# Patient Record
Sex: MALE | Race: BLACK OR AFRICAN AMERICAN | Employment: UNEMPLOYED | URBAN - METROPOLITAN AREA
[De-identification: names, ages, dates, MRNs, and addresses within clinical notes are randomized per-mention and may not be internally consistent; named-entity substitution may affect disease eponyms.]

---

## 2017-03-31 ENCOUNTER — OFFICE VISIT (OUTPATIENT)
Dept: ORTHOPEDIC SURGERY | Facility: CLINIC | Age: 47
End: 2017-03-31

## 2017-03-31 VITALS
DIASTOLIC BLOOD PRESSURE: 71 MMHG | BODY MASS INDEX: 21.19 KG/M2 | TEMPERATURE: 98.7 F | HEART RATE: 82 BPM | WEIGHT: 135 LBS | SYSTOLIC BLOOD PRESSURE: 117 MMHG | HEIGHT: 67 IN

## 2017-03-31 DIAGNOSIS — M75.81 ROTATOR CUFF TENDINITIS, RIGHT: ICD-10-CM

## 2017-03-31 DIAGNOSIS — Z98.890 S/P ARTHROSCOPY OF SHOULDER: ICD-10-CM

## 2017-03-31 DIAGNOSIS — M75.52 BURSITIS OF SHOULDER, LEFT: ICD-10-CM

## 2017-03-31 DIAGNOSIS — M87.051 AVASCULAR NECROSIS OF BONES OF BOTH HIPS (HCC): ICD-10-CM

## 2017-03-31 DIAGNOSIS — M87.052 AVASCULAR NECROSIS OF BONES OF BOTH HIPS (HCC): ICD-10-CM

## 2017-03-31 DIAGNOSIS — M17.12 PRIMARY OSTEOARTHRITIS OF LEFT KNEE: Primary | ICD-10-CM

## 2017-03-31 DIAGNOSIS — M87.9 BONE INFARCT (HCC): ICD-10-CM

## 2017-03-31 DIAGNOSIS — Z87.39 HX OF GOUT: ICD-10-CM

## 2017-03-31 DIAGNOSIS — M25.562 LEFT KNEE PAIN, UNSPECIFIED CHRONICITY: ICD-10-CM

## 2017-03-31 DIAGNOSIS — M22.42 CHONDROMALACIA, PATELLA, LEFT: ICD-10-CM

## 2017-03-31 RX ORDER — FOLIC ACID 1 MG/1
TABLET ORAL
COMMUNITY
Start: 2014-10-17 | End: 2018-01-16

## 2017-03-31 RX ORDER — LORATADINE 10 MG/1
TABLET ORAL
COMMUNITY
End: 2018-01-16

## 2017-03-31 NOTE — MR AVS SNAPSHOT
Visit Information Date & Time Provider Department Dept. Phone Encounter #  
 3/31/2017  2:00 PM Norman Wiseman MD [facility] [id number] Follow-up Instructions Return if symptoms worsen or fail to improve. Upcoming Health Maintenance Date Due DTaP/Tdap/Td series (1 - Tdap) 5/23/1991 INFLUENZA AGE 9 TO ADULT 8/1/2016 Allergies as of 3/31/2017  Review Complete On: 3/31/2017 By: Wash Rolls No Known Allergies Current Immunizations  Never Reviewed No immunizations on file. Not reviewed this visit You Were Diagnosed With   
  
 Codes Comments Primary osteoarthritis of left knee    -  Primary ICD-10-CM: M17.12 
ICD-9-CM: 715.16 Mild, medial compartment Left knee pain, unspecified chronicity     ICD-10-CM: J69.457 ICD-9-CM: 719.46 Bursitis of shoulder, left     ICD-10-CM: M75.52 
ICD-9-CM: 726.10 Chondromalacia, patella, left     ICD-10-CM: M22.42 
ICD-9-CM: 717.7 S/P arthroscopy of shoulder     ICD-10-CM: P08.473 ICD-9-CM: V45.89 Right Avascular necrosis of bones of both hips (HCC)     ICD-10-CM: M87.051, M87.052 ICD-9-CM: 733.42 Hx of gout     ICD-10-CM: Z87.39 
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)     ICD-10-CM: M87.9 ICD-9-CM: 733.40 Right humerus, proximal  
  
Vitals BP Pulse Temp Height(growth percentile) Weight(growth percentile) BMI  
 117/71 82 98.7 °F (37.1 °C) 5' 7\" (1.702 m) 135 lb (61.2 kg) 21.14 kg/m2 Smoking Status Never Smoker Vitals History BMI and BSA Data Body Mass Index Body Surface Area  
 21.14 kg/m 2 1.7 m 2 Your Updated Medication List  
  
   
This list is accurate as of: 3/31/17  2:26 PM.  Always use your most recent med list.  
  
  
  
  
 ALAVERT 10 mg tablet Generic drug:  loratadine Alavert TABS  Refills: 0  Active FLECTOR 1.3 % Pt12 Generic drug:  diclofenac  
by Apply Externally route. fluticasone 50 mcg/actuation nasal spray Commonly known as:  FLONASE  
by Nasal route nightly. Administer to {[facility] RX NASAL each/right/left nostril:12865} nostril. folic acid 1 mg tablet Commonly known as:  Waldo Folic Acid 1 MG Oral Tablet TAKE 1 TABLET DAILY. Quantity: 30;  Refills: 5    ANAHI STORM D.O.;  Started 17-Oct-2014 Active FOLIC ACID-VIT [id number] PO Take  by mouth. HYDROcodone-acetaminophen 5-500 mg Cap Take  by mouth. hydroxyurea 500 mg capsule Commonly known as:  HYDREA Take 500 mg by mouth daily. ibuprofen 800 mg tablet Commonly known as:  MOTRIN Take  by mouth every six (6) hours as needed. ULTRAM 50 mg tablet Generic drug:  traMADol Take 50 mg by mouth every six (6) hours as needed. We Performed the Following AMB POC X-RAY KNEE 3 VIEW [57939 CPT(R)] Follow-up Instructions Return if symptoms worsen or fail to improve. Patient Instructions Knee Arthritis: Exercises Your Care Instructions Here are some examples of exercises for knee arthritis. Start each exercise slowly. Ease off the exercise if you start to have pain. Your doctor or physical therapist will tell you when you can start these exercises and which ones will work best for you. How to do the exercises Knee flexion with heel slide 1. Lie on your back with your knees bent. 2. Slide your heel back by bending your affected knee as far as you can. Then hook your other foot around your ankle to help pull your heel even farther back. 3. Hold for about 6 seconds, then rest for up to 10 seconds. 4. Repeat 8 to 12 times. 5. Switch legs and repeat steps 1 through 4, even if only one knee is sore. [employer] 1.  Sit with your affected leg straight and supported on the floor or a firm bed. Place a small, rolled-up towel under your knee. Your other leg should be bent, with that foot flat on the floor. 2. Tighten the thigh muscles of your affected leg by pressing the back of your knee down into the towel. 3. Hold for about 6 seconds, then rest for up to 10 seconds. 4. Repeat 8 to 12 times. 5. Switch legs and repeat steps 1 through 4, even if only one knee is sore. Straight-leg raises to the front 1. Lie on your back with your good knee bent so that your foot rests flat on the floor. Your affected leg should be straight. Make sure that your low back has a normal curve. You should be able to slip your hand in between the floor and the small of your back, with your palm touching the floor and your back touching the back of your hand. 2. Tighten the thigh muscles in your affected leg by pressing the back of your knee flat down to the floor. Hold your knee straight. 3. Keeping the thigh muscles tight and your leg straight, lift your affected leg up so that your heel is about 12 inches off the floor. Hold for about 6 seconds, then lower slowly. 4. Relax for up to 10 seconds between repetitions. 5. Repeat 8 to 12 times. 6. Switch legs and repeat steps 1 through 5, even if only one knee is sore. Active knee flexion 1. Lie on your stomach with your knees straight. If your kneecap is uncomfortable, roll up a washcloth and put it under your leg just above your kneecap. 2. Lift the foot of your affected leg by bending the knee so that you bring the foot up toward your buttock. If this motion hurts, try it without bending your knee quite as far. This may help you avoid any painful motion. 3. Slowly move your leg up and down. 4. Repeat 8 to 12 times. 5. Switch legs and repeat steps 1 through 4, even if only one knee is sore. Quadriceps stretch (facedown) 1. Lie flat on your stomach, and rest your face on the floor. 2. Wrap a towel or belt strap around the lower part of your affected leg. Then use the towel or belt strap to slowly pull your heel toward your buttock until you feel a stretch. 3. Hold for about 15 to 30 seconds, then relax your leg against the towel or belt strap. 4. Repeat 2 to 4 times. 5. Switch legs and repeat steps 1 through 4, even if only one knee is sore. Stationary exercise bike If you do not have a stationary exercise bike at home, you can find one to ride at your local health club or community center. 1. Adjust the height of the bike seat so that your knee is slightly bent when your leg is extended downward. If your knee hurts when the pedal reaches the top, you can raise the seat so that your knee does not bend as much. 2. Start slowly. At first, try to do 5 to 10 minutes of cycling with little to no resistance. Then increase your time and the resistance bit by bit until you can do 20 to 30 minutes without pain. 3. If you start to have pain, rest your knee until your pain gets back to the level that is normal for you. Or cycle for less time or with less effort. Follow-up care is a key part of your treatment and safety. Be sure to make and go to all appointments, and call your doctor if you are having problems. It's also a good idea to know your test results and keep a list of the medicines you take. Where can you learn more? Go to http://davy-earl.info/. Enter C159 in the search box to learn more about \"Knee Arthritis: Exercises. \" Current as of: May 23, 2016 Content Version: 11.2 © 6211-7306 [employer], [employer]. Care instructions adapted under license by [employer] (which disclaims liability or warranty for this information). If you have questions about a medical condition or this instruction, always ask your healthcare professional. [address] any warranty or liability for your use of this information. Patellofemoral Pain Syndrome (Runner's Knee): Exercises Your Care Instructions Here are some examples of typical rehabilitation exercises for your condition. Start each exercise slowly. Ease off the exercise if you start to have pain. Your doctor or physical therapist will tell you when you can start these exercises and which ones will work best for you. How to do the exercises Calf wall stretch 6. Stand facing a wall with your hands on the wall at about eye level. Put your affected leg about a step behind your other leg. 7. Keeping your back leg straight and your back heel on the floor, bend your front knee and gently bring your hip and chest toward the wall until you feel a stretch in the calf of your back leg. 8. Hold the stretch for at least 15 to 30 seconds. 9. Repeat 2 to 4 times. 10. Repeat steps 1 through 4, but this time keep your back knee bent. Quadriceps stretch 6. If you are not steady on your feet, hold on to a chair, counter, or wall. 7. Bend your affected leg, and reach behind you to grab the front of your foot or ankle with the hand on the same side. For example, if you are stretching your right leg, use your right hand. 8. Keeping your knees next to each other, pull your foot toward your buttock until you feel a gentle stretch across the front of your hip and down the front of your thigh. Your knee should be pointed directly to the ground, and not out to the side. 9. Hold the stretch for at least 15 to 30 seconds. 10. Repeat 2 to 4 times. Hamstring wall stretch 1. Lie on your back in a doorway, with your good leg through the open door. 2. Slide your affected leg up the wall to straighten your knee. You should feel a gentle stretch down the back of your leg. ¨ Do not arch your back. ¨ Do not bend either knee. ¨ Keep one heel touching the floor and the other heel touching the wall. Do not point your toes. 3. Hold the stretch for at least 1 minute. Then over time, try to lengthen the time you hold the stretch to as long as 6 minutes. 4. Repeat 2 to 4 times. If you do not have a place to do this exercise in a doorway, there is another way to do it: 6. Lie on your back, and bend your affected leg. 7. Loop a towel under the ball and toes of that foot, and hold the ends of the towel in your hands. 8. Straighten your knee, and slowly pull back on the towel. You should feel a gentle stretch down the back of your leg. 9. Hold the stretch for at least 15 to 30 seconds. Or even better, hold the stretch for 1 minute if you can. 10. Repeat 2 to 4 times. [facility] 6. Sit with your affected leg straight and supported on the floor or a firm bed. Place a small, rolled-up towel under your affected knee. Your other leg should be bent, with that foot flat on the floor. 7. Tighten the thigh muscles of your affected leg by pressing the back of your knee down into the towel. 8. Hold for about 6 seconds, then rest for up to 10 seconds. 9. Repeat 8 to 12 times. Straight-leg raises to the front 4. Lie on your back with your good knee bent so that your foot rests flat on the floor. Your affected leg should be straight. Make sure that your low back has a normal curve. You should be able to slip your hand in between the floor and the small of your back, with your palm touching the floor and your back touching the back of your hand. 5. Tighten the thigh muscles in your affected leg by pressing the back of your knee flat down to the floor. Hold your knee straight. 6. Keeping the thigh muscles tight and your leg straight, lift your affected leg up so that your heel is about 12 inches off the floor. 7. Hold for about 6 seconds, then lower your leg slowly. Rest for up to 10 seconds between repetitions. 8. Repeat 8 to 12 times. Straight-leg raises to the back 1. Lie on your stomach, and lift your leg straight up behind you (toward the ceiling). 2. Lift your toes about 6 inches off the floor, hold for about 6 seconds, then lower slowly. 3. Do 8 to 12 repetitions. Wall slide with ball squeeze 1. Stand with your back against a wall and with your feet about shoulder-width apart. Your feet should be about 12 inches away from the wall. 2. Put a ball about the size of a soccer ball between your knees. Then slowly slide down the wall until your knees are bent about 20 to 30 degrees. 3. Tighten your thigh muscles by squeezing the ball between your knees. Hold that position for about 10 seconds, then stop squeezing. Rest for up to 10 seconds between repetitions. 4. Repeat 8 to 12 times. Follow-up care is a key part of your treatment and safety. Be sure to make and go to all appointments, and call your doctor if you are having problems. It's also a good idea to know your test results and keep a list of the medicines you take. Where can you learn more? Go to http://davy-earl.info/. Enter A404 in the search box to learn more about \"Patellofemoral Pain Syndrome (Runner's Knee): Exercises. \" Current as of: May 23, 2016 Content Version: 11.2 © 4476-0259 [employer]. Care instructions adapted under license by [employer] (which disclaims liability or warranty for this information). If you have questions about a medical condition or this instruction, always ask your healthcare professional. [place] [address] any warranty or liability for your use of this information. Introducing [facility]! Maddie Urban introduces [employer] patient portal. Now you can access parts of your medical record, email your doctor's office, and request medication refills online. 1. In your internet browser, go to [email or web address] 2. Click on the First Time User? Click Here link in the Sign In box.  You will see the New Member Sign Up page. 3. Enter your [facility] Access Code exactly as it appears below. You will not need to use this code after youve completed the sign-up process. If you do not sign up before the expiration date, you must request a new code. · [facility] Access Code: [email or web address] Expires: 6/29/2017  1:37 PM 
 
4. Enter the last four digits of your Social Security Number (xxxx) and Date of Birth (mm/dd/yyyy) as indicated and click Submit. You will be taken to the next sign-up page. 5. Create a [facility] ID. This will be your [facility] login ID and cannot be changed, so think of one that is secure and easy to remember. 6. Create a [facility] password. You can change your password at any time. 7. Enter your Password Reset Question and Answer. This can be used at a later time if you forget your password. 8. Enter your e-mail address. You will receive e-mail notification when new information is available in [address]. 9. Click Sign Up. You can now view and download portions of your medical record. 10. Click the Download Summary menu link to download a portable copy of your medical information. If you have questions, please visit the Frequently Asked Questions section of the [facility] website. Remember, [facility] is NOT to be used for urgent needs. For medical emergencies, dial 911. Now available from your iPhone and Android! Please provide this summary of care documentation to your next provider. Your primary care clinician is listed as Manuelito De. If you have any questions after today's visit, please call [phone].

## 2017-03-31 NOTE — PATIENT INSTRUCTIONS
Knee Arthritis: Exercises  Your Care Instructions  Here are some examples of exercises for knee arthritis. Start each exercise slowly. Ease off the exercise if you start to have pain. Your doctor or physical therapist will tell you when you can start these exercises and which ones will work best for you. How to do the exercises  Knee flexion with heel slide    1. Lie on your back with your knees bent. 2. Slide your heel back by bending your affected knee as far as you can. Then hook your other foot around your ankle to help pull your heel even farther back. 3. Hold for about 6 seconds, then rest for up to 10 seconds. 4. Repeat 8 to 12 times. 5. Switch legs and repeat steps 1 through 4, even if only one knee is sore. Quad sets    1. Sit with your affected leg straight and supported on the floor or a firm bed. Place a small, rolled-up towel under your knee. Your other leg should be bent, with that foot flat on the floor. 2. Tighten the thigh muscles of your affected leg by pressing the back of your knee down into the towel. 3. Hold for about 6 seconds, then rest for up to 10 seconds. 4. Repeat 8 to 12 times. 5. Switch legs and repeat steps 1 through 4, even if only one knee is sore. Straight-leg raises to the front    1. Lie on your back with your good knee bent so that your foot rests flat on the floor. Your affected leg should be straight. Make sure that your low back has a normal curve. You should be able to slip your hand in between the floor and the small of your back, with your palm touching the floor and your back touching the back of your hand. 2. Tighten the thigh muscles in your affected leg by pressing the back of your knee flat down to the floor. Hold your knee straight. 3. Keeping the thigh muscles tight and your leg straight, lift your affected leg up so that your heel is about 12 inches off the floor. Hold for about 6 seconds, then lower slowly.   4. Relax for up to 10 seconds between repetitions. 5. Repeat 8 to 12 times. 6. Switch legs and repeat steps 1 through 5, even if only one knee is sore. Active knee flexion    1. Lie on your stomach with your knees straight. If your kneecap is uncomfortable, roll up a washcloth and put it under your leg just above your kneecap. 2. Lift the foot of your affected leg by bending the knee so that you bring the foot up toward your buttock. If this motion hurts, try it without bending your knee quite as far. This may help you avoid any painful motion. 3. Slowly move your leg up and down. 4. Repeat 8 to 12 times. 5. Switch legs and repeat steps 1 through 4, even if only one knee is sore. Quadriceps stretch (facedown)    1. Lie flat on your stomach, and rest your face on the floor. 2. Wrap a towel or belt strap around the lower part of your affected leg. Then use the towel or belt strap to slowly pull your heel toward your buttock until you feel a stretch. 3. Hold for about 15 to 30 seconds, then relax your leg against the towel or belt strap. 4. Repeat 2 to 4 times. 5. Switch legs and repeat steps 1 through 4, even if only one knee is sore. Stationary exercise bike    If you do not have a stationary exercise bike at home, you can find one to ride at your local health club or community center. 1. Adjust the height of the bike seat so that your knee is slightly bent when your leg is extended downward. If your knee hurts when the pedal reaches the top, you can raise the seat so that your knee does not bend as much. 2. Start slowly. At first, try to do 5 to 10 minutes of cycling with little to no resistance. Then increase your time and the resistance bit by bit until you can do 20 to 30 minutes without pain. 3. If you start to have pain, rest your knee until your pain gets back to the level that is normal for you. Or cycle for less time or with less effort. Follow-up care is a key part of your treatment and safety.  Be sure to make and go to all appointments, and call your doctor if you are having problems. It's also a good idea to know your test results and keep a list of the medicines you take. Where can you learn more? Go to http://davy-earl.info/. Enter C159 in the search box to learn more about \"Knee Arthritis: Exercises. \"  Current as of: May 23, 2016  Content Version: 11.2  © 2006-2017 [employer]. Care instructions adapted under license by [employer] (which disclaims liability or warranty for this information). If you have questions about a medical condition or this instruction, always ask your healthcare professional. [place] [address] any warranty or liability for your use of this information. Patellofemoral Pain Syndrome (Runner's Knee): Exercises  Your Care Instructions  Here are some examples of typical rehabilitation exercises for your condition. Start each exercise slowly. Ease off the exercise if you start to have pain. Your doctor or physical therapist will tell you when you can start these exercises and which ones will work best for you. How to do the exercises  Calf wall stretch    6. Stand facing a wall with your hands on the wall at about eye level. Put your affected leg about a step behind your other leg. 7. Keeping your back leg straight and your back heel on the floor, bend your front knee and gently bring your hip and chest toward the wall until you feel a stretch in the calf of your back leg. 8. Hold the stretch for at least 15 to 30 seconds. 9. Repeat 2 to 4 times. 10. Repeat steps 1 through 4, but this time keep your back knee bent. Quadriceps stretch    6. If you are not steady on your feet, hold on to a chair, counter, or wall. 7. Bend your affected leg, and reach behind you to grab the front of your foot or ankle with the hand on the same side. For example, if you are stretching your right leg, use your right hand.   8. Keeping your knees next to each other, pull your foot toward your buttock until you feel a gentle stretch across the front of your hip and down the front of your thigh. Your knee should be pointed directly to the ground, and not out to the side. 9. Hold the stretch for at least 15 to 30 seconds. 10. Repeat 2 to 4 times. Hamstring wall stretch    1. Lie on your back in a doorway, with your good leg through the open door. 2. Slide your affected leg up the wall to straighten your knee. You should feel a gentle stretch down the back of your leg. ¨ Do not arch your back. ¨ Do not bend either knee. ¨ Keep one heel touching the floor and the other heel touching the wall. Do not point your toes. 3. Hold the stretch for at least 1 minute. Then over time, try to lengthen the time you hold the stretch to as long as 6 minutes. 4. Repeat 2 to 4 times. If you do not have a place to do this exercise in a doorway, there is another way to do it:  6. Lie on your back, and bend your affected leg. 7. Loop a towel under the ball and toes of that foot, and hold the ends of the towel in your hands. 8. Straighten your knee, and slowly pull back on the towel. You should feel a gentle stretch down the back of your leg. 9. Hold the stretch for at least 15 to 30 seconds. Or even better, hold the stretch for 1 minute if you can. 10. Repeat 2 to 4 times. Quad sets    6. Sit with your affected leg straight and supported on the floor or a firm bed. Place a small, rolled-up towel under your affected knee. Your other leg should be bent, with that foot flat on the floor. 7. Tighten the thigh muscles of your affected leg by pressing the back of your knee down into the towel. 8. Hold for about 6 seconds, then rest for up to 10 seconds. 9. Repeat 8 to 12 times. Straight-leg raises to the front    4. Lie on your back with your good knee bent so that your foot rests flat on the floor. Your affected leg should be straight.  Make sure that your low back has a normal curve. You should be able to slip your hand in between the floor and the small of your back, with your palm touching the floor and your back touching the back of your hand. 5. Tighten the thigh muscles in your affected leg by pressing the back of your knee flat down to the floor. Hold your knee straight. 6. Keeping the thigh muscles tight and your leg straight, lift your affected leg up so that your heel is about 12 inches off the floor. 7. Hold for about 6 seconds, then lower your leg slowly. Rest for up to 10 seconds between repetitions. 8. Repeat 8 to 12 times. Straight-leg raises to the back    1. Lie on your stomach, and lift your leg straight up behind you (toward the ceiling). 2. Lift your toes about 6 inches off the floor, hold for about 6 seconds, then lower slowly. 3. Do 8 to 12 repetitions. Wall slide with ball squeeze    1. Stand with your back against a wall and with your feet about shoulder-width apart. Your feet should be about 12 inches away from the wall. 2. Put a ball about the size of a soccer ball between your knees. Then slowly slide down the wall until your knees are bent about 20 to 30 degrees. 3. Tighten your thigh muscles by squeezing the ball between your knees. Hold that position for about 10 seconds, then stop squeezing. Rest for up to 10 seconds between repetitions. 4. Repeat 8 to 12 times. Follow-up care is a key part of your treatment and safety. Be sure to make and go to all appointments, and call your doctor if you are having problems. It's also a good idea to know your test results and keep a list of the medicines you take. Where can you learn more? Go to http://davy-earl.info/. Enter A404 in the search box to learn more about \"Patellofemoral Pain Syndrome (Runner's Knee): Exercises. \"  Current as of: May 23, 2016  Content Version: 11.2  © 6124-2153 [employer].  Care instructions adapted under license by [employer] (which disclaims liability or warranty for this information). If you have questions about a medical condition or this instruction, always ask your healthcare professional. [address] any warranty or liability for your use of this information.

## 2017-03-31 NOTE — PROGRESS NOTES
Patient: Connie Damon                MRN: [id number]       SSN: [id number]  YOB: 1970        AGE: 55 y.o. SEX: male    PCP: Regulo Zamora MD  03/31/17    Chief Complaint   Patient presents with    Knee Pain     left nx     HISTORY:  Connie Damon is a 55 y.o. male who is seen for left knee pain. He reports increased knee pains with descending stairs. He denies any previous knee injury or trauma. He notes pain with standing and walking. He was previously seen for right shoulder pain in June of 2015. He notes shoulder pain with overhead activities. He is s/p right shoulder arthroscopy by Dr. Quyen Malagon in 2006. He denies a h/o right shoulder injury or trauma. He had temporary response to previous shoulder cortisone injections. He has a h/o AVN in his hips and gout. Pain Assessment  3/31/2017   Location of Pain Knee   Location Modifiers Left   Severity of Pain 8   Quality of Pain Sharp; Aching   Duration of Pain Persistent     Occupation, etc:  Mr. Jim Ortiz receives social security disability benefits for sickle cell anemia. He states that he was hospitalized for 2 days for sickle cell crisis in January of 2017. He does not exercise regularly. He has been researching shoulder support garments online. He is right-handed. He reports that his weight is stable. Current weight is 135 pounds. He is 5'7\" tall. He is not diabetic. He stays busy going to the park and doing push-ups, pull-ups, and other home exercises. He lives with his parents and brother in [place]. Weight Metrics 3/31/2017 12/3/2010   Weight 135 lb 124 lb   BMI 21.14 kg/m2 19.42 kg/m2     Patient Active Problem List   Diagnosis Code    Bursitis of shoulder, left M75.52    DJD (degenerative joint disease) M19.90     REVIEW OF SYSTEMS: All Below are Negative except: See HPI   Constitutional: negative for fever, chills, and weight loss.    Cardiovascular: negative for chest pain, claudication, leg swelling, SOB, LEAVITT   Gastrointestinal: Negative for pain, N/V/C/D, Blood in stool or urine, dysuria,  hematuria, incontinence, pelvic pain. Musculoskeletal: See HPI   Neurological: Negative for dizziness and weakness. Negative for headaches, Visual changes, confusion, seizures   Phychiatric/Behavioral: Negative for depression, memory loss, substance  abuse. Extremities: Negative for hair changes, rash, or skin lesion changes. Hematologic: Negative for bleeding problems, bruising, pallor or swollen lymph  nodes   Peripheral Vascular: No calf pain, no circulation deficits. Social History     Social History    Marital status: SINGLE     Spouse name: N/A    Number of children: N/A    Years of education: N/A     Occupational History    Not on file.      Social History Main Topics    Smoking status: Never Smoker    Smokeless tobacco: Not on file    Alcohol use No    Drug use: No    Sexual activity: Not on file     Other Topics Concern    Not on file     Social History Narrative      No Known Allergies      PHYSICAL EXAMINATION:  Visit Vitals    /71    Pulse 82    Temp 98.7 °F (37.1 °C)    Ht 5' 7\" (1.702 m)    Wt 135 lb (61.2 kg)    BMI 21.14 kg/m2      ORTHO EXAMINATION:  Examination Right shoulder Left shoulder   Skin Intact Intact   Effusion - -   Biceps deformity - -   Atrophy - -   AC joint tenderness - -   Acromial tenderness + +   Biceps tenderness - -   Forward flexion/Elevation  170   Active abduction  160   External rotation ROM 30 30   Internal rotation ROM 70 70   Apprehension - -   Impingement - -   Drop Arm Test - -   Neurovascular Intact Intact     Examination Right knee Left knee   Skin Intact Intact   Range of motion 120-0 135-0   Effusion - -   Medial joint line tenderness + +, anterior   Lateral joint line tenderness - -   Popliteal tenderness - -   Osteophytes palpable - -   Gamas - -   Patella crepitus - +   Anterior drawer - -   Lateral laxity - - Medial laxity - -   Varus deformity - -   Valgus deformity - -   Pretibial edema - -   Calf tenderness - -     RADIOGRAPHS:  XR LEFT KNEE 3/31/17  IMPRESSION:  No fractures, no effusion, mild medial joint space narrowing, small lateral osteophytes present. IMPRESSION:      ICD-10-CM ICD-9-CM    1. Primary osteoarthritis of left knee M17.12 715.16 REFERRAL TO PHYSICAL THERAPY    Mild, medial compartment   2. Left knee pain, unspecified chronicity M25.562 719.46 AMB POC X-RAY KNEE 3 VIEW      REFERRAL TO PHYSICAL THERAPY   3. Bursitis of shoulder, left M75.52 726.10    4. Chondromalacia, patella, left M22.42 717.7 REFERRAL TO PHYSICAL THERAPY   5. S/P arthroscopy of shoulder Z98.890 V45.89     Right   6. Avascular necrosis of bones of both hips (HCC) M87.051 733.42     M87.052     7. Hx of gout Z87.39 V12.29    8. Rotator cuff tendinitis, right M75.81 726.10    9. 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 He will start a brief course of outpatient physical therapy to his left knee. We discussed a possible left knee MRI in the future if pain continues.       Scribed by [employer] ([address]) as dictated by Felipe Murrell MD

## 2017-04-04 ENCOUNTER — APPOINTMENT (OUTPATIENT)
Dept: PHYSICAL THERAPY | Age: 47
End: 2017-04-04

## 2017-04-17 ENCOUNTER — HOSPITAL ENCOUNTER (OUTPATIENT)
Dept: PHYSICAL THERAPY | Age: 47
Discharge: HOME | End: 2017-04-17
Payer: MEDICAID

## 2017-04-17 PROCEDURE — 97110 THERAPEUTIC EXERCISES: CPT

## 2017-04-17 PROCEDURE — 97161 PT EVAL LOW COMPLEX 20 MIN: CPT

## 2017-04-17 NOTE — PROGRESS NOTES
[facility] PHYSICAL THERAPY AT [facility] [place]    [address] [facility] [address] , [place]  Phone: [phone] Fax: [phone] / [address] [place] [address] PHYSICAL THERAPY SERVICES  Patient Name: Jewel Shelton : 1970   Medical   Diagnosis: Acute pain of left knee [M25.562] Treatment Diagnosis: L > R knee pain, chondromalacia patella   Onset Date: Nov/Dec 2016     Referral Source: Lowry Rinne, MD Start of Care [facility]): 2017   Prior Hospitalization: See medical history Provider #: [id number]   Prior Level of Function: WNL   Comorbidities: Sickle cell anemia; AVN (B) hips   Medications: Verified on Patient Summary List   The Plan of Care and following information is based on the information from the initial evaluation.   ========================================================================  Assessment / key information: Pt is a 56 yo male with insidious onset L knee pain Nov/Dec 2016. Pain with descending stairs . Previous rx has included the following: ice, meds, braces. He presents with pain ranging from 0-9/10, located patella L > R.  Pain is made worse with descending stairs, better with avoiding stairs. Functional Limitations include descending stairs. Social: full flight of stairs (5 + 10) inside, 5 stairs to enter home. Palpation reveals TTP L patellar tendon, medial joint line. Patellar tracking is normal although with crepitus during LAQ. Gait: WFL. Knee AROM 5 to 0 to 148 (B), no pain. Ankle AROM DF: 5 (B). Limited PROM hip flexion with posterior chain tightness. LE MMT: hip flex 4/5 (B), abd L 3+/4, R 5/5, add 4/5, ext R 4/5, L 5/5, knee flex 3+/5 (B), ext L 4+/5, R 5/5, ankle 4/5. Patellar mobility is 100% all planes, pain free. HS 90/90 30 (B), [place] + , Jose Elias R hip flexor and ITB, L ITB. SLS EO 10\" EO with increased sway, hip drop and poor ankle strategy.   Pt will benefit from PT interventions to address the aforementioned deficits and allow pt to return to PLOF.    ========================================================================  Eval Complexity: History: MEDIUM  Complexity : 1-2 comorbidities / personal factors will impact the outcome/ POC Exam:HIGH Complexity : 4+ Standardized tests and measures addressing body structure, function, activity limitation and / or participation in recreation  Presentation: LOW Complexity : Stable, uncomplicated  Clinical Decision Making:MEDIUM Complexity : FOTO score of 26-74Overall Complexity:LOW   Problem List: pain affecting function, decrease ROM, decrease strength, impaired gait/ balance, decrease ADL/ functional abilitiies, decrease activity tolerance, decrease flexibility/ joint mobility and decrease transfer abilities   Treatment Plan may include any combination of the following: Therapeutic exercise, Therapeutic activities, Neuromuscular re-education, Physical agent/modality, Gait/balance training, Manual therapy, Patient education, Self Care training, Functional mobility training, Home safety training and Stair training  Patient / Family readiness to learn indicated by: asking questions, trying to perform skills and interest  Persons(s) to be included in education: patient (P)  Barriers to Learning/Limitations: None  Measures taken: FOTO 46/100   Patient Goal (s): \"Get better, decrease pain when I come down stairs\"   Patient self reported health status: good  Rehabilitation Potential: good   Short Term Goals: To be accomplished in  2  treatments:  1. Pt will be independent and compliant with HEP to decrease pain, increase ROM and return pt to PLOF.  Long Term Goals: To be accomplished in  8-12  treatments:  1. Pt will have an increase in FOTO to > or = 64/100 to demo an increase in functional activity tolerance  2. Pt will have an increase in (B) hip and knee MMT to > or = 4+/5 all planes to improve ecccentric control for stair descent  3.  Pt will have an increase in SLS with EO to > or = 7\" with normal sway and no hip drop to improve control for stairs, curbs. 4. Pt will be able to descend a full flight of stairs in the clinic with pain no > than 3/10 to improve prognosis and in-home mobility   Frequency / Duration:   Patient to be seen  2  times per week for 8-12  treatments:  Patient / Caregiver education and instruction: self care, activity modification and exercises  G-Codes (GP): siena  Therapist Signature: Janene Pierson DPT Date: 3/30/3653   Certification Period: na Time: 10:24 AM   ========================================================================  I certify that the above Physical Therapy Services are being furnished while the patient is under my care. I agree with the treatment plan and certify that this therapy is necessary. Physician Signature:        Date:       Time:   Please sign and return to [facility] or you may fax the signed copy to [phone]. Thank you.

## 2017-04-17 NOTE — PROGRESS NOTES
PT DAILY TREATMENT NOTE     Patient Name: Jennifer Dale  Date:2017  : 1970  [x]  Patient  Verified  Payor: [address] / Plan: [address] [facility] / Product Type: Managed Care Medicaid /    In time:10:22  Out time:11:00  Total Treatment Time (min): 38  Total Timed Codes (min): 10  1:1 Treatment Time (min):    Visit #: 1 of     Treatment Area: Acute pain of left knee [M25.562]    SUBJECTIVE  Pain Level (0-10 scale): 1  Any medication changes, allergies to medications, adverse drug reactions, diagnosis change, or new procedure performed?: [x] No    [] Yes (see summary sheet for update)  Subjective functional status/changes:   [] No changes reported  SEE IE for Subjective Information    OBJECTIVE      10 min Therapeutic Exercise:  [x] See flow sheet :   Rationale: increase strength and improve coordination to improve the patients ability to iprove descending stairs           x min Patient Education: [x] Review HEP    [] Progressed/Changed HEP based on:   [] positioning   [] body mechanics   [] transfers   [x] heat/ice application        Other Objective/Functional Measures:   See IE    Pain Level (0-10 scale) post treatment: 0    ASSESSMENT/Changes in Function: initiate POC. See IE. Fatigued with initiation of TE. Patient will continue to benefit from skilled PT services to modify and progress therapeutic interventions, address functional mobility deficits, address ROM deficits, address strength deficits, analyze and address soft tissue restrictions, analyze and cue movement patterns, analyze and modify body mechanics/ergonomics, assess and modify postural abnormalities, address imbalance/dizziness and instruct in home and community integration to attain remaining goals.      [x]  See Plan of Care  []  See progress note/recertification  []  See Discharge Summary         Progress towards goals / Updated goals:  SEE IE FOR GOALS     PLAN  []  Upgrade activities as tolerated []  Continue plan of care  []  Update interventions per flow sheet       []  Discharge due to:_  [x]  Other:Initiate POC as stated in the IE      Justification for Eval Code Complexity:  Patient History : (B) hip avn, sickle cell anemia  Examination decreased flexibility ,decreased strength, impaired balance, impaired functional negotiation of stairs   Clinical Presentation: stable  Clinical Decision Making : CORIE Ca 4/17/2017  10:24 AM

## 2017-04-19 ENCOUNTER — HOSPITAL ENCOUNTER (OUTPATIENT)
Dept: PHYSICAL THERAPY | Age: 47
Discharge: HOME | End: 2017-04-19
Payer: MEDICAID

## 2017-04-19 PROCEDURE — 97110 THERAPEUTIC EXERCISES: CPT

## 2017-04-19 NOTE — PROGRESS NOTES
PT DAILY TREATMENT NOTE     Patient Name: Romina Orozco  Date:2017  : 1970  [x]  Patient  Verified  Payor: [address] / Plan: [address] [facility] / Product Type: Managed Care Medicaid /    In time: 5:00pm     Out time: 5:48pm  Total Treatment Time (min): 48  Visit #: 2 of     Treatment Area: Acute pain of left knee [M25.562]    SUBJECTIVE  Pain Level (0-10 scale): 0  Any medication changes, allergies to medications, adverse drug reactions, diagnosis change, or new procedure performed?: [x] No    [] Yes (see summary sheet for update)  Subjective functional status/changes:   [] No changes reported  \"I have been doing the exercises at home. \"    OBJECTIVE  Modality rationale: NT   Min Type Additional Details    [] Estim: []Att   []Unatt        []TENS instruct                  []IFC  []Premod   []NMES                     []Other:  []w/US   []w/ice   []w/heat  Position:  Location:    []  Traction: [] Cervical       []Lumbar                       [] Prone          []Supine                       []Intermittent   []Continuous Lbs:  [] before manual  [] after manual    []  Ultrasound: []Continuous   [] Pulsed                           []1MHz   []3MHz Location:  W/cm2:    []  Iontophoresis with dexamethasone         Location: [] Take home patch   [] In clinic    []  Ice     []  heat  []  Ice massage Position:  Location:    []  Vasopneumatic Device Pressure:       [] lo [] med [] hi   Temperature: [] lo [] med [] hi   [] Skin assessment post-treatment:  []intact []redness- no adverse reaction       []redness - adverse reaction:       48 min Therapeutic Exercise:  [x] See flow sheet: initiated therex per IE   Rationale: increase ROM, increase strength and improve coordination to improve the patients ability to negotiate stairs pain-free          X min Patient Education: [x] Review HEP from IE      Other Objective/Functional Measures:     Pain Level (0-10 scale) post treatment: 0    ASSESSMENT/Changes in Function:  HS strength deficits noted as patient demo knee hyperextension with SLR; cueing to decrease intensity of QS with proper carryover. Significant glut med strength deficits. Normal hip sway with EO SR with good stability. Poor balance without visual input as patient unable to maintain SR EC >5 seconds without LOB req UE support on // bars, making patient a fall risk for amb at night. No pain reported with leg press at 90 degree squat upon cueing for proper alignment of the knee to foot; will assess mechanics with stair negotiation NV. Patient will continue to benefit from skilled PT services to modify and progress therapeutic interventions, address functional mobility deficits, address ROM deficits, address strength deficits, analyze and address soft tissue restrictions, analyze and cue movement patterns, analyze and modify body mechanics/ergonomics, assess and modify postural abnormalities, address imbalance/dizziness and instruct in home and community integration to attain remaining goals. [x]  See Plan of Care  []  See progress note/recertification  []  See Discharge Summary         Progress towards goals / Updated goals: · Short Term Goals: To be accomplished in 2 treatments:  1. Pt will be independent and compliant with HEP to decrease pain, increase ROM and return pt to PLOF. · Long Term Goals: To be accomplished in 8-12 treatments:  1. Pt will have an increase in FOTO to > or = 64/100 to demo an increase in functional activity tolerance  2. Pt will have an increase in (B) hip and knee MMT to > or = 4+/5 all planes to improve ecccentric control for stair descent  3. Pt will have an increase in SLS with EO to > or = 7\" with normal sway and no hip drop to improve control for stairs, curbs. -SR 30 seconds EO bilaterally (4/19/17)  4.  Pt will be able to descend a full flight of stairs in the clinic with pain no > than 3/10 to improve prognosis and in-home mobility  -Good tolerance to SL leg press today (4/19/17)    PLAN  [x]  Upgrade activities as tolerated     [x]  Continue plan of care  []  Update interventions per flow sheet       []  Discharge due to:_  [x]  Other: please inc resistance with leg press; patient initially hesitant today but agreeable to inc resistance ANANT Muñiz, PTA 4/19/2017

## 2017-04-25 ENCOUNTER — HOSPITAL ENCOUNTER (OUTPATIENT)
Dept: PHYSICAL THERAPY | Age: 47
Discharge: HOME | End: 2017-04-25
Payer: MEDICAID

## 2017-04-25 PROCEDURE — 97110 THERAPEUTIC EXERCISES: CPT

## 2017-04-25 NOTE — PROGRESS NOTES
PT DAILY TREATMENT NOTE     Patient Name: Jermaine Holcomb  Date:2017  : 1970  [x]  Patient  Verified  Payor: [address] / Plan: [address] [facility] / Product Type: Managed Care Medicaid /    In time: 1:33pm     Out time: 2:33pm  Total Treatment Time (min): 60  Visit #: 3 of     Treatment Area: Acute pain of left knee [M25.562]    SUBJECTIVE  Pain Level (0-10 scale): 6 in (B) knees  Any medication changes, allergies to medications, adverse drug reactions, diagnosis change, or new procedure performed?: [x] No    [] Yes (see summary sheet for update)  Subjective functional status/changes:   [] No changes reported  \"It's always worse with bad weather. I still have pain when I go down the stairs. I think I slept wrong because my neck was bothering me. \"    OBJECTIVE  Modality rationale: NT   Min Type Additional Details    [] Estim: []Att   []Unatt        []TENS instruct                  []IFC  []Premod   []NMES                     []Other:  []w/US   []w/ice   []w/heat  Position:  Location:    []  Traction: [] Cervical       []Lumbar                       [] Prone          []Supine                       []Intermittent   []Continuous Lbs:  [] before manual  [] after manual    []  Ultrasound: []Continuous   [] Pulsed                           []1MHz   []3MHz Location:  W/cm2:    []  Iontophoresis with dexamethasone         Location: [] Take home patch   [] In clinic   10 [x]  Ice to (B) knees    [x]  Heat to C/S  []  Ice massage Position: semi-reclined  Location: see type    []  Vasopneumatic Device Pressure:       [] lo [] med [] hi   Temperature: [] lo [] med [] hi   [x] Skin assessment post-treatment:  [x]intact []redness- no adverse reaction       []redness - adverse reaction:       50 min Therapeutic Exercise:  [x] See flow sheet: added RDLs (DL) and progressed therex in reps or resistance   Rationale: increase ROM, increase strength and improve coordination to improve the patients ability to negotiate stairs pain-free          X min Patient Education: [x] Review HEP - added SLS at kitchen counter and prone quad stretch with belt     Other Objective/Functional Measures:    SLS EO: (L) 10 seconds, (R) 8 seconds     Pain Level (0-10 scale) post treatment: 0    ASSESSMENT/Changes in Function:  Good tolerance to progression of therex. Patient able to utilize HEP to abolish (B) knee symptoms; met LTG#3 today with normal sway and no substitutions at the hip. Patient will continue to benefit from skilled PT services to modify and progress therapeutic interventions, address functional mobility deficits, address ROM deficits, address strength deficits, analyze and address soft tissue restrictions, analyze and cue movement patterns, analyze and modify body mechanics/ergonomics, assess and modify postural abnormalities, address imbalance/dizziness and instruct in home and community integration to attain remaining goals. [x]  See Plan of Care  []  See progress note/recertification  []  See Discharge Summary         Progress towards goals / Updated goals:  Short Term Goals: To be accomplished in 2 treatments:  1. Pt will be independent and compliant with HEP to decrease pain, increase ROM and return pt to PLOF. -Goal met; patient reporting compliance (4/25/17)  Long Term Goals: To be accomplished in 8-12 treatments:  1. Pt will have an increase in FOTO to > or = 64/100 to demo an increase in functional activity tolerance  2. Pt will have an increase in (B) hip and knee MMT to > or = 4+/5 all planes to improve ecccentric control for stair descent  3. Pt will have an increase in SLS with EO to > or = 7\" with normal sway and no hip drop to improve control for stairs, curbs. -Goal met; (L) 10 seconds, (R) 8 seconds (4/25/17)  4.  Pt will be able to descend a full flight of stairs in the clinic with pain no > than 3/10 to improve prognosis and in-home mobility  -Good tolerance to SL leg press today (4/19/17)    PLAN  [x]  Upgrade activities as tolerated     [x]  Continue plan of care  []  Update interventions per flow sheet       []  Discharge due to:_  [x]  Other: possibly add forward elizabeth Marvin, PTA 4/25/2017

## 2017-04-27 ENCOUNTER — HOSPITAL ENCOUNTER (OUTPATIENT)
Dept: PHYSICAL THERAPY | Age: 47
Discharge: HOME | End: 2017-04-27
Payer: MEDICAID

## 2017-04-27 PROCEDURE — 97110 THERAPEUTIC EXERCISES: CPT

## 2017-04-27 NOTE — PROGRESS NOTES
PT DAILY TREATMENT NOTE     Patient Name: Clarisse Ivory  Date:2017  : 1970  [x]  Patient  Verified  Payor: [address] / Plan: [address] [facility] / Product Type: Managed Care Medicaid /    In time: 1:00pm     Out time: 2:10pm  Total Treatment Time (min): 70  Visit #: 4 of     Treatment Area: Acute pain of left knee [M25.562]    SUBJECTIVE  Pain Level (0-10 scale): 0  Any medication changes, allergies to medications, adverse drug reactions, diagnosis change, or new procedure performed?: [x] No    [] Yes (see summary sheet for update)  Subjective functional status/changes:   [] No changes reported  \"Going down the stairs still aggravates my knees, especially at night. My neck still bothers me, but it's because of the way I've been sleeping. \"    OBJECTIVE  Modality rationale: reduce post-therex soreness to improve patient's ability to perform ADLs   Min Type Additional Details    [] Estim: []Att   []Unatt        []TENS instruct                  []IFC  []Premod   []NMES                     []Other:  []w/US   []w/ice   []w/heat  Position:  Location:    []  Traction: [] Cervical       []Lumbar                       [] Prone          []Supine                       []Intermittent   []Continuous Lbs:  [] before manual  [] after manual    []  Ultrasound: []Continuous   [] Pulsed                           []1MHz   []3MHz Location:  W/cm2:    []  Iontophoresis with dexamethasone         Location: [] Take home patch   [] In clinic   10 [x]  Ice to (B) knees    [x]  Heat to C/S  []  Ice massage Position: semi-reclined  Location: see type    []  Vasopneumatic Device Pressure:       [] lo [] med [] hi   Temperature: [] lo [] med [] hi   [x] Skin assessment post-treatment:  [x]intact []redness- no adverse reaction       []redness - adverse reaction:       60 min Therapeutic Exercise:  [x] See flow sheet: assessed stair negotiation   Rationale: increase ROM, increase strength and improve coordination to improve the patients ability to negotiate stairs pain-free          X min Patient Education: [x] Review HEP - added SLS at kitchen counter and prone quad stretch with belt     Other Objective/Functional Measures:    Stair negotiation: intermittent 4/10 pain with stair descent; encouraged reduced valgus collapse with improved comfort   Pain: (A) 0, (W) 7 with stair descent    Pain Level (0-10 scale) post treatment: 0    ASSESSMENT/Changes in Function:  Good tolerance to progression of therex today. Improved static SLS balance, however, patient r/o foot \"burning\". Encouraged use of shoes that supply proper arch support to prevent valgus collapse at the ankle joint with patient agreeable. Discussed mechanics and positioning for stair negotiation. Patient will continue to benefit from skilled PT services to modify and progress therapeutic interventions, address functional mobility deficits, address ROM deficits, address strength deficits, analyze and address soft tissue restrictions, analyze and cue movement patterns, analyze and modify body mechanics/ergonomics, assess and modify postural abnormalities, address imbalance/dizziness and instruct in home and community integration to attain remaining goals. [x]  See Plan of Care  []  See progress note/recertification  []  See Discharge Summary         Progress towards goals / Updated goals:  Short Term Goals: To be accomplished in 2 treatments:  1. Pt will be independent and compliant with HEP to decrease pain, increase ROM and return pt to PLOF. -Goal met; patient reporting compliance (4/25/17)  Long Term Goals: To be accomplished in 8-12 treatments:  1. Pt will have an increase in FOTO to > or = 64/100 to demo an increase in functional activity tolerance  2. Pt will have an increase in (B) hip and knee MMT to > or = 4+/5 all planes to improve ecccentric control for stair descent. -Good progressing with SLR x 4 (4/27/17)  3.  Pt will an increase in SLS with EO to > or = 7\" with normal sway and no hip drop to improve control for stairs, curbs. -Goal met; (L) 10 seconds, (R) 8 seconds (4/25/17)  4.  Pt will be able to descend a full flight of stairs in the clinic with pain no > than 3/10 to improve prognosis and in-home mobility  -Goal progressing; intermittent 4/10 pain in (L) knee with stair descent, able to correct valgus collapse with improved comfort (4/27/17)    PLAN  [x]  Upgrade activities as tolerated     [x]  Continue plan of care  []  Update interventions per flow sheet       []  Discharge due to:_  []  Other:_    Maia Chapman PTA 4/27/2017

## 2017-05-02 ENCOUNTER — HOSPITAL ENCOUNTER (OUTPATIENT)
Dept: PHYSICAL THERAPY | Age: 47
Discharge: HOME | End: 2017-05-02
Payer: MEDICAID

## 2017-05-02 PROCEDURE — 97110 THERAPEUTIC EXERCISES: CPT

## 2017-05-02 NOTE — PROGRESS NOTES
PT DAILY TREATMENT NOTE     Patient Name: Tom Busby  Date:2017  : 1970  [x]  Patient  Verified  Payor: [address] / Plan: [address] [facility] / Product Type: Managed Care Medicaid /    In time: 12:56pm     Out time: 2:03pm  Total Treatment Time (min): 79  Visit #: 5 of     Treatment Area: Acute pain of left knee [M25.562]    SUBJECTIVE  Pain Level (0-10 scale): 0  Any medication changes, allergies to medications, adverse drug reactions, diagnosis change, or new procedure performed?: [x] No    [] Yes (see summary sheet for update)  Subjective functional status/changes:   [] No changes reported  \"I'm doing fine today, unless I go down the stairs. \"    OBJECTIVE  Modality rationale: reduce post-therex soreness to improve patient's ability to perform ADLs   Min Type Additional Details    [] Estim: []Att   []Unatt        []TENS instruct                  []IFC  []Premod   []NMES                     []Other:  []w/US   []w/ice   []w/heat  Position:  Location:    []  Traction: [] Cervical       []Lumbar                       [] Prone          []Supine                       []Intermittent   []Continuous Lbs:  [] before manual  [] after manual    []  Ultrasound: []Continuous   [] Pulsed                           []1MHz   []3MHz Location:  W/cm2:    []  Iontophoresis with dexamethasone         Location: [] Take home patch   [] In clinic   10 [x]  Ice to (B) knees    [x]  Heat to C/S  []  Ice massage Position: semi-reclined  Location: see type    []  Vasopneumatic Device Pressure:       [] lo [] med [] hi   Temperature: [] lo [] med [] hi   [x] Skin assessment post-treatment:  [x]intact []redness- no adverse reaction       []redness - adverse reaction:     57 min Therapeutic Exercise:  [x] See flow sheet: added SL RDLs; held DL RDLs sec pain   Rationale: increase ROM, increase strength and improve coordination to improve the patients ability to negotiate stairs pain-free          X min Patient Education: [x] Review HEP     Other Objective/Functional Measures:    (+) PRINCESS at ~110deg knee flexion     Pain Level (0-10 scale) post treatment: 0    ASSESSMENT/Changes in Function:  Continued valgus collapse with stair descent req VCs to correct with intermittent carryover. Improved gastroc tightness at ~12deg DF AROM with knee extended. Ecc quad strength slowly improving, but quick onset of fatigue and pain with knee flexion >90deg during close-chained therex. Held SL leg press to 1 sec pain at inferior knee. Poor balance noted with SL RDLs, therefore, may plan to hold NV. Patient will continue to benefit from skilled PT services to modify and progress therapeutic interventions, address functional mobility deficits, address ROM deficits, address strength deficits, analyze and address soft tissue restrictions, analyze and cue movement patterns, analyze and modify body mechanics/ergonomics, assess and modify postural abnormalities, address imbalance/dizziness and instruct in home and community integration to attain remaining goals. [x]  See Plan of Care  []  See progress note/recertification  []  See Discharge Summary         Progress towards goals / Updated goals:  Short Term Goals: To be accomplished in 2 treatments:  1. Pt will be independent and compliant with HEP to decrease pain, increase ROM and return pt to PLOF. -Goal met; patient reporting compliance (4/25/17)  Long Term Goals: To be accomplished in 8-12 treatments:  1. Pt will have an increase in FOTO to > or = 64/100 to demo an increase in functional activity tolerance  2. Pt will have an increase in (B) hip and knee MMT to > or = 4+/5 all planes to improve ecccentric control for stair descent. -Good progressing with SLR x 4 (4/27/17)  3. Pt will an increase in SLS with EO to > or = 7\" with normal sway and no hip drop to improve control for stairs, curbs. -Goal met; (L) 10 seconds, (R) 8 seconds (4/25/17)  4.  Pt will be able to descend a full flight of stairs in the clinic with pain no > than 3/10 to improve prognosis and in-home mobility  -Goal progressing; intermittent 4/10 pain in (L) knee with stair descent, able to correct valgus collapse with improved comfort (4/27/17)    PLAN  [x]  Upgrade activities as tolerated     [x]  Continue plan of care  []  Update interventions per flow sheet       []  Discharge due to:_  []  Other:_    Anthony Mcdermott, PTA 5/2/2017

## 2017-05-04 ENCOUNTER — HOSPITAL ENCOUNTER (OUTPATIENT)
Dept: PHYSICAL THERAPY | Age: 47
Discharge: HOME | End: 2017-05-04
Payer: MEDICAID

## 2017-05-04 PROCEDURE — 97110 THERAPEUTIC EXERCISES: CPT

## 2017-05-04 PROCEDURE — 97140 MANUAL THERAPY 1/> REGIONS: CPT

## 2017-05-09 ENCOUNTER — HOSPITAL ENCOUNTER (OUTPATIENT)
Dept: PHYSICAL THERAPY | Age: 47
Discharge: HOME | End: 2017-05-09
Payer: MEDICAID

## 2017-05-09 PROCEDURE — 97140 MANUAL THERAPY 1/> REGIONS: CPT

## 2017-05-09 PROCEDURE — 97016 VASOPNEUMATIC DEVICE THERAPY: CPT

## 2017-05-09 PROCEDURE — 97110 THERAPEUTIC EXERCISES: CPT

## 2017-05-09 NOTE — PROGRESS NOTES
PT DAILY TREATMENT NOTE     Patient Name: Goldy Washington  Date:2017  : 1970  [x]  Patient  Verified  Payor: [address] [place] [address] Ave / Plan: [address] [facility] / Product Type: Managed Care Medicaid /    In time: 1:00pm     Out time: 2:00pm  Total Treatment Time (min): 60  Visit #: 7 of     Treatment Area: Acute pain of left knee [M25.562]    SUBJECTIVE  Pain Level (0-10 scale): 0  Any medication changes, allergies to medications, adverse drug reactions, diagnosis change, or new procedure performed?: [x] No    [] Yes (see summary sheet for update)  Subjective functional status/changes:   [] No changes reported  \"Sara worked on my calves last time. It hurt but I felt so much better after. \"    OBJECTIVE  Modality rationale: decrease inflammation and decrease pain to improve the patients ability to improve comfort with descending stairs    Min Type Additional Details    [] Estim: []Att   []Unatt        []TENS instruct                  []IFC  []Premod   []NMES                     []Other:  []w/US   []w/ice   []w/heat  Position:  Location:    []  Traction: [] Cervical       []Lumbar                       [] Prone          []Supine                       []Intermittent   []Continuous Lbs:  [] before manual  [] after manual    []  Ultrasound: []Continuous   [] Pulsed                           []1MHz   []3MHz Location:  W/cm2:    []  Iontophoresis with dexamethasone         Location: [] Take home patch   [] In clinic   10 [x]  Ice     []  heat  []  Ice massage Position: semi-reclined  Location: (B) knees    []  Vasopneumatic Device Pressure:       [] lo [] med [] hi   Temperature: [] lo [] med [] hi   [x] Skin assessment post-treatment:  [x]intact []redness- no adverse reaction       []redness - adverse reaction:     35 min Therapeutic Exercise:  [x] See flow sheet: added clam I with RTB, TC other therex sec re-introduction of manual interventions   Rationale: increase ROM, increase strength and improve coordination to improve the patients ability to iprove descending stairs pain-free     15 min Manual Therapy:  DTM and TrP release to (B) gastroc and soleus in prone, (B) gastroc stretching 3x30\" each    Rationale: increase ROM, increase strength and improve coordination to improve the patients ability to iprove descending stairs pain-free           X min Patient Education: [x] Review HEP - added clams     Other Objective/Functional Measures:    Gastroc tightness: B 10 degrees (WNL = 20 degrees)    Pain Level (0-10 scale) post treatment: 0    ASSESSMENT/Changes in Function:   Quick onset of fatigue with addition of clams to therex. Excellent stability with EO SR standing up to 30 seconds without pain and \"foot burning\" today upon cueing for TA draw and GS, however, patient challenged by EC progression (unable to hold >10 secs bilaterally). Significant gastroc/soleus tightness addressed well with manual interventions. Patient reports last session aided in pain relief up to today. Patient will continue to benefit from skilled PT services to modify and progress therapeutic interventions, address functional mobility deficits, address ROM deficits, address strength deficits, analyze and address soft tissue restrictions, analyze and cue movement patterns, analyze and modify body mechanics/ergonomics, assess and modify postural abnormalities, address imbalance/dizziness and instruct in home and community integration to attain remaining goals. [x]  See Plan of Care  []  See progress note/recertification  []  See Discharge Summary         Progress towards goals / Updated goals:  Short Term Goals: To be accomplished in 2 treatments:  1. Pt will be independent and compliant with HEP to decrease pain, increase ROM and return pt to PLOF. -Goal met; patient reporting compliance (4/25/17)  Long Term Goals: To be accomplished in 8-12 treatments:  1.  Pt will have an increase in FOTO to > or = 64/100 to demo an increase in functional activity tolerance  2. Pt will have an increase in (B) hip and knee MMT to > or = 4+/5 all planes to improve ecccentric control for stair descent. -Good progressing with SLR x 4 (5/417) but fatigue noted  3. Pt will an increase in SLS with EO to > or = 7\" with normal sway and no hip drop to improve control for stairs, curbs. -Goal met; (L) 10 seconds, (R) 8 seconds (4/25/17)  4.  Pt will be able to descend a full flight of stairs in the clinic with pain no > than 3/10 to improve prognosis and in-home mobility  -Goal progressing; intermittent 4/10 pain in (L) knee with stair descent, able to correct valgus collapse with improved comfort (4/27/17)    PLAN  [x]  Upgrade activities as tolerated     [x]  Continue plan of care  []  Update interventions per flow sheet       []  Discharge due to:_  [x]  Other: assess response to clams at home     Alejandra Hazel, PTA  5/9/2017

## 2017-05-11 ENCOUNTER — HOSPITAL ENCOUNTER (OUTPATIENT)
Dept: PHYSICAL THERAPY | Age: 47
Discharge: HOME | End: 2017-05-11
Payer: MEDICAID

## 2017-05-11 PROCEDURE — 97110 THERAPEUTIC EXERCISES: CPT

## 2017-05-11 PROCEDURE — 97140 MANUAL THERAPY 1/> REGIONS: CPT

## 2017-05-11 NOTE — PROGRESS NOTES
PT DAILY TREATMENT NOTE     Patient Name: Josselyn Dumas  Date:2017  : 1970  [x]  Patient  Verified  Payor: [address] / Plan: [address] [facility] / Product Type: Managed Care Medicaid /    In time:5:00  Out time:6:07  Total Treatment Time (min): 67  Total Timed Codes (min): 57  1:1 Treatment Time (min): 62   Visit #: 8 of     Treatment Area: Acute pain of left knee [M25.562]    SUBJECTIVE  Pain Level (0-10 scale): 0  Any medication changes, allergies to medications, adverse drug reactions, diagnosis change, or new procedure performed?: [x] No    [] Yes (see summary sheet for update)  Subjective functional status/changes:   [] No changes reported  Feeling good with the work on the calves. But I still have pain when I come down stairs every time.      OBJECTIVE  Modality rationale: decrease edema, decrease inflammation and decrease pain to improve the patients ability to improve descending stairs    Min Type Additional Details    [] Estim: []Att   []Unatt        []TENS instruct                  []IFC  []Premod   []NMES                     []Other:  []w/US   []w/ice   []w/heat  Position:  Location:    []  Traction: [] Cervical       []Lumbar                       [] Prone          []Supine                       []Intermittent   []Continuous Lbs:  [] before manual  [] after manual    []  Ultrasound: []Continuous   [] Pulsed                           []1MHz   []3MHz Location:  W/cm2:    []  Iontophoresis with dexamethasone         Location: [] Take home patch   [] In clinic   10 [x]  Ice     []  heat  []  Ice massage Position: semi-reclined  Location: (B) knees    []  Vasopneumatic Device Pressure:       [] lo [] med [] hi   Temperature: [] lo [] med [] hi   [x] Skin assessment post-treatment:  [x]intact []redness- no adverse reaction       []redness - adverse reaction:       42 min Therapeutic Exercise:  [x] See flow sheet :   Rationale: increase ROM, increase strength, improve coordination and improve balance to improve the patients ability to improve descending stairs, mobility tolerance     15 min Manual Therapy:  DTm to (B) gastroc soleus in prone    Rationale: decrease pain, increase ROM, increase tissue extensibility and decrease trigger points to improve mobility for decline, descending stairs           x min Patient Education: [x] Review HEP    [] Progressed/Changed HEP based on:   [] positioning   [] body mechanics   [] transfers   [] heat/ice application        Other Objective/Functional Measures: TrP releasing between and within sessions. L lateral gastroc, R medial gastroc with TrP greater than other areas    Attempted decline squats with c/o pain  Descending stairs: c/o pain on every step  Attempted 120# leg press with c/o knee pain     Pain Level (0-10 scale) post treatment: 0    ASSESSMENT/Changes in Function: slow progress with functional gains for descending stairs and inclines without pain. natalie Beck pt demo's increased strength, increased gastroc length, increased activity tolerance. Patient will continue to benefit from skilled PT services to modify and progress therapeutic interventions, address functional mobility deficits, address ROM deficits, address strength deficits, analyze and address soft tissue restrictions, analyze and cue movement patterns, analyze and modify body mechanics/ergonomics, assess and modify postural abnormalities and instruct in home and community integration to attain remaining goals. []  See Plan of Care  []  See progress note/recertification  []  See Discharge Summary         Progress towards goals / Updated goals:  Short Term Goals: To be accomplished in 2 treatments:  1. Pt will be independent and compliant with HEP to decrease pain, increase ROM and return pt to PLOF. -Goal met; patient reporting compliance (4/25/17)  Long Term Goals: To be accomplished in 8-12 treatments:  1.  Pt will have an increase in FOTO to > or = 64/100 to demo an increase in functional activity tolerance  2. Pt will have an increase in (B) hip and knee MMT to > or = 4+/5 all planes to improve ecccentric control for stair descent. -Good progressing with SLR x 4 (5/417) but fatigue noted  3. Pt will an increase in SLS with EO to > or = 7\" with normal sway and no hip drop to improve control for stairs, curbs. -Goal met; (L) 10 seconds, (R) 8 seconds (4/25/17)  4. Pt will be able to descend a full flight of stairs in the clinic with pain no > than 3/10 to improve prognosis and in-home mobility  -Goal progressing with improved strength and decreased valgus collapse however con't with constant c/o pain each repetition (5/11/17)    PLAN  []  Upgrade activities as tolerated     []  Continue plan of care  []  Update interventions per flow sheet       []  Discharge due to:_  [x]  Other:_  Pt to go OOT for vacation for 2 days which should be strenuous on knees. Assess response to long walking, horseback riding.  Assess for PN next week and assess co'nt vs D/C due to no changes with pain related to chondromalacia despite increases in ROM, strength, balance and flexibility   Kvng Kilgore, PT 5/11/2017  1:39 PM

## 2017-05-22 ENCOUNTER — HOSPITAL ENCOUNTER (OUTPATIENT)
Dept: PHYSICAL THERAPY | Age: 47
Discharge: HOME | End: 2017-05-22
Payer: MEDICAID

## 2017-05-22 PROCEDURE — 97140 MANUAL THERAPY 1/> REGIONS: CPT

## 2017-05-22 PROCEDURE — 97110 THERAPEUTIC EXERCISES: CPT

## 2017-05-22 NOTE — PROGRESS NOTES
[address] [facility] [address] [facility] AT [address] Jonelle Coronado  Phone: [phone] Fax: [phone]  PROGRESS NOTE  Patient Name: Miroslava Mean : 1970   Treatment/Medical Diagnosis: Acute pain of left knee [M25.562]   Referral Source: Lizeth Back MD     Date of Initial Visit: 17 Attended Visits: 9 Missed Visits: 0     SUMMARY OF TREATMENT  Pt is a 54 yo male with insidious onset L knee pain Nov/Dec 2016, CC is pain with descending stairs. Ther ex including strengthening, ROM, flexibility, stabilization; manual therapy including: DTM and TrP release to (B) gastroc/soleus complex; flexibility; Patient education; HEP, cryotherapy for pain management and self-care education   CURRENT STATUS  Pt is making good progress in therapy, progressing well with functional gains but slowly changing pain levels. Pain ranges from 0-8/10, ave is 4/10 and 7/10 with descending stairs, and pt rates 80% improvement. Score on the FOTO has improved from 46/100 at IE to 68/100 demonstrating functional improvements. Functional improvements: stronger leg mm Allow for better squatting, descending stairs better but not fully normal  Functional Deficits: pain with descending stairs 7/10, descending ramps 7/10. Pt goals: \"No pain in the knees going down stairs\"    AROM knee L 1 to 0 to 142, R 1 to 0 to 146  Ankle DF witch knee exteded L 7, R 7, with knee flexed L 10 , R 11  LE MMT hip flexion L 4+, R 4+, abd L 4+/5, R 5/5, Extension L 4+, R 5/5, knee flexion L 4, R 4, knee ext L 4+ R 5  HS 90/90 L 26, R 31  Ely Min+ Left  Jose Elias L min ITB+, R hip flexor and ITB   SLS EO L 20\" normal sway and able to correct for 1xLOB, R 23\" with normal sway    Short Term Goals: To be accomplished in 2 treatments:  1. Pt will be independent and compliant with HEP to decrease pain, increase ROM and return pt to PLOF.  -Goal met; patient reporting compliance (17)  Long Term Goals: To be accomplished in 8-12 treatments:  1. Pt will have an increase in FOTO to > or = 64/100 to demo an increase in functional activity tolerance Goal met at 68/100 (5/22/17)  2. Pt will have an increase in (B) hip and knee MMT to > or = 4+/5 all planes to improve ecccentric control for stair descent. -Good progressing as above , increased TE (5/22/17)  3. Pt will an increase in SLS with EO to > or = 7\" with normal sway and no hip drop to improve control for stairs, curbs. -Goal met; (L) 20 seconds, (R) 22 seconds (5/22/17)  4. Pt will be able to descend a full flight of stairs in the clinic with pain no > than 3/10 to improve prognosis and in-home mobility  -Goal progressing with improved strength and decreased valgus collapse , con't with 7/10 pain (5/22/17)      New Goals to be achieved in __6__  treatments:  1. Pt will have an increase in (B) hip and knee MMT to > or = 4+/5 all planes to improve ecccentric control for stair descent   2. Pt will be able to descend a full flight of stairs in the clinic with pain no > than 3/10 to improve prognosis and in-home mobility   3. Pt will be independent and compliant with HEP to decrease pain, increase ROM and return pt to PLOF     G-Codes: na  RECOMMENDATIONS  Recommend con't with PT 1x/week for 6 sessions. If you have any questions/comments please contact us directly at [phone]. Thank you for allowing us to assist in the care of your patient. Therapist Signature: Tobin Cutler DPT Date: 5/22/2017     Time: 7:37 AM   NOTE TO PHYSICIAN:  PLEASE COMPLETE THE ORDERS BELOW AND FAX TO   [facility]: [phone].   If you are unable to process this request in 24 hours please contact our office: [phone].  ___ I have read the above report and request that my patient continue as recommended.   ___ I have read the above report and request that my patient continue therapy with the following changes/special instructions:_________________________________________________________   ___ I have read the above report and request that my patient be discharged from therapy.      Physician Signature:        Date:       Time:

## 2017-05-22 NOTE — PROGRESS NOTES
PT DAILY TREATMENT NOTE     Patient Name: Michael Rainey  Date:2017  : 1970  [x]  Patient  Verified  Payor: [address] / Plan: [address] [facility] / Product Type: Managed Care Medicaid /    In time:12:54  Out time:2:18  Total Treatment Time (min): 84  Total Timed Codes (min): 74  1:1 Treatment Time (min): 74   Visit #: 9 of     Treatment Area: Acute pain of left knee [M25.562]    SUBJECTIVE  Pain Level (0-10 scale): 4  Any medication changes, allergies to medications, adverse drug reactions, diagnosis change, or new procedure performed?: [x] No    [] Yes (see summary sheet for update)  Subjective functional status/changes:   [] No changes reported  Pain increased last Monday. We started at the [employer] with lots of incline and decline walking. Then lots of driving. Tired legs and painful knees. The pain was a 9/10 after all the walking and then decreased after rest. I climbed down into caverns and was ok but then climbed back up the cavern and pain up to 8/10. The following day, horseback riding did ok. Then went to [address] [address] and my legs were burning, knees were sore (points to anterior knees). Functional improvements: stronger leg mm  Allow for better squatting, descending stairs better but not fully normal  Functional Deficits: pain with descending stairs 7/10, descending ramps 7/10. Pain range recently 0-8/10, ave pain is typically 4/10  80% improvements    Pt goals: \"No pain in the knees going down stairs\"  No MD f/u at this time.         OBJECTIVE  Modality rationale: decrease edema, decrease inflammation and decrease pain to improve the patients ability to improve descending stairs    Min Type Additional Details    [] Estim: []Att   []Unatt        []TENS instruct                  []IFC  []Premod   []NMES                     []Other:  []w/US   []w/ice   []w/heat  Position:  Location:    []  Traction: [] Cervical       []Lumbar                       [] Prone          []Supine                       []Intermittent   []Continuous Lbs:  [] before manual  [] after manual    []  Ultrasound: []Continuous   [] Pulsed                           [x]1MHz   []3MHz Location:  W/cm2:    []  Iontophoresis with dexamethasone         Location: [] Take home patch   [] In clinic   10 [x]  Ice     []  heat  []  Ice massage Position: semireclined  Location: (B) knees    []  Vasopneumatic Device Pressure:       [] lo [] med [] hi   Temperature: [] lo [] med [] hi   [x] Skin assessment post-treatment:  [x]intact []redness- no adverse reaction       []redness - adverse reaction:       59 min Therapeutic Exercise:  [x] See flow sheet : reassessment. Initiated wall sits, changed supine SLR to stand 3 way hip for functional CKC strength, added hip flexor stretch ; completed FOTO with patient. Rationale: increase ROM, increase strength, improve coordination and improve balance to improve the patients ability to improve mobility, stair descent     15 min Manual Therapy:  DTM to (B) gastroc, soleus. Gastroc and soleus stretch. (B) quad stretch in prone.     Rationale: decrease pain, increase ROM, increase tissue extensibility and decrease trigger points to improve mobility, stair descent           x min Patient Education: [x] Review HEP    [] Progressed/Changed HEP based on:   [] positioning   [] body mechanics   [] transfers   [] heat/ice application        Other Objective/Functional Measures:   FOTO 68/100  AROM knee L 1 to 0 to 142, R 1 to 0 to 146  Ankle DF witch knee exteded L 7, R 7, with knee flexed L 10 , R 11  LE MMT hip flexion L 4+, R 4+, abd L 4+/5, R 5/5, Extension L 4+, R 5/5, knee flexion L 4, R 4, knee ext L 4+ R 5  HS 90/90 L 26, R 31  Ely Min+ Left  Jose Elias L min ITB+, R hip flexor and ITB   SLS EO L 20\" normal sway and able to correct for 1xLOB, R 23\" with normal sway    Pain Level (0-10 scale) post treatment: 0    ASSESSMENT/Changes in Function: see PN     Patient will continue to benefit from skilled PT services to modify and progress therapeutic interventions, address functional mobility deficits, address ROM deficits, address strength deficits, analyze and address soft tissue restrictions, analyze and cue movement patterns, analyze and modify body mechanics/ergonomics, assess and modify postural abnormalities, address imbalance/dizziness and instruct in home and community integration to attain remaining goals. []  See Plan of Care  []  See progress note/recertification  []  See Discharge Summary         Progress towards goals / Updated goals:  Short Term Goals: To be accomplished in 2 treatments:  1. Pt will be independent and compliant with HEP to decrease pain, increase ROM and return pt to PLOF. -Goal met; patient reporting compliance (4/25/17)  Long Term Goals: To be accomplished in 8-12 treatments:  1. Pt will have an increase in FOTO to > or = 64/100 to demo an increase in functional activity tolerance Goal met at 68/100 (5/22/17)  2. Pt will have an increase in (B) hip and knee MMT to > or = 4+/5 all planes to improve ecccentric control for stair descent. -Good progressing as above , increased TE (5/22/17)  3. Pt will an increase in SLS with EO to > or = 7\" with normal sway and no hip drop to improve control for stairs, curbs. -Goal met; (L) 20 seconds, (R) 22 seconds (5/22/17)  4. Pt will be able to descend a full flight of stairs in the clinic with pain no > than 3/10 to improve prognosis and in-home mobility  -Goal progressing with improved strength and decreased valgus collapse , con't with 7/10 pain (5/22/17)  PLAN  []  Upgrade activities as tolerated     []  Continue plan of care  []  Update interventions per flow sheet       []  Discharge due to:_  [x]  Other:_  Assess response to increased TE and change in HEP (5/22/17). 1x/week for 6 sessions to build strength and decrease pain for functional improvements.      Kennedi Hong, PT 5/22/2017  7:34 AM

## 2017-05-25 ENCOUNTER — HOSPITAL ENCOUNTER (OUTPATIENT)
Dept: PHYSICAL THERAPY | Age: 47
Discharge: HOME | End: 2017-05-25
Payer: MEDICAID

## 2017-05-25 PROCEDURE — 97140 MANUAL THERAPY 1/> REGIONS: CPT

## 2017-05-25 PROCEDURE — 97110 THERAPEUTIC EXERCISES: CPT

## 2017-05-25 NOTE — PROGRESS NOTES
PT DAILY TREATMENT NOTE 8    Patient Name: Dallas Simpson  Date:2017  : 1970  [x]  Patient  Verified  Payor: [address] / Plan: [address] [facility] / Product Type: Managed Care Medicaid /    In time: 1:00pm      Out time: 2:00pm  Total Treatment Time (min): 60  Visit #: 1 of 6    Treatment Area: Acute pain of left knee [M25.562]    SUBJECTIVE  Pain Level (0-10 scale): 0  Any medication changes, allergies to medications, adverse drug reactions, diagnosis change, or new procedure performed?: [x] No    [] Yes (see summary sheet for update)  Subjective functional status/changes:   [] No changes reported  \"I'm still having pain going down the stairs. The exercises at home are okay. \"    OBJECTIVE  Modality rationale: decrease edema, decrease inflammation and decrease pain to improve the patients ability to improve descending stairs    Min Type Additional Details    [] Estim: []Att   []Unatt        []TENS instruct                  []IFC  []Premod   []NMES                     []Other:  []w/US   []w/ice   []w/heat  Position:  Location:    []  Traction: [] Cervical       []Lumbar                       [] Prone          []Supine                       []Intermittent   []Continuous Lbs:  [] before manual  [] after manual    []  Ultrasound: []Continuous   [] Pulsed                           [x]1MHz   []3MHz Location:  W/cm2:    []  Iontophoresis with dexamethasone         Location: [] Take home patch   [] In clinic   10 [x]  Ice     []  heat  []  Ice massage Position: semireclined  Location: (B) knees    []  Vasopneumatic Device Pressure:       [] lo [] med [] hi   Temperature: [] lo [] med [] hi   [x] Skin assessment post-treatment:  [x]intact []redness- no adverse reaction       []redness - adverse reaction:       35 min Therapeutic Exercise:  [x] See flow sheet: progressed per FS   Rationale: increase ROM, increase strength, improve coordination and improve balance to improve the patients ability to improve mobility, stair descent     15 min Manual Therapy:  DTM to (B) gastroc and soleus; prone quad stretch   Rationale: decrease pain, increase ROM, increase tissue extensibility and decrease trigger points to improve mobility, stair descent           X min Patient Education: [x] Review HEP from last treatment      Other Objective/Functional Measures:    (-) L PRINCESS, (+) min R PRINCESS post-MT     Pain Level (0-10 scale) post treatment: 0    ASSESSMENT/Changes in Function:    Cont'd gastroc tightness addressed well with manual interventions. Pain persists with DL leg press, but no pain with RDLs. Slowly improving balance with SL HR/TR. Patient will continue to benefit from skilled PT services to modify and progress therapeutic interventions, address functional mobility deficits, address ROM deficits, address strength deficits, analyze and address soft tissue restrictions, analyze and cue movement patterns, analyze and modify body mechanics/ergonomics, assess and modify postural abnormalities, address imbalance/dizziness and instruct in home and community integration to attain remaining goals. []  See Plan of Care  [x]  See progress note/recertification  []  See Discharge Summary         Progress towards goals / Updated goals:  1. Pt will have an increase in (B) hip and knee MMT to > or = 4+/5 all planes to improve ecccentric control for stair descent. -Addressing well with std hip 3-way (5/25/17)   2. Pt will be able to descend a full flight of stairs in the clinic with pain no > than 3/10 to improve prognosis and in-home mobility.    3.  Pt will be independent and compliant with HEP to decrease pain, increase ROM and return pt to PLOF.      PLAN  [x]  Upgrade activities as tolerated     [x]  Continue plan of care  []  Update interventions per flow sheet       []  Discharge due to:_  []  Other:_     Ghada Alvarado, PTA 5/25/2017

## 2017-05-31 ENCOUNTER — HOSPITAL ENCOUNTER (OUTPATIENT)
Dept: PHYSICAL THERAPY | Age: 47
Discharge: HOME | End: 2017-05-31
Payer: MEDICAID

## 2017-05-31 PROCEDURE — 97110 THERAPEUTIC EXERCISES: CPT

## 2017-05-31 PROCEDURE — 97140 MANUAL THERAPY 1/> REGIONS: CPT

## 2017-05-31 NOTE — PROGRESS NOTES
PT DAILY TREATMENT NOTE 8    Patient Name: Edna Stevenson  Date:2017  : 1970  [x]  Patient  Verified  Payor: [address] [facility] [facility] [address] / Plan: [address] [facility] / Product Type: Managed Care Medicaid /    In time: 4:00pm      Out time: 4:57pm  Total Treatment Time (min): 62  Visit #: 2 of 6    Treatment Area: Acute pain of left knee [M25.562]    SUBJECTIVE  Pain Level (0-10 scale): 0  Any medication changes, allergies to medications, adverse drug reactions, diagnosis change, or new procedure performed?: [x] No    [] Yes (see summary sheet for update)  Subjective functional status/changes:   [] No changes reported  \"I've been walking up and down the stairs. My knees didn't feel too good. I started having pain on the inner knee and below the knee. \"    OBJECTIVE  Modality rationale: decrease edema, decrease inflammation and decrease pain to improve the patients ability to improve descending stairs    Min Type Additional Details    [] Estim: []Att   []Unatt        []TENS instruct                  []IFC  []Premod   []NMES                     []Other:  []w/US   []w/ice   []w/heat  Position:  Location:    []  Traction: [] Cervical       []Lumbar                       [] Prone          []Supine                       []Intermittent   []Continuous Lbs:  [] before manual  [] after manual    []  Ultrasound: []Continuous   [] Pulsed                           [x]1MHz   []3MHz Location:  W/cm2:    []  Iontophoresis with dexamethasone         Location: [] Take home patch   [] In clinic   NI [x]  Ice     []  heat  []  Ice massage Position: semireclined  Location: (B) knees    []  Vasopneumatic Device Pressure:       [] lo [] med [] hi   Temperature: [] lo [] med [] hi   [x] Skin assessment post-treatment:  [x]intact []redness- no adverse reaction       []redness - adverse reaction:       37 min Therapeutic Exercise:  [x] See flow sheet: (billed 1 unit)   Rationale: increase ROM, increase strength, improve coordination and improve balance to improve the patients ability to improve mobility, stair descent     20 min Manual Therapy: rolling to (B) ITB in S/L; DTM/TPR to (B) adductor group; hamstring and calf stretching; (B) grade III inferior and medial patellar mobs and knee PROM   Rationale: decrease pain, increase ROM, increase tissue extensibility and decrease trigger points to improve mobility, stair descent           X min Patient Education: [x] Review HEP - added prone quad stretch with patient instructed to perform in prone/standing before going up/down stairs and wall sits     Other Objective/Functional Measures:     Pain Level (0-10 scale) post treatment: 0    ASSESSMENT/Changes in Function:    Noted pain infrapatellar knee pain with knee flexion at ~30-45degrees during wall sits abolished with inc knee flexion to 90 degrees (pain-free), no pain reported when returning into extended position upon cueing for QS. Reinitiated prone quad stretch with minimal pain reported during stair negotiation in clinic. Patient will continue to benefit from skilled PT services to modify and progress therapeutic interventions, address functional mobility deficits, address ROM deficits, address strength deficits, analyze and address soft tissue restrictions, analyze and cue movement patterns, analyze and modify body mechanics/ergonomics, assess and modify postural abnormalities, address imbalance/dizziness and instruct in home and community integration to attain remaining goals. []  See Plan of Care  [x]  See progress note/recertification  []  See Discharge Summary         Progress towards goals / Updated goals:  1. Pt will have an increase in (B) hip and knee MMT to > or = 4+/5 all planes to improve ecccentric control for stair descent. -Addressing well with std hip 3-way (5/25/17)   2. Pt will be able to descend a full flight of stairs in the clinic with pain no > than 3/10 to improve prognosis and in-home mobility. -Goal met; min pain with stair negotiation following quad stretching and ITB rolling (5/31/17)   3. Pt will be independent and compliant with HEP to decrease pain, increase ROM and return pt to PLOF.  -Goal met; patient reporting compliance (5/31/17)      PLAN  [x]  Upgrade activities as tolerated     [x]  Continue plan of care  []  Update interventions per flow sheet       []  Discharge due to:_  []  Other:_     Harini Manuel, PTA 5/31/2017

## 2017-06-06 ENCOUNTER — HOSPITAL ENCOUNTER (OUTPATIENT)
Dept: PHYSICAL THERAPY | Age: 47
Discharge: HOME | End: 2017-06-06
Payer: MEDICAID

## 2017-06-06 PROCEDURE — 97140 MANUAL THERAPY 1/> REGIONS: CPT

## 2017-06-06 PROCEDURE — 97110 THERAPEUTIC EXERCISES: CPT

## 2017-06-06 NOTE — PROGRESS NOTES
PT DAILY TREATMENT NOTE     Patient Name: Rafaela Samson  Date:2017  : 1970  [x]  Patient  Verified  Payor: [address] / Plan: [address] [facility] / Product Type: Managed Care Medicaid /    In time: 2:33pm      Out time: 3:20pm  Total Treatment Time (min): 52  Visit #: 3 of 6    Treatment Area: Acute pain of left knee [M25.562]    SUBJECTIVE  Pain Level (0-10 scale): 0  Any medication changes, allergies to medications, adverse drug reactions, diagnosis change, or new procedure performed?: [x] No    [] Yes (see summary sheet for update)  Subjective functional status/changes:   [] No changes reported  \"I don't have pain walking for long anymore, but definitely going down the stairs. I'm not feeling too great today, just really tired. I think it's the sickle cell. \"    OBJECTIVE  Modality rationale: decrease edema, decrease inflammation and decrease pain to improve the patients ability to improve descending stairs    Min Type Additional Details    [] Estim: []Att   []Unatt        []TENS instruct                  []IFC  []Premod   []NMES                     []Other:  []w/US   []w/ice   []w/heat  Position:  Location:    []  Traction: [] Cervical       []Lumbar                       [] Prone          []Supine                       []Intermittent   []Continuous Lbs:  [] before manual  [] after manual    []  Ultrasound: []Continuous   [] Pulsed                           [x]1MHz   []3MHz Location:  W/cm2:    []  Iontophoresis with dexamethasone         Location: [] Take home patch   [] In clinic   NI [x]  Ice     []  heat  []  Ice massage Position: semireclined  Location: (B) knees    []  Vasopneumatic Device Pressure:       [] lo [] med [] hi   Temperature: [] lo [] med [] hi   [x] Skin assessment post-treatment:  [x]intact []redness- no adverse reaction       []redness - adverse reaction:     27 min Therapeutic Exercise:  [x] See flow sheet: held most therex today sec inc fatigue Rationale: increase ROM, increase strength, improve coordination and improve balance to improve the patients ability to improve mobility, stair descent     20 min Manual Therapy: rolling to (B) ITB in S/L; DTM/TPR to (B) adductor group; hamstring and calf stretching; (B) grade III inferior and medial patellar mobs and knee PROM   Rationale: decrease pain, increase ROM, increase tissue extensibility and decrease trigger points to improve mobility, stair descent           X min Patient Education: [x] Review HEP - encouraged inc hold with wall sits; reviewed importance of stretching prior to stair negotiation and HEP     Other Objective/Functional Measures:    O2 stats: 84% saturation following std hip 3-way, held further therex and encouraged seated rest break, inc to 94% following 3 minutes of diaphragmatic breathing    Pain Level (0-10 scale) post treatment: 0    ASSESSMENT/Changes in Function:    Patient notes no pain with prolonged walking, amb on inclines, and decreased pain with descending stairs. Inc fatigue noted upon arrival patient attributes to hx of sickle cell disease - modified therex to include inc hold times to improve recovery. Held all therex following standing hip 3-way due to r/o lightheadedness and feeling of \"passing out. \" Encouraged patient to perform diaphragmatic breathing in sitting and provided 3 cups of water to aid in O2 saturation, inc from 84% to 94% with patient noting 80% is patient's normal.    Patient will continue to benefit from skilled PT services to modify and progress therapeutic interventions, address functional mobility deficits, address ROM deficits, address strength deficits, analyze and address soft tissue restrictions, analyze and cue movement patterns, analyze and modify body mechanics/ergonomics, assess and modify postural abnormalities, address imbalance/dizziness and instruct in home and community integration to attain remaining goals.      []  See Plan of Care  [x] See progress note/recertification  []  See Discharge Summary         Progress towards goals / Updated goals:  1. Pt will have an increase in (B) hip and knee MMT to > or = 4+/5 all planes to improve ecccentric control for stair descent. -Addressing well with std hip 3-way (5/25/17)   2. Pt will be able to descend a full flight of stairs in the clinic with pain no > than 3/10 to improve prognosis and in-home mobility. -Goal met; min pain with stair negotiation following quad stretching and ITB rolling (5/31/17)   3. Pt will be independent and compliant with HEP to decrease pain, increase ROM and return pt to PLOF.  -Goal met; patient reporting compliance (5/31/17)      PLAN  [x]  Upgrade activities as tolerated     [x]  Continue plan of care  []  Update interventions per flow sheet       []  Discharge due to:_  [x]  Other: assess response to treatment    Salvatore Mayen PTA 6/6/2017

## 2017-06-13 ENCOUNTER — HOSPITAL ENCOUNTER (OUTPATIENT)
Dept: PHYSICAL THERAPY | Age: 47
Discharge: HOME | End: 2017-06-13
Payer: MEDICAID

## 2017-06-13 PROCEDURE — 97140 MANUAL THERAPY 1/> REGIONS: CPT

## 2017-06-13 PROCEDURE — 97110 THERAPEUTIC EXERCISES: CPT

## 2017-06-13 NOTE — PROGRESS NOTES
PT DAILY TREATMENT NOTE 8-14    Patient Name: Miroslava Mean  Date:2017  : 1970  [x]  Patient  Verified  Payor: [address] / Plan: [address] [facility] / Product Type: Managed Care Medicaid /    In time: 2:28pm      Out time: 3:34pm  Total Treatment Time (min): 66  Visit #: 4 of 6    Treatment Area: Acute pain of left knee [M25.562]    SUBJECTIVE  Pain Level (0-10 scale): 0  Any medication changes, allergies to medications, adverse drug reactions, diagnosis change, or new procedure performed?: [x] No    [] Yes (see summary sheet for update)  Subjective functional status/changes:   [] No changes reported  \"I can finally say I feel a difference with going up and down the stairs. I don't have pain anymore and I haven't had pain the last three days. The wall sits still hurt though. I go back to work [employer] this week. \" Pt notes quad weakness with the wall sits.     OBJECTIVE  Modality rationale: decrease edema, decrease inflammation and decrease pain to improve the patients ability to improve descending stairs    Min Type Additional Details    [] Estim: []Att   []Unatt        []TENS instruct                  []IFC  []Premod   []NMES                     []Other:  []w/US   []w/ice   []w/heat  Position:  Location:    []  Traction: [] Cervical       []Lumbar                       [] Prone          []Supine                       []Intermittent   []Continuous Lbs:  [] before manual  [] after manual    []  Ultrasound: []Continuous   [] Pulsed                           [x]1MHz   []3MHz Location:  W/cm2:    []  Iontophoresis with dexamethasone         Location: [] Take home patch   [] In clinic   10 [x]  Ice     []  heat  []  Ice massage Position: semireclined  Location: (B) knees    []  Vasopneumatic Device Pressure:       [] lo [] med [] hi   Temperature: [] lo [] med [] hi   [x] Skin assessment post-treatment:  [x]intact []redness- no adverse reaction       []redness - adverse reaction: 36 min Therapeutic Exercise:  [x] See flow sheet: reinitiated prone planks, clams with RTB   Rationale: increase ROM, increase strength, improve coordination and improve balance to improve the patients ability to improve mobility, stair descent     20 min Manual Therapy: rolling to (B) ITB in S/L; DTM/TPR to (B) adductor group; hamstring and calf stretching; (B) grade III inferior and medial patellar mobs and knee PROM   Rationale: decrease pain, increase ROM, increase tissue extensibility and decrease trigger points to improve mobility, stair descent           X min Patient Education: [x] Review HEP     Other Objective/Functional Measures:    Functional improvement: stair negotiation, recently    Pain Level (0-10 scale) post treatment: 0    ASSESSMENT/Changes in Function:    Good tolerance to treatment today. Patient cont to r/o infrapatellar pain with wall sits, but reporting significant improvement in ability to negotiate stairs pain-free. Quad and core strength deficits noted by challenge with maintaining prone plank. Cont to address quad and calf tightness with (+) results. Patient will continue to benefit from skilled PT services to modify and progress therapeutic interventions, address functional mobility deficits, address ROM deficits, address strength deficits, analyze and address soft tissue restrictions, analyze and cue movement patterns, analyze and modify body mechanics/ergonomics, assess and modify postural abnormalities, address imbalance/dizziness and instruct in home and community integration to attain remaining goals. []  See Plan of Care  [x]  See progress note/recertification  []  See Discharge Summary         Progress towards goals / Updated goals:  1. Pt will have an increase in (B) hip and knee MMT to > or = 4+/5 all planes to improve ecccentric control for stair descent. -Addressing well with std hip 3-way (5/25/17)   2.   Pt will be able to descend a full flight of stairs in the clinic with pain no > than 3/10 to improve prognosis and in-home mobility. -Goal met; patient reporting no pain with stair negotiation recently (6/13/17)   3. Pt will be independent and compliant with HEP to decrease pain, increase ROM and return pt to PLOF.  -Goal met; patient reporting compliance (5/31/17)      PLAN  [x]  Upgrade activities as tolerated     [x]  Continue plan of care  []  Update interventions per flow sheet       []  Discharge due to:_  [x]  Other: assess response to treatment; patient scheduled for two additional sessions and will most likely be DC at that point    Paul Clark, PTA 6/13/2017

## 2017-06-27 ENCOUNTER — HOSPITAL ENCOUNTER (OUTPATIENT)
Dept: PHYSICAL THERAPY | Age: 47
Discharge: HOME | End: 2017-06-27
Payer: MEDICAID

## 2017-06-27 PROCEDURE — 97530 THERAPEUTIC ACTIVITIES: CPT | Performed by: PHYSICAL THERAPIST

## 2017-06-27 PROCEDURE — 97110 THERAPEUTIC EXERCISES: CPT | Performed by: PHYSICAL THERAPIST

## 2017-06-27 NOTE — PROGRESS NOTES
PT DAILY TREATMENT NOTE 8-    Patient Name: Blaise Little  Date:2017  : 1970  [x]  Patient  Verified  Payor: [address] [place] [address] Ave / Plan: [address] [facility] / Product Type: Managed Care Medicaid /    In time: 550pm      Out time: 630pm  Total Treatment Time (min): 40  Visit #: 6 of 6    Treatment Area: Acute pain of left knee [M25.562]    SUBJECTIVE  Pain Level (0-10 scale): 1  Any medication changes, allergies to medications, adverse drug reactions, diagnosis change, or new procedure performed?: [x] No    [] Yes (see summary sheet for update)  Subjective functional status/changes:   [] No changes reported  See DC  summary    OBJECTIVE  Modality rationale: decrease edema, decrease inflammation and decrease pain to improve the patients ability to improve descending stairs    Min Type Additional Details    [] Estim: []Att   []Unatt        []TENS instruct                  []IFC  []Premod   []NMES                     []Other:  []w/US   []w/ice   []w/heat  Position:  Location:    []  Traction: [] Cervical       []Lumbar                       [] Prone          []Supine                       []Intermittent   []Continuous Lbs:  [] before manual  [] after manual    []  Ultrasound: []Continuous   [] Pulsed                           [x]1MHz   []3MHz Location:  W/cm2:    []  Iontophoresis with dexamethasone         Location: [] Take home patch   [] In clinic   PD [x]  Ice     []  heat  []  Ice massage Position: semireclined  Location: (B) knees    []  Vasopneumatic Device Pressure:       [] lo [] med [] hi   Temperature: [] lo [] med [] hi   [x] Skin assessment post-treatment:  [x]intact []redness- no adverse reaction       []redness - adverse reaction:     40 min Therapeutic Exercise:  [x] See flow sheet: reviewed HEP and self care   Rationale: increase ROM, increase strength, improve coordination and improve balance to improve the patients ability to improve mobility, stair descent     PD min Manual Therapy: rolling to (B) ITB in S/L; DTM/TPR to (B) adductor group; hamstring and calf stretching; (B) grade III inferior and medial patellar mobs and knee PROM   Rationale: decrease pain, increase ROM, increase tissue extensibility and decrease trigger points to improve mobility, stair descent           X min Patient Education: [x] Review HEP     Other Objective/Functional Measures    Patient reports 50% improvements in sx since [facility]. Functional improvements: Patient states pain decreased with descending stairs but continues to have mild pain, improved strength-knee no longer gives out   Objective: Hip strength 4+/5 overall, continues with pain with eccentric lowering at step and with squats. Pain is a 4/10 consistently with going up/down stairs. Patient states pain levels decreased for a while, but now the pain is staying at about a 4/10 with steps. Patient states I in HEP with daily compliance. Patient educated in importance of flexibility of quad and strength maintenance. Pain Level (0-10 scale) post treatment: 0-1    ASSESSMENT/Changes in Function: see DC    Patient will continue to benefit from skilled PT services to modify and progress therapeutic interventions, address functional mobility deficits, address ROM deficits, address strength deficits, analyze and address soft tissue restrictions, analyze and cue movement patterns, analyze and modify body mechanics/ergonomics, assess and modify postural abnormalities, address imbalance/dizziness and instruct in home and community integration to attain remaining goals. []  See Plan of Care  []  See progress note/recertification  [x]  See Discharge Summary         Progress towards goals / Updated goals:  1. Pt will have an increase in (B) hip and knee MMT to > or = 4+/5 all planes to improve ecccentric control for stair descent. -Addressing well with std hip 3-way (5/25/17)   2.   Pt will be able to descend a full flight of stairs in the clinic with pain no > than 3/10 to improve prognosis and in-home mobility. -Goal met; patient reporting no pain with stair negotiation recently (6/13/17)   3. Pt will be independent and compliant with HEP to decrease pain, increase ROM and return pt to PLOF.  -Goal met; patient reporting compliance (5/31/17)      PLAN  []  Upgrade activities as tolerated     []  Continue plan of care  []  Update interventions per flow sheet       [x]  Discharge due to:MEt or progressing towards goals_  []  Other:      Adela Bates, PT 6/27/2017

## 2017-06-27 NOTE — PROGRESS NOTES
[address] [facility] AT [address], [place], [place]  Phone: [phone] Fax [phone]  DISCHARGE SUMMARY  Patient Name: Wolfgang Obregon : 1970   Treatment/Medical Diagnosis: Acute pain of left knee [M25.562]   Referral Source: Inessa Lima MD     Date of Initial Visit: 17 Attended Visits: 15 Missed Visits: 0     SUMMARY OF TREATMENT  Therapeutic exercise including ROM, stretching, progressive strengthening, stabilization training,  patient education, HEP instruction, CP, MT.    CURRENT STATUS    Patient reports 50% improvements in sx since [facility]. Functional improvements: Patient states pain decreased with descending stairs but continues to have mild pain, improved strength-knee no longer gives out   Objective: Hip strength 4+/5 overall, continues with pain with eccentric lowering at step and with squats. Pain is a 4/10 consistently with going up/down stairs. Patient states pain levels decreased for a while, but now the pain is staying at about a 4/10 with steps. Patient states I in HEP with daily compliance. Patient educated in importance of flexibility of quad and strength maintenance. Progress towards goals / Updated goals:  1. Pt will have an increase in (B) hip and knee MMT to > or = 4+/5 all planes to improve ecccentric control for stair descent. -Addressing well with std hip 3-way (17)   2. Pt will be able to descend a full flight of stairs in the clinic with pain no > than 3/10 to improve prognosis and in-home mobility. -Goal met; patient reporting no pain with stair negotiation recently (17)   3. Pt will be independent and compliant with HEP to decrease pain, increase ROM and return pt to PLOF. -Goal met; patient reporting compliance (17)     RECOMMENDATIONS  Discontinue therapy. Progressing towards or have reached established goals. Gcode:    If you have any questions/comments please contact us directly at [phone]. Thank you for allowing us to assist in the care of your patient.   Therapist Signature: Ivonne Richardson, PT Date: 6-27-17   Reporting Period:  Time: 6:39 PM

## 2017-07-05 ENCOUNTER — APPOINTMENT (OUTPATIENT)
Dept: PHYSICAL THERAPY | Age: 47
End: 2017-07-05

## 2018-01-16 PROBLEM — K80.20 CHOLELITHIASIS: Status: ACTIVE | Noted: 2018-01-16

## 2018-01-16 PROBLEM — M19.90 ARTHRITIS: Status: ACTIVE | Noted: 2018-01-16

## 2018-01-16 PROBLEM — D64.9 ANEMIA: Status: ACTIVE | Noted: 2018-01-16

## 2018-01-16 PROBLEM — M79.605 PAIN OF LEFT LOWER EXTREMITY: Status: ACTIVE | Noted: 2018-01-16

## 2018-01-16 PROBLEM — J02.9 ACUTE PHARYNGITIS: Status: ACTIVE | Noted: 2018-01-16

## 2018-01-16 PROBLEM — R07.89 ATYPICAL CHEST PAIN: Status: ACTIVE | Noted: 2018-01-16

## 2018-01-16 PROBLEM — H72.00 CENTRAL PERFORATION OF TYMPANIC MEMBRANE: Status: ACTIVE | Noted: 2018-01-16

## 2018-01-16 PROBLEM — H81.10 BENIGN PAROXYSMAL POSITIONAL VERTIGO: Status: ACTIVE | Noted: 2018-01-16

## 2018-01-16 PROBLEM — M10.9 GOUT: Status: ACTIVE | Noted: 2018-01-16

## 2018-01-16 PROBLEM — Z23 NEED FOR IMMUNIZATION AGAINST INFLUENZA: Status: ACTIVE | Noted: 2018-01-16

## 2018-01-16 PROBLEM — M54.12 CERVICAL RADICULOPATHY: Status: ACTIVE | Noted: 2018-01-16

## 2018-01-16 PROBLEM — M87.00 AVASCULAR NECROSIS (HCC): Status: ACTIVE | Noted: 2018-01-16

## 2018-01-16 PROBLEM — R79.89 ABNORMAL LIVER FUNCTION TESTS: Status: ACTIVE | Noted: 2018-01-16

## 2018-01-16 PROBLEM — M25.569 KNEE PAIN: Status: ACTIVE | Noted: 2018-01-16

## 2018-01-16 PROBLEM — H90.2 CONDUCTIVE HEARING LOSS, UNILATERAL: Status: ACTIVE | Noted: 2018-01-16

## 2018-01-16 PROBLEM — M54.2 NECK PAIN: Status: ACTIVE | Noted: 2018-01-16

## 2018-01-16 PROBLEM — R79.89 ABNORMAL LIVER FUNCTION TEST: Status: ACTIVE | Noted: 2018-01-16

## 2018-01-16 PROBLEM — G47.21 SLEEP-WAKE SCHEDULE DISORDER, DELAYED PHASE TYPE: Status: ACTIVE | Noted: 2018-01-16

## 2018-01-16 PROBLEM — R30.0 DIFFICULT OR PAINFUL URINATION: Status: ACTIVE | Noted: 2018-01-16

## 2018-01-16 PROBLEM — J30.9 ATOPIC RHINITIS: Status: ACTIVE | Noted: 2018-01-16

## 2018-01-16 PROBLEM — J06.9 ACUTE UPPER RESPIRATORY INFECTION: Status: ACTIVE | Noted: 2018-01-16

## 2018-01-16 PROBLEM — G56.00 CARPAL TUNNEL SYNDROME: Status: ACTIVE | Noted: 2018-01-16

## 2018-01-16 PROBLEM — M79.629 PAIN IN AXILLA: Status: ACTIVE | Noted: 2018-01-16

## 2018-01-16 PROBLEM — G44.82 COITAL HEADACHE: Status: ACTIVE | Noted: 2018-01-16

## 2018-01-16 PROBLEM — K59.00 CONSTIPATION: Status: ACTIVE | Noted: 2018-01-16

## 2018-01-16 PROBLEM — D57.1 HB-SS DISEASE WITHOUT CRISIS (HCC): Status: ACTIVE | Noted: 2018-01-16

## 2018-01-16 PROBLEM — R82.998 DARK URINE: Status: ACTIVE | Noted: 2018-01-16

## 2018-01-16 PROBLEM — M87.00 AVASCULAR NECROSIS OF BONE (HCC): Status: ACTIVE | Noted: 2018-01-16

## 2018-01-16 PROBLEM — M79.603 PAIN OF UPPER EXTREMITY: Status: ACTIVE | Noted: 2018-01-16

## 2018-01-16 PROBLEM — J30.9 ALLERGIC RHINITIS: Status: ACTIVE | Noted: 2018-01-16

## 2018-01-16 PROBLEM — M54.6 THORACIC BACK PAIN: Status: ACTIVE | Noted: 2018-01-16

## 2018-01-16 PROBLEM — S29.9XXA: Status: ACTIVE | Noted: 2018-01-16

## 2018-02-06 ENCOUNTER — OFFICE VISIT (OUTPATIENT)
Dept: SURGERY | Age: 48
End: 2018-02-06

## 2018-02-06 VITALS
RESPIRATION RATE: 16 BRPM | DIASTOLIC BLOOD PRESSURE: 60 MMHG | WEIGHT: 138.8 LBS | HEIGHT: 67 IN | HEART RATE: 98 BPM | BODY MASS INDEX: 21.79 KG/M2 | SYSTOLIC BLOOD PRESSURE: 125 MMHG | TEMPERATURE: 98.1 F | OXYGEN SATURATION: 98 %

## 2018-02-06 DIAGNOSIS — K80.20 GALLSTONES: ICD-10-CM

## 2018-02-06 DIAGNOSIS — K81.1 CHRONIC CHOLECYSTITIS: Primary | ICD-10-CM

## 2018-02-06 RX ORDER — SUMATRIPTAN 50 MG/1
TABLET, FILM COATED ORAL
COMMUNITY
Start: 2016-11-30 | End: 2018-03-23

## 2018-02-06 RX ORDER — GABAPENTIN 300 MG/1
300 CAPSULE ORAL 3 TIMES DAILY
COMMUNITY
Start: 2018-02-05 | End: 2018-04-16 | Stop reason: ALTCHOICE

## 2018-02-06 NOTE — PATIENT INSTRUCTIONS
If you have any questions or concerns about today's appointment, the verbal and/or written instructions you were given for follow up care, please call our office at [phone]. [address] [facility]  [id number] [place] [address] [address], [address]    [phone] office  [phone] fax      . PATIENT PRE AND POST OPERATIVE INSTRUCTIONS     [facility] [address]  [phone]    Before Surgery Instructions:   1) You must have someone available to drive you to and from your procedure and stay with you for the first 24 hours. 2) It is very important that you have nothing to eat or drink after midnight the night before your surgery. This includes chewing gum or sucking on hard candy. Take only heart, blood pressure and cholesterol medications the morning of surgery with only a sip of water. 3) Please stop taking Plavix 10 days prior to your surgery. Stop taking Coumadin 5 days prior to your surgery. Stop taking all Aspirin or Aspirin containing products 7 days prior to your surgery. Stop taking Advil, Motrin, Aleve, and etc. 3 days prior to your surgery. 4) If you take any diabetic medications please consult with your primary care physician on how to take them on the day of your surgery  5) Please stop all Herbal products 2 weeks prior to your surgery. 6) Please arrive at the hospital 2 hours prior to your surgery, unless you have been otherwise instructed. 7) Patients having an operation on their colon will be given a separate instruction sheet on their Bowel Prep. 8) For any pre-operative work up check in at the main entrance to [facility], and then go to Patient Registration. These studies are done on a walk in basis they are open from 7:00am to 5:00pm Monday through Friday. 9) Please wash your surgical site the morning of your surgery with soap and water.   10) If you are of child bearing age you will have pregnancy test done the morning of your surgery as soon as you arrive. 11) You may be contacted to change your surgery time. At times this is necessary due to equipment or staffing needs. After Surgery Instructions: You will need to be seen in the office for a follow-up visit 7-14 days after your surgery. Please call after you have had the procedure to make this appointment. Unless otherwise instructed, you may remove your outer bandage and shower 48 hours after your surgery. If you develop a fever greater than 101, have any significant drainage, bleeding, swelling and/or pus of the wound. Please call our office immediately. Surgery Date and Time: Tuesday, March 27, 2018 at 7:30am    Please check in at [facility], enter through the Emergency Room entrance and go up to the second floor. Please check in by  6:00am the day of your surgery. You may contact Uri Hernandez with any questions at [phone].

## 2018-02-06 NOTE — PROGRESS NOTES
General Surgery Consult      [place]  Admit date: (Not on file)    MRN: [id number]     : 1970     Age: 52 y.o. Attending Physician: Gavin Escamilla MD, FACS      Subjective:     Moni Valencia is a 52 y.o. male with a history of abdominal pain. He has been having the pain for about 10 years. This pain is localized in the right upper quadrant. It is not associated with any food intake. He has nausea but no vomiting. He had multiple ultrasound in the past, one in  and one in , and both showed cholelithiasis with evidence of cholecystitis with thickening of the gallbladder wall and pericholecystic fluid. The patient has a history of sickle cell disease, which could be the reason for his multiple gallstones. The patient  has not had jaundice, acholic stools or dark urine and has not had a history of pancreatitis or hepatitis. He has no previous abdominal surgeries.     Patient Active Problem List    Diagnosis Date Noted    Abnormal liver function tests 2018    Abnormal liver function test 2018    Acute pharyngitis 2018    Acute upper respiratory infection 2018    Atopic rhinitis 2018    Allergic rhinitis 2018    Anemia 2018    Pain of upper extremity 2018    Pain in axilla 2018    Arthritis 2018    Atypical chest pain 2018    Avascular necrosis of bone ([address] 2018    Avascular necrosis (HCC) 2018    Benign paroxysmal positional vertigo 2018    Carpal tunnel syndrome 2018    Central perforation of tympanic membrane 2018    Cervical radiculopathy 2018    Neck pain 2018    Cholelithiasis 2018    Sleep-wake schedule disorder, delayed phase type 2018    Coital headache 2018    Conductive hearing loss, unilateral 2018    Constipation 2018    Dark urine 2018    Difficult or painful urination 2018    Need for immunization against influenza 01/16/2018    Gout 01/16/2018    Hb-SS disease without crisis ([facility] 01/16/2018    Pain of left lower extremity 01/16/2018    Injury to chest wall 01/16/2018    Knee pain 01/16/2018    Thoracic back pain 01/16/2018    Bursitis of shoulder, left 08/03/2009    DJD (degenerative joint disease) 08/03/2009     Past Medical History:   Diagnosis Date    Anemia NEC     Bursitis of shoulder, left 08/03/2009    MRI revealed tendinitis and bursitis of the rotator cuff.  DJD (degenerative joint disease) 08/03/2009    Early djd, left radiocarpal joint with associated ganglion cyst.      Elevated white blood cell count 07/20/2009    GERD (gastroesophageal reflux disease)     H/O: rotator cuff tear 06/07/2006    Chronic    Hyperlipidemia     Pain in joint, upper arm 07/2009    Pain in shoulder, elbow and wrist.    Sickle cell anemia ([facility]     Sleep apnea     Vertigo 07/20/2009      Past Surgical History:   Procedure Laterality Date    SHOULDER SURG PROC UNLISTED  06/14/2006    right shoulder arthroscopy      Social History   Substance Use Topics    Smoking status: Never Smoker    Smokeless tobacco: Never Used    Alcohol use No      History   Smoking Status    Never Smoker   Smokeless Tobacco    Never Used     Family History   Problem Relation Age of Onset    Diabetes Mother       Current Outpatient Prescriptions   Medication Sig    gabapentin (NEURONTIN) 300 mg capsule Take  by mouth.  SUMAtriptan (IMITREX) 50 mg tablet Take  by mouth.  amitriptyline (ELAVIL) 25 mg tablet     cyclobenzaprine (FLEXERIL) 5 mg tablet Cyclobenzaprine HCl - 5 MG Oral Tablet  TAKE 1 TABLET AT BEDTIME AS NEEDED. Quantity: 30;  Refills: 0       [employer]., ;  Started 12-May-2015  Active    multivitamin (ONE A DAY) tablet Take 1 Tab by mouth daily.  meclizine (ANTIVERT) 25 mg tablet Take  by mouth three (3) times daily as needed.     naproxen (NAPROSYN) 250 mg tablet Take  by mouth two (2) times daily (with meals).  hydrocodone-acetaminophen 5-500 mg Cap Take  by mouth. No current facility-administered medications for this visit. Allergies   Allergen Reactions    Other Food Other (comments)     Walnuts. Gets headaches    Corn Other (comments)     Told by  allergic    Shellfish Derived Swelling     swelling        Review of Systems:  Constitutional: negative  Eyes: negative  Ears, Nose, Mouth, Throat, and Face: negative  Respiratory: negative  Cardiovascular: negative  Gastrointestinal: positive for nausea and abdominal pain  Genitourinary:negative  Integument/Breast: negative  Hematologic/Lymphatic: negative  Musculoskeletal:negative  Neurological: negative  Behavioral/Psychiatric: negative  Endocrine: negative  Allergic/Immunologic: negative    Objective:     Visit Vitals    /60 (BP 1 Location: Right arm, BP Patient Position: Sitting)    Pulse 98    Temp 98.1 °F (36.7 °C) (Oral)    Resp 16    Ht 5' 7\" (1.702 m)    Wt 63 kg (138 lb 12.8 oz)    SpO2 98%    BMI 21.74 kg/m2       Physical Exam:      General:  in no apparent distress, alert, oriented times 3, afebrile and normal vitals   Eyes:  conjunctivae and sclerae normal, pupils equal, round, reactive to light   Throat & Neck: no erythema or exudates noted and neck supple and symmetrical; no palpable masses   Lungs:   clear to auscultation bilaterally   Heart:  Regular rate and rhythm   Abdomen:   rounded, soft, non tender except for right upper quadrant tenderness, nondistended, no masses or organomegaly but he has a slight bulge on the left side of the abdominal wall.  No abdominal wall hernias   Extremities: extremities normal, atraumatic, no cyanosis or edema   Skin: Normal.         Imaging and Lab Review:     CBC: No results found for: WBC, RBC, HGB, HCT, PLT, HGBEXT, HCTEXT, PLTEXT  BMP: No results found for: GLU, NA, K, CL, CO2, BUN, CREA, CA  CMP:No results found for: GLU, NA, K, CL, CO2, BUN, CREA, CA, AGAP, BUCR, TBIL, GPT, AP, TP, ALB, GLOB, AGRAT    No results found for this or any previous visit (from the past 24 hour(s)). images and reports reviewed    Assessment:   Cathy Blair is a 52 y.o. male is presenting with abdominal pain and a picture of chronic cholecystitis. I explained the indications for robotic cholecystectomy as well as the alternatives. I discussed the potential risks, including but not limited to bleeding, wound infection, trocar injuries, bile duct injury and leak, and also the possible need for conversion to open procedure. I also explained the firefly technology and how it allow us to visualize the biliary tree to avoid bile duct injury or leak. He indicates that he understands the risks, accepts and wishes to proceed. Plan:     1. Will schedule for robotic single-site cholecystectomy with firefly (ICG) technology for identification of the biliary tree. 2. No heavy lifting (more than 15 pounds) for 2 weeks after the surgery. 3. Avoid constipation after the surgery (take stool softener).       Please call me if you have any questions (cell phone: [phone])     Signed By: Corinne Nix MD     February 6, 2018

## 2018-02-06 NOTE — LETTER
2/6/2018 2:27 PM 
 
Patient:  Nathanael Hathaway YOB: 1970 Date of Visit: 2/6/2018 Bijal Layton MD 
[address] [place] [address]  [address] [id number] VIA Facsimile: [phone] Dear Bijal Layton MD, Thank you for referring Mr. Cristian Mahoney to [place] [address] for evaluation and treatment. Below are the relevant portions of my assessment and plan of care. Thank you very much for your referral of Mr. Cristian Mahoney. If you have questions, please do not hesitate to call me. I look forward to following Mr. Claude Huerta along with you and will keep you updated as to his progress. Sincerely, Adalid Khan MD

## 2018-02-06 NOTE — MR AVS SNAPSHOT
Lizabeth Veliz 
 
 
 [address] [address] 
[phone] Patient: Jose Antonio Elkins MRN: [id number] Visit Information Date & Time Provider Department Dept. Phone Encounter #  
 2/6/2018  2:30 PM MD [facility] [phone] [id number] Your Appointments 4/9/2018 11:00 AM  
POST OP with Olimpia Santiago MD  
[address] [facility]) Appt Note: 2 weeks post op [address] [place]  
  
   
 [address] [facility] [address] 710 [facility] 951 4/10/2018  9:15 AM  
ESTABLISHED PATIENT with Jen Jameson MD  
[facility] [address]) Appt Note: 3 MONTH F/U  
 [address] [place] [address]  
[phone]  
  
   
 [place] [phone] Upcoming Health Maintenance Date Due Pneumococcal 19-64 Highest Risk (1 of 3 - PCV13) 5/23/1989 DTaP/Tdap/Td series (1 - Tdap) 5/23/1991 Influenza Age 5 to Adult 8/1/2017 Allergies as of 2/6/2018  Review Complete On: 2/6/2018 By: Pearl Queen Severity Noted Reaction Type Reactions Other Food  01/16/2018    Other (comments) Walnuts. Gets headaches Equality  01/16/2018    Other (comments) Told by  allergic Shellfish Derived  01/16/2018    Swelling  
 swelling Current Immunizations  Never Reviewed No immunizations on file. Not reviewed this visit Vitals BP Pulse Temp Resp Height(growth percentile) Weight(growth percentile) 125/60 (BP 1 Location: Right arm, BP Patient Position: Sitting) 98 98.1 °F (36.7 °C) (Oral) 16 5' 7\" (1.702 m) 138 lb 12.8 oz (63 kg) SpO2 BMI Smoking Status 98% 21.74 kg/m2 Never Smoker BMI and BSA Data Body Mass Index Body Surface Area 21.74 kg/m 2 1.73 m 2 Your Updated Medication List  
  
   
This list is accurate as of: 2/6/18  2:50 PM.  Always use your most recent med list.  
  
  
  
  
 amitriptyline 25 mg tablet Commonly known as:  ELAVIL  
  
 cyclobenzaprine 5 mg tablet Commonly known as:  FLEXERIL Cyclobenzaprine HCl - 5 MG Oral Tablet TAKE 1 TABLET AT BEDTIME AS NEEDED. Quantity: 30;  Refills: 0    [employer]., ;  Started 12-May-2015 Active  
  
 gabapentin 300 mg capsule Commonly known as:  NEURONTIN Take  by mouth. HYDROcodone-acetaminophen 5-500 mg Cap Take  by mouth.  
  
 meclizine 25 mg tablet Commonly known as:  ANTIVERT Take  by mouth three (3) times daily as needed. multivitamin tablet Commonly known as:  ONE A DAY Take 1 Tab by mouth daily. naproxen 250 mg tablet Commonly known as:  NAPROSYN Take  by mouth two (2) times daily (with meals). SUMAtriptan 50 mg tablet Commonly known as:  IMITREX Take  by mouth. Patient Instructions If you have any questions or concerns about today's appointment, the verbal and/or written instructions you were given for follow up care, please call our office at [phone]. [facility] - [address], [address] [facility], [address] [place] [address] 
 
[phone] office [phone] fax Vera Pérez PATIENT PRE AND POST OPERATIVE INSTRUCTIONS [address] 
[phone] Before Surgery Instructions:  
1) You must have someone available to drive you to and from your procedure and stay with you for the first 24 hours. 2) It is very important that you have nothing to eat or drink after midnight the night before your surgery. This includes chewing gum or sucking on hard candy. Take only heart, blood pressure and cholesterol medications the morning of surgery with only a sip of water. 3) Please stop taking Plavix 10 days prior to your surgery. Stop taking Coumadin 5 days prior to your surgery. Stop taking all Aspirin or Aspirin containing products 7 days prior to your surgery. Stop taking Advil, Motrin, Aleve, and etc. 3 days prior to your surgery. 4) If you take any diabetic medications please consult with your primary care physician on how to take them on the day of your surgery 5) Please stop all Herbal products 2 weeks prior to your surgery. 6) Please arrive at the hospital 2 hours prior to your surgery, unless you have been otherwise instructed. 7) Patients having an operation on their colon will be given a separate instruction sheet on their Bowel Prep. 8) For any pre-operative work up check in at the main entrance to [address] [place] [address], and then go to Patient Registration. These studies are done on a walk in basis they are open from 7:00am to 5:00pm Monday through Friday. 9) Please wash your surgical site the morning of your surgery with soap and water. 10) If you are of child bearing age you will have pregnancy test done the morning of your surgery as soon as you arrive. 11) You may be contacted to change your surgery time. At times this is necessary due to equipment or staffing needs. After Surgery Instructions: You will need to be seen in the office for a follow-up visit 7-14 days after your surgery. Please call after you have had the procedure to make this appointment. Unless otherwise instructed, you may remove your outer bandage and shower 48 hours after your surgery. If you develop a fever greater than 101, have any significant drainage, bleeding, swelling and/or pus of the wound. Please call our office immediately. Surgery Date and Time: Tuesday, March 27, 2018 at 7:30am 
 
Please check in at [facility], enter through the Emergency Room entrance and go up to the second floor. Please check in by  6:00am the day of your surgery. You may contact Zackery Cole with any questions at [phone]. Introducing [facility]! [facility] introduces [employer] patient portal. Now you can access parts of your medical record, email your doctor's office, and request medication refills online. 1. In your internet browser, go to [email or web address] 2. Click on the First Time User? Click Here link in the Sign In box. You will see the New Member Sign Up page. 3. Enter your Bindo Access Code exactly as it appears below. You will not need to use this code after youve completed the sign-up process. If you do not sign up before the expiration date, you must request a new code. · Bindo Access Code: [email or web address] Expires: 4/16/2018  3:07 PM 
 
4. Enter the last four digits of your Social Security Number (xxxx) and Date of Birth (mm/dd/yyyy) as indicated and click Submit. You will be taken to the next sign-up page. 5. Create a Bindo ID. This will be your Bindo login ID and cannot be changed, so think of one that is secure and easy to remember. 6. Create a Bindo password. You can change your password at any time. 7. Enter your Password Reset Question and Answer. This can be used at a later time if you forget your password. 8. Enter your e-mail address. You will receive e-mail notification when new information is available in [address] 9. Click Sign Up. You can now view and download portions of your medical record. 10. Click the Download Summary menu link to download a portable copy of your medical information. If you have questions, please visit the Frequently Asked Questions section of the Bindo website. Remember, Bindo is NOT to be used for urgent needs. For medical emergencies, dial 911. Now available from your iPhone and Android! Please provide this summary of care documentation to your next provider. Your primary care clinician is listed as Elliott Mancera.  If you have any questions after today's visit, please call [phone].

## 2018-03-12 ENCOUNTER — HOSPITAL ENCOUNTER (OUTPATIENT)
Dept: MRI IMAGING | Age: 48
Discharge: HOME | End: 2018-03-12
Attending: FAMILY MEDICINE
Payer: COMMERCIAL

## 2018-03-12 DIAGNOSIS — M25.512 LEFT SHOULDER PAIN: ICD-10-CM

## 2018-03-12 DIAGNOSIS — G62.9 NEUROPATHY: ICD-10-CM

## 2018-03-12 PROCEDURE — 73221 MRI JOINT UPR EXTREM W/O DYE: CPT

## 2018-03-23 RX ORDER — PROPRANOLOL HYDROCHLORIDE 80 MG/1
80 CAPSULE, EXTENDED RELEASE ORAL DAILY
COMMUNITY
Start: 2018-03-15 | End: 2018-09-06

## 2018-03-23 RX ORDER — LORATADINE 10 MG
10 TABLET,DISINTEGRATING ORAL DAILY
COMMUNITY
End: 2018-09-06

## 2018-03-23 RX ORDER — VALACYCLOVIR HYDROCHLORIDE 1 G/1
1 TABLET, FILM COATED ORAL
Refills: 0 | COMMUNITY
Start: 2018-01-18

## 2018-03-23 RX ORDER — ASPIRIN 500 MG
1000 TABLET, DELAYED RELEASE (ENTERIC COATED) ORAL
COMMUNITY
End: 2018-09-06

## 2018-03-26 ENCOUNTER — ANESTHESIA EVENT (OUTPATIENT)
Dept: SURGERY | Age: 48
End: 2018-03-26
Payer: MEDICAID

## 2018-03-27 ENCOUNTER — HOSPITAL ENCOUNTER (OUTPATIENT)
Age: 48
Setting detail: OUTPATIENT SURGERY
Discharge: HOME | End: 2018-03-27
Attending: SURGERY | Admitting: SURGERY
Payer: MEDICAID

## 2018-03-27 ENCOUNTER — ANESTHESIA (OUTPATIENT)
Dept: SURGERY | Age: 48
End: 2018-03-27
Payer: MEDICAID

## 2018-03-27 VITALS
RESPIRATION RATE: 16 BRPM | HEIGHT: 67 IN | HEART RATE: 83 BPM | TEMPERATURE: 97.7 F | OXYGEN SATURATION: 94 % | WEIGHT: 124.06 LBS | BODY MASS INDEX: 19.47 KG/M2 | DIASTOLIC BLOOD PRESSURE: 77 MMHG | SYSTOLIC BLOOD PRESSURE: 129 MMHG

## 2018-03-27 LAB
BUN BLD-MCNC: 31 MG/DL (ref 7–18)
GLUCOSE BLD STRIP.AUTO-MCNC: 96 MG/DL (ref 74–106)
HCT VFR BLD CALC: 22 % (ref 36–49)
HGB BLD-MCNC: 7.5 G/DL (ref 12–16)
POTASSIUM BLD-SCNC: 4.2 MMOL/L (ref 3.5–5.5)
SODIUM BLD-SCNC: 141 MMOL/L (ref 136–145)

## 2018-03-27 PROCEDURE — 84295 ASSAY OF SERUM SODIUM: CPT

## 2018-03-27 PROCEDURE — 77030010939 HC CLP LIG TELE -B: Performed by: SURGERY

## 2018-03-27 PROCEDURE — 77030011640 HC PAD GRND REM COVD -A: Performed by: SURGERY

## 2018-03-27 PROCEDURE — 74011000272 HC RX REV CODE- 272: Performed by: SURGERY

## 2018-03-27 PROCEDURE — 76210000026 HC REC RM PH II 1 TO 1.5 HR: Performed by: SURGERY

## 2018-03-27 PROCEDURE — 74011250636 HC RX REV CODE- 250/636: Performed by: SURGERY

## 2018-03-27 PROCEDURE — 76060000032 HC ANESTHESIA 0.5 TO 1 HR: Performed by: SURGERY

## 2018-03-27 PROCEDURE — 74011000250 HC RX REV CODE- 250: Performed by: SURGERY

## 2018-03-27 PROCEDURE — 77030008683 HC TU ET CUF COVD -A: Performed by: NURSE ANESTHETIST, CERTIFIED REGISTERED

## 2018-03-27 PROCEDURE — 77030002933 HC SUT MCRYL J&J -A: Performed by: SURGERY

## 2018-03-27 PROCEDURE — 77030020703 HC SEAL CANN DISP INTU -B: Performed by: SURGERY

## 2018-03-27 PROCEDURE — 77030011267 HC ELECTRD BLD COVD -A: Performed by: SURGERY

## 2018-03-27 PROCEDURE — 77030018842 HC SOL IRR SOD CL 9% BAXT -A: Performed by: SURGERY

## 2018-03-27 PROCEDURE — 74011250636 HC RX REV CODE- 250/636: Performed by: NURSE ANESTHETIST, CERTIFIED REGISTERED

## 2018-03-27 PROCEDURE — 77030002966 HC SUT PDS J&J -A: Performed by: SURGERY

## 2018-03-27 PROCEDURE — 77030008477 HC STYL SATN SLP COVD -A: Performed by: NURSE ANESTHETIST, CERTIFIED REGISTERED

## 2018-03-27 PROCEDURE — 74011250636 HC RX REV CODE- 250/636

## 2018-03-27 PROCEDURE — 74011250637 HC RX REV CODE- 250/637: Performed by: NURSE ANESTHETIST, CERTIFIED REGISTERED

## 2018-03-27 PROCEDURE — 88304 TISSUE EXAM BY PATHOLOGIST: CPT | Performed by: SURGERY

## 2018-03-27 PROCEDURE — 76010000933 HC OR TIME 0.5 TO 1HR INTENSV - TIER 2: Performed by: SURGERY

## 2018-03-27 PROCEDURE — 77030032490 HC SLV COMPR SCD KNE COVD -B: Performed by: SURGERY

## 2018-03-27 PROCEDURE — 74011000250 HC RX REV CODE- 250

## 2018-03-27 PROCEDURE — 76210000000 HC OR PH I REC 2 TO 2.5 HR: Performed by: SURGERY

## 2018-03-27 RX ORDER — NEOSTIGMINE METHYLSULFATE 5 MG/5 ML
SYRINGE (ML) INTRAVENOUS AS NEEDED
Status: DISCONTINUED | OUTPATIENT
Start: 2018-03-27 | End: 2018-03-27 | Stop reason: HOSPADM

## 2018-03-27 RX ORDER — FLUMAZENIL 0.1 MG/ML
0.2 INJECTION INTRAVENOUS
Status: DISCONTINUED | OUTPATIENT
Start: 2018-03-27 | End: 2018-03-27 | Stop reason: HOSPADM

## 2018-03-27 RX ORDER — LIDOCAINE HYDROCHLORIDE 40 MG/ML
SOLUTION TOPICAL AS NEEDED
Status: DISCONTINUED | OUTPATIENT
Start: 2018-03-27 | End: 2018-03-27 | Stop reason: HOSPADM

## 2018-03-27 RX ORDER — MAGNESIUM SULFATE 100 %
4 CRYSTALS MISCELLANEOUS AS NEEDED
Status: DISCONTINUED | OUTPATIENT
Start: 2018-03-27 | End: 2018-03-27 | Stop reason: HOSPADM

## 2018-03-27 RX ORDER — CEFAZOLIN SODIUM 2 G/50ML
2 SOLUTION INTRAVENOUS
Status: COMPLETED | OUTPATIENT
Start: 2018-03-27 | End: 2018-03-27

## 2018-03-27 RX ORDER — LIDOCAINE HYDROCHLORIDE 20 MG/ML
INJECTION, SOLUTION EPIDURAL; INFILTRATION; INTRACAUDAL; PERINEURAL AS NEEDED
Status: DISCONTINUED | OUTPATIENT
Start: 2018-03-27 | End: 2018-03-27 | Stop reason: HOSPADM

## 2018-03-27 RX ORDER — KETOROLAC TROMETHAMINE 10 MG/1
10 TABLET, FILM COATED ORAL
Qty: 30 TAB | Refills: 0 | Status: SHIPPED | OUTPATIENT
Start: 2018-03-27 | End: 2018-05-10 | Stop reason: SDUPTHER

## 2018-03-27 RX ORDER — FAMOTIDINE 20 MG/1
20 TABLET, FILM COATED ORAL ONCE
Status: COMPLETED | OUTPATIENT
Start: 2018-03-28 | End: 2018-03-27

## 2018-03-27 RX ORDER — ONDANSETRON 2 MG/ML
INJECTION INTRAMUSCULAR; INTRAVENOUS AS NEEDED
Status: DISCONTINUED | OUTPATIENT
Start: 2018-03-27 | End: 2018-03-27 | Stop reason: HOSPADM

## 2018-03-27 RX ORDER — HYDROCODONE BITARTRATE AND ACETAMINOPHEN 5; 325 MG/1; MG/1
1 TABLET ORAL
Status: DISCONTINUED | OUTPATIENT
Start: 2018-03-27 | End: 2018-03-27 | Stop reason: HOSPADM

## 2018-03-27 RX ORDER — MORPHINE SULFATE 10 MG/ML
INJECTION, SOLUTION INTRAMUSCULAR; INTRAVENOUS AS NEEDED
Status: DISCONTINUED | OUTPATIENT
Start: 2018-03-27 | End: 2018-03-27 | Stop reason: HOSPADM

## 2018-03-27 RX ORDER — FENTANYL CITRATE 50 UG/ML
INJECTION, SOLUTION INTRAMUSCULAR; INTRAVENOUS AS NEEDED
Status: DISCONTINUED | OUTPATIENT
Start: 2018-03-27 | End: 2018-03-27 | Stop reason: HOSPADM

## 2018-03-27 RX ORDER — PROPOFOL 10 MG/ML
INJECTION, EMULSION INTRAVENOUS AS NEEDED
Status: DISCONTINUED | OUTPATIENT
Start: 2018-03-27 | End: 2018-03-27 | Stop reason: HOSPADM

## 2018-03-27 RX ORDER — DEXTROSE MONOHYDRATE 25 G/50ML
25-50 INJECTION, SOLUTION INTRAVENOUS AS NEEDED
Status: DISCONTINUED | OUTPATIENT
Start: 2018-03-27 | End: 2018-03-27 | Stop reason: HOSPADM

## 2018-03-27 RX ORDER — GLYCOPYRROLATE 0.2 MG/ML
INJECTION INTRAMUSCULAR; INTRAVENOUS AS NEEDED
Status: DISCONTINUED | OUTPATIENT
Start: 2018-03-27 | End: 2018-03-27 | Stop reason: HOSPADM

## 2018-03-27 RX ORDER — VECURONIUM BROMIDE FOR INJECTION 1 MG/ML
INJECTION, POWDER, LYOPHILIZED, FOR SOLUTION INTRAVENOUS AS NEEDED
Status: DISCONTINUED | OUTPATIENT
Start: 2018-03-27 | End: 2018-03-27 | Stop reason: HOSPADM

## 2018-03-27 RX ORDER — FAMOTIDINE 20 MG/1
TABLET, FILM COATED ORAL
Status: DISCONTINUED
Start: 2018-03-27 | End: 2018-03-27 | Stop reason: HOSPADM

## 2018-03-27 RX ORDER — FENTANYL CITRATE 50 UG/ML
50 INJECTION, SOLUTION INTRAMUSCULAR; INTRAVENOUS
Status: DISCONTINUED | OUTPATIENT
Start: 2018-03-27 | End: 2018-03-27 | Stop reason: HOSPADM

## 2018-03-27 RX ORDER — NALOXONE HYDROCHLORIDE 0.4 MG/ML
0.04 INJECTION, SOLUTION INTRAMUSCULAR; INTRAVENOUS; SUBCUTANEOUS
Status: DISCONTINUED | OUTPATIENT
Start: 2018-03-27 | End: 2018-03-27 | Stop reason: HOSPADM

## 2018-03-27 RX ORDER — FENTANYL CITRATE 50 UG/ML
25 INJECTION, SOLUTION INTRAMUSCULAR; INTRAVENOUS AS NEEDED
Status: DISCONTINUED | OUTPATIENT
Start: 2018-03-27 | End: 2018-03-27 | Stop reason: HOSPADM

## 2018-03-27 RX ORDER — SUCCINYLCHOLINE CHLORIDE 20 MG/ML
INJECTION INTRAMUSCULAR; INTRAVENOUS AS NEEDED
Status: DISCONTINUED | OUTPATIENT
Start: 2018-03-27 | End: 2018-03-27 | Stop reason: HOSPADM

## 2018-03-27 RX ORDER — SODIUM CHLORIDE, SODIUM LACTATE, POTASSIUM CHLORIDE, CALCIUM CHLORIDE 600; 310; 30; 20 MG/100ML; MG/100ML; MG/100ML; MG/100ML
100 INJECTION, SOLUTION INTRAVENOUS CONTINUOUS
Status: DISCONTINUED | OUTPATIENT
Start: 2018-03-28 | End: 2018-03-27 | Stop reason: HOSPADM

## 2018-03-27 RX ORDER — MIDAZOLAM HYDROCHLORIDE 1 MG/ML
INJECTION, SOLUTION INTRAMUSCULAR; INTRAVENOUS AS NEEDED
Status: DISCONTINUED | OUTPATIENT
Start: 2018-03-27 | End: 2018-03-27 | Stop reason: HOSPADM

## 2018-03-27 RX ADMIN — MIDAZOLAM HYDROCHLORIDE 2 MG: 1 INJECTION, SOLUTION INTRAMUSCULAR; INTRAVENOUS at 07:28

## 2018-03-27 RX ADMIN — SUCCINYLCHOLINE CHLORIDE 60 MG: 20 INJECTION INTRAMUSCULAR; INTRAVENOUS at 07:37

## 2018-03-27 RX ADMIN — PROPOFOL 130 MG: 10 INJECTION, EMULSION INTRAVENOUS at 07:36

## 2018-03-27 RX ADMIN — FENTANYL CITRATE 50 MCG: 50 INJECTION, SOLUTION INTRAMUSCULAR; INTRAVENOUS at 09:17

## 2018-03-27 RX ADMIN — LIDOCAINE HYDROCHLORIDE 40 MG: 20 INJECTION, SOLUTION EPIDURAL; INFILTRATION; INTRACAUDAL; PERINEURAL at 07:36

## 2018-03-27 RX ADMIN — ONDANSETRON 4 MG: 2 INJECTION INTRAMUSCULAR; INTRAVENOUS at 07:41

## 2018-03-27 RX ADMIN — FENTANYL CITRATE 50 MCG: 50 INJECTION, SOLUTION INTRAMUSCULAR; INTRAVENOUS at 07:31

## 2018-03-27 RX ADMIN — LIDOCAINE HYDROCHLORIDE 4 ML: 40 SOLUTION TOPICAL at 07:36

## 2018-03-27 RX ADMIN — EPINEPHRINE 30 ML: 1 INJECTION, SOLUTION INTRAMUSCULAR; SUBCUTANEOUS at 08:09

## 2018-03-27 RX ADMIN — FENTANYL CITRATE 50 MCG: 50 INJECTION, SOLUTION INTRAMUSCULAR; INTRAVENOUS at 07:42

## 2018-03-27 RX ADMIN — VECURONIUM BROMIDE FOR INJECTION 1 MG: 1 INJECTION, POWDER, LYOPHILIZED, FOR SOLUTION INTRAVENOUS at 07:36

## 2018-03-27 RX ADMIN — VECURONIUM BROMIDE FOR INJECTION 3 MG: 1 INJECTION, POWDER, LYOPHILIZED, FOR SOLUTION INTRAVENOUS at 07:41

## 2018-03-27 RX ADMIN — MORPHINE SULFATE 5 MG: 10 INJECTION, SOLUTION INTRAMUSCULAR; INTRAVENOUS at 07:28

## 2018-03-27 RX ADMIN — CEFAZOLIN SODIUM 2 G: 2 SOLUTION INTRAVENOUS at 07:39

## 2018-03-27 RX ADMIN — FAMOTIDINE 20 MG: 20 TABLET ORAL at 07:10

## 2018-03-27 RX ADMIN — Medication 3 MG: at 08:06

## 2018-03-27 RX ADMIN — GLYCOPYRROLATE 0.4 MG: 0.2 INJECTION INTRAMUSCULAR; INTRAVENOUS at 08:06

## 2018-03-27 RX ADMIN — SODIUM CHLORIDE, SODIUM LACTATE, POTASSIUM CHLORIDE, AND CALCIUM CHLORIDE 100 ML/HR: 600; 310; 30; 20 INJECTION, SOLUTION INTRAVENOUS at 07:10

## 2018-03-27 RX ADMIN — MORPHINE SULFATE 2 MG: 10 INJECTION, SOLUTION INTRAMUSCULAR; INTRAVENOUS at 07:41

## 2018-03-27 NOTE — ANESTHESIA POSTPROCEDURE EVALUATION
Post-Anesthesia Evaluation and Assessment    Patient: Gilberto Quevedo MRN: [id number]  SSN: [id number]    YOB: 1970  Age: 52 y.o. Sex: male      Data from PACU flowsheet    Cardiovascular Function/Vital Signs  Visit Vitals    /85    Pulse 81    Temp 36.5 °C (97.7 °F)    Resp 9    Ht 5' 7\" (1.702 m)    Wt 56.3 kg (124 lb 1 oz)    SpO2 92%    BMI 19.43 kg/m2       Patient is status post general anesthesia for Procedure(s):  davinci  SINGLE SITE CHOLECYSTECTOMY ROBOTIC ASSISTED. Nausea/Vomiting: controlled    Postoperative hydration reviewed and adequate. Pain:  Pain Scale 1: Visual (03/27/18 0953)  Pain Intensity 1: 0 (03/27/18 0953)   Managed      Mental Status and Level of Consciousness: Alert and oriented     Pulmonary Status:   O2 Device: Nasal cannula (03/27/18 0953)   Adequate oxygenation and airway patent    Complications related to anesthesia: None    Post-anesthesia assessment completed.  No concerns    Signed By: Linh Benz MD     March 27, 2018

## 2018-03-27 NOTE — PERIOP NOTES
Spoke w/Dr. Nam Rodriguez re: inability to get patient off of 02 without sats dropping to 86-88. Discussed incentive spirometer and re eval.    Had pt move around and get dressed. PT is now Sating 92-97%    Advised by Dr. Nam Rodriguez to use 02 tonight. Ok for release.

## 2018-03-27 NOTE — ANESTHESIA PREPROCEDURE EVALUATION
Anesthetic History   No history of anesthetic complications            Review of Systems / Medical History  Patient summary reviewed and pertinent labs reviewed    Pulmonary        Sleep apnea: No treatment           Neuro/Psych   Within defined limits           Cardiovascular  Within defined limits                Exercise tolerance: >4 METS     GI/Hepatic/Renal  Within defined limits              Endo/Other        Blood dyscrasia (Sickle Cell Anemia) and arthritis     Other Findings   Comments: Documentation of current medication  Current medications obtained, documented and obtained? YES      Risk Factors for Postoperative nausea/vomiting:       History of postoperative nausea/vomiting? NO       Female? NO       Motion sickness? NO       Intended opioid administration for postoperative analgesia? YES      Smoking Abstinence:  Current Smoker? NO  Elective Surgery? YES  Seen preoperatively by anesthesiologist or proxy prior to day of surgery? YES  Pt abstained from smoking 24 hours prior to anesthesia?  N/A    Preventive care/screening for High Blood Pressure:  Aged 18 years and older: YES  Screened for high blood pressure: YES  Patients with high blood pressure referred to primary care provider   for BP management: YES                 Physical Exam    Airway  Mallampati: II  TM Distance: 4 - 6 cm  Neck ROM: normal range of motion   Mouth opening: Normal     Cardiovascular  Regular rate and rhythm,  S1 and S2 normal,  no murmur, click, rub, or gallop  Rhythm: regular  Rate: normal         Dental  No notable dental hx    Comments: One top front tooth missing   Pulmonary  Breath sounds clear to auscultation               Abdominal  GI exam deferred       Other Findings            Anesthetic Plan    ASA: 2  Anesthesia type: general          Induction: Intravenous  Anesthetic plan and risks discussed with: Patient

## 2018-03-27 NOTE — IP AVS SNAPSHOT
[address] [address] [place]  
[phone] Patient: Justina Crow MRN: [id number] [facility]:6/56/9280 About your hospitalization You were admitted on:  March 27, 2018 You last received care in the:  [facility] PHASE 2 RECOVERY You were discharged on:  March 27, 2018 Why you were hospitalized Your primary diagnosis was:  Not on File Follow-up Information Follow up With Details Comments Contact Info Syed Abraham MD   [address] [place] [address] [address] [address] 
[phone] Your Scheduled Appointments Monday April 09, 2018 11:00 AM EDT  
POST OP with Bhargav Castellano MD  
[address] [facility] [id number] [place] [address] [address] [address]  
[phone] Tuesday April 10, 2018  9:15 AM EDT  
ESTABLISHED PATIENT with Amanda Rankin MD  
[facility]. Melchor Ortega [address] [place] [address]  
[phone] Discharge Orders None A check rogelio indicates which time of day the medication should be taken. My Medications START taking these medications Instructions Each Dose to Equal  
 Morning Noon Evening Bedtime  
 ketorolac 10 mg tablet Commonly known as:  TORADOL Your last dose was: Your next dose is: Take 1 Tab by mouth every six (6) hours as needed for Pain. 10 mg CONTINUE taking these medications Instructions Each Dose to Equal  
 Morning Noon Evening Bedtime  
 aspirin  mg tablet Your last dose was: Your next dose is: Take 1,000 mg by mouth every twelve (12) hours as needed for Pain. 1000 mg  
    
   
   
   
  
 gabapentin 300 mg capsule Commonly known as:  NEURONTIN Your last dose was: Your next dose is: Take 300 mg by mouth three (3) times daily. 300 mg HYDROcodone-acetaminophen 5-500 mg Cap Your last dose was: Your next dose is: Take 1 Tab by mouth every six (6) hours as needed. 1 Tab INDERAL LA 80 mg SR capsule Generic drug:  propranolol LA Your last dose was: Your next dose is: Take 80 mg by mouth daily. Headache prevention 80 mg  
    
   
   
   
  
 loratadine 10 mg dissolvable tablet Commonly known as:  Dylanjosue Villar Your last dose was: Your next dose is: Take 10 mg by mouth daily. 10 mg  
    
   
   
   
  
 meclizine 25 mg tablet Commonly known as:  ANTIVERT Your last dose was: Your next dose is: Take  by mouth three (3) times daily as needed. multivitamin tablet Commonly known as:  ONE A DAY Your last dose was: Your next dose is: Take 1 Tab by mouth daily. 1 Tab  
    
   
   
   
  
 valACYclovir 1 gram tablet Commonly known as:  VALTREX Your last dose was: Your next dose is: Take 1 Tab by mouth as needed. 1 Tab ASK your doctor about these medications Instructions Each Dose to Equal  
 Morning Noon Evening Bedtime  
 naproxen 250 mg tablet Commonly known as:  NAPROSYN Your last dose was: Your next dose is: Take  by mouth two (2) times daily (with meals). Where to Get Your Medications Information on where to get these meds will be given to you by the nurse or doctor. ! Ask your nurse or doctor about these medications  
  ketorolac 10 mg tablet Opioid Education  Prescription Opioids: What You Need to Know: 
 
 
After general anesthesia or intravenous sedation, for 24 hours or while taking prescription Narcotics: · Limit your activities · Do not drive and operate hazardous machinery · Do not make important personal or business decisions · Do  not drink alcoholic beverages · If you have not urinated within 8 hours after discharge, please contact your surgeon on call. Report the following to your surgeon: 
· Excessive pain, swelling, redness or odor of or around the surgical area · Temperature over 100.5 · Nausea and vomiting lasting longer than 4 hours or if unable to take medications · Any signs of decreased circulation or nerve impairment to extremity: change in color, persistent  numbness, tingling, coldness or increase pain · Any questions What to do at Home: These are general instructions for a healthy lifestyle: No smoking/ No tobacco products/ Avoid exposure to second hand smoke Surgeon General's Warning:  Quitting smoking now greatly reduces serious risk to your health. Obesity, smoking, and sedentary lifestyle greatly increases your risk for illness A healthy diet, regular physical exercise & weight monitoring are important for maintaining a healthy lifestyle You may be retaining fluid if you have a history of heart failure or if you experience any of the following symptoms:  Weight gain of 3 pounds or more overnight or 5 pounds in a week, increased swelling in our hands or feet or shortness of breath while lying flat in bed. Please call your doctor as soon as you notice any of these symptoms; do not wait until your next office visit. Recognize signs and symptoms of STROKE: 
 
F-face looks uneven A-arms unable to move or move unevenly S-speech slurred or non-existent T-time-call 911 as soon as signs and symptoms begin-DO NOT go Back to bed or wait to see if you get better-TIME IS BRAIN. Warning Signs of HEART ATTACK Call 911 if you have these symptoms: 
? Chest discomfort. Most heart attacks involve discomfort in the center of the chest that lasts more than a few minutes, or that goes away and comes back. It can feel like uncomfortable pressure, squeezing, fullness, or pain. ? Discomfort in other areas of the upper body. Symptoms can include pain or discomfort in one or both arms, the back, neck, jaw, or stomach. ? Shortness of breath with or without chest discomfort. ? Other signs may include breaking out in a cold sweat, nausea, or lightheadedness. Don't wait more than five minutes to call [address]! Fast action can save your life. Calling 911 is almost always the fastest way to get lifesaving treatment. Emergency Medical Services staff can begin treatment when they arrive  up to an hour sooner than if someone gets to the hospital by car. The discharge information has been reviewed with the patient. The patient verbalized understanding. Discharge medications reviewed with the patient and appropriate educational materials and side effects teaching were provided. ___________________________________________________________________________________________________________________________________ Instructions Following Ambulatory Surgery Patient: Barbra Zarate MRN: [id number]  SSN: [id number] YOB: 1970  Age: 52 y.o. Sex: male Activity · As tolerated, walking encourage, stairs are okay · Avoid strenuous activities - no lifting anything heavier than 15 pounds. · You may shower at home after 48 hours. Diet · Regular diet after nausea from the anesthetic has passed. Pain · Take pain medication as directed by your doctor ·  Call your doctor if pain is NOT relieved by medication Dressing Care · Remove outer dressing (if any) after 48 hours. Leave steri-strips (if any) in place until they fall off. After Anesthesia · For the first 24 hours: DO NOT Drive, Drink alcoholic beverages, or Make important decisions · Be aware of dizziness following anesthesia and while taking pain medication Call your doctor if 
· Excessive bleeding that does not stop after holding mild pressure over the area · Temperature of 101 degrees F or above · Redness,excessive swelling or bruising, and/or green or yellow, smelly discharge from incision · If nausea and vomiting continues Follow-Up Phone Calls · Call the office at [phone] to make your follow-up appointment Appointment date/time: 2 weeks after the surgery. Dr. Raz Bhakta cell phone number is [phone]. Please call me if you have any concerns or questions. Patient armband removed and given to patient to take home. Patient was informed of the privacy risks if armband lost or stolen Introducing [facility]! [employer] introduces Heart Buddy patient portal. Now you can access parts of your medical record, email your doctor's office, and request medication refills online. 1. In your internet browser, go to [email or web address] 2. Click on the First Time User? Click Here link in the Sign In box. You will see the New Member Sign Up page. 3. Enter your Heart Buddy Access Code exactly as it appears below. You will not need to use this code after youve completed the sign-up process. If you do not sign up before the expiration date, you must request a new code. · Heart Buddy Access Code: [email or web address] Expires: 4/16/2018  4:07 PM 
 
4. Enter the last four digits of your Social Security Number (xxxx) and Date of Birth (mm/dd/yyyy) as indicated and click Submit. You will be taken to the next sign-up page. 5. Create a Heart Buddy ID.  This will be your Heart Buddy login ID and cannot be changed, so think of one that is secure and easy to remember. 6. Create a UPlanMe password. You can change your password at any time. 7. Enter your Password Reset Question and Answer. This can be used at a later time if you forget your password. 8. Enter your e-mail address. You will receive e-mail notification when new information is available in [address] 9. Click Sign Up. You can now view and download portions of your medical record. 10. Click the Download Summary menu link to download a portable copy of your medical information. If you have questions, please visit the Frequently Asked Questions section of the UPlanMe website. Remember, UPlanMe is NOT to be used for urgent needs. For medical emergencies, dial 911. Now available from your iPhone and Android! Introducing Bashir Walters As a [employer] patient, I wanted to make you aware of our electronic visit tool called Bashir Silvianofin. [employer] 24/SocialBuy allows you to connect within minutes with a medical provider 24 hours a day, seven days a week via a mobile device or tablet or logging into a secure website from your computer. You can access Bashir Avaamoperfin from anywhere in the [place]. A virtual visit might be right for you when you have a simple condition and feel like you just dont want to get out of bed, or cant get away from work for an appointment, when your regular [employer] [employer] provider is not available (evenings, weekends or holidays), or when youre out of town and need minor care. Electronic visits cost only $49 and if the [employer] 24/SocialBuy provider determines a prescription is needed to treat your condition, one can be electronically transmitted to a nearby pharmacy*. Please take a moment to enroll today if you have not already done so. The enrollment process is free and takes just a few minutes.   To enroll, please download the [employer] 24/7 bret to your tablet or phone, or visit [email or web address] to enroll on your computer. And, as an [address] [place] [address] patient with a Vestorly account, the results of your visits will be scanned into your electronic medical record and your primary care provider will be able to view the scanned results. We urge you to continue to see your regular [facility] provider for your ongoing medical care. And while your primary care provider may not be the one available when you seek a Bashir Zhangperfin virtual visit, the peace of mind you get from getting a real diagnosis real time can be priceless. For more information on Qiwi Post, view our Frequently Asked Questions (FAQs) at [email or web address]. Sincerely, 
 
Moses Allen MD 
Chief Medical Officer [facility] Carmen Segovia *:  certain medications cannot be prescribed via DECAperfin Providers Seen During Your Hospitalization Provider Specialty Primary office phone Annel Vo MD Surgery [phone] Your Primary Care Physician (PCP) Primary Care Physician Office Phone Office [email or web address]  [phone] You are allergic to the following Allergen Reactions Other Food Other (comments) Walnuts. Gets headaches Corn Other (comments) Told by  allergic Shellfish Derived Swelling  
 swelling Recent Documentation Height Weight BMI Smoking Status 1.702 m 56.3 kg 19.43 kg/m2 Never Smoker Emergency Contacts Name Discharge Info Relation Home Work Mobile [address] CAREGIVER [3] Mother [14] [phone] [address] [facility]. CAREGIVER [3] Girlfriend [18] [phone] Patient Belongings The following personal items are in your possession at time of discharge: 
  Dental Appliances: None  Visual Aid: None Please provide this summary of care documentation to your next provider. Signatures-by signing, you are acknowledging that this After Visit Summary has been reviewed with you and you have received a copy. Patient Signature:  ____________________________________________________________ Date:  ____________________________________________________________  
  
Cornelia Deiters Provider Signature:  ____________________________________________________________ Date:  ____________________________________________________________

## 2018-03-27 NOTE — DISCHARGE INSTRUCTIONS
DISCHARGE SUMMARY from Nurse    PATIENT INSTRUCTIONS:    After general anesthesia or intravenous sedation, for 24 hours or while taking prescription Narcotics:  · Limit your activities  · Do not drive and operate hazardous machinery  · Do not make important personal or business decisions  · Do  not drink alcoholic beverages  · If you have not urinated within 8 hours after discharge, please contact your surgeon on call. Report the following to your surgeon:  · Excessive pain, swelling, redness or odor of or around the surgical area  · Temperature over 100.5  · Nausea and vomiting lasting longer than 4 hours or if unable to take medications  · Any signs of decreased circulation or nerve impairment to extremity: change in color, persistent  numbness, tingling, coldness or increase pain  · Any questions    What to do at Home:  These are general instructions for a healthy lifestyle:    No smoking/ No tobacco products/ Avoid exposure to second hand smoke  Surgeon General's Warning:  Quitting smoking now greatly reduces serious risk to your health. Obesity, smoking, and sedentary lifestyle greatly increases your risk for illness    A healthy diet, regular physical exercise & weight monitoring are important for maintaining a healthy lifestyle    You may be retaining fluid if you have a history of heart failure or if you experience any of the following symptoms:  Weight gain of 3 pounds or more overnight or 5 pounds in a week, increased swelling in our hands or feet or shortness of breath while lying flat in bed. Please call your doctor as soon as you notice any of these symptoms; do not wait until your next office visit. Recognize signs and symptoms of STROKE:    F-face looks uneven    A-arms unable to move or move unevenly    S-speech slurred or non-existent    T-time-call 911 as soon as signs and symptoms begin-DO NOT go       Back to bed or wait to see if you get better-TIME IS BRAIN.     Warning Signs of HEART ATTACK     Call 911 if you have these symptoms:   Chest discomfort. Most heart attacks involve discomfort in the center of the chest that lasts more than a few minutes, or that goes away and comes back. It can feel like uncomfortable pressure, squeezing, fullness, or pain.  Discomfort in other areas of the upper body. Symptoms can include pain or discomfort in one or both arms, the back, neck, jaw, or stomach.  Shortness of breath with or without chest discomfort.  Other signs may include breaking out in a cold sweat, nausea, or lightheadedness. Don't wait more than five minutes to call 911 - MINUTES MATTER! Fast action can save your life. Calling 911 is almost always the fastest way to get lifesaving treatment. Emergency Medical Services staff can begin treatment when they arrive -- up to an hour sooner than if someone gets to the hospital by car. The discharge information has been reviewed with the patient. The patient verbalized understanding. Discharge medications reviewed with the patient and appropriate educational materials and side effects teaching were provided. ___________________________________________________________________________________________________________________________________ Instructions Following Ambulatory Surgery    Patient: Kim Vivar MRN: [id number]  SSN: [id number]    YOB: 1970  Age: 52 y.o. Sex: male      Activity  · As tolerated, walking encourage, stairs are okay  · Avoid strenuous activities - no lifting anything heavier than 15 pounds. · You may shower at home after 48 hours. Diet  · Regular diet after nausea from the anesthetic has passed. Pain  · Take pain medication as directed by your doctor  ·  Call your doctor if pain is NOT relieved by medication    Dressing Care  · Remove outer dressing (if any) after 48 hours. Leave steri-strips (if any) in place until they fall off.     After Anesthesia  · For the first 24 hours: DO NOT Drive, Drink alcoholic beverages, or Make important decisions  · Be aware of dizziness following anesthesia and while taking pain medication    Call your doctor if  · Excessive bleeding that does not stop after holding mild pressure over the area  · Temperature of 101 degrees F or above  · Redness,excessive swelling or bruising, and/or green or yellow, smelly discharge from incision  · If nausea and vomiting continues    Follow-Up Phone Calls  · Call the office at [phone] to make your follow-up appointment    Appointment date/time: 2 weeks after the surgery. Dr. Lucio Cassidy cell phone number is [phone]. Please call me if you have any concerns or questions. Patient armband removed and given to patient to take home.   Patient was informed of the privacy risks if armband lost or stolen

## 2018-03-27 NOTE — OP NOTES
[address] REPORT    Jf Lewis  MR#: [id number]  : 1970  ACCOUNT #: [de-identified]   DATE OF SERVICE: 2018    SURGEON:  Piero Escamilla MD    PREOPERATIVE DIAGNOSES:  Cholelithiasis and chronic cholecystitis. POSTOPERATIVE DIAGNOSES:  Cholelithiasis and possible chronic cholecystitis. PROCEDURE PERFORMED:  Robotic single-site cholecystectomy with Firefly to identify the biliary tree. ANESTHESIA:  General.    COMPLICATIONS:  None. SPECIMEN:  Gallbladder. BLOOD LOSS:  Minimal.     DETAILS OF PROCEDURE:  The patient was brought to the operating room. Anesthesia was induced. Prepping and draping of the abdomen was done in usual manner. A timeout was performed. A skin incision was performed inside the umbilicus. It was about 2.5 cm. There was already an umbilical hernia about 1 cm in size. This was included in the opening of the fascia. Under direct visualization there was no adhesion inside the abdomen. The single port was inserted. The robot was docked. First, I tried to look at the spleen because the patient has sickle cell anemia. His left lobe of the liver was very large and his colon was slightly distended, so I was not able to see the spleen very well, so I decided to proceed with the cholecystectomy. At this point, the gallbladder was retracted cephalad. The cystic duct and cystic artery were dissected at the Calot triangle. Firefly was used to identify the junction between the cystic duct and the common bile duct. The cystic duct was clipped and divided. The cystic artery was cauterized. The gallbladder was taken out from the liver bed using electrocautery. Hemostasis well secured. The gallbladder and the single port were taken out through the single incision. The fascia and the umbilical hernia were closed with a #1 PDS figure-of-eight suture and the skin was closed loosely with 4-0 Monocryl.       Ismael Serrano MD Evertt Holter / Eri Sherri  D: 03/27/2018 08:19     T: 03/27/2018 11:23  JOB #: [id number]

## 2018-03-27 NOTE — BRIEF OP NOTE
BRIEF OPERATIVE NOTE    Date of Procedure: 3/27/2018   Preoperative Diagnosis: chronic cholecysitis gallstone   k81.1   k80.20  Postoperative Diagnosis: chronic cholecysitis gallstone   k81.1   k80.20    Procedure(s):  davinci  SINGLE SITE CHOLECYSTECTOMY ROBOTIC ASSISTED  Surgeon(s) and Role:     * Surjit Montejo MD - Primary         Assistant Staff: None      Surgical Staff:  Circ-1: Altagracia Porras RN  Scrub Tech-1: [facility]  Surg Asst-1: Lisa CRAVEN Thompsons  Event Time In   Incision Start 5479   Incision Close 0813     Anesthesia: General   Estimated Blood Loss: Minimal  Specimens:   ID Type Source Tests Collected by Time Destination   1 : GALLBLADDER Preservative   Surjit Montejo MD 3/27/2018 0800 Pathology      Findings: Gallstones   Complications: none  Implants: * No implants in log *

## 2018-03-27 NOTE — H&P
Lui Clifton is a 52 y.o. male with a history of abdominal pain. He has been having the pain for about 10 years. This pain is localized in the right upper quadrant. It is not associated with any food intake. He has nausea but no vomiting. He had multiple ultrasound in the past, one in 2013 and one in 2017, and both showed cholelithiasis with evidence of cholecystitis with thickening of the gallbladder wall and pericholecystic fluid. The patient has a history of sickle cell disease, which could be the reason for his multiple gallstones. The patient  has not had jaundice, acholic stools or dark urine and has not had a history of pancreatitis or hepatitis.  He has no previous abdominal surgeries.          Patient Active Problem List     Diagnosis Date Noted    Abnormal liver function tests 01/16/2018    Abnormal liver function test 01/16/2018    Acute pharyngitis 01/16/2018    Acute upper respiratory infection 01/16/2018    Atopic rhinitis 01/16/2018    Allergic rhinitis 01/16/2018    Anemia 01/16/2018    Pain of upper extremity 01/16/2018    Pain in axilla 01/16/2018    Arthritis 01/16/2018    Atypical chest pain 01/16/2018    Avascular necrosis of bone ([address] 01/16/2018    Avascular necrosis ([address] 01/16/2018    Benign paroxysmal positional vertigo 01/16/2018    Carpal tunnel syndrome 01/16/2018    Central perforation of tympanic membrane 01/16/2018    Cervical radiculopathy 01/16/2018    Neck pain 01/16/2018    Cholelithiasis 01/16/2018    Sleep-wake schedule disorder, delayed phase type 01/16/2018    Coital headache 01/16/2018    Conductive hearing loss, unilateral 01/16/2018    Constipation 01/16/2018    Dark urine 01/16/2018    Difficult or painful urination 01/16/2018    Need for immunization against influenza 01/16/2018    Gout 01/16/2018    Hb-SS disease without crisis ([address] 01/16/2018    Pain of left lower extremity 01/16/2018    Injury to chest wall 01/16/2018    Knee pain 01/16/2018  Thoracic back pain 01/16/2018    Bursitis of shoulder, left 08/03/2009    DJD (degenerative joint disease) 08/03/2009           Past Medical History:   Diagnosis Date    Anemia NEC      Bursitis of shoulder, left 08/03/2009     MRI revealed tendinitis and bursitis of the rotator cuff.  DJD (degenerative joint disease) 08/03/2009     Early djd, left radiocarpal joint with associated ganglion cyst.      Elevated white blood cell count 07/20/2009    GERD (gastroesophageal reflux disease)      H/O: rotator cuff tear 06/07/2006     Chronic    Hyperlipidemia      Pain in joint, upper arm 07/2009     Pain in shoulder, elbow and wrist.    Sickle cell anemia (HCC)      Sleep apnea      Vertigo 07/20/2009            Past Surgical History:   Procedure Laterality Date    SHOULDER SURG PROC UNLISTED   06/14/2006     right shoulder arthroscopy           Social History   Substance Use Topics    Smoking status: Never Smoker    Smokeless tobacco: Never Used    Alcohol use No      History   Smoking Status    Never Smoker   Smokeless Tobacco    Never Used            Family History   Problem Relation Age of Onset    Diabetes Mother             Current Outpatient Prescriptions   Medication Sig    gabapentin (NEURONTIN) 300 mg capsule Take  by mouth.  SUMAtriptan (IMITREX) 50 mg tablet Take  by mouth.  amitriptyline (ELAVIL) 25 mg tablet      cyclobenzaprine (FLEXERIL) 5 mg tablet Cyclobenzaprine HCl - 5 MG Oral Tablet  TAKE 1 TABLET AT BEDTIME AS NEEDED. Quantity: 30;  Refills: 0         [employer] ;  Started 12-May-2015  Active    multivitamin (ONE A DAY) tablet Take 1 Tab by mouth daily.  meclizine (ANTIVERT) 25 mg tablet Take  by mouth three (3) times daily as needed.  naproxen (NAPROSYN) 250 mg tablet Take  by mouth two (2) times daily (with meals).  hydrocodone-acetaminophen 5-500 mg Cap Take  by mouth.      No current facility-administered medications for this visit. Allergies   Allergen Reactions    Other Food Other (comments)       Walnuts. Gets headaches    Corn Other (comments)       Told by  allergic    Shellfish Derived Swelling       swelling         Review of Systems:  Constitutional: negative  Eyes: negative  Ears, Nose, Mouth, Throat, and Face: negative  Respiratory: negative  Cardiovascular: negative  Gastrointestinal: positive for nausea and abdominal pain  Genitourinary:negative  Integument/Breast: negative  Hematologic/Lymphatic: negative  Musculoskeletal:negative  Neurological: negative  Behavioral/Psychiatric: negative  Endocrine: negative  Allergic/Immunologic: negative     Objective:           Visit Vitals    /60 (BP 1 Location: Right arm, BP Patient Position: Sitting)    Pulse 98    Temp 98.1 °F (36.7 °C) (Oral)    Resp 16    Ht 5' 7\" (1.702 m)    Wt 63 kg (138 lb 12.8 oz)    SpO2 98%    BMI 21.74 kg/m2         Physical Exam:       General:  in no apparent distress, alert, oriented times 3, afebrile and normal vitals   Eyes:  conjunctivae and sclerae normal, pupils equal, round, reactive to light   Throat & Neck: no erythema or exudates noted and neck supple and symmetrical; no palpable masses   Lungs:   clear to auscultation bilaterally   Heart:  Regular rate and rhythm   Abdomen:   rounded, soft, non tender except for right upper quadrant tenderness, nondistended, no masses or organomegaly but he has a slight bulge on the left side of the abdominal wall.  No abdominal wall hernias   Extremities: extremities normal, atraumatic, no cyanosis or edema   Skin: Normal.          Imaging and Lab Review:      CBC: No results found for: WBC, RBC, HGB, HCT, PLT, HGBEXT, HCTEXT, PLTEXT  BMP: No results found for: GLU, NA, K, CL, CO2, BUN, CREA, CA  CMP:No results found for: GLU, NA, K, CL, CO2, BUN, CREA, CA, AGAP, BUCR, TBIL, GPT, AP, TP, ALB, GLOB, AGRAT      Recent Results    No results found for this or any previous visit (from the past 24 hour(s)).        images and reports reviewed     Assessment:   Chavo Kwok is a 52 y.o. male is presenting with abdominal pain and a picture of chronic cholecystitis. I explained the indications for robotic cholecystectomy as well as the alternatives. I discussed the potential risks, including but not limited to bleeding, wound infection, trocar injuries, bile duct injury and leak, and also the possible need for conversion to open procedure. I also explained the firefly technology and how it allow us to visualize the biliary tree to avoid bile duct injury or leak. He indicates that he understands the risks, accepts and wishes to proceed.     Plan:      1. Will schedule for robotic single-site cholecystectomy with firefly (ICG) technology for identification of the biliary tree. 2. No heavy lifting (more than 15 pounds) for 2 weeks after the surgery. 3. Avoid constipation after the surgery (take stool softener).

## 2018-03-27 NOTE — PROGRESS NOTES
Date of Surgery Update:  Marciano Pollack was seen and examined. History and physical has been reviewed. The patient has been examined. There have been no significant clinical changes since the completion of the originally dated History and Physical. Patient seen and examined. Will proceed with robotic cholecystectomy.      Signed By: Lauren Trejo MD     March 27, 2018 6:58 AM

## 2018-03-27 NOTE — PERIOP NOTES
902 - Updated girlfriend, Noreen Brown, in waiting room. Girlfriend stated that patient is tran on percocet and that pt takes Vicodin at home. 1014 - Updated girlfriend, Noreen Brown, in waiting room.  Notified that trying to maintain pt oxygen sats - stated he always has a low oxygen level

## 2018-04-09 ENCOUNTER — OFFICE VISIT (OUTPATIENT)
Dept: SURGERY | Age: 48
End: 2018-04-09

## 2018-04-09 VITALS
TEMPERATURE: 97.5 F | OXYGEN SATURATION: 94 % | DIASTOLIC BLOOD PRESSURE: 61 MMHG | HEART RATE: 65 BPM | WEIGHT: 136 LBS | RESPIRATION RATE: 18 BRPM | HEIGHT: 67 IN | SYSTOLIC BLOOD PRESSURE: 130 MMHG | BODY MASS INDEX: 21.35 KG/M2

## 2018-04-09 DIAGNOSIS — Z09 POSTOPERATIVE EXAMINATION: Primary | ICD-10-CM

## 2018-04-09 NOTE — MR AVS SNAPSHOT
Agnes Sanchez 
 
 
 [address] [address] [address] 
[phone] Patient: Jaylin Matos MRN: [id number] TSL:[birth year] Visit Information Date & Time Provider Department Dept. Phone Encounter #  
 4/9/2018 11:00 AM MD Alejo Biswas [facility] [phone] [id number] Your Appointments 4/10/2018  9:15 AM  
ESTABLISHED PATIENT with Shania Sesay MD  
[facility]) Appt Note: 3 MONTH F/U  
 [address] [address]  
[phone]  
  
   
 [place] [address] Upcoming Health Maintenance Date Due Pneumococcal 19-64 Highest Risk (1 of 3 - PCV13) 5/23/1989 DTaP/Tdap/Td series (1 - Tdap) 5/23/1991 Allergies as of 4/9/2018  Review Complete On: 4/9/2018 By: Severiano Silva Severity Noted Reaction Type Reactions Other Food  01/16/2018    Other (comments) Walnuts. Gets headaches [place]  01/16/2018    Other (comments) Told by  allergic Shellfish Derived  01/16/2018    Swelling  
 swelling Current Immunizations  Never Reviewed No immunizations on file. Not reviewed this visit You Were Diagnosed With   
  
 Codes Comments Postoperative examination    -  Primary ICD-10-CM: L33 ICD-9-CM: V67.00 Vitals BP Pulse Temp Resp Height(growth percentile) Weight(growth percentile) 130/61 (BP 1 Location: Right arm, BP Patient Position: Sitting) 65 97.5 °F (36.4 °C) (Oral) 18 5' 7\" (1.702 m) 136 lb (61.7 kg) SpO2 BMI Smoking Status 94% 21.3 kg/m2 Never Smoker Vitals History BMI and BSA Data Body Mass Index Body Surface Area  
 21.3 kg/m 2 1.71 m 2 Your Updated Medication List  
  
   
This list is accurate as of 4/9/18 11:15 AM.  Always use your most recent med list.  
  
  
  
  
 aspirin  mg tablet Take 1,000 mg by mouth every twelve (12) hours as needed for Pain.  
  
 gabapentin 300 mg capsule Commonly known as:  NEURONTIN Take 300 mg by mouth three (3) times daily. HYDROcodone-acetaminophen 5-500 mg Cap Take 1 Tab by mouth every six (6) hours as needed. INDERAL LA 80 mg SR capsule Generic drug:  propranolol LA Take 80 mg by mouth daily. Headache prevention  
  
 ketorolac 10 mg tablet Commonly known as:  TORADOL Take 1 Tab by mouth every six (6) hours as needed for Pain.  
  
 loratadine 10 mg dissolvable tablet Commonly known as:  Girtha Elliott Take 10 mg by mouth daily. meclizine 25 mg tablet Commonly known as:  ANTIVERT Take  by mouth three (3) times daily as needed. multivitamin tablet Commonly known as:  ONE A DAY Take 1 Tab by mouth daily. naproxen 250 mg tablet Commonly known as:  NAPROSYN Take  by mouth two (2) times daily (with meals). valACYclovir 1 gram tablet Commonly known as:  VALTREX Take 1 Tab by mouth as needed. Patient Instructions If you have any questions or concerns about today's appointment, the verbal and/or written instructions you were given for follow up care, please call our office at [phone]. [facility] - [address] 
 
[phone] office [phone] Introducing [facility] & HEALTH [facility]! [facility] introduces [employer] patient portal. Now you can access parts of your medical record, email your doctor's office, and request medication refills online. 1. In your internet browser, go to [email or web address] 2. Click on the First Time User? Click Here link in the Sign In box. You will see the New Member Sign Up page. 3. Enter your Fan[employer] Access Code exactly as it appears below. You will not need to use this code after youve completed the sign-up process.  If you do not sign up before the expiration date, you must request a new code. · [employer] ([employer]) Access Code: [email or web address] Expires: 4/16/2018  4:07 PM 
 
4. Enter the last four digits of your Social Security Number (xxxx) and Date of Birth (mm/dd/yyyy) as indicated and click Submit. You will be taken to the next sign-up page. 5. Create a [employer]) ID. This will be your [employer] login ID and cannot be changed, so think of one that is secure and easy to remember. 6. Create a [employer] password. You can change your password at any time. 7. Enter your Password Reset Question and Answer. This can be used at a later time if you forget your password. 8. Enter your e-mail address. You will receive e-mail notification when new information is available in [address] 9. Click Sign Up. You can now view and download portions of your medical record. 10. Click the Download Summary menu link to download a portable copy of your medical information. If you have questions, please visit the Frequently Asked Questions section of the [employer] website. Remember, [employer]) is NOT to be used for urgent needs. For medical emergencies, dial 911. Now available from your iPhone and Android! Please provide this summary of care documentation to your next provider. Your primary care clinician is listed as Heriberto Hyltonite. If you have any questions after today's visit, please call [phone].

## 2018-04-09 NOTE — PROGRESS NOTES
Candelario Angelo is a 52 y.o. male who presents today for a post-op exam for a robotic single-site cholecystectomy performed on 03/27/18. Patient scores their post-op pain level on a pain scale from 1-10 as a 0 today. 1. Have you been to the ER, urgent care clinic since your last visit? Hospitalized since your last visit? No    2. Have you seen or consulted any other health care providers outside of the [facility] since your last visit? Include any pap smears or colon screening.  No

## 2018-04-09 NOTE — PATIENT INSTRUCTIONS
If you have any questions or concerns about today's appointment, the verbal and/or written instructions you were given for follow up care, please call our office at [phone].     [facility] - [address]    [phone] office  [phone]

## 2018-04-09 NOTE — LETTER
4/9/2018 11:02 AM 
 
Patient:  Horace Katz YOB: 1970 Date of Visit: 4/9/2018 Eric Rojo MD 
[address] [place] [address]  [address] [id number] VIA Facsimile: [phone] Dear Eric Rojo MD, Thank you for referring Mr. Shanta Mathew to [place] [address] for evaluation and treatment. Below are the relevant portions of my assessment and plan of care. Thank you very much for your referral of Mr. Shanta Mathew. If you have questions, please do not hesitate to call me. I look forward to following Mr. Karlee Sandra along with you and will keep you updated as to his progress. Sincerely, Melodie Simpson MD

## 2018-04-09 NOTE — PROGRESS NOTES
Patient seen and examined. He is doing well. Tolerating diet.    Abdomen is soft and non-tender  Wound is healing well  Follow up as needed

## 2018-04-10 PROBLEM — T14.8XXA NERVE COMPRESSION: Status: ACTIVE | Noted: 2018-04-10

## 2018-04-10 PROBLEM — G47.33 OSA (OBSTRUCTIVE SLEEP APNEA): Status: ACTIVE | Noted: 2018-04-10

## 2018-04-10 PROBLEM — M75.42 IMPINGEMENT SYNDROME OF LEFT SHOULDER: Status: ACTIVE | Noted: 2018-04-10

## 2018-04-10 PROBLEM — Z79.891 OPIOID CONTRACT EXISTS: Status: ACTIVE | Noted: 2018-04-10

## 2018-04-10 PROBLEM — D72.829 LEUKOCYTOSIS: Status: ACTIVE | Noted: 2018-04-10

## 2018-04-10 PROBLEM — H90.11 CONDUCTIVE HEARING LOSS IN RIGHT EAR: Status: ACTIVE | Noted: 2018-04-10

## 2018-04-10 PROBLEM — Z09 HOSPITAL DISCHARGE FOLLOW-UP: Status: ACTIVE | Noted: 2018-04-10

## 2018-04-10 PROBLEM — Z87.39 HISTORY OF OSTEONECROSIS: Status: ACTIVE | Noted: 2018-04-10

## 2018-04-10 PROBLEM — Z86.39 HISTORY OF HYPERKALEMIA: Status: ACTIVE | Noted: 2018-04-10

## 2018-04-10 PROBLEM — D75.839 THROMBOCYTOSIS: Status: ACTIVE | Noted: 2018-04-10

## 2018-04-10 PROBLEM — R59.1 LYMPHADENOPATHY: Status: ACTIVE | Noted: 2018-04-10

## 2018-04-10 PROBLEM — S20.211A CONTUSION OF RIB ON RIGHT SIDE: Status: ACTIVE | Noted: 2018-04-10

## 2018-04-10 PROBLEM — M75.100 SUPRASPINATUS TENDON TEAR: Status: ACTIVE | Noted: 2018-04-10

## 2018-04-10 PROBLEM — J18.9 COMMUNITY ACQUIRED PNEUMONIA: Status: ACTIVE | Noted: 2017-12-09

## 2018-04-10 PROBLEM — N52.9 MALE ERECTILE DYSFUNCTION, UNSPECIFIED: Status: ACTIVE | Noted: 2018-04-10

## 2018-04-10 PROBLEM — E78.5 HYPERLIPIDEMIA: Status: ACTIVE | Noted: 2018-04-10

## 2018-04-10 PROBLEM — R07.89 STERNUM PAIN: Status: ACTIVE | Noted: 2018-04-10

## 2018-04-10 PROBLEM — Z86.69 HISTORY OF PERFORATED EAR DRUM: Status: ACTIVE | Noted: 2018-04-10

## 2018-04-10 PROBLEM — H81.399 VERTIGO, PERIPHERAL: Status: ACTIVE | Noted: 2018-04-10

## 2018-04-10 PROBLEM — R51.9 HEADACHE: Status: ACTIVE | Noted: 2018-04-10

## 2018-04-10 PROBLEM — B00.1 RECURRENT COLD SORES: Status: ACTIVE | Noted: 2018-04-10

## 2018-04-10 PROBLEM — N46.9 MALE FERTILITY PROBLEM: Status: ACTIVE | Noted: 2018-04-10

## 2018-04-10 PROBLEM — D57.00 SICKLE CELL ANEMIA WITH PAIN (HCC): Status: ACTIVE | Noted: 2017-12-09

## 2018-04-10 PROBLEM — D57.00 SICKLE CELL PAIN CRISIS (HCC): Status: ACTIVE | Noted: 2017-01-03

## 2018-04-10 PROBLEM — I27.20 PULMONARY HYPERTENSION (HCC): Status: ACTIVE | Noted: 2018-04-10

## 2018-04-10 PROBLEM — R20.2 PARESTHESIA OF LEFT UPPER EXTREMITY: Status: ACTIVE | Noted: 2018-04-10

## 2018-04-10 PROBLEM — Z20.820 EXPOSURE TO CHICKENPOX: Status: ACTIVE | Noted: 2018-04-10

## 2018-04-10 PROBLEM — M75.20 BICIPITAL TENDINITIS: Status: ACTIVE | Noted: 2018-04-10

## 2018-04-10 PROBLEM — D57.00 SICKLE CELL CRISIS (HCC): Status: ACTIVE | Noted: 2017-01-03

## 2018-04-12 ENCOUNTER — OFFICE VISIT (OUTPATIENT)
Dept: ORTHOPEDIC SURGERY | Facility: CLINIC | Age: 48
End: 2018-04-12

## 2018-04-12 VITALS
OXYGEN SATURATION: 88 % | RESPIRATION RATE: 16 BRPM | WEIGHT: 135 LBS | SYSTOLIC BLOOD PRESSURE: 98 MMHG | HEART RATE: 66 BPM | HEIGHT: 67 IN | DIASTOLIC BLOOD PRESSURE: 49 MMHG | BODY MASS INDEX: 21.19 KG/M2 | TEMPERATURE: 97.8 F

## 2018-04-12 DIAGNOSIS — M54.2 NECK PAIN: Primary | ICD-10-CM

## 2018-04-12 DIAGNOSIS — M25.512 CHRONIC LEFT SHOULDER PAIN: ICD-10-CM

## 2018-04-12 DIAGNOSIS — D57.00 HB-SS DISEASE WITH CRISIS (HCC): ICD-10-CM

## 2018-04-12 DIAGNOSIS — R20.0 NUMBNESS AND TINGLING IN LEFT HAND: ICD-10-CM

## 2018-04-12 DIAGNOSIS — G89.29 CHRONIC LEFT SHOULDER PAIN: ICD-10-CM

## 2018-04-12 DIAGNOSIS — R20.2 NUMBNESS AND TINGLING IN LEFT HAND: ICD-10-CM

## 2018-04-12 RX ORDER — BUPIVACAINE HYDROCHLORIDE 2.5 MG/ML
4 INJECTION, SOLUTION EPIDURAL; INFILTRATION; INTRACAUDAL ONCE
Qty: 4 ML | Refills: 0
Start: 2018-04-12 | End: 2018-04-12

## 2018-04-12 RX ORDER — BETAMETHASONE SODIUM PHOSPHATE AND BETAMETHASONE ACETATE 3; 3 MG/ML; MG/ML
6 INJECTION, SUSPENSION INTRA-ARTICULAR; INTRALESIONAL; INTRAMUSCULAR; SOFT TISSUE ONCE
Qty: 0.5 ML | Refills: 0
Start: 2018-04-12 | End: 2018-04-12

## 2018-04-12 NOTE — PATIENT INSTRUCTIONS
Neck: Exercises  Your Care Instructions  Here are some examples of typical rehabilitation exercises for your condition. Start each exercise slowly. Ease off the exercise if you start to have pain. Your doctor or physical therapist will tell you when you can start these exercises and which ones will work best for you. How to do the exercises  Neck stretch    1. This stretch works best if you keep your shoulder down as you lean away from it. To help you remember to do this, start by relaxing your shoulders and lightly holding on to your thighs or your chair. 2. Tilt your head toward your shoulder and hold for 15 to 30 seconds. Let the weight of your head stretch your muscles. 3. If you would like a little added stretch, use your hand to gently and steadily pull your head toward your shoulder. For example, keeping your right shoulder down, lean your head to the left. 4. Repeat 2 to 4 times toward each shoulder. Diagonal neck stretch    1. Turn your head slightly toward the direction you will be stretching, and tilt your head diagonally toward your chest and hold for 15 to 30 seconds. 2. If you would like a little added stretch, use your hand to gently and steadily pull your head forward on the diagonal.  3. Repeat 2 to 4 times toward each side. Dorsal glide stretch    The dorsal glide stretches the back of the neck. If you feel pain, do not glide so far back. Some people find this exercise easier to do while lying on their backs with an ice pack on the neck. 1. Sit or stand tall and look straight ahead. 2. Slowly tuck your chin as you glide your head backward over your body  3. Hold for a count of 6, and then relax for up to 10 seconds. 4. Repeat 8 to 12 times. Chest and shoulder stretch    1. Sit or stand tall and glide your head backward as in the dorsal glide stretch. 2. Raise both arms so that your hands are next to your ears.   3. Take a deep breath, and as you breathe out, lower your elbows down and behind your back. You will feel your shoulder blades slide down and together, and at the same time you will feel a stretch across your chest and the front of your shoulders. 4. Hold for about 6 seconds, and then relax for up to 10 seconds. 5. Repeat 8 to 12 times. Strengthening: Hands on head    1. Move your head backward, forward, and side to side against gentle pressure from your hands, holding each position for about 6 seconds. 2. Repeat 8 to 12 times. Follow-up care is a key part of your treatment and safety. Be sure to make and go to all appointments, and call your doctor if you are having problems. It's also a good idea to know your test results and keep a list of the medicines you take. Where can you learn more? Go to http://davy-earl.info/. Enter P975 in the search box to learn more about \"Neck: Exercises. \"  Current as of: March 21, 2017  Content Version: 11.4  © 6819-4086 [employer]. Care instructions adapted under license by [employer] (which disclaims liability or warranty for this information). If you have questions about a medical condition or this instruction, always ask your healthcare professional. [place] [address] any warranty or liability for your use of this information. Joint Injections: Care Instructions  Your Care Instructions  Joint injections are shots into a joint, such as the knee. They may be used to put in medicines, such as pain relievers. Or they can be used to take out fluid. Sometimes the fluid is tested in a lab. This can help find the cause of a joint problem. A corticosteroid, or steroid, shot is used to reduce inflammation in tendons or joints. It is often used to treat problems such as arthritis, tendinitis, and bursitis. Steroids can be injected directly into a painful, inflamed joint. They can also help reduce inflammation of a bursa. A bursa is a sac of fluid.  It cushions and lubricates areas where tendons, ligaments, skin, muscles, or bones rub against each other. A steroid shot can sometimes help with short-term pain relief when other treatments haven't worked. If steroid shots help, pain may improve for weeks or months. Follow-up care is a key part of your treatment and safety. Be sure to make and go to all appointments, and call your doctor if you are having problems. It's also a good idea to know your test results and keep a list of the medicines you take. How can you care for yourself at home? · Put ice or a cold pack on the area for 10 to 20 minutes at a time. Put a thin cloth between the ice and your skin. · Take anti-inflammatory medicines to reduce pain, swelling, or inflammation. These include ibuprofen (Advil, Motrin) and naproxen (Aleve). Read and follow all instructions on the label. · Avoid strenuous activities for several days, especially those that put stress on the area where you got the shot. · If you have dressings over the area, keep them clean and dry. You may remove them when your doctor tells you to. When should you call for help? Call your doctor now or seek immediate medical care if:  ? · You have signs of infection, such as:  ¨ Increased pain, swelling, warmth, or redness. ¨ Red streaks leading from the site. ¨ Pus draining from the site. ¨ A fever. ? Watch closely for changes in your health, and be sure to contact your doctor if you have any problems. Where can you learn more? Go to http://davy-earl.info/. Enter N616 in the search box to learn more about \"Joint Injections: Care Instructions. \"  Current as of: March 21, 2017  Content Version: 11.4  © 9942-1105 [employer]. Care instructions adapted under license by [employer] (which disclaims liability or warranty for this information).  If you have questions about a medical condition or this instruction, always ask your healthcare professional. [employer] disclaims any warranty or liability for your use of this information.

## 2018-04-12 NOTE — MR AVS SNAPSHOT
[address] [place] [address], [address] [facility] [address] 
[phone] Patient: Sunny Harman MRN: [id number] WSD:[id number] Visit Information Date & Time Provider Department Dept. Phone Encounter #  
 4/12/2018 11:10 AM Rae Acevedo MD [facility] - [place] [id number]  Follow-up Instructions Return if symptoms worsen or fail to improve. Your Appointments 7/30/2018  1:45 PM  
ESTABLISHED PATIENT with Jm Huynh MD  
[facility] [address] [place] [address] [facility]: 3 MONTH F/U  
 [address] [address]  
[phone]  
  
   
 [place] [id number] Upcoming Health Maintenance Date Due Pneumococcal 19-64 Highest Risk (3 of 3 - PCV13) 10/15/2017 DTaP/Tdap/Td series (2 - Td) 10/15/2022 Allergies as of 4/12/2018  Review Complete On: 0/57/0817 By: Sheldon Naranjo Severity Noted Reaction Type Reactions Other Food  01/16/2018    Other (comments) Walnuts. Gets headaches [facility]  01/16/2018    Other (comments) Told by  allergic Shellfish Derived  01/16/2018    Swelling  
 swelling Current Immunizations  Never Reviewed Name Date Hib (PRP-OMP) 10/19/2007 12:00 AM  
 Influenza Vaccine 11/24/2017 12:00 AM, 10/14/2016 12:00 AM, 10/15/2015 12:00 AM, 10/17/2014 12:00 AM, 10/19/2007 12:00 AM, 10/25/2006 12:00 AM, 10/25/2005 12:00 AM  
 Meningococcal (MCV4P) Vaccine 8/31/2016 12:00 AM, 10/19/2007 12:00 AM  
 Pneumococcal Conjugate (PCV-7) 10/17/2014 12:00 AM  
 Pneumococcal Polysaccharide (PPSV-23) 10/15/2016 12:00 AM, 8/31/2016 12:00 AM, 10/19/2007 12:00 AM  
 TB Skin Test (PPD) Intradermal 4/16/2012 12:00 AM  
 Tdap 10/15/2012 12:00 AM  
  
 Not reviewed this visit You Were Diagnosed With   
  
 Codes Comments Neck pain    -  Primary ICD-10-CM: M54.2 ICD-9-CM: 723.1 Chronic left shoulder pain     ICD-10-CM: M25.512, G89.29 ICD-9-CM: 719.41, 338.29 Numbness and tingling in left hand     ICD-10-CM: R20.0, R20.2 ICD-9-CM: 782.0 Hb-SS disease with crisis [facility])     ICD-10-CM: D57.00 ICD-9-CM: 282.62 Vitals BP Pulse Temp Resp Height(growth percentile) Weight(growth percentile) 98/49 66 97.8 °F (36.6 °C) (Oral) 16 5' 7\" (1.702 m) 135 lb (61.2 kg) SpO2 BMI Smoking Status (!) 88% 21.14 kg/m2 Former Smoker Vitals History BMI and BSA Data Body Mass Index Body Surface Area  
 21.14 kg/m 2 1.7 m 2 Your Updated Medication List  
  
   
This list is accurate as of 4/12/18 11:53 AM.  Always use your most recent med list.  
  
  
  
  
 aspirin  mg tablet Take 1,000 mg by mouth every twelve (12) hours as needed for Pain.  
  
 gabapentin 300 mg capsule Commonly known as:  NEURONTIN Take 300 mg by mouth three (3) times daily. HYDROcodone-acetaminophen 5-500 mg Cap Take 1 Tab by mouth every six (6) hours as needed. INDERAL LA 80 mg SR capsule Generic drug:  propranolol LA Take 80 mg by mouth daily. Headache prevention  
  
 ketorolac 10 mg tablet Commonly known as:  TORADOL Take 1 Tab by mouth every six (6) hours as needed for Pain.  
  
 loratadine 10 mg dissolvable tablet Commonly known as:  Lorrene Closs Take 10 mg by mouth daily. meclizine 25 mg tablet Commonly known as:  ANTIVERT Take  by mouth three (3) times daily as needed. multivitamin tablet Commonly known as:  ONE A DAY Take 1 Tab by mouth daily. naproxen 250 mg tablet Commonly known as:  NAPROSYN Take  by mouth two (2) times daily (with meals). valACYclovir 1 gram tablet Commonly known as:  VALTREX Take 1 Tab by mouth as needed. Follow-up Instructions Return if symptoms worsen or fail to improve. Patient Instructions Neck: Exercises Your Care Instructions Here are some examples of typical rehabilitation exercises for your condition. Start each exercise slowly. Ease off the exercise if you start to have pain. Your doctor or physical therapist will tell you when you can start these exercises and which ones will work best for you. How to do the exercises Neck stretch 1. This stretch works best if you keep your shoulder down as you lean away from it. To help you remember to do this, start by relaxing your shoulders and lightly holding on to your thighs or your chair. 2. Tilt your head toward your shoulder and hold for 15 to 30 seconds. Let the weight of your head stretch your muscles. 3. If you would like a little added stretch, use your hand to gently and steadily pull your head toward your shoulder. For example, keeping your right shoulder down, lean your head to the left. 4. Repeat 2 to 4 times toward each shoulder. Diagonal neck stretch 1. Turn your head slightly toward the direction you will be stretching, and tilt your head diagonally toward your chest and hold for 15 to 30 seconds. 2. If you would like a little added stretch, use your hand to gently and steadily pull your head forward on the diagonal. 
3. Repeat 2 to 4 times toward each side. Dorsal glide stretch The dorsal glide stretches the back of the neck. If you feel pain, do not glide so far back. Some people find this exercise easier to do while lying on their backs with an ice pack on the neck. 1. Sit or stand tall and look straight ahead. 2. Slowly tuck your chin as you glide your head backward over your body 3. Hold for a count of 6, and then relax for up to 10 seconds. 4. Repeat 8 to 12 times. Chest and shoulder stretch 1. Sit or stand tall and glide your head backward as in the dorsal glide stretch. 2. Raise both arms so that your hands are next to your ears.  
3. Take a deep breath, and as you breathe out, lower your elbows down and behind your back. You will feel your shoulder blades slide down and together, and at the same time you will feel a stretch across your chest and the front of your shoulders. 4. Hold for about 6 seconds, and then relax for up to 10 seconds. 5. Repeat 8 to 12 times. Strengthening: Hands on head 1. Move your head backward, forward, and side to side against gentle pressure from your hands, holding each position for about 6 seconds. 2. Repeat 8 to 12 times. Follow-up care is a key part of your treatment and safety. Be sure to make and go to all appointments, and call your doctor if you are having problems. It's also a good idea to know your test results and keep a list of the medicines you take. Where can you learn more? Go to http://davy-earl.info/. Enter P975 in the search box to learn more about \"Neck: Exercises. \" Current as of: March 21, 2017 Content Version: 11.4 © 7169-2018 [employer]. Care instructions adapted under license by [employer] (which disclaims liability or warranty for this information). If you have questions about a medical condition or this instruction, always ask your healthcare professional. [address] any warranty or liability for your use of this information. Joint Injections: Care Instructions Your Care Instructions Joint injections are shots into a joint, such as the knee. They may be used to put in medicines, such as pain relievers. Or they can be used to take out fluid. Sometimes the fluid is tested in a lab. This can help find the cause of a joint problem. A corticosteroid, or steroid, shot is used to reduce inflammation in tendons or joints. It is often used to treat problems such as arthritis, tendinitis, and bursitis. Steroids can be injected directly into a painful, inflamed joint. They can also help reduce inflammation of a bursa. A bursa is a sac of fluid.  It cushions and lubricates areas where tendons, ligaments, skin, muscles, or bones rub against each other. A steroid shot can sometimes help with short-term pain relief when other treatments haven't worked. If steroid shots help, pain may improve for weeks or months. Follow-up care is a key part of your treatment and safety. Be sure to make and go to all appointments, and call your doctor if you are having problems. It's also a good idea to know your test results and keep a list of the medicines you take. How can you care for yourself at home? · Put ice or a cold pack on the area for 10 to 20 minutes at a time. Put a thin cloth between the ice and your skin. · Take anti-inflammatory medicines to reduce pain, swelling, or inflammation. These include ibuprofen (Advil, Motrin) and naproxen (Aleve). Read and follow all instructions on the label. · Avoid strenuous activities for several days, especially those that put stress on the area where you got the shot. · If you have dressings over the area, keep them clean and dry. You may remove them when your doctor tells you to. When should you call for help? Call your doctor now or seek immediate medical care if: 
? · You have signs of infection, such as: 
¨ Increased pain, swelling, warmth, or redness. ¨ Red streaks leading from the site. ¨ Pus draining from the site. ¨ A fever. ? Watch closely for changes in your health, and be sure to contact your doctor if you have any problems. Where can you learn more? Go to http://davy-earl.info/. Enter N616 in the search box to learn more about \"Joint Injections: Care Instructions. \" Current as of: March 21, 2017 Content Version: 11.4 © 1834-6010 [employer]. Care instructions adapted under license by [employer] (which disclaims liability or warranty for this information).  If you have questions about a medical condition or this instruction, always ask your healthcare professional. [place] [address] any warranty or liability for your use of this information. Introducing [facility] & HE[facility]! Schuyler Glez introduces DeluxeBox patient portal. Now you can access parts of your medical record, email your doctor's office, and request medication refills online. 1. In your internet browser, go to [email or web address] 2. Click on the First Time User? Click Here link in the Sign In box. You will see the New Member Sign Up page. 3. Enter your DeluxeBox Access Code exactly as it appears below. You will not need to use this code after youve completed the sign-up process. If you do not sign up before the expiration date, you must request a new code. · DeluxeBox Access Code: [email or web address] Expires: 4/16/2018  4:07 PM 
 
4. Enter the last four digits of your Social Security Number (xxxx) and Date of Birth (mm/dd/yyyy) as indicated and click Submit. You will be taken to the next sign-up page. 5. Create a DeluxeBox ID. This will be your DeluxeBox login ID and cannot be changed, so think of one that is secure and easy to remember. 6. Create a DeluxeBox password. You can change your password at any time. 7. Enter your Password Reset Question and Answer. This can be used at a later time if you forget your password. 8. Enter your e-mail address. You will receive e-mail notification when new information is available in [address] 9. Click Sign Up. You can now view and download portions of your medical record. 10. Click the Download Summary menu link to download a portable copy of your medical information. If you have questions, please visit the Frequently Asked Questions section of the DeluxeBox website. Remember, DeluxeBox is NOT to be used for urgent needs. For medical emergencies, dial 911. Now available from your iPhone and Android! Please provide this summary of care documentation to your next provider. Your primary care clinician is listed as Kendal Matthew. If you have any questions after today's visit, please call [phone].

## 2018-04-12 NOTE — PROGRESS NOTES
Patient: Clemente Lemus                MRN: [id number]       SSN: [id number]  YOB: 1970        AGE: 52 y.o. SEX: male    PCP: Conrad Bosworth, MD  04/12/18    Chief Complaint   Patient presents with    Shoulder Pain     L SHOULDER PAIN      HISTORY:  Clemente Lemus is a 52 y.o. male who is seen for left shoulder and neck pain. He reports left shoulder pain for the past few months with no history of injury. His neck pain radiates into his left shoulder and left arm. He states that when he lays down at night, he is paralyzed and is not able to move. He states he has numbness in his left hand. He was previously seen for right shoulder and left knee pain. He notes shoulder pain with overhead activities. He is s/p right shoulder arthroscopy by Dr. Leyda Gastelum in 2006. He denies a h/o right shoulder injury or trauma. He had temporary response to previous shoulder cortisone injections. He has a h/o AVN in his hips and gout. He reports increased knee pains with descending stairs. He denies any previous knee injury or trauma. He notes pain with standing and walking. Pain Assessment  4/12/2018   Location of Pain Shoulder   Location Modifiers Left   Severity of Pain 9   Quality of Pain Throbbing;Locking   Duration of Pain Persistent   Frequency of Pain Constant   Aggravating Factors (No Data)   Aggravating Factors Comment NO   Limiting Behavior Yes   Relieving Factors Nothing   Result of Injury No     Occupation, etc:  Mr. Richelle Puckett receives social security disability benefits for sickle cell anemia. He states that he was hospitalized for 2 days for sickle cell crisis in January of 2017. He does not exercise regularly. He has been researching shoulder support garments online. He is right-handed. He reports that his weight is stable. Current weight is 135 pounds. He is 5'7\" tall. He is not diabetic.   He stays busy going to the park and doing push-ups, pull-ups, and other home exercises. He lives with his parents and brother in [place]. He is left handed. He is s/p cholecystectomy on 3/27/18. Weight Metrics 4/12/2018 4/10/2018 4/9/2018 3/27/2018 2/6/2018 1/16/2018 3/31/2017   Weight 135 lb 130 lb 136 lb 124 lb 1 oz 138 lb 12.8 oz 135 lb 135 lb   BMI 21.14 kg/m2 20.36 kg/m2 21.3 kg/m2 19.43 kg/m2 21.74 kg/m2 21.14 kg/m2 21.14 kg/m2     Patient Active Problem List   Diagnosis Code    Bursitis of shoulder, left M75.52    DJD (degenerative joint disease) M19.90    Abnormal liver function tests R94.5    Abnormal liver function test R94.5    Acute pharyngitis J02.9    Acute upper respiratory infection J06.9    Atopic rhinitis J30.9    Allergic rhinitis J30.9    Anemia D64.9    Pain of upper extremity M79.603    Pain in axilla M79.629    Arthritis M19.90    Atypical chest pain R07.89    Avascular necrosis of bone (HCC) M87.00    Avascular necrosis (HCC) M87.00    Benign paroxysmal positional vertigo H81.10    Carpal tunnel syndrome G56.00    Central perforation of tympanic membrane H72.00    Cervical radiculopathy M54.12    Neck pain M54.2    Cholelithiasis K80.20    Sleep-wake schedule disorder, delayed phase type G47.21    Coital headache G44.82    Conductive hearing loss, unilateral H90.2    Constipation K59.00    Dark urine R82.99    Difficult or painful urination R30.0    Need for immunization against influenza Z23    Gout M10.9    Hb-SS disease without crisis ([facility] [address] 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 9XXA    Knee pain M25.569    Thoracic back pain M54.6    Bicipital tendinitis M75.20    Cervical spondylosis without myelopathy M47.812    Community acquired pneumonia J18.9    Conductive hearing loss in right ear H90.11    Contusion of rib on right side S20.211A    Exposure to chickenpox Z20.820    Hb-SS disease with acute chest syndrome (HCC) D57.01    Headache R51    History of hyperkalemia Z86.39    History of osteonecrosis Z87.39    History of perforated ear drum Z86.69   [facility][1] discharge follow-up Z09    Hyperlipidemia E78.5    Hypoxia R09.02    Impingement syndrome of left shoulder M75.42    Leukocytosis D72.829    Lymphadenopathy R59.1    Male erectile dysfunction, unspecified N52.9    Male fertility problem N46.9    Nerve compression T14. 8XXA    Opioid contract exists Z02.89    DANIELA (obstructive sleep apnea) G47.33    Paresthesia of left upper extremity R20.2    Pneumonia J18.9    Pulmonary hypertension (HCC) I27.20    Recurrent cold sores B00.1    Sickle-cell anemia ([facility]) D57.1    Sickle cell anemia with pain ([facility]) D57.00    Sickle cell crisis (HCC) D57.00    Sickle cell pain crisis ([facility]) D57.00    SOB (shortness of breath) R06.02    Sternum pain R07.89    Supraspinatus tendon tear S46.819A    Thrombocytosis ([facility]) D47.3    Vertigo, peripheral H81.399     REVIEW OF SYSTEMS: All Below are Negative except: See HPI   Constitutional: negative for fever, chills, and weight loss. Cardiovascular: negative for chest pain, claudication, leg swelling, SOB, LEAVITT   Gastrointestinal: Negative for pain, N/V/C/D, Blood in stool or urine, dysuria,  hematuria, incontinence, pelvic pain. Musculoskeletal: See HPI   Neurological: Negative for dizziness and weakness. Negative for headaches, Visual changes, confusion, seizures   Phychiatric/Behavioral: Negative for depression, memory loss, substance  abuse. Extremities: Negative for hair changes, rash, or skin lesion changes. Hematologic: Negative for bleeding problems, bruising, pallor or swollen lymph  nodes   Peripheral Vascular: No calf pain, no circulation deficits. Social History     Social History    Marital status: SINGLE     Spouse name: N/A    Number of children: N/A    Years of education: N/A     Occupational History    Not on file.      Social History Main Topics    Smoking status: Former Smoker     Types: Cigarettes     Quit date: 7/12/2003    Smokeless tobacco: Never Used    Alcohol use No    Drug use: No    Sexual activity: Yes     Partners: Female     Other Topics Concern    Not on file     Social History Narrative      Allergies   Allergen Reactions    Other Food Other (comments)     Walnuts. Gets headaches    Corn Other (comments)     Told by  allergic    Shellfish Derived Swelling     swelling         PHYSICAL EXAMINATION:  Visit Vitals    BP 98/49    Pulse 66    Temp 97.8 °F (36.6 °C) (Oral)    Resp 16    Ht 5' 7\" (1.702 m)    Wt 135 lb (61.2 kg)    SpO2 (!) 88%    BMI 21.14 kg/m2      ORTHO EXAMINATION:  Examination Neck   Skin Intact   Tenderness +, left paracervical   Tightness +, left paracervical   Flexion Decreased 25%   Extension Decreased 25%   Lateral bend left Normal   Lateral bend right Normal   Masses -   Biceps reflex Normal   Triceps reflex Normal   Brachioradialis reflex Normal     Examination Right shoulder Left shoulder   Skin Intact Intact   Effusion - -   Biceps deformity - -   Atrophy - -   AC joint tenderness - -   Acromial tenderness - -   Biceps tenderness - -   Forward flexion/Elevation  170   Active abduction  160   External rotation ROM 30 30   Internal rotation ROM 70 70   Apprehension - -   Impingement - -   Drop Arm Test - -   Neurovascular Intact Intact     Examination Right knee Left knee   Skin Intact Intact   Range of motion 130-0 135-0   Effusion - -   Medial joint line tenderness + +, anterior   Lateral joint line tenderness - -   Popliteal tenderness - -   Osteophytes palpable - -   Gamas - -   Patella crepitus - +   Anterior drawer - -   Lateral laxity - -   Medial laxity - -   Varus deformity - -   Valgus deformity - -   Pretibial edema - -   Calf tenderness - -     PROCEDURE: After discussing treatment options, patient's left paracervical region was injected with 4 cc Marcaine and 1/2 cc Celestone.     Chart reviewed for the following:   Sean Gonzalez MD, have reviewed the History, Physical and updated the Allergic reactions for George Garzon performed immediately prior to start of procedure:  Sean Gonzalez MD, have performed the following reviews on Clemente Lemus prior to the start of the procedure:            * Patient was identified by name and date of birth   * Agreement on procedure being performed was verified  * Risks and Benefits explained to the patient  * Procedure site verified and marked as necessary  * Patient was positioned for comfort  * Consent was obtained     Time: 11:50 AM     Date of procedure: 4/12/2018    Procedure performed by:  Abbie Castellano MD    Mr. Lozano tolerated the procedure well with no complications. MRI LEFT SHOULDER WO CONT 3/12/18  IMPRESSION:   1. Suboptimal visualization intra-articular biceps tendon, not confirmed intact.   2. Slight, linear insertional supraspinatus tendon tearing. Mild inferior offset of the acromion which can predispose to supraspinatus outlet impingement.   3. Overall decreased T1 marrow signal, again which can be seen in the setting of marrow expansion/anemia. MRI RIGHT SHOULDER WO CONT 6/22/15  Impression:   1. Moderate supraspinatus, mild infraspinatus, and moderate subscapularis insertional tendinosis. Degenerative fibrillation along the bursal sided supraspinatus critical zone fibers. No focal rotator cuff tear. 2. Mild degenerative osteoarthropathy of the right acromioclavicular joint without morphology or secondary findings of subacromial impingement. Mild subacromial-subdeltoid bursitis. 3. Unchanged humeral head medullary infarct; no findings of avascular necrosis.     RADIOGRAPHS:  XR LEFT KNEE 3/31/17  IMPRESSION:  No fractures, no effusion, mild medial joint space narrowing, small lateral osteophytes present. IMPRESSION:      ICD-10-CM ICD-9-CM    1.  Neck pain M54.2 723.1 betamethasone (CELESTONE SOLUSPAN) 6 mg/mL injection      BETAMETHASONE ACETATE & SODIUM PHOSPHATE INJECTION 3 MG EA. THER/PROPH/DIAG INJECTION, SUBCUT/IM      bupivacaine, PF, (MARCAINE, PF,) 0.25 % (2.5 mg/mL) injection      REFERRAL TO SPINE SURGERY   2. Chronic left shoulder pain M25.512 719.41     G89.29 338.29    3. Numbness and tingling in left hand R20.0 782.0     R20.2     4. Hb-SS disease with crisis ([facility] [address] D57.00 282.62      PLAN:  After discussing treatment options, patient's left paracervical region was injected with 4 cc Marcaine and 1/2 cc Celestone. He will follow up at the spine center if neck pain continues.      Scribed by Francine Brown ([facility]) as dictated by Johnny Park MD

## 2018-04-16 ENCOUNTER — OFFICE VISIT (OUTPATIENT)
Dept: ORTHOPEDIC SURGERY | Age: 48
End: 2018-04-16

## 2018-04-16 VITALS
WEIGHT: 137 LBS | RESPIRATION RATE: 20 BRPM | OXYGEN SATURATION: 89 % | DIASTOLIC BLOOD PRESSURE: 64 MMHG | HEART RATE: 84 BPM | BODY MASS INDEX: 21.5 KG/M2 | HEIGHT: 67 IN | TEMPERATURE: 98.9 F | SYSTOLIC BLOOD PRESSURE: 112 MMHG

## 2018-04-16 DIAGNOSIS — M79.18 MYOFASCIAL PAIN: ICD-10-CM

## 2018-04-16 DIAGNOSIS — M54.12 CERVICAL RADICULOPATHY: Primary | ICD-10-CM

## 2018-04-16 DIAGNOSIS — M54.2 NONSPECIFIC PAIN IN THE NECK REGION: ICD-10-CM

## 2018-04-16 RX ORDER — GABAPENTIN 300 MG/1
600 CAPSULE ORAL 3 TIMES DAILY
Qty: 180 CAP | Refills: 2 | Status: SHIPPED | OUTPATIENT
Start: 2018-04-16 | End: 2018-06-22 | Stop reason: ALTCHOICE

## 2018-04-16 NOTE — MR AVS SNAPSHOT
[address] [place] [address] [address] [facility] [address] 
[phone] Patient: Leslie Mosley MRN: [id number] Visit Information Date & Time Provider Department Dept. Phone Encounter #  
 4/16/2018  9:00 AM Rut Kiser MD [facility] [phone] [id number] Follow-up Instructions Return in about 2 weeks (around 4/30/2018). Your Appointments 7/30/2018  1:45 PM  
ESTABLISHED PATIENT with Rudy Naranjo MD  
[facility]. [address] Orte[address] [facility]) Appt Note: 3 MONTH F/U  
 [address] [address]  
[phone]  
  
   
 [place] [id number] Upcoming Health Maintenance Date Due Pneumococcal 19-64 Highest Risk (3 of 3 - PCV13) 10/15/2017 DTaP/Tdap/Td series (2 - Td) 10/15/2022 Allergies as of 4/16/2018  Review Complete On: 4/16/2018 By: Rut Kiser MD  
  
 Severity Noted Reaction Type Reactions Other Food  01/16/2018    Other (comments) Walnuts. Gets headaches [place]  01/16/2018    Other (comments) Told by  allergic Shellfish Derived  01/16/2018    Swelling  
 swelling Current Immunizations  Never Reviewed Name Date Hib (PRP-OMP) 10/19/2007 12:00 AM  
 Influenza Vaccine 11/24/2017 12:00 AM, 10/14/2016 12:00 AM, 10/15/2015 12:00 AM, 10/17/2014 12:00 AM, 10/19/2007 12:00 AM, 10/25/2006 12:00 AM, 10/25/2005 12:00 AM  
 Meningococcal (MCV4P) Vaccine 8/31/2016 12:00 AM, 10/19/2007 12:00 AM  
 Pneumococcal Conjugate (PCV-7) 10/17/2014 12:00 AM  
 Pneumococcal Polysaccharide (PPSV-23) 10/15/2016 12:00 AM, 8/31/2016 12:00 AM, 10/19/2007 12:00 AM  
 TB Skin Test (PPD) Intradermal 4/16/2012 12:00 AM  
 Tdap 10/15/2012 12:00 AM  
  
 Not reviewed this visit You Were Diagnosed With   
  
 Codes Comments Cervical radiculopathy    -  Primary ICD-10-CM: M54.12 
ICD-9-CM: 723.4 Myofascial pain     ICD-10-CM: M79.1 ICD-9-CM: 729.1 Nonspecific pain in the neck region     ICD-10-CM: M54.2 ICD-9-CM: 723.1 Vitals BP Pulse Temp Resp Height(growth percentile) Weight(growth percentile) 112/64 84 98.9 °F (37.2 °C) 20 5' 7\" (1.702 m) 137 lb (62.1 kg) SpO2 BMI Smoking Status (!) 89% 21.46 kg/m2 Former Smoker BMI and BSA Data Body Mass Index Body Surface Area  
 21.46 kg/m 2 1.71 m 2 Preferred Pharmacy Pharmacy Name Phone [facility] [address]  [facility] [phone] Your Updated Medication List  
  
   
This list is accurate as of 4/16/18 10:03 AM.  Always use your most recent med list.  
  
  
  
  
 aspirin  mg tablet Take 1,000 mg by mouth every twelve (12) hours as needed for Pain.  
  
 gabapentin 300 mg capsule Commonly known as:  NEURONTIN Take 2 Caps by mouth three (3) times daily. HYDROcodone-acetaminophen 5-500 mg Cap Take 1 Tab by mouth every six (6) hours as needed. INDERAL LA 80 mg SR capsule Generic drug:  propranolol LA Take 80 mg by mouth daily. Headache prevention  
  
 ketorolac 10 mg tablet Commonly known as:  TORADOL Take 1 Tab by mouth every six (6) hours as needed for Pain.  
  
 loratadine 10 mg dissolvable tablet Commonly known as:  Vickki Yeboah Take 10 mg by mouth daily. meclizine 25 mg tablet Commonly known as:  ANTIVERT Take  by mouth three (3) times daily as needed. multivitamin tablet Commonly known as:  ONE A DAY Take 1 Tab by mouth daily. naproxen 250 mg tablet Commonly known as:  NAPROSYN Take  by mouth two (2) times daily (with meals). valACYclovir 1 gram tablet Commonly known as:  VALTREX Take 1 Tab by mouth as needed. Prescriptions Sent to Pharmacy Refills  
 gabapentin (NEURONTIN) 300 mg capsule 2 Sig: Take 2 Caps by mouth three (3) times daily. Class: Normal  
 Pharmacy: [facility] [address]  [facility] #: [phone] Route: Oral  
  
We Performed the Following AMB POC XRAY, SPINE, CERVICAL; 2 OR 3 [38068 CPT(R)] REFERRAL TO PAIN MANAGEMENT [BGJ399 Custom] Comments:  
 Consider for cervical epidurals Follow-up Instructions Return in about 2 weeks (around 4/30/2018). To-Do List   
 04/23/2018 Imaging:  MRI CERV SPINE WO CONT   
  
 04/25/2018 3:00 PM  
  Appointment with [facility] MRI RM 1 at [address] MRI ([phone]) GENERAL INSTRUCTIONS  Bring information (ID card) if you have any medically implanted devices. You will be required to lie still while the procedure is being performed. Remove any jewelry (including body piercing, hairpins) prior to MRI. If you have had a creatinine level drawn within the past 30 days, please bring most recent results to your appt. Bring any films, CD's, and reports related to your study with you on the day of your exam.  This only includes studies done outside of [address] [place] [address], [address]. Bring a complete list of all medications you are currently taking to include prescriptions, over-the-counter meds, herbals, vitamins & any dietary supplements. If you were given medications for claustrophobia or anxiety, please arrange to have someone drive you to your appointment. QUESTIONS  Notify the MRI Department if you have any questions concerning your study. [facility] - [phone] [facility] [address] [place] [address] [phone] Referral Information Referral ID Referred By Referred To  
  
 [id number] Kaylin Talley MD   
   [address] [facility] [place], [address] Phone: [phone] Fax: [phone] Visits Status Start Date End Date 1 New Request 4/16/18 4/16/19 If your referral has a status of pending review or denied, additional information will be sent to support the outcome of this decision. Introducing [facility]! Jessica Perez introduces [employer] patient portal. Now you can access parts of your medical record, email your doctor's office, and request medication refills online. 1. In your internet browser, go to [email or web address] 2. Click on the First Time User? Click Here link in the Sign In box. You will see the New Member Sign Up page. 3. Enter your [employer] Access Code exactly as it appears below. You will not need to use this code after youve completed the sign-up process. If you do not sign up before the expiration date, you must request a new code. · [employer] Access Code: [email or web address] Expires: 4/16/2018  4:07 PM 
 
4. Enter the last four digits of your Social Security Number (xxxx) and Date of Birth (mm/dd/yyyy) as indicated and click Submit. You will be taken to the next sign-up page. 5. Create a [employer] ID. This will be your [employer] login ID and cannot be changed, so think of one that is secure and easy to remember. 6. Create a [employer] password. You can change your password at any time. 7. Enter your Password Reset Question and Answer. This can be used at a later time if you forget your password. 8. Enter your e-mail address. You will receive e-mail notification when new information is available in [address] 9. Click Sign Up. You can now view and download portions of your medical record. 10. Click the Download Summary menu link to download a portable copy of your medical information. If you have questions, please visit the Frequently Asked Questions section of the [employer] website. Remember, [employer] is NOT to be used for urgent needs. For medical emergencies, dial 911. Now available from your iPhone and Android! Please provide this summary of care documentation to your next provider. Your primary care clinician is listed as Laurie Anguiano. If you have any questions after today's visit, please call [phone].

## 2018-04-16 NOTE — PROGRESS NOTES
[address]  [place], [address] [place] [address] Street  Phone: [phone]  Fax: [phone]        Michelle Montaño  : 1970  PCP: Lion Escamilla MD  2018    NEW PATIENT      ASSESSMENT AND PLAN     Maude Puckett comes in to the office today c/o chronic neck pain radiating into his LUE and numbness in his fingers that has worsened over the last 4 months with the new symptoms being radicular. He rates his pain as an 8/10 today. On examination, he had decreased sensation on the left along a C7 distribution to light touch. Otherwise, he is neurologically intact. He had tenderness to palpation throughout his back axially, worse in his parascapular region. Given his symptoms, and previous MRI findings () of a left paracentral disc extrusion at C6-7, we discussed cervical injections. His pain is likely myofascial pain with a left-sided C7 cervical radiculopathy. He would like to move forward with the cervical injection for the cervical radiculopathy and then resume PT for his myofascial pain. I referred to Dr. Corey Jarquin for cervical epidural injections. I also referred for an updated cervical MRI and increased his Gabapentin to 600mg TID. If the cervical injections do not provide relief of his symptoms, we will likely consider an EMG vs surgical consult. Pt will f/u in 2 weeks or sooner if needed. Diagnoses and all orders for this visit:    1. Cervical radiculopathy  -     REFERRAL TO PAIN MANAGEMENT  -     MRI CERV SPINE WO CONT; Future  -     gabapentin (NEURONTIN) 300 mg capsule; Take 2 Caps by mouth three (3) times daily. 2. Myofascial pain    3. Nonspecific pain in the neck region  -     [88884] C Spine 2-3V         Follow-up Disposition: Not on File    CHIEF COMPLAINT  George Phipps is seen today in consultation at the request of Lion Escamilla MD for complaints of neck pain.        HISTORY OF PRESENT ILLNESS  [place] Lida Reyes is a 52 y.o. male c/o neck pain radiating into his LUE and numbness in his fingers. He recently completed 10 visits to PT and has 6 remaining. He notes when he lays down, his whole body gets paralyzed. He has seen a sleep doctor who states he had sleep apnea, but no sleep paralysis. The only thing that has provided him any relief has been pain medication, but it has had less of an effect recently. He previously had neck pain for which he had an MRI in 2016, but he did not begin to have numbness in his fingers on his left hand until recently. He has a dx of sickle cell, but notes he does not have a pain management or hematologist. His PCP has been managing his sickle cell for him. Pt denies any fevers, chills, nausea, vomiting. Pt denies any chest pain and shortness of breath. Pt denies any ear, nose, and throat problems. Pt denies any fecal or urinary incontinence. PAST MEDICAL HISTORY   Past Medical History:   Diagnosis Date    Anemia NEC     Bursitis of shoulder, left 08/03/2009    MRI revealed tendinitis and bursitis of the rotator cuff.  DJD (degenerative joint disease) 08/03/2009    Early djd, left radiocarpal joint with associated ganglion cyst.      Elevated white blood cell count 07/20/2009    GERD (gastroesophageal reflux disease)     H/O: rotator cuff tear 06/07/2006    Chronic    Hyperlipidemia     Pain in joint, upper arm 07/2009    Pain in shoulder, elbow and wrist.    Sickle cell anemia (HCC)     Sleep apnea     Does not use CPAP    Testicular failure     Vertigo 07/20/2009       Past Surgical History:   Procedure Laterality Date    HX CHOLECYSTECTOMY  03/27/2018    HX HEENT      eardrum repair    HX SHOULDER ARTHROSCOPY Right        MEDICATIONS    Current Outpatient Prescriptions   Medication Sig Dispense Refill    ketorolac (TORADOL) 10 mg tablet Take 1 Tab by mouth every six (6) hours as needed for Pain.  30 Tab 0    loratadine (CLARITIN REDITABS) 10 mg dissolvable tablet Take 10 mg by mouth daily.  propranolol LA (INDERAL LA) 80 mg SR capsule Take 80 mg by mouth daily. Headache prevention      valACYclovir (VALTREX) 1 gram tablet Take 1 Tab by mouth as needed. 0    aspirin  mg tablet Take 1,000 mg by mouth every twelve (12) hours as needed for Pain.  gabapentin (NEURONTIN) 300 mg capsule Take 300 mg by mouth three (3) times daily.  multivitamin (ONE A DAY) tablet Take 1 Tab by mouth daily.  meclizine (ANTIVERT) 25 mg tablet Take  by mouth three (3) times daily as needed.  naproxen (NAPROSYN) 250 mg tablet Take  by mouth two (2) times daily (with meals).  hydrocodone-acetaminophen 5-500 mg Cap Take 1 Tab by mouth every six (6) hours as needed. ALLERGIES  Allergies   Allergen Reactions    Other Food Other (comments)     Walnuts. Gets headaches    Corn Other (comments)     Told by  allergic    Shellfish Derived Swelling     swelling          SOCIAL HISTORY    Social History     Social History    Marital status: SINGLE     Spouse name: N/A    Number of children: N/A    Years of education: N/A     Social History Main Topics    Smoking status: Former Smoker     Types: Cigarettes     Quit date: 7/12/2003    Smokeless tobacco: Never Used    Alcohol use No    Drug use: No    Sexual activity: Yes     Partners: Female     Other Topics Concern    Not on file     Social History Narrative       FAMILY HISTORY  Family History   Problem Relation Age of Onset    Diabetes Mother     Cancer Father          REVIEW OF SYSTEMS  Review of Systems   Musculoskeletal: Positive for neck pain. LUE pain and numbness into fingers         PHYSICAL EXAMINATION  There were no vitals taken for this visit.       Pain Assessment  4/12/2018   Location of Pain Shoulder   Location Modifiers Left   Severity of Pain 9   Quality of Pain Throbbing;Locking   Duration of Pain Persistent   Frequency of Pain Constant Aggravating Factors (No Data)   Aggravating Factors Comment NO   Limiting Behavior Yes   Relieving Factors Nothing   Result of Injury No         Constitutional:  Well developed, well nourished, in no acute distress. Psychiatric: Affect and mood are appropriate. HEENT: Normocephalic, atraumatic. Extraocular movements intact. Integumentary: No rashes or abrasions noted on exposed areas. Cardiovascular: Regular rate and rhythm. Pulmonary: Clear to auscultation bilaterally. SPINE/MUSCULOSKELETAL EXAM    Cervical spine:  Neck is midline. Normal muscle tone. No focal atrophy is noted. ROM painful. Shoulder ROM intact. Tenderness to palpation. Negative Spurling's sign. Negative Tinel's sign. Negative Tomas's sign. Sensation in the bilateral arms grossly intact to light touch. Lumbar spine:  No rash, ecchymosis, or gross obliquity. No fasciculations. No focal atrophy is noted. No pain with hip ROM. Full range of motion. Tenderness to palpation. No tenderness to palpation at the sciatic notch. SI joints non-tender. Trochanters non tender. Sensation in the bilateral legs grossly intact to light touch. MOTOR:      Biceps  Triceps Deltoids Wrist Ext Wrist Flex Hand Intrin   Right 5/5 5/5 5/5 5/5 5/5 5/5   Left 5/5 5/5 5/5 5/5 5/5 5/5             Hip Flex  Quads Hamstrings Ankle DF EHL Ankle PF   Right 5/5 5/5 5/5 5/5 5/5 5/5   Left 5/5 5/5 5/5 5/5 5/5 5/5     DTRs are 2+ biceps, triceps, brachioradialis, patella, and Achilles. Negative Straight Leg raise. Squat not tested. No difficulty with tandem gait. Ambulation without assistive device. FWB.       RADIOGRAPHS  2V Cervical XR images taken on 4/16/18 personally reviewed with patient:  Straightening of the cervical lordosis  Endplate osteophytes  Slight lean to right  Normal disc spacing  No obvious compression fractures or instabilities      Cervical MRI images taken on 1/14/16 personally reviewed with patient:  COMPARISON: MRI cervical spine 2/8/12    TECHNIQUE: Cervical spine scanned with axial and sagittal T2W scans, sagittal T1W scans. FINDINGS:  Straightening of the cervical spine. Slight retrolisthesis of C4 on C5. Remainder of vertebral bodies maintain normal alignment. The cervical cord appears normal. Diffuse hypointense marrow signal, similar to prior studies. The visualized posterior fossa structures are unremarkable. C2/C3: No significant spinal stenosis or neural foraminal narrowing. C3/C4: Mild disc bulge with posterior central annular tear. Mild/moderate right and mild left foraminal stenosis. Mild narrowing of the central canal, midline AP diameter is 8.4 mm.      C4/C5: Mild disc bulge. Mild left foraminal stenosis. Mild central canal stenosis, midline AP diameter is 8 mm. C5/C6: No significant spinal stenosis or neural foraminal narrowing. C6/C7: Small left paracentral disc extrusion with annular tear. Disc material extends slightly below the disc space. Mild narrowing of the left central canal. Neural foramen are patent. C7/T1: No significant spinal stenosis or neural foraminal narrowing. Vertebral artery flow voids present. Similar appearance of prominent adenoid tissue and cervical adenopathy. Impression:    1. Mild/moderate multilevel degenerative discogenic disease. Marginal change in comparison to prior. 2. Similar appearance of small left paracentral disc extrusion at C6-C7. 3. Similar appearance of adenopathy and prominent adenoid tissue. 4. Diffuse hypointense marrow signal, similar to prior. Could be related to anemia in this patient with history of sickle cell disease.  reviewed    Mr. Jesse Dillard has a reminder for a \"due or due soon\" health maintenance. I have asked that he contact his primary care provider for follow-up on this health maintenance.      21 minutes of face-to-face contact were spent with the patient during today's visit extensively discussing symptoms and treatment plan. All questions were answered. More than half of this visit today was spent on counseling. Written by Tootie Obregon, as dictated by Dr. Etta Sierra. I, Dr. Etta Sierra, confirm that all documentation is accurate.

## 2018-04-26 ENCOUNTER — OFFICE VISIT (OUTPATIENT)
Dept: PAIN MANAGEMENT | Age: 48
End: 2018-04-26

## 2018-04-26 VITALS
WEIGHT: 137 LBS | TEMPERATURE: 98.4 F | HEIGHT: 67 IN | SYSTOLIC BLOOD PRESSURE: 121 MMHG | BODY MASS INDEX: 21.5 KG/M2 | HEART RATE: 83 BPM | DIASTOLIC BLOOD PRESSURE: 74 MMHG | RESPIRATION RATE: 14 BRPM

## 2018-04-26 DIAGNOSIS — M47.812 SPONDYLOSIS OF CERVICAL REGION WITHOUT MYELOPATHY OR RADICULOPATHY: ICD-10-CM

## 2018-04-26 DIAGNOSIS — M47.812 CERVICAL SPONDYLOSIS WITHOUT MYELOPATHY: ICD-10-CM

## 2018-04-26 DIAGNOSIS — G89.4 CHRONIC PAIN SYNDROME: ICD-10-CM

## 2018-04-26 DIAGNOSIS — M50.30 DEGENERATIVE DISC DISEASE, CERVICAL: ICD-10-CM

## 2018-04-26 DIAGNOSIS — M54.12 CERVICAL RADICULOPATHY: Primary | ICD-10-CM

## 2018-04-27 NOTE — PROGRESS NOTES
[address] [place] [address] for Pain Management  Interventional Pain Management Consultation History & Physical    PATIENT NAME:  Poonam Red     YOB: 1970    DATE OF SERVICE:   4/26/2018      CHIEF COMPLAINT:  Back Pain and Finger Pain (left index)      REASON FOR VISIT:   Poonam Red presents to the pain clinic today for initial evaluation and to consider interventional pain management options as indicated for the type and location of the pain the patient is presenting with. HISTORY OF PRESENT ILLNESS:    Patient presents for initial evaluation and consideration for interventional procedures as indicated. He is referred to us by Dr Isrrael Carbajal for consideration for cervical epidural steroid injection series as indicated. At today's evaluation, patient endorses chronic neck and shoulder pain of long-standing duration. He denies any specific antecedent trauma injury or accident. He endorses insidious onset to his neck and shoulder pain symptoms. He currently endorses primarily bilateral neck pain numbness and tingling down the left more than the right upper extremity. Aching throbbing pain along both sides of his neck, left greater than right. Pain is increased with turning his head in either direction as well as looking up or down. Pain numbness tingling down through the left shoulder down through the left arm into the left index and middle fingers. He states that his left index finger is chronically numb. He is tried physical therapy that did not help. He states that medications are not helping. Patient states that his current pain regimen is interfering with the quality of life including his ability to perform ADL skills including dressing and undressing himself, caring for his household including doing dishes three-point vacuuming. He is having increasing difficulty with mobility getting around.   He has had oral steroid regimens that have helped. He currently states that his shoulder pain hurts all day. He has not had previous cervical spine surgery. He is not take any blood thinners. By review of available medical records, progress note reviewed by Dr Rc Braga, progress note written on April 16, 2018. Chronic neck and radiating left upper extremity pain numbness and tingling are noted. Symptoms into left fingers that recently worsened. Decreased sensation on the left C7 nerve root distribution. Tenderness to palpation throughout the back as well as left periscapular area. MRI performed 2016 shows left paracentral disc extrusion at C6-7. Myofascial pain is also noted as well as left C7 radiculopathy. Trial of gabapentin initiated. Echo cell disease. Anemia. GERD, history rotator cuff tear. High cholesterol. Sickle anemia. Has been taking Toradol, aspirin, naproxen. ASSESSMENT/OPTIONS: as follows. We discussed options per I am in agreement with Dr Rc Braga and that I believe patient has signs and symptoms, as well as exam and imaging evidence suggestive of left sided C7 radiculopathy. He further very likely has myofascial pain syndrome of the left more than the right shoulder girdle. I will set this gentleman up for series of cervical epidural steroid injections with IV conscious sedation. I have discussed the risks and benefits, indications, contraindications, and side effects of intended procedure with the patient. I have used skeleton spine model to describe and discuss the procedure with the patient. I have answered all questions relating to the procedure. Patient understands the nature of the procedure and wishes to proceed. Patient has no further questions. MRI Results (most recent):    Results from [place] encounter on 03/12/18   MRI SHOULDER LT WO CONT   Narrative History: Left scapular pain and numbness the 2nd and 3rd fingers.     TECHNIQUE: Coronal and sagittal PD and T2 fat-sat, axial PD with and without  fat-sat imaging obtained through the left glenohumeral joint. COMPARISON STUDY: 08/17/09. FINDINGS:    Rotator cuff: No muscle atrophy or abnormal signal.    Supraspinatus: Mild tendinosis. Slight linear insertional hyperintense signal  which may reflect minimal tear. Infraspinatus: Mild tendinosis. Otherwise intact by MRI. Subscapularis: Intact by MRI. Teres minor: Intact by MRI. Biceps tendon: Appropriately located in bicipital groove. Intra-articular  portion poorly seen. Biceps labral anchor intact    Labrum left glenohumeral joint: No significant glenohumeral joint fluid limiting  sensitivity for detection of labral abnormalities. Labrum intact within limits  of study. Acromioclavicular joint: Mild inferior offset of the acromion with respect to  the clavicle. No significant degenerative changes. No os acromiale. Small amount  of fluid in subacromial/subdeltoid bursa. Osseous/marrow: Overall decreased T1 marrow signal which can be seen in the  setting of marrow expansion/anemia, similar to prior. Otherwise unremarkable. Periarticular: Multiple axillary lymph nodes are present, similar to prior,  nonspecific. Impression IMPRESSION:     1. Suboptimal visualization intra-articular biceps tendon, not confirmed intact. 2. Slight, linear insertional supraspinatus tendon tearing. Mild inferior offset  of the acromion which can predispose to supraspinatus outlet impingement. 3. Overall decreased T1 marrow signal, again which can be seen in the setting of  marrow expansion/anemia. PAST MEDICAL HISTORY:   The patient  has a past medical history of Anemia NEC; Bursitis of shoulder, left (08/03/2009); DJD (degenerative joint disease) (08/03/2009); Elevated white blood cell count (07/20/2009); GERD (gastroesophageal reflux disease); H/O: rotator cuff tear (06/07/2006); Hyperlipidemia;  Pain in joint, upper arm (07/2009); Sickle cell anemia ([facility] [address] Sleep apnea; Testicular failure; and Vertigo (07/20/2009). PAST SURGICAL HISTORY:   The patient  has a past surgical history that includes hx shoulder arthroscopy (Right); hx heent; and hx cholecystectomy (03/27/2018). CURRENT MEDICATIONS:   The patient has a current medication list which includes the following prescription(s): gabapentin, ketorolac, loratadine, multivitamin, meclizine, propranolol la, valacyclovir, aspirin ec, naproxen, and hydrocodone-acetaminophen. ALLERGIES:     Allergies   Allergen Reactions    Other Food Other (comments)     Walnuts. Gets headaches    Corn Other (comments)     Told by  allergic    Shellfish Derived Swelling     swelling       FAMILY HISTORY:   The patient family history includes Cancer in his father; Diabetes in his mother. SOCIAL HISTORY:   The patient  reports that he quit smoking about 14 years ago. His smoking use included Cigarettes. He has never used smokeless tobacco. The patient  reports that he does not drink alcohol. He      REVIEW OF SYSTEMS:    The patient denies fever, chills, weight loss (Constitutional), rash, itching (Skin), tinnitus, congestion (HENT), blurred vision, photophobia (Eyes), palpitations, orthopnea (Cardiovascular), hemoptysis, wheezing (Respiratory), nausea, vomiting, diarrhea (Gastrointestinal), dysuria, hematuria, urgency (Genitourinary), bowel or bladder incontinence, loss of consciousness (Neurologic), suicidal or homicidal ideation or hallucinations (Psychiatric). Denies swelling, axillary or groin masses (Lymphatic). PHYSICAL EXAM:  VS:   Visit Vitals    /74 (BP 1 Location: Right arm, BP Patient Position: Sitting)    Pulse 83    Temp 98.4 °F (36.9 °C) (Oral)    Resp 14    Ht 5' 7\" (1.702 m)    Wt 62.1 kg (137 lb)    BMI 21.46 kg/m2     General: Well-developed and well-nourished. Body habitus consistent with recorded height and weight and the calculated BMI.  Apparent distress due to neck and upper extremity symptoms. Head: Normocephalic, atraumatic. Skin: Inspection of the skin reveals no rashes, lesions or infection. CV: Regular rate. No murmurs or rubs noted. No peripheral edema noted. Pulm: Respirations are even and unlabored. Extr: No clubbing, cyanosis, or edema noted. Musculoskeletal:  1. Cervical spine decreased range of motion left greater than right. Paraspinous tenderness left greater than right. There is no scoliosis, asymmetry, or musculoskeletal defect. 2. Thoracic spine decreased range of motion left side . No paraspinous tenderness at any level. There is no scoliosis, asymmetry, or musculoskeletal defect. 3. Lumbar spine  Full ROM. No paraspinous tenderness at any level. SI joints are nontender bilaterally. There is no scoliosis, asymmetry, or musculoskeletal defect. 4. Right upper extremity  Full ROM. 5/5 muscle strength in all muscle groups. No pain or tenderness in shoulder, elbow, wrist, or hand. 5. Left upper extremity  Full ROM. 5/5 muscle strength in all muscle groups. No pain or tenderness in shoulder, elbow, wrist, or hand. 6. Right lower extremity  Full ROM. 5/5 muscle strength in all muscle groups. No pain, tenderness, or swelling in the hip, knee, ankle or foot. 7. Left lower extremity  Full ROM. 5/5 muscle strength in all muscle groups. No pain, tenderness, or swelling in the hip, knee, ankle or foot. Neurological:  1. Mental Status - Alert, awake and oriented. Speech is clear and appropriate. 2. Cranial Nerves - Extraocular muscles intact bilaterally. Cranial nerves II-XII grossly intact bilaterally. 3. Gait - Non-antalgic   4. Reflexes - 2+ and symmetric throughout. 5. Sensation - Intact to light touch and pin prick. 6. Provocative Tests - Spurlings negative bilaterally. Straight leg raise negative bilaterally. Psychological:  1. Mood and affect  Appropriate. 2. Speech  Appropriate.   3. Though content  Appropriate. 4. Judgment  Appropriate. ASSESSMENT:      ICD-10-CM ICD-9-CM    1. Cervical radiculopathy M54.12 723.4    2. Degenerative disc disease, cervical M50.30 722.4    3. Spondylosis of cervical region without myelopathy or radiculopathy M47.812 721.0    4. Chronic pain syndrome G89.4 338.4    5. Cervical spondylosis without myelopathy M47.812 721.0            PLAN:    1.    I have thoroughly discussed the risks and benefits, indications, contraindications, and side effects of any and procedures that were mentioned at today's patient visit. I have used a skeleton model to explain all procedures, as well as to provide added emphasis regarding procedures and as well for patient education purposes. I have answered all questions in great detail, and I have obtained verbal confirmation for all procedures planned with the patient. 3.    I have reviewed in great detail today, when indicated, the patient's MRI and other imaging studies with the patient. I have explained to the patient, when indicated, their condition using both actual recent and relevant images insofar as I am able to obtain actual images. I have used a skeleton model for added emphasis as well as patient education. 4.    I have advised patient to have a primary care provider continue to care for their health maintenance and general medical conditions. 5,    I have placed appropriate referrals to specialty care providers as I have deemed necessary through today's clinical consultation with the patient. 5.    I have explained to the patient that if any significant side effects, issues, problems, concerns, or perceived complications may arise at around the time of the patient's procedures, they should either call the pain management clinic or go to the emergency room immediately for medical provider evaluation.    6.   I have encouraged all patients to call the pain management clinic with any questions or concerns that they may have pertaining to their procedures. DISPOSITION:   The patients condition and plan were discussed at length and all questions were answered. The patient agrees with the plan. A total of 40 minutes was spent with the patient of which over half of the time was spent counseling the patient. Ankita Love MD 4/26/2018 10:07 PM    Note: Although these clinic notes were documented by the provider at the time of the exam, they have not been proofed and are subject to transcription variance.

## 2018-05-02 ENCOUNTER — TELEPHONE (OUTPATIENT)
Dept: PAIN MANAGEMENT | Age: 48
End: 2018-05-02

## 2018-05-02 RX ORDER — MIDAZOLAM HYDROCHLORIDE 1 MG/ML
.5-6 INJECTION, SOLUTION INTRAMUSCULAR; INTRAVENOUS
Status: CANCELLED | OUTPATIENT
Start: 2018-05-07

## 2018-05-02 RX ORDER — SODIUM CHLORIDE 0.9 % (FLUSH) 0.9 %
5-10 SYRINGE (ML) INJECTION AS NEEDED
Status: CANCELLED | OUTPATIENT
Start: 2018-05-07

## 2018-05-02 NOTE — TELEPHONE ENCOUNTER
Called [phone] left message that he needed to have stopped toradol, ASA, and nsaids for 7 days prior to procedure on 5/7/18.

## 2018-05-04 ENCOUNTER — OFFICE VISIT (OUTPATIENT)
Dept: ORTHOPEDIC SURGERY | Age: 48
End: 2018-05-04

## 2018-05-04 VITALS
TEMPERATURE: 98.3 F | SYSTOLIC BLOOD PRESSURE: 130 MMHG | HEART RATE: 85 BPM | BODY MASS INDEX: 21.43 KG/M2 | WEIGHT: 136.8 LBS | DIASTOLIC BLOOD PRESSURE: 74 MMHG

## 2018-05-04 NOTE — MR AVS SNAPSHOT
[address] [place] [address] [address] [facility] [address] 
[phone] Patient: Himanshu Polanco MRN: [id number] CLT:6/70/1608 Visit Information Date & Time Provider Department Dept. Phone Encounter #  
 5/4/2018  9:30 AM Edilia Sims MD [facility] [phone] [id number] Your Appointments 7/30/2018  1:45 PM  
ESTABLISHED PATIENT with Fabrizio Mccann MD  
[facility] Appt Note: 3 MONTH F/U  
 [address] [facility] [address]  
[phone]  
  
   
 [place] [id number] Upcoming Health Maintenance Date Due Pneumococcal 19-64 Highest Risk (3 of 3 - PCV13) 10/15/2017 Influenza Age 5 to Adult 8/1/2018 DTaP/Tdap/Td series (2 - Td) 10/15/2022 Allergies as of 5/4/2018  Review Complete On: 5/4/2018 By: Mary Casas LPN Severity Noted Reaction Type Reactions Other Food  01/16/2018    Other (comments) Walnuts. Gets headaches [place]  01/16/2018    Other (comments) Told by  allergic Shellfish Derived  01/16/2018    Swelling  
 swelling Current Immunizations  Never Reviewed Name Date Hib (PRP-OMP) 10/19/2007 12:00 AM  
 Influenza Vaccine 11/24/2017 12:00 AM, 10/14/2016 12:00 AM, 10/15/2015 12:00 AM, 10/17/2014 12:00 AM, 10/19/2007 12:00 AM, 10/25/2006 12:00 AM, 10/25/2005 12:00 AM  
 Meningococcal (MCV4P) Vaccine 8/31/2016 12:00 AM, 10/19/2007 12:00 AM  
 Pneumococcal Conjugate (PCV-7) 10/17/2014 12:00 AM  
 Pneumococcal Polysaccharide (PPSV-23) 10/15/2016 12:00 AM, 8/31/2016 12:00 AM, 10/19/2007 12:00 AM  
 TB Skin Test (PPD) Intradermal 4/16/2012 12:00 AM  
 Tdap 10/15/2012 12:00 AM  
  
 Not reviewed this visit Vitals BP Pulse Temp Weight(growth percentile) BMI Smoking Status 130/74 85 98.3 °F (36.8 °C) (Oral) 136 lb 12.8 oz (62.1 kg) 21.43 kg/m2 Former Smoker BMI and BSA Data Body Mass Index Body Surface Area  
 21.43 kg/m 2 1.71 m 2 Preferred Pharmacy Pharmacy Name Phone [facility] [address]  [facility] [phone] Your Updated Medication List  
  
   
This list is accurate as of 5/4/18 10:39 AM.  Always use your most recent med list.  
  
  
  
  
 aspirin  mg tablet Take 1,000 mg by mouth every twelve (12) hours as needed for Pain.  
  
 gabapentin 300 mg capsule Commonly known as:  NEURONTIN Take 2 Caps by mouth three (3) times daily. HYDROcodone-acetaminophen 5-500 mg Cap Take 1 Tab by mouth every six (6) hours as needed. INDERAL LA 80 mg SR capsule Generic drug:  propranolol LA Take 80 mg by mouth daily. Headache prevention  
  
 ketorolac 10 mg tablet Commonly known as:  TORADOL Take 1 Tab by mouth every six (6) hours as needed for Pain.  
  
 loratadine 10 mg dissolvable tablet Commonly known as:  Junie Crofts Take 10 mg by mouth daily. meclizine 25 mg tablet Commonly known as:  ANTIVERT Take  by mouth three (3) times daily as needed. multivitamin tablet Commonly known as:  ONE A DAY Take 1 Tab by mouth daily. naproxen 250 mg tablet Commonly known as:  NAPROSYN Take  by mouth two (2) times daily (with meals). valACYclovir 1 gram tablet Commonly known as:  VALTREX Take 1 Tab by mouth as needed. Introducing [facility]! Jessica Perez introduces [employer] patient portal. Now you can access parts of your medical record, email your doctor's office, and request medication refills online. 1. In your internet browser, go to [email or web address][email or web address] 2. Click on the First Time User? Click Here link in the Sign In box. You will see the New Member Sign Up page. 3. Enter your [employer] Access Code exactly as it appears below.  You will not need to use this code after youve completed the sign-up process. If you do not sign up before the expiration date, you must request a new code. · [employer] Access Code: [email or web address] Expires: 7/16/2018 12:42 PM 
 
4. Enter the last four digits of your Social Security Number (xxxx) and Date of Birth (mm/dd/yyyy) as indicated and click Submit. You will be taken to the next sign-up page. 5. Create a Smish ID. This will be your Smish login ID and cannot be changed, so think of one that is secure and easy to remember. 6. Create a Smish password. You can change your password at any time. 7. Enter your Password Reset Question and Answer. This can be used at a later time if you forget your password. 8. Enter your e-mail address. You will receive e-mail notification when new information is available in [address]. 9. Click Sign Up. You can now view and download portions of your medical record. 10. Click the Download Summary menu link to download a portable copy of your medical information. If you have questions, please visit the Frequently Asked Questions section of the Smish website. Remember, [employer]ish is NOT to be used for urgent needs. For medical emergencies, dial 911. Now available from your iPhone and Android! Please provide this summary of care documentation to your next provider. Your primary care clinician is listed as Eric Rojo. If you have any questions after today's visit, please call [phone].

## 2018-05-07 ENCOUNTER — HOSPITAL ENCOUNTER (OUTPATIENT)
Age: 48
Setting detail: OUTPATIENT SURGERY
Discharge: HOME | End: 2018-05-07
Attending: PHYSICAL MEDICINE & REHABILITATION | Admitting: PHYSICAL MEDICINE & REHABILITATION
Payer: MEDICAID

## 2018-05-07 ENCOUNTER — APPOINTMENT (OUTPATIENT)
Dept: GENERAL RADIOLOGY | Age: 48
End: 2018-05-07
Attending: PHYSICAL MEDICINE & REHABILITATION
Payer: MEDICAID

## 2018-05-07 VITALS
DIASTOLIC BLOOD PRESSURE: 69 MMHG | WEIGHT: 136 LBS | HEIGHT: 67 IN | SYSTOLIC BLOOD PRESSURE: 116 MMHG | HEART RATE: 80 BPM | BODY MASS INDEX: 21.35 KG/M2 | TEMPERATURE: 98.8 F | RESPIRATION RATE: 18 BRPM | OXYGEN SATURATION: 96 %

## 2018-05-07 PROCEDURE — 74011636320 HC RX REV CODE- 636/320: Performed by: PHYSICAL MEDICINE & REHABILITATION

## 2018-05-07 PROCEDURE — 74011636320 HC RX REV CODE- 636/320

## 2018-05-07 PROCEDURE — 76010000009 HC PAIN MGT 0 TO 30 MIN PROC: Performed by: PHYSICAL MEDICINE & REHABILITATION

## 2018-05-07 PROCEDURE — 74011250636 HC RX REV CODE- 250/636

## 2018-05-07 PROCEDURE — 74011250636 HC RX REV CODE- 250/636: Performed by: PHYSICAL MEDICINE & REHABILITATION

## 2018-05-07 PROCEDURE — 74011000250 HC RX REV CODE- 250

## 2018-05-07 RX ORDER — FENTANYL CITRATE 50 UG/ML
INJECTION, SOLUTION INTRAMUSCULAR; INTRAVENOUS AS NEEDED
Status: DISCONTINUED | OUTPATIENT
Start: 2018-05-07 | End: 2018-05-07 | Stop reason: HOSPADM

## 2018-05-07 RX ORDER — SODIUM CHLORIDE 0.9 % (FLUSH) 0.9 %
5-10 SYRINGE (ML) INJECTION AS NEEDED
Status: DISCONTINUED | OUTPATIENT
Start: 2018-05-07 | End: 2018-05-07 | Stop reason: HOSPADM

## 2018-05-07 RX ORDER — LIDOCAINE HYDROCHLORIDE 10 MG/ML
INJECTION, SOLUTION EPIDURAL; INFILTRATION; INTRACAUDAL; PERINEURAL AS NEEDED
Status: DISCONTINUED | OUTPATIENT
Start: 2018-05-07 | End: 2018-05-07 | Stop reason: HOSPADM

## 2018-05-07 RX ORDER — DEXAMETHASONE SODIUM PHOSPHATE 100 MG/10ML
INJECTION INTRAMUSCULAR; INTRAVENOUS AS NEEDED
Status: DISCONTINUED | OUTPATIENT
Start: 2018-05-07 | End: 2018-05-07 | Stop reason: HOSPADM

## 2018-05-07 RX ORDER — MIDAZOLAM HYDROCHLORIDE 1 MG/ML
INJECTION, SOLUTION INTRAMUSCULAR; INTRAVENOUS AS NEEDED
Status: DISCONTINUED | OUTPATIENT
Start: 2018-05-07 | End: 2018-05-07 | Stop reason: HOSPADM

## 2018-05-07 RX ORDER — MIDAZOLAM HYDROCHLORIDE 1 MG/ML
.5-6 INJECTION, SOLUTION INTRAMUSCULAR; INTRAVENOUS
Status: DISCONTINUED | OUTPATIENT
Start: 2018-05-07 | End: 2018-05-07 | Stop reason: HOSPADM

## 2018-05-07 NOTE — PROCEDURES
[facility]    INTERLAMINAR CERVICAL EPIDURAL STEROID INJECTION  PROCEDURE REPORT      PATIENT:  Petros [address] Street OF BIRTH:  1970  DATE OF SERVICE:  5/7/2018  [facility]    PRE-PROCEDURE DIAGNOSIS:  See Above    POST-PROCEDURE DIAGNOSIS:  See Above                PROCEDURE:    1. Interlaminar cervical epidural steroid injection, C7-T1  (18751)  2. Fluoroscopic needle guidance (spinal) (98349)  3. Supervision of moderate sedation (46488)    ANESTHESIA:  Local with moderate IV sedation. See Medication Administration Record for specific medications and dosage. COMPLICATIONS: None. PHYSICIAN:  Rick Francis MD    PRE-PROCEDURE NOTE:  Pre-procedural assessment of the patient was performed including a limited history and physical examination. The details of the procedure were discussed with the patient, including the risks, benefits and alternative options and an informed consent was obtained. The patients NPO status, if necessary for the specific procedure and/or administration of moderate intravenous sedation, if utilized, and availability of a responsible adult to escort the patient following the procedure were confirmed. A peripheral intravenous cannula was placed without difficulty and lactated Ringers solution administered. See nursing notes for details. PROCEDURE NOTE:  The patient was brought to the procedure suite and positioned on the fluoroscopy table in the prone position. Physiologic monitors were applied and supplemental oxygen was administered via nasal cannula. The skin was prepped in the standard surgical fashion and sterile drapes were applied over the procedure site.  Please refer to the Flowsheet for documentation of the patients vital signs and the Medication Administration Record for any oral and/or intravenous sedation administered prior to or during the procedure. The above-listed interlaminar space was identified and the skin and subcutaneous tissues were infiltrated with 1% Lidocaine. Under anterior-posterior fluoroscopic guidance an 18g, 3.5-inch Tuohy epidural needle was advanced along the previously identified interlaminar space. The fluoroscope was then turned lateral view and a loss of resistance syringe was attached to the needle containing preservative free normal saline. Under lateral flouroscopic guidance, the needle was then advanced through the ligamentum flavum, entering the epidural space with a clear and crisp loss of resistance. The needle was not advanced beyond the interlaminar line at any time during this process. No CSF was noted. Aspiration was negative for blood or CSF. Additional confirmation was made with the injection of 0.5 mL of a nonionic water-soluble radiographic contrast medium (Isovue-M 200) demonstrating appropriate epidural spread and the absence of vascular uptake. Following this, a 3 mL solution comprised of 2 mL of dexamethasone (10mg/ml) and 1 mL of lidocaine 1% preservative free was injected slowly after negative aspiration. The needle was cleared of steroid solution and removed. The area was thoroughly cleaned and sterile bandages applied as necessary. The patient tolerated the procedure well and vital signs remained stable throughout the procedure. POST-PROCEDURE COURSE:  The patient was escorted from the procedure suite in satisfactory condition and recovered per facility protocol based on the type of procedure performed and/or the sedation utilized. The patient did not experience any adverse events and remained hemodynamically stable during the post-procedure period. DISCHARGE NOTE:  Upon discharge, the patient was able to tolerate fluids and was in no acute distress. The patient was oriented to person, place and time and vital signs were stable.  Appropriate post-procedure instructions were provided and explained to the patient in detail and all questions were answered.     Jaret Chavez MD 5/7/2018 11:26 AM

## 2018-05-07 NOTE — INTERVAL H&P NOTE
H&P Update:  Yonas Olivier was seen and examined. History and physical has been reviewed. The patient has been examined. There have been no significant clinical changes since the completion of the originally dated History and Physical.    [facility]  Brief Pre-Procedure History & Physical    PATIENT NAME:  Yonas Olivier   YOB: 1970  DATE OF SERVICE:  5/7/2018      CHIEF COMPLAINT:  Pain    HISTORY OF PRESENT ILLNESS:  Yonas Olivier presents today for a previously diagnosed problem contributing to some or all of this patients pain. The location and pattern of the pain has not changed substantially since the last visit in our office. No other significant medical changes have occurred in the last 30 days. PAST MEDICAL HISTORY:  The patient  has a past medical history of Anemia NEC; Bursitis of shoulder, left (08/03/2009); DJD (degenerative joint disease) (08/03/2009); Elevated white blood cell count (07/20/2009); GERD (gastroesophageal reflux disease); H/O: rotator cuff tear (06/07/2006); Hyperlipidemia; Pain in joint, upper arm (07/2009); Sickle cell anemia ([facility] [address] Sleep apnea; Testicular failure; and Vertigo (07/20/2009). PAST SURGICAL HISTORY:  The patient  has a past surgical history that includes hx shoulder arthroscopy (Right); hx heent; and hx cholecystectomy (03/27/2018). CURRENT MEDICATIONS:  See Medication Administration Record [facility]) in the patient's electronic record. ALLERGIES:    Allergies   Allergen Reactions    Other Food Other (comments)     Walnuts. Gets headaches    Corn Other (comments)     Told by  allergic    Shellfish Derived Swelling     swelling       FAMILY HISTORY:  The patient family history includes Cancer in his father; Diabetes in his mother. SOCIAL HISTORY:  The patient  reports that he quit smoking about 14 years ago. His smoking use included Cigarettes.  He has never used smokeless tobacco. The patient  reports that he does not drink alcohol. He  reports that he does not use illicit drugs. REVIEW OF SYSTEMS:  Jaylin Matos denies any fever, chills, unexplained weight loss, use of antibiotics for recent infection or bleeding abnormalities. PHYSICAL EXAM:  VS:   Visit Vitals    /72 (BP 1 Location: Right arm, BP Patient Position: Sitting)    Pulse 77    Temp 98.8 °F (37.1 °C)    Resp 18    Ht 5' 7\" (1.702 m)    Wt 61.7 kg (136 lb)    SpO2 92%    BMI 21.3 kg/m2     Gen: Well-developed. Body habitus consistent with recorded height and weight and the calculated BMI. No apparent distress. Head: Normocephalic, atraumatic. Skin: No obvious rashes, lesions or infection. Pulm: Respirations are even and unlabored. Psych:    Mood, affect and speech  Appropriate. ASSESSMENT:   1. Stable for cervical DOMINIC interventional pain procedure as discussed. PLAN:  Proceed with scheduled procedure.      Lianne Tellez MD 5/7/2018 9:44 AM        Signed By: Lianne Tellez MD     May 7, 2018 9:44 AM

## 2018-05-07 NOTE — DISCHARGE INSTRUCTIONS
[facility]      Post Procedures Instructions    *Resume Diet and Activity as tolerated. Rest for the remainder of the day. *You may fell worse before you feel better as the numbing medications wear off before the steroids take effect if used for your procedures. *Do not use affected extremity until numbness or loss of sensation has completely resolved without assistance. *DO NOT DRIVE, operate machinery/heavey equipment for 24 hours. *DO NOT DRINK ALCOHOL for 24 hours as it may interact with the sedation if you received it and also thins your blood and may cause you to bleed. *WAIT 24 hours before starting back ANY Blood thinning medications:   (Heparin, Coumadin, Warfarin, Lovenox, Plavix, Aggrenox)    *Resume Pre-Procedure Medications as prescribed except Blood Thinners unless directed by your Physician or Cardiologist.     *Avoid Hot tubs and Heating pad for 24 hours to prevent dissipation of medications, you may shower to remove bandages and remaining prep residue on the skin. * If you develop a Headache, drink plenty of fluids including beverages with caffeine (Coffee, Mt. Dew etc.) and rest.  If the headache persists longer than 24 hoursor intensifies - Please call [facility] ([facility]) [phone]    * If you are DIABETIC, check your blood sugar three times a day for the next three days, the steroids will increase your blood sugar. If your blood sugar is greater than 400 have someone drive you to the nearest [address]. * If you experience any of the following problems, call the [facility] [phone] between 8:00 am - 4:30pm or After Hours [phone].     Shortness of breath    Fever of 101 F or higher    Nausea / Vomiting (not normal to you)    Increasing stiffness in the neck    Weakness or numbness in the arms or legs that is not resolving    Prolonged and increasing pain > than 4 days    ANYTHING OUT of the ORDINARY TO YOU    If YOU are experiencing a severe reaction / complication that you have never had before post procedure, call 911 or go to the nearest emergency room! All patients must have a  for transportation [place] regardless if you do or do not receive sedation. DISCHARGE SUMMARY from Nurse      PATIENT INSTRUCTIONS:    After Oral  or intravenous sedation, for 24 hours or while taking prescription Narcotics:  · Limit your activities  · Do not drive and operate hazardous machinery  · Do not make important personal or business decisions  · Do  not drink alcoholic beverages  · If you have not urinated within 8 hours after discharge, please contact your surgeon on call. Report the following to your surgeon:  · Excessive pain, swelling, redness or odor of or around the surgical area  · Temperature over 101  · Nausea and vomiting lasting longer than 4 hours or if unable to take medications  · Any signs of decreased circulation or nerve impairment to extremity: change in color, persistent  numbness, tingling, coldness or increase pain  · Any questions        What to do at Home:  Recommended activity: Activity as tolerated, NO DRIVING FOR 24 Hours post injection          *  Please give a list of your current medications to your Primary Care Provider. *  Please update this list whenever your medications are discontinued, doses are      changed, or new medications (including over-the-counter products) are added. *  Please carry medication information at all times in case of emergency situations. These are general instructions for a healthy lifestyle:    No smoking/ No tobacco products/ Avoid exposure to second hand smoke    Surgeon General's Warning:  Quitting smoking now greatly reduces serious risk to your health.     Obesity, smoking, and sedentary lifestyle greatly increases your risk for illness    A healthy diet, regular physical exercise & weight monitoring are important for maintaining a healthy lifestyle    You may be retaining fluid if you have a history of heart failure or if you experience any of the following symptoms:  Weight gain of 3 pounds or more overnight or 5 pounds in a week, increased swelling in our hands or feet or shortness of breath while lying flat in bed. Please call your doctor as soon as you notice any of these symptoms; do not wait until your next office visit. Recognize signs and symptoms of STROKE:    F-face looks uneven    A-arms unable to move or move unevenly    S-speech slurred or non-existent    T-time-call 911 as soon as signs and symptoms begin-DO NOT go       Back to bed or wait to see if you get better-TIME IS BRAIN.

## 2018-05-14 RX ORDER — KETOROLAC TROMETHAMINE 10 MG/1
TABLET, FILM COATED ORAL
Qty: 30 TAB | Refills: 0 | Status: SHIPPED | OUTPATIENT
Start: 2018-05-14 | End: 2018-09-06

## 2018-05-16 ENCOUNTER — TELEPHONE (OUTPATIENT)
Dept: PAIN MANAGEMENT | Age: 48
End: 2018-05-16

## 2018-05-16 RX ORDER — SODIUM CHLORIDE 0.9 % (FLUSH) 0.9 %
5-10 SYRINGE (ML) INJECTION AS NEEDED
Status: CANCELLED | OUTPATIENT
Start: 2018-05-21

## 2018-05-16 RX ORDER — MIDAZOLAM HYDROCHLORIDE 1 MG/ML
.5-6 INJECTION, SOLUTION INTRAMUSCULAR; INTRAVENOUS
Status: CANCELLED | OUTPATIENT
Start: 2018-05-21

## 2018-05-16 NOTE — TELEPHONE ENCOUNTER
Mr. Roxane Villar was contacted for follow-up status post Interlaminar cervical epidural steroid injection, C7-T1   on May 7, 2018.  He reports:    Pre-procedure numerical pain score: 8/10  Post-procedure numerical pain score immediately after: 7/10  Duration of relief post-procedure (if applicable): 1 days  Improvement in functional activities (if applicable): No  Percentage of overall improvement: 0%    COMMENTS:

## 2018-05-17 ENCOUNTER — OFFICE VISIT (OUTPATIENT)
Dept: PAIN MANAGEMENT | Age: 48
End: 2018-05-17

## 2018-05-17 VITALS
DIASTOLIC BLOOD PRESSURE: 73 MMHG | WEIGHT: 136 LBS | TEMPERATURE: 97.5 F | RESPIRATION RATE: 14 BRPM | SYSTOLIC BLOOD PRESSURE: 134 MMHG | HEART RATE: 64 BPM | BODY MASS INDEX: 21.35 KG/M2 | HEIGHT: 67 IN

## 2018-05-17 DIAGNOSIS — M75.42 IMPINGEMENT SYNDROME OF LEFT SHOULDER: ICD-10-CM

## 2018-05-17 DIAGNOSIS — M50.30 DEGENERATIVE DISC DISEASE, CERVICAL: ICD-10-CM

## 2018-05-17 DIAGNOSIS — R20.2 PARESTHESIA OF LEFT UPPER EXTREMITY: ICD-10-CM

## 2018-05-17 DIAGNOSIS — M54.12 CERVICAL RADICULOPATHY: Primary | ICD-10-CM

## 2018-05-17 DIAGNOSIS — G89.4 CHRONIC PAIN SYNDROME: ICD-10-CM

## 2018-05-17 DIAGNOSIS — M47.812 SPONDYLOSIS OF CERVICAL REGION WITHOUT MYELOPATHY OR RADICULOPATHY: ICD-10-CM

## 2018-05-17 NOTE — PROGRESS NOTES
[facility]  Interventional Pain Management Consultation History & Physical    PATIENT NAME:  Isaak Castillo     YOB: 1970    DATE OF SERVICE:   5/17/2018      CHIEF COMPLAINT:  Shoulder Pain (left) and Finger Pain (left index)      REASON FOR VISIT:   Isaak Castillo presents to the pain clinic today for follow on evaluation and to consider interventional pain management options as indicated for the type and location of the pain the patient is presenting with. HISTORY OF PRESENT ILLNESS:      This is a reevaluation regarding this gentleman who recently underwent cervical epidural steroid injection. I initially saw this gentleman May 26, 2018 at the request of Dr Darrius Rao. Consideration for cervical epidural steroid injection series as indicated was entertained. I found this gentleman to have neck and radiating left upper extremity pain numbness tingling consistent with cervical radiculitis secondary to cervical spondylosis. He underwent initial cervical epidural steroid injection on May 7, 2018. Unfortunately this gentleman did not obtain any benefit of any significance whatsoever from cervical procedure. He comes back for review of examination. He continues to endorse neck pain and left upper extremity radiating pain numbness and tingling. He has numbness of the distal index and middle finger. His right shoulder also has some pain and discomfort, he is status post right shoulder surgery I believe for rotator cuff repair. He feels the same issues are occurring in his left shoulder. We reviewed again his lumbar MRI imaging from 2016. He has left paracentral disc extrusion at C6-7. Myofascial pain in the distribution of the left C7 dermatome. ASSESSMENT/OPTIONS: as follows. This gentlemen's pain symptoms have persisted for quite some time.   They have become worse, especially since 2016 which was the date of his last cervical MRI. His insurance has denied repeating cervical MRI. As he has not had any benefit from cervical epidural steroid injection, I will therefore refer him back to Dr Knowles Better for other considerations. Perhaps consideration could be given as to whether this gentleman may be having primarily left shoulder related pain rather than neck pain, radiculitis. MRI Results (most recent):    Results from [place] encounter on 03/12/18   MRI SHOULDER LT WO CONT   Narrative History: Left scapular pain and numbness the 2nd and 3rd fingers. TECHNIQUE: Coronal and sagittal PD and T2 fat-sat, axial PD with and without  fat-sat imaging obtained through the left glenohumeral joint. COMPARISON STUDY: 08/17/09. FINDINGS:    Rotator cuff: No muscle atrophy or abnormal signal.    Supraspinatus: Mild tendinosis. Slight linear insertional hyperintense signal  which may reflect minimal tear. Infraspinatus: Mild tendinosis. Otherwise intact by MRI. Subscapularis: Intact by MRI. Teres minor: Intact by MRI. Biceps tendon: Appropriately located in bicipital groove. Intra-articular  portion poorly seen. Biceps labral anchor intact    Labrum left glenohumeral joint: No significant glenohumeral joint fluid limiting  sensitivity for detection of labral abnormalities. Labrum intact within limits  of study. Acromioclavicular joint: Mild inferior offset of the acromion with respect to  the clavicle. No significant degenerative changes. No os acromiale. Small amount  of fluid in subacromial/subdeltoid bursa. Osseous/marrow: Overall decreased T1 marrow signal which can be seen in the  setting of marrow expansion/anemia, similar to prior. Otherwise unremarkable. Periarticular: Multiple axillary lymph nodes are present, similar to prior,  nonspecific. Impression IMPRESSION:     1. Suboptimal visualization intra-articular biceps tendon, not confirmed intact.     2. Slight, linear insertional supraspinatus tendon tearing. Mild inferior offset  of the acromion which can predispose to supraspinatus outlet impingement. 3. Overall decreased T1 marrow signal, again which can be seen in the setting of  marrow expansion/anemia. PAST MEDICAL HISTORY:   The patient  has a past medical history of Anemia NEC; Bursitis of shoulder, left (08/03/2009); DJD (degenerative joint disease) (08/03/2009); Elevated white blood cell count (07/20/2009); GERD (gastroesophageal reflux disease); H/O: rotator cuff tear (06/07/2006); Hyperlipidemia; Pain in joint, upper arm (07/2009); Sickle cell anemia ([facility] [address] Sleep apnea; Testicular failure; and Vertigo (07/20/2009). PAST SURGICAL HISTORY:   The patient  has a past surgical history that includes hx shoulder arthroscopy (Right); hx heent; and hx cholecystectomy (03/27/2018). CURRENT MEDICATIONS:   The patient has a current medication list which includes the following prescription(s): ketorolac, gabapentin, multivitamin, meclizine, naproxen, loratadine, propranolol la, valacyclovir, aspirin ec, and hydrocodone-acetaminophen. ALLERGIES:     Allergies   Allergen Reactions    Other Food Other (comments)     Walnuts. Gets headaches    Corn Other (comments)     Told by  allergic    Shellfish Derived Swelling     Swelling. States okay with betadine       FAMILY HISTORY:   The patient family history includes Cancer in his father; Diabetes in his mother. SOCIAL HISTORY:   The patient  reports that he quit smoking about 14 years ago. His smoking use included Cigarettes. He has never used smokeless tobacco. The patient  reports that he does not drink alcohol.  He      REVIEW OF SYSTEMS:    The patient denies fever, chills, weight loss (Constitutional), rash, itching (Skin), tinnitus, congestion (HENT), blurred vision, photophobia (Eyes), palpitations, orthopnea (Cardiovascular), hemoptysis, wheezing (Respiratory), nausea, vomiting, diarrhea (Gastrointestinal), dysuria, hematuria, urgency (Genitourinary), bowel or bladder incontinence, loss of consciousness (Neurologic), suicidal or homicidal ideation or hallucinations (Psychiatric). Denies swelling, axillary or groin masses (Lymphatic). PHYSICAL EXAM:  VS:   Visit Vitals    /73 (BP 1 Location: Left arm, BP Patient Position: Sitting)    Pulse 64    Temp 97.5 °F (36.4 °C) (Oral)    Resp 14    Ht 5' 7\" (1.702 m)    Wt 61.7 kg (136 lb)    BMI 21.3 kg/m2     General: Well-developed and well-nourished. Body habitus consistent with recorded height and weight and the calculated BMI. Apparent distress due to neck pain and left upper extremity symptoms. Head: Normocephalic, atraumatic. Skin: Inspection of the skin reveals no rashes, lesions or infection. CV: Regular rate. No murmurs or rubs noted. No peripheral edema noted. Pulm: Respirations are even and unlabored. Extr: No clubbing, cyanosis, or edema noted. Musculoskeletal:  1. Cervical spine decreased range of motion left greater than right . Paraspinous tenderness left greater than right . There is no scoliosis, asymmetry, or musculoskeletal defect. 2. Thoracic spine  Full ROM. No paraspinous tenderness at any level. There is no scoliosis, asymmetry, or musculoskeletal defect. 3. Lumbar spine  Full ROM. No paraspinous tenderness at any level. SI joints are nontender bilaterally. There is no scoliosis, asymmetry, or musculoskeletal defect. 4. Right upper extremity  Full ROM. 5/5 muscle strength in all muscle groups. No pain or tenderness in shoulder, elbow, wrist, or hand. 5. Left upper extremity  Full ROM. 5/5 muscle strength in all muscle groups. No pain or tenderness in shoulder, elbow, wrist, or hand. 6. Right lower extremity  Full ROM. 5/5 muscle strength in all muscle groups. No pain, tenderness, or swelling in the hip, knee, ankle or foot. 7. Left lower extremity  Full ROM.   5/5 muscle strength in all muscle groups. No pain, tenderness, or swelling in the hip, knee, ankle or foot. Neurological:  1. Mental Status - Alert, awake and oriented. Speech is clear and appropriate. 2. Cranial Nerves - Extraocular muscles intact bilaterally. Cranial nerves II-XII grossly intact bilaterally. 3. Gait - Non-antalgic   4. Reflexes - 2+ and symmetric throughout. 5. Sensation - Intact to light touch and pin prick. 6. Provocative Tests - Spurlings negative bilaterally. Straight leg raise negative bilaterally. Psychological:  1. Mood and affect  Appropriate. 2. Speech  Appropriate. 3. Though content  Appropriate. 4. Judgment  Appropriate. ASSESSMENT:      ICD-10-CM ICD-9-CM    1. Cervical radiculopathy M54.12 723.4    2. Degenerative disc disease, cervical M50.30 722.4    3. Spondylosis of cervical region without myelopathy or radiculopathy M47.812 721.0    4. Chronic pain syndrome G89.4 338.4    5. Impingement syndrome of left shoulder M75.42 726.2    6. Paresthesia of left upper extremity R20.2 782.0            PLAN:    1.    I have thoroughly discussed the risks and benefits, indications, contraindications, and side effects of any/all procedures that were mentioned at today's patient visit. I have used a skeleton spine model when indicated to explain all procedures, as well as to provide added emphasis regarding procedures and as well for patient education purposes. I have answered all questions in great detail, and I have obtained verbal and written confirmation for all procedures planned with the patient. 3.    I have reviewed in great detail today, when indicated, the patient's MRI and other imaging studies with the patient. I have explained to the patient, when indicated, their condition using both actual recent and relevant images insofar as I am able to obtain these images. I have used a skeleton spine model for added emphasis as well as for patient education.       4.    I have advised patient to have a primary care provider continue to care for their health maintenance and general medical conditions. 5,    I have placed appropriate referrals to specialty care providers as I have deemed necessary through today's clinical consultation with the patient. 5.    I have explained to the patient that if any significant side effects, issues, problems, concerns, or perceived complications as may arise at around the time of the patient's procedures, they should either call the pain management clinic or go to the emergency room immediately for medical provider evaluation. 6.   I have encouraged all patients to call the pain management clinic with any questions or concerns that they may have pertaining to their procedures. DISPOSITION:   The patients condition and plan were discussed at length and all questions were answered. The patient agrees with the plan. A total of 15 minutes was spent with the patient of which over half of the time was spent counseling the patient. Kay Juan MD 5/17/2018 9:50 AM    Note: Although these clinic notes were documented by the provider at the time of the exam, they have not been proofed and are subject to transcription variance.

## 2018-06-04 ENCOUNTER — TELEPHONE (OUTPATIENT)
Dept: ORTHOPEDIC SURGERY | Age: 48
End: 2018-06-04

## 2018-06-04 DIAGNOSIS — M25.512 LEFT SHOULDER PAIN, UNSPECIFIED CHRONICITY: Primary | ICD-10-CM

## 2018-06-22 ENCOUNTER — OFFICE VISIT (OUTPATIENT)
Dept: ORTHOPEDIC SURGERY | Age: 48
End: 2018-06-22

## 2018-06-22 VITALS
TEMPERATURE: 98.6 F | BODY MASS INDEX: 21.82 KG/M2 | HEIGHT: 67 IN | DIASTOLIC BLOOD PRESSURE: 63 MMHG | HEART RATE: 86 BPM | WEIGHT: 139 LBS | SYSTOLIC BLOOD PRESSURE: 129 MMHG | RESPIRATION RATE: 17 BRPM | OXYGEN SATURATION: 96 %

## 2018-06-22 DIAGNOSIS — M54.12 CERVICAL RADICULOPATHY: Primary | ICD-10-CM

## 2018-06-22 DIAGNOSIS — M25.511 RIGHT SHOULDER PAIN, UNSPECIFIED CHRONICITY: ICD-10-CM

## 2018-06-22 RX ORDER — PREGABALIN 75 MG/1
75 CAPSULE ORAL 2 TIMES DAILY
Qty: 14 CAP | Refills: 0 | Status: SHIPPED | OUTPATIENT
Start: 2018-06-22 | End: 2020-03-03 | Stop reason: SDUPTHER

## 2018-06-22 RX ORDER — PREGABALIN 150 MG/1
150 CAPSULE ORAL 2 TIMES DAILY
Qty: 60 CAP | Refills: 2 | Status: SHIPPED | OUTPATIENT
Start: 2018-06-22

## 2018-06-22 NOTE — PROGRESS NOTES
[address], [address]  [facility], [address]  Phone: [phone]  Fax: [phone]        Milad Boas  : 1970  PCP: Bronwyn Peña MD  2018    PROGRESS NOTE      ASSESSMENT AND PLAN    Sadia Whitley comes in to the office today for cervical injection f/u. He did not find any relief from the cervical injections. He continues to have neck pain radiating into his LUE that is worsening over the last month. He also c/o a new right-sided neck and shoulder pain. He discontinued the Gabapentin because he did not feel it was effective and he was experiencing somnolence. He rates his pain as an 8/10 today. On examination, he has a positive Wise on the L. He had a negative Spurling's sign bilaterally. His pain may be associated with a cervical radiculopathy with a component of myofascial pain. I referred to PT with optional dry needling. I also prescribed Lyrica 75mg BID for 1 week, then 150mg BID moving forward. Pt is also interested in chiropractic care. Pt will f/u in 6 weeks or sooner as needed. Diagnoses and all orders for this visit:    1. Cervical radiculopathy  -     REFERRAL TO PHYSICAL THERAPY  -     pregabalin (LYRICA) 75 mg capsule; Take 1 Cap by mouth two (2) times a day. Max Daily Amount: 150 mg.  -     pregabalin (LYRICA) 150 mg capsule; Take 1 Cap by mouth two (2) times a day. Max Daily Amount: 300 mg.    2. Right shoulder pain, unspecified chronicity  -     REFERRAL TO PHYSICAL THERAPY       Follow-up Disposition: Not on File      HISTORY OF PRESENT ILLNESS  George Goodrich is a 50 y.o. male. A&P / HPI from 18:  Sadia Wihtley comes in to the office today c/o chronic neck pain radiating into his LUE and numbness in his fingers that has worsened over the last 4 months with the new symptoms being radicular.  He rates his pain as an 8/10 today.     On examination, he had decreased sensation on the left along a C7 distribution to light touch. Otherwise, he is neurologically intact. He had tenderness to palpation throughout his back axially, worse in his parascapular region.     Given his symptoms, and previous MRI findings (2016) of a left paracentral disc extrusion at C6-7, we discussed cervical injections. His pain is likely myofascial pain with a left-sided C7 cervical radiculopathy. He would like to move forward with the cervical injection for the cervical radiculopathy and then resume PT for his myofascial pain.     I referred to Dr. Carolynn Adame for cervical epidural injections. I also referred for an updated cervical MRI and increased his Gabapentin to 600mg TID.     If the cervical injections do not provide relief of his symptoms, we will likely consider an EMG vs surgical consult.      Pt will f/u in 2 weeks or sooner if needed. HISTORY OF PRESENT ILLNESS  George Barahona is a 52 y.o. male c/o neck pain radiating into his LUE and numbness in his fingers. He recently completed 10 visits to PT and has 6 remaining. He notes when he lays down, his whole body gets paralyzed. He has seen a sleep doctor who states he had sleep apnea, but no sleep paralysis. The only thing that has provided him any relief has been pain medication, but it has had less of an effect recently. He previously had neck pain for which he had an MRI in 2016, but he did not begin to have numbness in his fingers on his left hand until recently.     He has a dx of sickle cell, but notes he does not have a pain management or hematologist. His PCP has been managing his sickle cell for him.     Pt denies any fevers, chills, nausea, vomiting. Pt denies any chest pain and shortness of breath. Pt denies any ear, nose, and throat problems. Pt denies any fecal or urinary incontinence. Updates from 06/22/18:  Pt presents for cervical injection f/u.     He notes he did not find any relief from the cervical injections, and his neck pain and LUE paraesthesia has worsened over the last month. He notes he now has a right-sided neck pain associated with his right shoulder pain. He is scheduled to see Dr. Gayatri Nguyen on Monday for his right shoulder pain. He did not tolerate Gabapentin well as he experienced somnolence. Pt is interested in chiropractic care. PAST MEDICAL HISTORY   Past Medical History:   Diagnosis Date    Anemia NEC     Bursitis of shoulder, left 08/03/2009    MRI revealed tendinitis and bursitis of the rotator cuff.  DJD (degenerative joint disease) 08/03/2009    Early djd, left radiocarpal joint with associated ganglion cyst.      Elevated white blood cell count 07/20/2009    GERD (gastroesophageal reflux disease)     H/O: rotator cuff tear 06/07/2006    Chronic    Hyperlipidemia     Pain in joint, upper arm 07/2009    Pain in shoulder, elbow and wrist.    Sickle cell anemia (HCC)     Sleep apnea     Does not use CPAP    Testicular failure     Vertigo 07/20/2009       Past Surgical History:   Procedure Laterality Date    HX CHOLECYSTECTOMY  03/27/2018    HX HEENT      eardrum repair    HX SHOULDER ARTHROSCOPY Right    . MEDICATIONS    Current Outpatient Prescriptions   Medication Sig Dispense Refill    ketorolac (TORADOL) 10 mg tablet take 1 tablet by mouth every 6 hours if needed for pain 30 Tab 0    gabapentin (NEURONTIN) 300 mg capsule Take 2 Caps by mouth three (3) times daily. 180 Cap 2    loratadine (CLARITIN REDITABS) 10 mg dissolvable tablet Take 10 mg by mouth daily.  propranolol LA (INDERAL LA) 80 mg SR capsule Take 80 mg by mouth daily. Headache prevention      valACYclovir (VALTREX) 1 gram tablet Take 1 Tab by mouth as needed. 0    aspirin  mg tablet Take 1,000 mg by mouth every twelve (12) hours as needed for Pain.  multivitamin (ONE A DAY) tablet Take 1 Tab by mouth daily.       meclizine (ANTIVERT) 25 mg tablet Take  by mouth three (3) times daily as needed.  naproxen (NAPROSYN) 250 mg tablet Take  by mouth two (2) times daily (with meals).  hydrocodone-acetaminophen 5-500 mg Cap Take 1 Tab by mouth every six (6) hours as needed. ALLERGIES  Allergies   Allergen Reactions    Other Food Other (comments)     Walnuts. Gets headaches    Corn Other (comments)     Told by  allergic    Shellfish Derived Swelling     Swelling. States okay with betadine          SOCIAL HISTORY    Social History     Social History    Marital status: SINGLE     Spouse name: N/A    Number of children: N/A    Years of education: N/A     Social History Main Topics    Smoking status: Former Smoker     Types: Cigarettes     Quit date: 7/12/2003    Smokeless tobacco: Never Used    Alcohol use No    Drug use: No    Sexual activity: Yes     Partners: Female     Other Topics Concern    Not on file     Social History Narrative       FAMILY HISTORY  Family History   Problem Relation Age of Onset    Diabetes Mother     Cancer Father          REVIEW OF SYSTEMS  Review of Systems   Musculoskeletal: Positive for neck pain. Right shoulder pain  LUE paraesthesia           PHYSICAL EXAMINATION  There were no vitals taken for this visit. Pain Assessment  5/4/2018   Location of Pain Neck   Location Modifiers -   Severity of Pain 8   Quality of Pain Aching   Quality of Pain Comment -   Duration of Pain -   Frequency of Pain Constant   Aggravating Factors (No Data)   Aggravating Factors Comment Ongoing   Limiting Behavior -   Relieving Factors Nothing   Result of Injury -           Constitutional:  Well developed, well nourished, in no acute distress. Psychiatric: Affect and mood are appropriate. Integumentary: No rashes or abrasions noted on exposed areas. SPINE/MUSCULOSKELETAL EXAM    Cervical spine:  Neck is midline. Normal muscle tone. No focal atrophy is noted. ROM painful. Shoulder ROM intact.    Tenderness to palpation. Negative Spurling's sign. Negative Tinel's sign. Negative Tomas's sign.       Sensation in the bilateral arms grossly intact to light touch.      Lumbar spine:  No rash, ecchymosis, or gross obliquity. No fasciculations. No focal atrophy is noted. No pain with hip ROM. Full range of motion. Tenderness to palpation. No tenderness to palpation at the sciatic notch. SI joints non-tender. Trochanters non tender.      Sensation in the bilateral legs grossly intact to light touch. MOTOR:      Biceps  Triceps Deltoids Wrist Ext Wrist Flex Hand Intrin   Right 5/5 5/5 5/5 5/5 5/5 5/5   Left 5/5 5/5 5/5 5/5 5/5 5/5             Hip Flex  Quads Hamstrings Ankle DF EHL Ankle PF   Right 5/5 5/5 5/5 5/5 5/5 5/5   Left 5/5 5/5 5/5 5/5 5/5 5/5     DTRs are 2+ biceps, triceps, brachioradialis, patella, and Achilles.     Negative Straight Leg raise. Squat not tested. No difficulty with tandem gait.      Ambulation without assistive device. FWB.       RADIOGRAPHS  2V Cervical XR images taken on 4/16/18 personally reviewed with patient:  Straightening of the cervical lordosis  Endplate osteophytes  Slight lean to right  Normal disc spacing  No obvious compression fractures or instabilities        Cervical MRI images taken on 1/14/16 personally reviewed with patient:  COMPARISON: MRI cervical spine 2/8/12    TECHNIQUE: Cervical spine scanned with axial and sagittal T2W scans, sagittal T1W scans. FINDINGS:  Straightening of the cervical spine. Slight retrolisthesis of C4 on C5. Remainder of vertebral bodies maintain normal alignment. The cervical cord appears normal. Diffuse hypointense marrow signal, similar to prior studies. The visualized posterior fossa structures are unremarkable. C2/C3: No significant spinal stenosis or neural foraminal narrowing. C3/C4: Mild disc bulge with posterior central annular tear. Mild/moderate right and mild left foraminal stenosis.  Mild narrowing of the central canal, midline AP diameter is 8.4 mm.      C4/C5: Mild disc bulge. Mild left foraminal stenosis. Mild central canal stenosis, midline AP diameter is 8 mm. C5/C6: No significant spinal stenosis or neural foraminal narrowing. C6/C7: Small left paracentral disc extrusion with annular tear. Disc material extends slightly below the disc space. Mild narrowing of the left central canal. Neural foramen are patent. C7/T1: No significant spinal stenosis or neural foraminal narrowing. Vertebral artery flow voids present. Similar appearance of prominent adenoid tissue and cervical adenopathy.     Impression:    1. Mild/moderate multilevel degenerative discogenic disease. Marginal change in comparison to prior. 2. Similar appearance of small left paracentral disc extrusion at C6-C7. 3. Similar appearance of adenopathy and prominent adenoid tissue. 4. Diffuse hypointense marrow signal, similar to prior. Could be related to anemia in this patient with history of sickle cell disease. 12 minutes of face-to-face contact were spent with the patient during today's visit extensively discussing symptoms and treatment plan. All questions were answered. More than half of this visit today was spent on counseling.      Written by Lakeisha Sutherland as dictated by Juan Kirby MD

## 2018-06-22 NOTE — MR AVS SNAPSHOT
[address] [facility] [facility] [address] 
[phone] Patient: Marck Nuñez MRN: [id number] WSE:[id number] Visit Information Date & Time Provider Department Dept. Phone Encounter #  
 6/22/2018  1:45 PM Prince Al MD [facility] [phone] [id number] Follow-up Instructions Return in about 6 weeks (around 8/3/2018). Your Appointments 6/25/2018  1:45 PM  
PROBLEM VISIT with Marion Jama MD  
[address] [facility], [address] [facility] - [address] [facility]) Appt Note: Efrain shoulder/ ref by Marjorie/ was seen for right only, now both are hurting/ *Advised the patient to come 30 minutes prior to their appointment with their picture I.D, Insurance cards and a list of their medications & dosage to the [place] [address] location [address] [facility], [address] [facility] [address] [facility]  
  
   
 [address] [facility], [address] [place] [address]  
  
    
 7/30/2018  1:45 PM  
ESTABLISHED PATIENT with Catherine Su MD  
[facility]) Appt Note: 3 MONTH F/U  
 [address] [place] [address]  
[phone]  
  
   
 [place] [id number] Upcoming Health Maintenance Date Due Pneumococcal 19-64 Highest Risk (3 of 3 - PCV13) 10/15/2017 Influenza Age 5 to Adult 8/1/2018 DTaP/Tdap/Td series (2 - Td) 10/15/2022 Allergies as of 6/22/2018  Review Complete On: 6/22/2018 By: Prince Al MD  
  
 Severity Noted Reaction Type Reactions Other Food  01/16/2018    Other (comments) Walnuts. Gets headaches Regan  01/16/2018    Other (comments) Told by  allergic Shellfish Derived  01/16/2018    Swelling Swelling. States okay with betadine Current Immunizations  Never Reviewed Name Date  Hib (PRP-OMP) 10/19/2007 12:00 AM  
 Influenza Vaccine 11/24/2017 12:00 AM, 10/14/2016 12:00 AM, 10/15/2015 12:00 AM, 10/17/2014 12:00 AM, 10/19/2007 12:00 AM, 10/25/2006 12:00 AM, 10/25/2005 12:00 AM  
 Meningococcal (MCV4P) Vaccine 8/31/2016 12:00 AM, 10/19/2007 12:00 AM  
 Pneumococcal Conjugate (PCV-7) 10/17/2014 12:00 AM  
 Pneumococcal Polysaccharide (PPSV-23) 10/15/2016 12:00 AM, 8/31/2016 12:00 AM, 10/19/2007 12:00 AM  
 TB Skin Test (PPD) Intradermal 4/16/2012 12:00 AM  
 Tdap 10/15/2012 12:00 AM  
  
 Not reviewed this visit You Were Diagnosed With   
  
 Codes Comments Cervical radiculopathy    -  Primary ICD-10-CM: M54.12 
ICD-9-CM: 723.4 Right shoulder pain, unspecified chronicity     ICD-10-CM: M25.511 ICD-9-CM: 719.41 Vitals BP Pulse Temp Resp Height(growth percentile) Weight(growth percentile) 129/63 86 98.6 °F (37 °C) 17 5' 7\" (1.702 m) 139 lb (63 kg) SpO2 BMI Smoking Status 96% 21.77 kg/m2 Former Smoker BMI and BSA Data Body Mass Index Body Surface Area 21.77 kg/m 2 1.73 m 2 Preferred Pharmacy Pharmacy Name Phone [address]  [facility] [phone] Your Updated Medication List  
  
   
This list is accurate as of 6/22/18  2:26 PM.  Always use your most recent med list.  
  
  
  
  
 aspirin  mg tablet Take 1,000 mg by mouth every twelve (12) hours as needed for Pain. HYDROcodone-acetaminophen 5-500 mg Cap Take 1 Tab by mouth every six (6) hours as needed. INDERAL LA 80 mg SR capsule Generic drug:  propranolol LA Take 80 mg by mouth daily. Headache prevention  
  
 ketorolac 10 mg tablet Commonly known as:  TORADOL  
take 1 tablet by mouth every 6 hours if needed for pain * loratadine 10 mg dissolvable tablet Commonly known as:  [place] Kurk Take 10 mg by mouth daily. * ALAVERT PO Take  by mouth.  
  
 meclizine 25 mg tablet Commonly known as:  ANTIVERT  
 Take  by mouth three (3) times daily as needed. multivitamin tablet Commonly known as:  ONE A DAY Take 1 Tab by mouth daily. naproxen 250 mg tablet Commonly known as:  NAPROSYN Take  by mouth two (2) times daily (with meals). * pregabalin 75 mg capsule Commonly known as:  Shiela Mas Take 1 Cap by mouth two (2) times a day. Max Daily Amount: 150 mg.  
  
 * pregabalin 150 mg capsule Commonly known as:  Shiela Mas Take 1 Cap by mouth two (2) times a day. Max Daily Amount: 300 mg.  
  
 valACYclovir 1 gram tablet Commonly known as:  VALTREX Take 1 Tab by mouth as needed. * Notice: This list has 4 medication(s) that are the same as other medications prescribed for you. Read the directions carefully, and ask your doctor or other care provider to review them with you. Prescriptions Printed Refills  
 pregabalin (LYRICA) 75 mg capsule 0 Sig: Take 1 Cap by mouth two (2) times a day. Max Daily Amount: 150 mg.  
 Class: Print Route: Oral  
 pregabalin (LYRICA) 150 mg capsule 2 Sig: Take 1 Cap by mouth two (2) times a day. Max Daily Amount: 300 mg. Class: Print Route: Oral  
  
We Performed the Following REFERRAL TO PHYSICAL THERAPY [DGK22 Custom] Comments:  
 eval and treat Neck pain radiating into his LUE Right shoulder pain Optional dry needling Follow-up Instructions Return in about 6 weeks (around 8/3/2018). Referral Information Referral ID Referred By Referred To  
  
 [id number] Radha Crow [place] [address] [address] [facility], [address] [place] [address] Phone: [phone] Fax: [phone] Visits Status Start Date End Date 1 New Request 6/22/18 6/22/19 If your referral has a status of pending review or denied, additional information will be sent to support the outcome of this decision. Introducing [facility]! Alejandro Greer introduces D[employer]bz patient portal. Now you can access parts of your medical record, email your doctor's office, and request medication refills online. 1. In your internet browser, go to [email or web address] 2. Click on the First Time User? Click Here link in the Sign In box. You will see the New Member Sign Up page. 3. Enter your Dibbz Access Code exactly as it appears below. You will not need to use this code after youve completed the sign-up process. If you do not sign up before the expiration date, you must request a new code. · D[employer]bz Access Code: [email or web address] Expires: 7/16/2018 12:42 PM 
 
4. Enter the last four digits of your Social Security Number (xxxx) and Date of Birth (mm/dd/yyyy) as indicated and click Submit. You will be taken to the next sign-up page. 5. Create a Dibbz ID. This will be your Dibbz login ID and cannot be changed, so think of one that is secure and easy to remember. 6. Create a Dibbz password. You can change your password at any time. 7. Enter your Password Reset Question and Answer. This can be used at a later time if you forget your password. 8. Enter your e-mail address. You will receive e-mail notification when new information is available in [address] 9. Click Sign Up. You can now view and download portions of your medical record. 10. Click the Download Summary menu link to download a portable copy of your medical information. If you have questions, please visit the Frequently Asked Questions section of the D[employer]bz website. Remember, D[employer]bz is NOT to be used for urgent needs. For medical emergencies, dial 911. Now available from your iPhone and Android! Please provide this summary of care documentation to your next provider. Your primary care clinician is listed as Deisy Christiansen. If you have any questions after today's visit, please call [phone].

## 2018-06-25 ENCOUNTER — OFFICE VISIT (OUTPATIENT)
Dept: ORTHOPEDIC SURGERY | Facility: CLINIC | Age: 48
End: 2018-06-25

## 2018-06-25 VITALS
SYSTOLIC BLOOD PRESSURE: 112 MMHG | DIASTOLIC BLOOD PRESSURE: 54 MMHG | RESPIRATION RATE: 18 BRPM | WEIGHT: 138.4 LBS | HEART RATE: 88 BPM | BODY MASS INDEX: 21.72 KG/M2 | OXYGEN SATURATION: 93 % | TEMPERATURE: 99.4 F | HEIGHT: 67 IN

## 2018-06-25 DIAGNOSIS — Z98.890 S/P ARTHROSCOPY OF RIGHT SHOULDER: Primary | ICD-10-CM

## 2018-06-25 DIAGNOSIS — Z87.39 HX OF GOUT: ICD-10-CM

## 2018-06-25 DIAGNOSIS — G89.29 CHRONIC RIGHT SHOULDER PAIN: ICD-10-CM

## 2018-06-25 DIAGNOSIS — M54.2 NECK PAIN: ICD-10-CM

## 2018-06-25 DIAGNOSIS — M25.511 CHRONIC RIGHT SHOULDER PAIN: ICD-10-CM

## 2018-06-25 RX ORDER — BETAMETHASONE SODIUM PHOSPHATE AND BETAMETHASONE ACETATE 3; 3 MG/ML; MG/ML
6 INJECTION, SUSPENSION INTRA-ARTICULAR; INTRALESIONAL; INTRAMUSCULAR; SOFT TISSUE ONCE
Qty: 0.5 ML | Refills: 0
Start: 2018-06-25 | End: 2018-06-25

## 2018-06-25 RX ORDER — BUPIVACAINE HYDROCHLORIDE 2.5 MG/ML
4 INJECTION, SOLUTION EPIDURAL; INFILTRATION; INTRACAUDAL ONCE
Qty: 4 ML | Refills: 0
Start: 2018-06-25 | End: 2018-06-25

## 2018-06-25 NOTE — MR AVS SNAPSHOT
[address] [facility] 
 
 
 [address], [address] [facility] [address] [phone] Patient: Horace Katz MRN: [id number] Visit Information Date & Time Provider Department Dept. Phone Encounter #  
 6/25/2018  1:45 PM Rocky Cho, [address] [facility] [phone] [id number] Follow-up Instructions Return if symptoms worsen or fail to improve. Your Appointments 7/30/2018  1:45 PM  
ESTABLISHED PATIENT with Ruslan Nayak MD  
[facility]. [address]) Appt Note: 3 MONTH F/U  
 [address] [facility] [address] [facility] [address] [id number]  
  
    
 8/21/2018  1:45 PM  
Follow Up with Vega Robison MD  
[address] [facility], [address] [facility] [address]) Appt Note: 8 wk fu back and neck [address] [facility] [address]  
[phone]  
  
   
 [address] [facility] [address] Upcoming Health Maintenance Date Due Pneumococcal 19-64 Highest Risk (3 of 3 - PCV13) 10/15/2017 Influenza Age 5 to Adult 8/1/2018 DTaP/Tdap/Td series (2 - Td) 10/15/2022 Allergies as of 6/25/2018  Review Complete On: 6/25/2018 By: Rocky Cho MD  
  
 Severity Noted Reaction Type Reactions Other Food  01/16/2018    Other (comments) Walnuts. Gets headaches [place]  01/16/2018    Other (comments) Told by  allergic Shellfish Derived  01/16/2018    Swelling Swelling. States okay with betadine Current Immunizations  Never Reviewed Name Date  Hib (PRP-OMP) 10/19/2007 12:00 AM  
 Influenza Vaccine 11/24/2017 12:00 AM, 10/14/2016 12:00 AM, 10/15/2015 12:00 AM, 10/17/2014 12:00 AM, 10/19/2007 12:00 AM, 10/25/2006 12:00 AM, 10/25/2005 12:00 AM  
 Meningococcal (MCV4P) Vaccine 8/31/2016 12:00 AM, 10/19/2007 12:00 AM  
 Pneumococcal Conjugate (PCV-7) 10/17/2014 12:00 AM  
 Pneumococcal Polysaccharide (PPSV-23) 10/15/2016 12:00 AM, 8/31/2016 12:00 AM, 10/19/2007 12:00 AM  
 TB Skin Test (PPD) Intradermal 4/16/2012 12:00 AM  
 Tdap 10/15/2012 12:00 AM  
  
 Not reviewed this visit You Were Diagnosed With   
  
 Codes Comments S/P arthroscopy of right shoulder    -  Primary ICD-10-CM: O48.784 ICD-9-CM: V45.89 Chronic right shoulder pain     ICD-10-CM: M25.511, G89.29 ICD-9-CM: 719.41, 338.29 Neck pain     ICD-10-CM: M54.2 ICD-9-CM: 723.1 Hx of gout     ICD-10-CM: Z87.39 
ICD-9-CM: V12.29 Vitals BP Pulse Temp Resp Height(growth percentile) Weight(growth percentile) 112/54 88 99.4 °F (37.4 °C) (Oral) 18 5' 7\" (1.702 m) 138 lb 6.4 oz (62.8 kg) SpO2 BMI Smoking Status 93% 21.68 kg/m2 Former Smoker BMI and BSA Data Body Mass Index Body Surface Area  
 21.68 kg/m 2 1.72 m 2 Preferred Pharmacy Pharmacy Name Phone [facility] [address], located within [facility] [phone] Your Updated Medication List  
  
   
This list is accurate as of 6/25/18  3:06 PM.  Always use your most recent med list.  
  
  
  
  
 aspirin  mg tablet Take 1,000 mg by mouth every twelve (12) hours as needed for Pain. HYDROcodone-acetaminophen 5-500 mg Cap Take 1 Tab by mouth every six (6) hours as needed. INDERAL LA 80 mg SR capsule Generic drug:  propranolol LA Take 80 mg by mouth daily. Headache prevention  
  
 ketorolac 10 mg tablet Commonly known as:  TORADOL  
take 1 tablet by mouth every 6 hours if needed for pain * loratadine 10 mg dissolvable tablet Commonly known as:  Carvel Hark Take 10 mg by mouth daily. * ALAVERT PO Take  by mouth.  
  
 meclizine 25 mg tablet Commonly known as:  ANTIVERT Take  by mouth three (3) times daily as needed. multivitamin tablet Commonly known as:  ONE A DAY  
 Take 1 Tab by mouth daily. naproxen 250 mg tablet Commonly known as:  NAPROSYN Take  by mouth two (2) times daily (with meals). * pregabalin 75 mg capsule Commonly known as:  Deni Majestic Take 1 Cap by mouth two (2) times a day. Max Daily Amount: 150 mg.  
  
 * pregabalin 150 mg capsule Commonly known as:  Deni Majestic Take 1 Cap by mouth two (2) times a day. Max Daily Amount: 300 mg.  
  
 valACYclovir 1 gram tablet Commonly known as:  VALTREX Take 1 Tab by mouth as needed. * Notice: This list has 4 medication(s) that are the same as other medications prescribed for you. Read the directions carefully, and ask your doctor or other care provider to review them with you. We Performed the Following AMB POC XRAY, SHOULDER; COMPLETE, 2+ [23903 CPT(R)] Follow-up Instructions Return if symptoms worsen or fail to improve. Patient Instructions Neck: Exercises Your Care Instructions Here are some examples of typical rehabilitation exercises for your condition. Start each exercise slowly. Ease off the exercise if you start to have pain. Your doctor or physical therapist will tell you when you can start these exercises and which ones will work best for you. How to do the exercises Neck stretch 1. This stretch works best if you keep your shoulder down as you lean away from it. To help you remember to do this, start by relaxing your shoulders and lightly holding on to your thighs or your chair. 2. Tilt your head toward your shoulder and hold for 15 to 30 seconds. Let the weight of your head stretch your muscles. 3. If you would like a little added stretch, use your hand to gently and steadily pull your head toward your shoulder. For example, keeping your right shoulder down, lean your head to the left. 4. Repeat 2 to 4 times toward each shoulder. Diagonal neck stretch 1.  Turn your head slightly toward the direction you will be stretching, and tilt your head diagonally toward your chest and hold for 15 to 30 seconds. 2. If you would like a little added stretch, use your hand to gently and steadily pull your head forward on the diagonal. 
3. Repeat 2 to 4 times toward each side. Dorsal glide stretch The dorsal glide stretches the back of the neck. If you feel pain, do not glide so far back. Some people find this exercise easier to do while lying on their backs with an ice pack on the neck. 1. Sit or stand tall and look straight ahead. 2. Slowly tuck your chin as you glide your head backward over your body 3. Hold for a count of 6, and then relax for up to 10 seconds. 4. Repeat 8 to 12 times. Chest and shoulder stretch 1. Sit or stand tall and glide your head backward as in the dorsal glide stretch. 2. Raise both arms so that your hands are next to your ears. 3. Take a deep breath, and as you breathe out, lower your elbows down and behind your back. You will feel your shoulder blades slide down and together, and at the same time you will feel a stretch across your chest and the front of your shoulders. 4. Hold for about 6 seconds, and then relax for up to 10 seconds. 5. Repeat 8 to 12 times. Strengthening: Hands on head 1. Move your head backward, forward, and side to side against gentle pressure from your hands, holding each position for about 6 seconds. 2. Repeat 8 to 12 times. Follow-up care is a key part of your treatment and safety. Be sure to make and go to all appointments, and call your doctor if you are having problems. It's also a good idea to know your test results and keep a list of the medicines you take. Where can you learn more? Go to http://davy-earl.info/. Enter P975 in the search box to learn more about \"Neck: Exercises. \" Current as of: March 21, 2017 Content Version: 11.4 © 1584-5562 Healthwise, Incorporated.  Care instructions adapted under license by Mir[employer] MOISE Salcedo (which disclaims liability or warranty for this information). If you have questions about a medical condition or this instruction, always ask your healthcare professional. [address] any warranty or liability for your use of this information. Shoulder Stretches: Exercises Your Care Instructions Here are some examples of exercises for your shoulder. Start each exercise slowly. Ease off the exercise if you start to have pain. Your doctor or physical therapist will tell you when you can start these exercises and which ones will work best for you. How to do the exercises Shoulder stretch 5.  a doorway and place one arm against the door frame. Your elbow should be a little higher than your shoulder. 6. Relax your shoulders as you lean forward, allowing your chest and shoulder muscles to stretch. You can also turn your body slightly away from your arm to stretch the muscles even more. 7. Hold for 15 to 30 seconds. 8. Repeat 2 to 4 times with each arm. Shoulder and chest stretch 4. Shoulder and chest stretch 5. While sitting, relax your upper body so you slump slightly in your chair. 6. As you breathe in, straighten your back and open your arms out to the sides. 7. Gently pull your shoulder blades back and downward. 8. Hold for 15 to 30 seconds as your breathe normally. 9. Repeat 2 to 4 times. Overhead stretch 5. Reach up over your head with both arms. 6. Hold for 15 to 30 seconds. 7. Repeat 2 to 4 times. Follow-up care is a key part of your treatment and safety. Be sure to make and go to all appointments, and call your doctor if you are having problems. It's also a good idea to know your test results and keep a list of the medicines you take. Where can you learn more? Go to http://davy-earl.info/. Enter S254 in the search box to learn more about \"Shoulder Stretches: Exercises. \" 
 Current as of: March 21, 2017 Content Version: 11.4 © 3234-1034 Healthwise, [employer]. Care instructions adapted under license by [employer] (which disclaims liability or warranty for this information). If you have questions about a medical condition or this instruction, always ask your healthcare professional. [address] any warranty or liability for your use of this information. Introducing [facility]! [employer] introduces Doochoo patient portal. Now you can access parts of your medical record, email your doctor's office, and request medication refills online. 1. In your internet browser, go to [email or web address] 2. Click on the First Time User? Click Here link in the Sign In box. You will see the New Member Sign Up page. 3. Enter your Doochoo Access Code exactly as it appears below. You will not need to use this code after youve completed the sign-up process. If you do not sign up before the expiration date, you must request a new code. · Doochoo Access Code: [email or web address] Expires: 7/16/2018 12:42 PM 
 
4. Enter the last four digits of your Social Security Number (xxxx) and Date of Birth (mm/dd/yyyy) as indicated and click Submit. You will be taken to the next sign-up page. 5. Create a Doochoo ID. This will be your Doochoo login ID and cannot be changed, so think of one that is secure and easy to remember. 6. Create a Doochoo password. You can change your password at any time. 7. Enter your Password Reset Question and Answer. This can be used at a later time if you forget your password. 8. Enter your e-mail address. You will receive e-mail notification when new information is available in [address] 9. Click Sign Up. You can now view and download portions of your medical record. 10. Click the Download Summary menu link to download a portable copy of your medical information. If you have questions, please visit the Frequently Asked Questions section of the Pythiant website. Remember, SWYF is NOT to be used for urgent needs. For medical emergencies, dial 911. Now available from your iPhone and Android! Please provide this summary of care documentation to your next provider. Your primary care clinician is listed as Flor Menendez. If you have any questions after today's visit, please call [phone].

## 2018-06-25 NOTE — PATIENT INSTRUCTIONS
Neck: Exercises  Your Care Instructions  Here are some examples of typical rehabilitation exercises for your condition. Start each exercise slowly. Ease off the exercise if you start to have pain. Your doctor or physical therapist will tell you when you can start these exercises and which ones will work best for you. How to do the exercises  Neck stretch    1. This stretch works best if you keep your shoulder down as you lean away from it. To help you remember to do this, start by relaxing your shoulders and lightly holding on to your thighs or your chair. 2. Tilt your head toward your shoulder and hold for 15 to 30 seconds. Let the weight of your head stretch your muscles. 3. If you would like a little added stretch, use your hand to gently and steadily pull your head toward your shoulder. For example, keeping your right shoulder down, lean your head to the left. 4. Repeat 2 to 4 times toward each shoulder. Diagonal neck stretch    1. Turn your head slightly toward the direction you will be stretching, and tilt your head diagonally toward your chest and hold for 15 to 30 seconds. 2. If you would like a little added stretch, use your hand to gently and steadily pull your head forward on the diagonal.  3. Repeat 2 to 4 times toward each side. Dorsal glide stretch    The dorsal glide stretches the back of the neck. If you feel pain, do not glide so far back. Some people find this exercise easier to do while lying on their backs with an ice pack on the neck. 1. Sit or stand tall and look straight ahead. 2. Slowly tuck your chin as you glide your head backward over your body  3. Hold for a count of 6, and then relax for up to 10 seconds. 4. Repeat 8 to 12 times. Chest and shoulder stretch    1. Sit or stand tall and glide your head backward as in the dorsal glide stretch. 2. Raise both arms so that your hands are next to your ears.   3. Take a deep breath, and as you breathe out, lower your elbows down and behind your back. You will feel your shoulder blades slide down and together, and at the same time you will feel a stretch across your chest and the front of your shoulders. 4. Hold for about 6 seconds, and then relax for up to 10 seconds. 5. Repeat 8 to 12 times. Strengthening: Hands on head    1. Move your head backward, forward, and side to side against gentle pressure from your hands, holding each position for about 6 seconds. 2. Repeat 8 to 12 times. Follow-up care is a key part of your treatment and safety. Be sure to make and go to all appointments, and call your doctor if you are having problems. It's also a good idea to know your test results and keep a list of the medicines you take. Where can you learn more? Go to http://davy-earl.info/. Enter P975 in the search box to learn more about \"Neck: Exercises. \"  Current as of: March 21, 2017  Content Version: 11.4  © 8663-9531 [employer]. Care instructions adapted under license by [employer] (which disclaims liability or warranty for this information). If you have questions about a medical condition or this instruction, always ask your healthcare professional. [place] [address] any warranty or liability for your use of this information. Shoulder Stretches: Exercises  Your Care Instructions  Here are some examples of exercises for your shoulder. Start each exercise slowly. Ease off the exercise if you start to have pain. Your doctor or physical therapist will tell you when you can start these exercises and which ones will work best for you. How to do the exercises  Shoulder stretch    5.  a doorway and place one arm against the door frame. Your elbow should be a little higher than your shoulder. 6. Relax your shoulders as you lean forward, allowing your chest and shoulder muscles to stretch.  You can also turn your body slightly away from your arm to stretch the muscles even more. 7. Hold for 15 to 30 seconds. 8. Repeat 2 to 4 times with each arm. Shoulder and chest stretch    4. Shoulder and chest stretch  5. While sitting, relax your upper body so you slump slightly in your chair. 6. As you breathe in, straighten your back and open your arms out to the sides. 7. Gently pull your shoulder blades back and downward. 8. Hold for 15 to 30 seconds as your breathe normally. 9. Repeat 2 to 4 times. Overhead stretch    5. Reach up over your head with both arms. 6. Hold for 15 to 30 seconds. 7. Repeat 2 to 4 times. Follow-up care is a key part of your treatment and safety. Be sure to make and go to all appointments, and call your doctor if you are having problems. It's also a good idea to know your test results and keep a list of the medicines you take. Where can you learn more? Go to http://davy-earl.info/. Enter S254 in the search box to learn more about \"Shoulder Stretches: Exercises. \"  Current as of: March 21, 2017  Content Version: 11.4  © 9829-5842 Healthwise, Incorporated. Care instructions adapted under license by [employer] (which disclaims liability or warranty for this information). If you have questions about a medical condition or this instruction, always ask your healthcare professional. [address] any warranty or liability for your use of this information.

## 2018-06-25 NOTE — PROGRESS NOTES
Patient: Johanne Palacios                MRN: [id number]       SSN: [id number]  YOB: 1970        AGE: 50 y.o. SEX: male    PCP: Kendal Castro MD  06/25/18    Chief Complaint   Patient presents with    Shoulder Pain     Efrain     HISTORY:  Johanne Palacios is a 50 y.o. male who is seen for right shoulder and neck pain. He reports his pain has moved from his left side to his right side recently. He states that when he lays down at night, he is paralyzed and is not able to move. He states he has numbness in his left hand. He notes right shoulder pain with overhead activities. He is s/p right shoulder arthroscopy by Dr. Phil Castro in 2006. He denies a h/o right shoulder injury or trauma. He had temporary response to previous shoulder cortisone injections. He has a h/o gout. He was previously seen for right shoulder and left knee pain. He has a h/o hip AVN and gout. He reports increased knee pains with descending stairs. He denies any previous knee injury or trauma. He notes pain with standing and walking. Pain Assessment  6/25/2018   Location of Pain Shoulder   Location Modifiers Left;Right   Severity of Pain 6   Quality of Pain Dull;Aching   Quality of Pain Comment -   Duration of Pain Persistent   Frequency of Pain Constant   Aggravating Factors Bending;Stretching;Straightening   Aggravating Factors Comment -   Limiting Behavior Yes   Relieving Factors Nothing   Result of Injury No     Occupation, etc:  Mr. Jayy Patel receives social security disability benefits for sickle cell anemia. He states that he was hospitalized for 2 days for sickle cell crisis in January of 2017. He does not exercise regularly. He has been researching shoulder support garments online. He is right-handed. He reports that his weight is stable. Current weight is 135 pounds. He is 5'7\" tall. He is not diabetic.   He stays busy going to the park and doing push-ups, pull-ups, and other home exercises. He lives with his parents and brother in [place]. He is left handed. He is s/p cholecystectomy on 3/27/18. Weight Metrics 6/25/2018 6/22/2018 5/17/2018 5/7/2018 5/4/2018 4/26/2018 4/16/2018   Weight 138 lb 6.4 oz 139 lb 136 lb 136 lb 136 lb 12.8 oz 137 lb 137 lb   BMI 21.68 kg/m2 21.77 kg/m2 21.3 kg/m2 21.3 kg/m2 21.43 kg/m2 21.46 kg/m2 21.46 kg/m2     Patient Active Problem List   Diagnosis Code    Bursitis of shoulder, left M75.52    DJD (degenerative joint disease) M19.90    Abnormal liver function tests R94.5    Abnormal liver function test R94.5    Acute pharyngitis J02.9    Acute upper respiratory infection J06.9    Atopic rhinitis J30.9    Allergic rhinitis J30.9    Anemia D64.9    Pain of upper extremity M79.603    Pain in axilla M79.629    Arthritis M19.90    Atypical chest pain R07.89    Avascular necrosis of bone (HCC) M87.00    Avascular necrosis (HCC) M87.00    Benign paroxysmal positional vertigo H81.10    Carpal tunnel syndrome G56.00    Central perforation of tympanic membrane H72.00    Cervical radiculopathy M54.12    Neck pain M54.2    Cholelithiasis K80.20    Sleep-wake schedule disorder, delayed phase type G47.21    Coital headache G44.82    Conductive hearing loss, unilateral H90.2    Constipation K59.00    Dark urine R82.99    Difficult or painful urination R30.0    Need for immunization against influenza Z23    Gout M10.9    Hb-SS disease without crisis ([facility] [address] 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 9XXA    Knee pain M25.569    Thoracic back pain M54.6    Bicipital tendinitis M75.20    Cervical spondylosis without myelopathy M47.812    Community acquired pneumonia J18.9    Conductive hearing loss in right ear H90.11    Contusion of rib on right side S20.211A    Exposure to chickenpox Z20.820    Hb-SS disease with acute chest syndrome (HCC) D57.01    Headache R51    History of hyperkalemia Z86.39    History of osteonecrosis Z87.39    History of perforated ear drum Z86.69   [facility] discharge follow-up Z09    Hyperlipidemia E78.5    Hypoxia R09.02    Impingement syndrome of left shoulder M75.42    Leukocytosis D72.829    Lymphadenopathy R59.1    Male erectile dysfunction, unspecified N52.9    Male fertility problem N46.9    Nerve compression T14. 8XXA    Opioid contract exists Z02.89    DANIELA (obstructive sleep apnea) G47.33    Paresthesia of left upper extremity R20.2    Pneumonia J18.9    Pulmonary hypertension ([facility]) I27.20    Recurrent cold sores B00.1    Sickle-cell anemia ([facility]) D57.1    Sickle cell anemia with pain ([facility]) D57.00    Sickle cell crisis ([facility]) D57.00    Sickle cell pain crisis ([facility]) D57.00    SOB (shortness of breath) R06.02    Sternum pain R07.89    Supraspinatus tendon tear S46.819A    Thrombocytosis ([facility]) D47.3    Vertigo, peripheral H81.399    Degenerative disc disease, cervical M50.30    Spondylosis of cervical region without myelopathy or radiculopathy M47.812    Chronic pain syndrome G89.4     REVIEW OF SYSTEMS: All Below are Negative except: See HPI   Constitutional: negative for fever, chills, and weight loss. Cardiovascular: negative for chest pain, claudication, leg swelling, SOB, LEAVITT   Gastrointestinal: Negative for pain, N/V/C/D, Blood in stool or urine, dysuria,  hematuria, incontinence, pelvic pain. Musculoskeletal: See HPI   Neurological: Negative for dizziness and weakness. Negative for headaches, Visual changes, confusion, seizures   Phychiatric/Behavioral: Negative for depression, memory loss, substance  abuse. Extremities: Negative for hair changes, rash, or skin lesion changes. Hematologic: Negative for bleeding problems, bruising, pallor or swollen lymph  nodes   Peripheral Vascular: No calf pain, no circulation deficits.     Social History     Social History    Marital status: SINGLE     Spouse name: N/A    Number of children: N/A    Years of education: N/A     Occupational History    Not on file. Social History Main Topics    Smoking status: Former Smoker     Types: Cigarettes     Quit date: 7/12/2003    Smokeless tobacco: Never Used    Alcohol use No    Drug use: No    Sexual activity: Yes     Partners: Female     Other Topics Concern    Not on file     Social History Narrative      Allergies   Allergen Reactions    Other Food Other (comments)     Walnuts. Gets headaches    Corn Other (comments)     Told by  allergic    Shellfish Derived Swelling     Swelling.  States okay with betadine         PHYSICAL EXAMINATION:  Visit Vitals    /54    Pulse 88    Temp 99.4 °F (37.4 °C) (Oral)    Resp 18    Ht 5' 7\" (1.702 m)    Wt 138 lb 6.4 oz (62.8 kg)    SpO2 93%    BMI 21.68 kg/m2      ORTHO EXAMINATION:  Examination Neck   Skin Intact   Tenderness +, left paracervical   Tightness +, left paracervical   Flexion Decreased 25%   Extension Decreased 25%   Lateral bend left Normal   Lateral bend right Normal   Masses -   Biceps reflex Normal   Triceps reflex Normal   Brachioradialis reflex Normal     Examination Right shoulder Left shoulder   Skin Intact Intact   Effusion - -   Biceps deformity - -   Atrophy - -   AC joint tenderness - -   Acromial tenderness - -   Biceps tenderness - -   Forward flexion/Elevation  170   Active abduction  160   External rotation ROM 30 30   Internal rotation  70   Apprehension - -   Impingement - -   Drop Arm Test - -   Neurovascular Intact Intact     Examination Right knee Left knee   Skin Intact Intact   Range of motion 130-0 135-0   Effusion - -   Medial joint line tenderness + +, anterior   Lateral joint line tenderness - -   Popliteal tenderness - -   Osteophytes palpable - -   Gamas - -   Patella crepitus - +   Anterior drawer - -   Lateral laxity - -   Medial laxity - -   Varus deformity - -   Valgus deformity - -   Pretibial edema - -   Calf tenderness - - PROCEDURE: After discussing treatment options, patient's left paracervical region was injected with 4 cc Marcaine and 1/2 cc Celestone. Chart reviewed for the following:   Angela Cano MD, have reviewed the History, Physical and updated the Allergic reactions for George Garzon performed immediately prior to start of procedure:  Angela Cano MD, have performed the following reviews on [employer] prior to the start of the procedure:            * Patient was identified by name and date of birth   * Agreement on procedure being performed was verified  * Risks and Benefits explained to the patient  * Procedure site verified and marked as necessary  * Patient was positioned for comfort  * Consent was obtained     Time: 3:03 PM     Date of procedure: 6/25/2018    Procedure performed by:  Ranjith Abraham MD    Mr. Lozano tolerated the procedure well with no complications. MRI LEFT SHOULDER WO CONT 3/12/18  IMPRESSION:   1. Suboptimal visualization intra-articular biceps tendon, not confirmed intact.   2. Slight, linear insertional supraspinatus tendon tearing. Mild inferior offset of the acromion which can predispose to supraspinatus outlet impingement.   3. Overall decreased T1 marrow signal, again which can be seen in the setting of marrow expansion/anemia. MRI RIGHT SHOULDER WO CONT 6/22/15  Impression:   1. Moderate supraspinatus, mild infraspinatus, and moderate subscapularis insertional tendinosis. Degenerative fibrillation along the bursal sided supraspinatus critical zone fibers. No focal rotator cuff tear. 2. Mild degenerative osteoarthropathy of the right acromioclavicular joint without morphology or secondary findings of subacromial impingement. Mild subacromial-subdeltoid bursitis.   3. Unchanged humeral head medullary infarct; no findings of avascular necrosis.     RADIOGRAPHS:  XR RT SHOULDER 6/25/2018  IMPRESSION:  Three views - No fractures, mild acromioclavicular narrowing, mild glenohumeral narrowing, no calcific densities. XR LEFT KNEE 3/31/17  IMPRESSION:  No fractures, no effusion, mild medial joint space narrowing, small lateral osteophytes present. IMPRESSION:      ICD-10-CM ICD-9-CM    1. S/P arthroscopy of right shoulder Z98.890 V45.89 betamethasone (CELESTONE SOLUSPAN) 6 mg/mL injection      BETAMETHASONE ACETATE & SODIUM PHOSPHATE INJECTION 3 MG EA.      DRAIN/INJECT LARGE JOINT/BURSA      bupivacaine, PF, (MARCAINE, PF,) 0.25 % (2.5 mg/mL) injection      REFERRAL TO PHYSICAL THERAPY   2. Chronic right shoulder pain M25.511 719.41 AMB POC XRAY, SHOULDER; COMPLETE, 2+    G89.29 338.29 betamethasone (CELESTONE SOLUSPAN) 6 mg/mL injection      BETAMETHASONE ACETATE & SODIUM PHOSPHATE INJECTION 3 MG EA.      DRAIN/INJECT LARGE JOINT/BURSA      bupivacaine, PF, (MARCAINE, PF,) 0.25 % (2.5 mg/mL) injection      REFERRAL TO PHYSICAL THERAPY   3. Neck pain M54.2 723.1 REFERRAL TO PHYSICAL THERAPY   4. Hx of gout Z87.39 V12.29 REFERRAL TO PHYSICAL THERAPY     PLAN:  After discussing treatment options, patient's right shoulder was injected with 4 cc Marcaine and 1/2 cc Celestone. He will follow up at the spine center if neck pains continue. He will start a brief course of outpatient physical therapy to his right shoulder and neck.       Scribed by [facility]) as dictated by Gradie Aase, MD

## 2018-07-05 ENCOUNTER — HOSPITAL ENCOUNTER (OUTPATIENT)
Dept: PHYSICAL THERAPY | Age: 48
Discharge: HOME | End: 2018-07-05
Payer: MEDICAID

## 2018-07-05 PROCEDURE — 97110 THERAPEUTIC EXERCISES: CPT

## 2018-07-05 PROCEDURE — 97162 PT EVAL MOD COMPLEX 30 MIN: CPT

## 2018-07-05 PROCEDURE — 97012 MECHANICAL TRACTION THERAPY: CPT

## 2018-07-05 NOTE — PROGRESS NOTES
[address] [facility] [address] [facility] AT [address] , [place]  Phone: [phone] Fax: [phone] / [address] [address] PHYSICAL THERAPY SERVICES  Patient Name: Krysta Garcia : 1970   Medical   Diagnosis: Chronic right shoulder pain [M25.511, G89.29] Treatment Diagnosis: CS radiculopathy and B shoulder pain    Onset Date: Ongoing CS pain exac 2017     Referral Source: Kennedi Colvin MD Start of [facility]): 2018   Prior Hospitalization: See medical history Provider #: [id number]   Prior Level of Function: Functional I with <5 year hx pain   Comorbidities: Hip AVN, sickle cell anemia    Medications: Verified on Patient Summary List   The Plan of Care and following information is based on the information from the initial evaluation.   ========================================================================  Assessment / key information:  Pt is a 50y.o. year old male who presents with co right shoulder and neck pain with hx s/p right shoulder arthroscopy by Dr. Harlan Klinefelter in . Patient was referred for R shoulder however main co CS pain and numbness in L hand starting Dec 2017 after hospitalization for pnuemonia. Sx insidious onset with no trauma. Treatment has include medications, spinal injections (), oral steroids, previous PT with no change in sx. Patient is getting PT in order to get MRI of CS. CS MRI in 2016 showing C5-6 HNP. Xrays negative. MRI L shoulder negative. He had temporary response to previous shoulder cortisone injections with recent injection by ortho on 18. Patient is also being treated by spine specialist for CS pain. Other PMH significant for sickle cell anemia, hip AVN and gout. PLEASE SIGN/COSIGN IF AGREEABLE TO TREAT PATIENTS CS AND RADICULAR SX. Lucy Malik   Current deficits include: increased pain to 9/10 at worst, decreased shoulder and postural strength per chart, compensatory UT and scap tightness sec to weak RTC, decreased  strength L, TTP  B UT and medial scap,  Functional deficits include: heavy household chores, lifting, gripping, writing (patient is L hand dominant). Home exercise program initiated on initial evaluation to address these deficits. Pt would benefit from PT to address these deficits for increased functional mobility and QOL. Strength:                                                                          L (1-5) R (1-5)   Flexors 4 4   Abductors 4- 4-   External Rotators 4 4   Internal Rotators 4 4   Lower Trapezius 4- 4   Elbow Flexion 5 5   Elbow Extension 4 4     ========================================================================  Eval Complexity: History: MEDIUM  Complexity : 1-2 comorbidities / personal factors will impact the outcome/ POC Exam:HIGH Complexity : 4+ Standardized tests and measures addressing body structure, function, activity limitation and / or participation in recreation  Presentation: MEDIUM Complexity : Evolving with changing characteristics  Clinical Decision Making:MEDIUM Complexity : FOTO score of 26-74Overall Complexity:MEDIUM  Problem List: pain affecting function, decrease strength, decrease ADL/ functional abilitiies, decrease activity tolerance, decrease flexibility/ joint mobility and other FOTO 70/100   Treatment Plan may include any combination of the following: Therapeutic exercise, Therapeutic activities, Neuromuscular re-education, Physical agent/modality, Gait/balance training, Manual therapy, Aquatic therapy, Patient education, Self Care training, Functional mobility training and Home safety training  Patient / Family readiness to learn indicated by: asking questions, trying to perform skills and interest  Persons(s) to be included in education: patient (P)  Barriers to Learning/Limitations: None  Measures taken:    Patient Goal (s): \"just a reason for insurance to give me an MRI. \"   Patient self reported health status: good  Rehabilitation Potential: good   Short Term Goals: To be accomplished in  1  weeks:  1. Pt will be independent and compliant with HEP   Long Term Goals: To be accomplished in  8-12  treatments:  1. Patient will increase FOTO score to 73/100 for indications of increased functional mobility. 2.  Patient will demo 4+/5 ER strength B shoulder for improved GH stability with lifting   3. Patient will report 50% improvement in L Hand n/t symptoms for ease with writing and gripping   4. Patient will demo 4/5 L LT strength for improved postural strength with ADLs   Frequency / Duration:   Patient to be seen  2-3  times per week for 8-12  treatments:  Patient / Caregiver education and instruction: self care, activity modification and exercises  G-Codes (GP): MARIE  Therapist Signature: Gael Finnegan PT Date: 4/3/7693   Certification Period: NA  Time: 200pm    ========================================================================  I certify that the above Physical Therapy Services are being furnished while the patient is under my care. I agree with the treatment plan and certify that this therapy is necessary. Physician Signature:        Date:       Time:   Please sign and return to [facility] or you may fax the signed copy to [phone]. Thank you.

## 2018-07-05 NOTE — PROGRESS NOTES
PT SHOULDER EVAL AND TREATMENT      Patient Name: Dk Chatman  Date:2018  : 1970  [x]  Patient  Verified  Payor: [employer] / Plan: [facility] [address] [facility] CCCP / Product Type: Managed Care Medicaid /    In time:105  Out time:155  Total Treatment Time (min): 50  Visit #: 1 of     Treatment Area: Chronic right shoulder pain [M25.511, G89.29]    SUBJECTIVE  Pain Level (0-10 scale):  (C): 6 (B): 4 (W):  9  Any medication changes, allergies to medications, diagnosis change, or new procedure performed: see summary sheet for update  Subjective functional status/changes:  CHIEF COMPLAINT:  Patient co right shoulder and neck pain with hx s/p right shoulder arthroscopy by Dr. Selvin Motley in . Patient was referred for R shoulder however main co CS pain and numbness in L hand starting Dec 2017 after hospitalization for pnuemonia. Sx insidious onset with no trauma. Treatment has include medications, spinal injections (), oral steroids, previous PT with no change in sx. Patient is getting PT in order to get MRI of CS. CS MRI in  showing C5-6 HNP. Xrays negative. MRI L shoulder negative. He had temporary response to previous shoulder cortisone injections with recent injection by ortho on 18. Patient is also being treated by spine specialist for CS pain. Other PMH significant for sickle cell anemia, hip AVN and gout.  PLEASE SIGN BELOW IF AGREEABLE TO TREAT PATIENTS CS AND RADICULAR SX.     DEFICITS: heavy household chores, lifting, gripping, writing (patient is L hand dominant)    OBJECTIVE:   Posture: sh depression     ROM:  WNL all directions, pain throughout ROM B shoulders                              Strength:                                                                         L (1-5) R (1-5) Pain   Flexors 4 4 _ Yes   [] No   Abductors 4- 4- [] Yes   [] No   External Rotators 4 4 [] Yes   [] No   Internal Rotators 4 4 [] Yes   [] No   Lower Trapezius 4- 4 [] Yes   [] No   Elbow Flexion 5 5 [] Yes   [] No   Elbow Extension 4 4 [] Yes   [] No    strength :  L dec 25%    Scapulohumoral Control / Rhythm:  Able to eccentrically lower with good control?  Left: [x] Yes   [] No     Right: [x] Yes   [] No    Palpation  B UT (L greater than R), medial scap L deltoid tenderness     Other Tests / Comments:     Patient Education/ Therapeutic Exercise : [x] Discussed POT including PT expectation, established HEP with pictures and instruction,  Per FS  (minutes) : 8 min    Manual: NV     Modality (rationale): promote spinal decompression   [x]  CS traction 2 step intermittent 15/10 # with MHP     Pain Level (0-10 scale) post treatment: 4    ASSESSMENT  [x]  See Plan of Care    PLAN  [x]  Upgrade activities as tolerated  [x] Other:_POC   2-3 x  8-12    Elzbieta Green, PT 7/5/2018      Justification for Eval Code Complexity:  Patient History : chronicity,   Examination see exam  Clinical Presentation: evolving sec to radicualr sx   Clinical Decision Making : FOTO : 79 /100

## 2018-07-16 ENCOUNTER — HOSPITAL ENCOUNTER (OUTPATIENT)
Dept: PHYSICAL THERAPY | Age: 48
Discharge: HOME | End: 2018-07-16
Payer: MEDICAID

## 2018-07-16 ENCOUNTER — APPOINTMENT (OUTPATIENT)
Dept: PHYSICAL THERAPY | Age: 48
End: 2018-07-16

## 2018-07-16 PROCEDURE — 97140 MANUAL THERAPY 1/> REGIONS: CPT

## 2018-07-16 PROCEDURE — 97110 THERAPEUTIC EXERCISES: CPT

## 2018-07-16 PROCEDURE — 97012 MECHANICAL TRACTION THERAPY: CPT

## 2018-07-16 NOTE — PROGRESS NOTES
PT DAILY TREATMENT NOTE     Patient Name: Brianna Martinez  Date:2018  : 1970  [x]  Patient  Verified  Payor: [facility] MEDICAID / Plan: [facility] [address] [facility] CCCP / Product Type: Managed Care Medicaid /    In time: 3:30 pm        Out time: 4:25 pm  Total Treatment Time (min): 55  Visit #: 2 of 8-10    Treatment Area: Chronic right shoulder pain [M25.511, G89.29]    SUBJECTIVE  Pain Level (0-10 scale): 8  Any medication changes, allergies to medications, adverse drug reactions, diagnosis change, or new procedure performed?: [x] No    [] Yes (see summary sheet for update)  Subjective functional status/changes:   [] No changes reported  \"I have been doing the exercises. \"    OBJECTIVE  Modality rationale: decrease pain and increase tissue extensibility to improve the patients ability to perform ADLs   Min Type Additional Details    [] Estim: []Att   []Unatt        []TENS instruct                  []IFC  []Premod   []NMES                     []Other:  []w/US   []w/ice   []w/heat  Position:  Location:   15 [x]  Traction: [x] Cervical       []Lumbar                       [] Prone          [x]Supine                       [x]Intermittent   []Continuous Lbs: 18/13#  [] before manual  [x] after manual    []  Ultrasound: []Continuous   [] Pulsed                           []1MHz   []3MHz Location:  W/cm2:    []  Iontophoresis with dexamethasone         Location: [] Take home patch   [] In clinic    []  Ice     []  heat  []  Ice massage Position:  Location:    []  Vasopneumatic Device Pressure:       [] lo [] med [] hi   Temperature: [] lo [] med [] hi   [x] Skin assessment post-treatment:  [x]intact []redness- no adverse reaction       []redness - adverse reaction:     32 min Therapeutic Exercise:  [x] See flow sheet: initiated therex per IE   Rationale: increase ROM, increase strength and improve coordination to improve the patients ability to perform ADLs    8 min Manual Therapy:  supine STM/DTM to (B) UT, CPS, and rhomboids; SOR   Rationale: decrease pain, increase ROM, increase tissue extensibility and decrease trigger points to perform ADLs          X min Patient Education: [x] Review HEP from IE     Other Objective/Functional Measures:   STG met. SL (R) shoulder ABD: WNL    Pain Level (0-10 scale) post treatment: 8    ASSESSMENT/Changes in Function:   Good tolerance to treatment today with patient req 100% verbal/tactile cueing and demo for proper form/technique with all newly introduced therex. Minimal tissue tension in the rhomboids and UT today. Patient will continue to benefit from skilled PT services to modify and progress therapeutic interventions, address functional mobility deficits, address ROM deficits, address strength deficits, analyze and address soft tissue restrictions, analyze and cue movement patterns, analyze and modify body mechanics/ergonomics and assess and modify postural abnormalities to attain remaining goals. [x]  See Plan of Care  []  See progress note/recertification  []  See Discharge Summary         Progress towards goals / Updated goals:  Short Term Goals: To be accomplished in  1  weeks:  1. Pt will be independent and compliant with HEP. -Goal met; pt notes compliance (7/16/18)  Long Term Goals: To be accomplished in  8-12  treatments:  1. Patient will increase FOTO score to 73/100 for indications of increased functional mobility. 2.  Patient will demo 4+/5 ER strength B shoulder for improved GH stability with lifting   3. Patient will report 50% improvement in L Hand n/t symptoms for ease with writing and gripping   4.   Patient will demo 4/5 L LT strength for improved postural strength with ADLs     PLAN  [x]  Upgrade activities as tolerated     [x]  Continue plan of care  []  Update interventions per flow sheet       []  Discharge due to:_  []  Other:_      Mirella Willis, PTA 7/16/2018

## 2018-07-18 ENCOUNTER — APPOINTMENT (OUTPATIENT)
Dept: PHYSICAL THERAPY | Age: 48
End: 2018-07-18

## 2018-07-18 ENCOUNTER — HOSPITAL ENCOUNTER (OUTPATIENT)
Dept: PHYSICAL THERAPY | Age: 48
Discharge: HOME | End: 2018-07-18
Payer: MEDICAID

## 2018-07-18 PROCEDURE — 97140 MANUAL THERAPY 1/> REGIONS: CPT

## 2018-07-18 PROCEDURE — 97110 THERAPEUTIC EXERCISES: CPT

## 2018-07-18 PROCEDURE — 97014 ELECTRIC STIMULATION THERAPY: CPT

## 2018-07-18 PROCEDURE — 97012 MECHANICAL TRACTION THERAPY: CPT

## 2018-07-18 NOTE — PROGRESS NOTES
PT DAILY TREATMENT NOTE     Patient Name: Gerhardt Haley  Date:2018  : 1970  [x]  Patient  Verified  Payor: Andreea Party / Plan: [facility] [address] [facility] CCCP / Product Type: Managed Care Medicaid /    In time: 3:31 pm        Out time: 4:43 pm  Total Treatment Time (min): 72  Visit #: 3 of 8-10    Treatment Area: Chronic right shoulder pain [M25.511, G89.29]    SUBJECTIVE  Pain Level (0-10 scale): 8  Any medication changes, allergies to medications, adverse drug reactions, diagnosis change, or new procedure performed?: [x] No    [] Yes (see summary sheet for update)  Subjective functional status/changes:   [] No changes reported  \"I felt great after I left here, but then the pain returned after an hour. \" Patient demos cell phone use with excessive C/S flexion.     OBJECTIVE  Modality rationale: decrease pain and increase tissue extensibility to improve the patients ability to perform ADLs   Min Type Additional Details    [] Estim: []Att   []Unatt        []TENS instruct                  []IFC  []Premod   []NMES                     []Other:  []w/US   []w/ice   []w/heat  Position:  Location:   15 [x]  Traction: [x] Cervical       []Lumbar                       [] Prone          [x]Supine                       [x]Intermittent   []Continuous Lbs: 20/15#  [] before manual  [x] after manual    []  Ultrasound: []Continuous   [] Pulsed                           []1MHz   []3MHz Location:  W/cm2:    []  Iontophoresis with dexamethasone         Location: [] Take home patch   [] In clinic    []  Ice     []  heat  []  Ice massage Position:  Location:    []  Vasopneumatic Device Pressure:       [] lo [] med [] hi   Temperature: [] lo [] med [] hi   [x] Skin assessment post-treatment:  [x]intact []redness- no adverse reaction       []redness - adverse reaction:     49 min Therapeutic Exercise:  [x] See flow sheet: initiated wall pushups, wall V's, and H/L chest press with 7# hand-weight   Rationale: increase ROM, increase strength and improve coordination to improve the patients ability to perform ADLs    8 min Manual Therapy:  supine STM/DTM to (R)>(L) UT, CPS, and rhomboids; SOR   Rationale: decrease pain, increase ROM, increase tissue extensibility and decrease trigger points to perform ADLs          X min Patient Education: [x] Review HEP; discussed C/S positioning for cell phone use     Other Objective/Functional Measures:   CS AROM rotation: WNL bilaterally  Inc traction pull to 20#/15# to inc jt decompression - tolerated well. Pain Level (0-10 scale) post treatment: 0; pt denies radiculopathy    ASSESSMENT/Changes in Function:   VCs to avoid UT hike with all therex - good carryover. Patient demos [place] C/S AROM for rotation (B). Discussed neck position (neutral C/S, bringing cell phone to meet eyes) for cell phone use, reading, driving. Patient will continue to benefit from skilled PT services to modify and progress therapeutic interventions, address functional mobility deficits, address ROM deficits, address strength deficits, analyze and address soft tissue restrictions, analyze and cue movement patterns, analyze and modify body mechanics/ergonomics and assess and modify postural abnormalities to attain remaining goals. [x]  See Plan of Care  []  See progress note/recertification  []  See Discharge Summary         Progress towards goals / Updated goals:  Short Term Goals: To be accomplished in  1  weeks:  1. Pt will be independent and compliant with HEP. -Goal met; pt notes compliance (7/16/18)  Long Term Goals: To be accomplished in  8-12  treatments:  1. Patient will increase FOTO score to 73/100 for indications of increased functional mobility. 2.  Patient will demo 4+/5 ER strength B shoulder for improved GH stability with lifting. -Goal progressing; reduced challenge noted with resisted shoulder ER; will progress NV to match strength gains (7/18/18)  3.   Patient will report 50% improvement in L hand N/T symptoms for ease with writing and gripping. -Goal progressing; pt notes no N/T or radiculopathy following treatment (7/18/18)  4.   Patient will demo 4/5 L LT strength for improved postural strength with ADLs     PLAN  [x]  Upgrade activities as tolerated     [x]  Continue plan of care  []  Update interventions per flow sheet       []  Discharge due to:_  []  Other:_      Gisele Valdez, PTA 7/18/2018

## 2018-07-20 ENCOUNTER — APPOINTMENT (OUTPATIENT)
Dept: PHYSICAL THERAPY | Age: 48
End: 2018-07-20

## 2018-07-20 ENCOUNTER — HOSPITAL ENCOUNTER (OUTPATIENT)
Dept: PHYSICAL THERAPY | Age: 48
Discharge: HOME | End: 2018-07-20
Payer: MEDICAID

## 2018-07-20 PROCEDURE — 97110 THERAPEUTIC EXERCISES: CPT

## 2018-07-20 PROCEDURE — 97140 MANUAL THERAPY 1/> REGIONS: CPT

## 2018-07-20 PROCEDURE — 97012 MECHANICAL TRACTION THERAPY: CPT

## 2018-07-20 NOTE — PROGRESS NOTES
PT DAILY TREATMENT NOTE     Patient Name: Kobe Garcia  Date:2018  : 1970  [x]  Patient  Verified  Payor: [facility] MEDICAID / Plan: [facility] [address] [facility] CCCP / Product Type: Managed Care Medicaid /    In time: 1:30 pm        Out time: 2:34 pm  Total Treatment Time (min): 64  Visit #: 4 of 8-10    Treatment Area: Chronic right shoulder pain [M25.511, G89.29]    SUBJECTIVE  Pain Level (0-10 scale): 8  Any medication changes, allergies to medications, adverse drug reactions, diagnosis change, or new procedure performed?: [x] No    [] Yes (see summary sheet for update)  Subjective functional status/changes:   [] No changes reported  \"About the same. I have to keep catching myself looking down. \"    OBJECTIVE  Modality rationale: decrease pain and increase tissue extensibility to improve the patients ability to perform ADLs   Min Type Additional Details    [] Estim: []Att   []Unatt        []TENS instruct                  []IFC  []Premod   []NMES                     []Other:  []w/US   []w/ice   []w/heat  Position:  Location:   15 [x]  Traction: [x] Cervical       []Lumbar                       [] Prone          [x]Supine                       [x]Intermittent   []Continuous Lbs: 23/15#  [] before manual  [x] after manual    []  Ultrasound: []Continuous   [] Pulsed                           []1MHz   []3MHz Location:  W/cm2:    []  Iontophoresis with dexamethasone         Location: [] Take home patch   [] In clinic    []  Ice     []  heat  []  Ice massage Position:  Location:    []  Vasopneumatic Device Pressure:       [] lo [] med [] hi   Temperature: [] lo [] med [] hi   [x] Skin assessment post-treatment:  [x]intact []redness- no adverse reaction       []redness - adverse reaction:     41 min Therapeutic Exercise:  [x] See flow sheet: added chest flies   Rationale: increase ROM, increase strength and improve coordination to improve the patients ability to perform ADLs    8 min Manual Therapy: prone STM/DTM to (R)>(L) UT, CPS, and rhomboids   Rationale: decrease pain, increase ROM, increase tissue extensibility and decrease trigger points to perform ADLs          X min Patient Education: [x] Review HEP; discussed C/S positioning for cell phone use     Other Objective/Functional Measures:   Inc traction pull to 23#/15# to inc jt decompression - tolerated well. Pain Level (0-10 scale) post treatment: 0 C/S and (R) UE; pt notes pain/numbness in (L) fingers    ASSESSMENT/Changes in Function:   Cont's with good response to mechanical traction with centralization and abolishment of (R) UE sx. Patient cont's with quick onset of fatigue to periscapulars and posterior chain resulting into return to [facility] posturing. Patient will continue to benefit from skilled PT services to modify and progress therapeutic interventions, address functional mobility deficits, address ROM deficits, address strength deficits, analyze and address soft tissue restrictions, analyze and cue movement patterns, analyze and modify body mechanics/ergonomics and assess and modify postural abnormalities to attain remaining goals. [x]  See Plan of Care  []  See progress note/recertification  []  See Discharge Summary         Progress towards goals / Updated goals:  Short Term Goals: To be accomplished in  1  weeks:  1. Pt will be independent and compliant with HEP. -Goal met; pt notes compliance (7/16/18)  Long Term Goals: To be accomplished in  8-12  treatments:  1. Patient will increase FOTO score to 73/100 for indications of increased functional mobility. 2.  Patient will demo 4+/5 ER strength B shoulder for improved GH stability with lifting. -Goal progressing; reduced challenge noted with resisted shoulder ER; will progress NV to match strength gains (7/18/18)  3.   Patient will report 50% improvement in L hand N/T symptoms for ease with writing and gripping. -Goal progressing; pt notes no N/T or radiculopathy following treatment (7/23/18)  4.   Patient will demo 4/5 L LT strength for improved postural strength with ADLs     PLAN  [x]  Upgrade activities as tolerated     [x]  Continue plan of care  []  Update interventions per flow sheet       []  Discharge due to:_  [x]  Other: add prone Ts and Ts for scapular strength    Loc Campos, PTA 7/20/2018

## 2018-07-24 ENCOUNTER — HOSPITAL ENCOUNTER (OUTPATIENT)
Dept: PHYSICAL THERAPY | Age: 48
Discharge: HOME | End: 2018-07-24
Payer: MEDICAID

## 2018-07-24 PROCEDURE — 97012 MECHANICAL TRACTION THERAPY: CPT | Performed by: PHYSICAL THERAPIST

## 2018-07-24 PROCEDURE — 97140 MANUAL THERAPY 1/> REGIONS: CPT | Performed by: PHYSICAL THERAPIST

## 2018-07-24 PROCEDURE — 97110 THERAPEUTIC EXERCISES: CPT | Performed by: PHYSICAL THERAPIST

## 2018-07-24 NOTE — PROGRESS NOTES
PT DAILY TREATMENT NOTE     Patient Name: Jolene Doty  Date:2018  : 1970  [x]  Patient  Verified  Payor: [facility] MEDICAID / Plan: [facility] [address] [facility] CCCP / Product Type: Managed Care Medicaid /    In time: 230 pm        Out time: 338 pm  Total Treatment Time (min): 68  Visit #: 5 of 8-10    Treatment Area: Chronic right shoulder pain [M25.511, G89.29]    SUBJECTIVE  Pain Level (0-10 scale): 7  Any medication changes, allergies to medications, adverse drug reactions, diagnosis change, or new procedure performed?: [x] No    [] Yes (see summary sheet for update)  Subjective functional status/changes:   [x] No changes reported  I just feel it in my fingers     OBJECTIVE  Modality rationale: decrease pain and increase tissue extensibility to improve the patients ability to perform ADLs   Min Type Additional Details    [] Estim: []Att   []Unatt        []TENS instruct                  []IFC  []Premod   []NMES                     []Other:  []w/US   []w/ice   []w/heat  Position:  Location:   15 [x]  Traction: [x] Cervical       []Lumbar                       [] Prone          [x]Supine                       [x]Intermittent   []Continuous Lbs: 25/15#  [] before manual  [x] after manual    []  Ultrasound: []Continuous   [] Pulsed                           []1MHz   []3MHz Location:  W/cm2:    []  Iontophoresis with dexamethasone         Location: [] Take home patch   [] In clinic    []  Ice     []  heat  []  Ice massage Position:  Location:    []  Vasopneumatic Device Pressure:       [] lo [] med [] hi   Temperature: [] lo [] med [] hi   [x] Skin assessment post-treatment:  [x]intact []redness- no adverse reaction       []redness - adverse reaction:     43/38 min Therapeutic Exercise:  [x] See flow sheet:    Rationale: increase ROM, increase strength and improve coordination to improve the patients ability to perform ADLs    10 min Manual Therapy: supine c/s PA mobes C5-C6, STM/DTM to ((L) UT, REIL/PROM with therapist off table   Rationale: decrease pain, increase ROM, increase tissue extensibility and decrease trigger points to perform ADLs          X min Patient Education: [x] Review HEP      Other Objective/Functional Measures:   Progressed to YTB for waiters pose, progressed to mat push ups from wall with good tolerance,   REIL off mat table during manual therapy: increase in thumb and finger sx. added prone Ts for scapular strength    Pain Level (0-10 scale) post treatment: 0  C/S and (R) UE    ASSESSMENT/Changes in Function: Patient continues ot report L finger sx consistent with C6. Reports temporary abolishment of sx on traction- but no lasting change. Continue to progress scapular strengthening. Noted challenge with Prone Ts today. Following tx today, pt reports decreased intensity of sx, but sx still present. Bumped up to 25# today for max pull. Patient will continue to benefit from skilled PT services to modify and progress therapeutic interventions, address functional mobility deficits, address ROM deficits, address strength deficits, analyze and address soft tissue restrictions, analyze and cue movement patterns, analyze and modify body mechanics/ergonomics and assess and modify postural abnormalities to attain remaining goals. [x]  See Plan of Care  []  See progress note/recertification  []  See Discharge Summary         Progress towards goals / Updated goals:  Short Term Goals: To be accomplished in  1  weeks:  1. Pt will be independent and compliant with HEP. -Goal met; pt notes compliance (7/16/18)  Long Term Goals: To be accomplished in  8-12  treatments:  1. Patient will increase FOTO score to 73/100 for indications of increased functional mobility. 2.  Patient will demo 4+/5 ER strength B shoulder for improved GH stability with lifting. -Goal progressing; reduced challenge noted with resisted shoulder ER; will progress NV to match strength gains (7/18/18)  3.   Patient will report 50% improvement in L hand N/T symptoms for ease with writing and gripping. -Goal progressing; pt notes no N/T or radiculopathy following treatment (7/23/18)  4.   Patient will demo 4/5 L LT strength for improved postural strength with ADLs     PLAN  [x]  Upgrade activities as tolerated     [x]  Continue plan of care  []  Update interventions per flow sheet       []  Discharge due to:_  [x]  Other:    Reji Ta, PT 7/24/2018

## 2018-07-26 ENCOUNTER — HOSPITAL ENCOUNTER (OUTPATIENT)
Dept: PHYSICAL THERAPY | Age: 48
Discharge: HOME | End: 2018-07-26
Payer: MEDICAID

## 2018-07-26 PROCEDURE — 97012 MECHANICAL TRACTION THERAPY: CPT | Performed by: PHYSICAL THERAPIST

## 2018-07-26 PROCEDURE — 97140 MANUAL THERAPY 1/> REGIONS: CPT | Performed by: PHYSICAL THERAPIST

## 2018-07-26 PROCEDURE — 97110 THERAPEUTIC EXERCISES: CPT | Performed by: PHYSICAL THERAPIST

## 2018-07-26 NOTE — PROGRESS NOTES
PT DAILY TREATMENT NOTE     Patient Name: Kobe Garcia  Date:2018  : 1970  [x]  Patient  Verified  Payor: [facility] MEDICAID / [place] [facility] [address] [facility]P / Product Type: Managed Care Medicaid /    In time:220 pm        Out time: 320 pm  Total Treatment Time (min): 60  Visit #: 6 of 8-10    Treatment Area: Chronic right shoulder pain [M25.511, G89.29]    SUBJECTIVE  Pain Level (0-10 scale): 7 in fingers   Any medication changes, allergies to medications, adverse drug reactions, diagnosis change, or new procedure performed?: [x] No    [] Yes (see summary sheet for update)  Subjective functional status/changes:   [x] No changes reported  The sx in hand never really goes away.      OBJECTIVE  Modality rationale: decrease pain and increase tissue extensibility to improve the patients ability to perform ADLs   Min Type Additional Details    [] Estim: []Att   []Unatt        []TENS instruct                  []IFC  []Premod   []NMES                     []Other:  []w/US   []w/ice   []w/heat  Position:  Location:   15 [x]  Traction: [x] Cervical       []Lumbar                       [] Prone          [x]Supine                       [x]Intermittent   []Continuous Lbs: 25/15#  [] before manual  [x] after manual    []  Ultrasound: []Continuous   [] Pulsed                           []1MHz   []3MHz Location:  W/cm2:    []  Iontophoresis with dexamethasone         Location: [] Take home patch   [] In clinic    []  Ice     []  heat  []  Ice massage Position:  Location:    []  Vasopneumatic Device Pressure:       [] lo [] med [] hi   Temperature: [] lo [] med [] hi   [x] Skin assessment post-treatment:  [x]intact []redness- no adverse reaction       []redness - adverse reaction:     37/32 min Therapeutic Exercise:  [x] See flow sheet:    Rationale: increase ROM, increase strength and improve coordination to improve the patients ability to perform ADLs    8 min Manual Therapy: supine c/s PA genesis C5-C6, REIL/PROM with therapist off table   Rationale: decrease pain, increase ROM, increase tissue extensibility and decrease trigger points to perform ADLs          X min Patient Education: [x] Review HEP      Other Objective/Functional Measures:     Progressed to Y's  Added ball drops out to side at 45deg    Pain Level (0-10 scale) post treatment: 0  C/S and (R) UE    ASSESSMENT/Changes in Function: Patient continues ot report L finger sx consistent with C6 distribution with no significant change with tx. Possible D/C tx if no significant changes in next 2 sessions. Pt states the intensity decreases temporarily but doesn't last. Good tolerance of ext with chin tuck off mat during MT. Patient states he gets pain when extending in seated position for KEE. Patient will continue to benefit from skilled PT services to modify and progress therapeutic interventions, address functional mobility deficits, address ROM deficits, address strength deficits, analyze and address soft tissue restrictions, analyze and cue movement patterns, analyze and modify body mechanics/ergonomics and assess and modify postural abnormalities to attain remaining goals. [x]  See Plan of Care  []  See progress note/recertification  []  See Discharge Summary         Progress towards goals / Updated goals:  Short Term Goals: To be accomplished in  1  weeks:  1. Pt will be independent and compliant with HEP. -Goal met; pt notes compliance (7/16/18)  Long Term Goals: To be accomplished in  8-12  treatments:  1. Patient will increase FOTO score to 73/100 for indications of increased functional mobility. 2.  Patient will demo 4+/5 ER strength B shoulder for improved GH stability with lifting. -Goal progressing; reduced challenge noted with resisted shoulder ER; will progress NV to match strength gains (7/18/18)  3.   Patient will report 50% improvement in L hand N/T symptoms for ease with writing and gripping. -Goal progressing; pt notes no N/T or radiculopathy following treatment (7/23/18)  4. Patient will demo 4/5 L LT strength for improved postural strength with ADLs  Began prone Ys today with significant fatigue  7/26/18    PLAN  [x]  Upgrade activities as tolerated     [x]  Continue plan of care  []  Update interventions per flow sheet       []  Discharge due to:_  [x]  Other: discuss DC of tx in 2 sessions if no change noted in sx.     Margarita Manual, PT 7/26/2018

## 2018-07-31 ENCOUNTER — HOSPITAL ENCOUNTER (OUTPATIENT)
Dept: PHYSICAL THERAPY | Age: 48
Discharge: HOME | End: 2018-07-31
Payer: MEDICAID

## 2018-07-31 PROCEDURE — 97012 MECHANICAL TRACTION THERAPY: CPT | Performed by: PHYSICAL THERAPIST

## 2018-07-31 PROCEDURE — 97110 THERAPEUTIC EXERCISES: CPT | Performed by: PHYSICAL THERAPIST

## 2018-07-31 PROCEDURE — 97140 MANUAL THERAPY 1/> REGIONS: CPT | Performed by: PHYSICAL THERAPIST

## 2018-07-31 NOTE — PROGRESS NOTES
PT DAILY TREATMENT NOTE     Patient Name: Javier Sep  Date:2018  : 1970  [x]  Patient  Verified  Payor: [facility] MEDICAID / Plan: [facility] [address] [facility] CCCP / Product Type: Managed Care Medicaid /    In time:230 pm        Out time: 339 pm  Total Treatment Time (min): 71  Visit #: 7 of 8-10    Treatment Area: Chronic right shoulder pain [M25.511, G89.29]    SUBJECTIVE  Pain Level (0-10 scale): 6 in fingers   Any medication changes, allergies to medications, adverse drug reactions, diagnosis change, or new procedure performed?: [x] No    [] Yes (see summary sheet for update)  Subjective functional status/changes:   [x] No changes reported  \" Nothing has changed \"    OBJECTIVE  Modality rationale: decrease pain and increase tissue extensibility to improve the patients ability to perform ADLs   Min Type Additional Details    [] Estim: []Att   []Unatt        []TENS instruct                  []IFC  []Premod   []NMES                     []Other:  []w/US   []w/ice   []w/heat  Position:  Location:   15 [x]  Traction: [x] Cervical       []Lumbar                       [] Prone          [x]Supine                       [x]Intermittent   []Continuous Lbs: 25/15#  [] before manual  [x] after manual    []  Ultrasound: []Continuous   [] Pulsed                           []1MHz   []3MHz Location:  W/cm2:    []  Iontophoresis with dexamethasone         Location: [] Take home patch   [] In clinic    []  Ice     []  heat  []  Ice massage Position:  Location:    []  Vasopneumatic Device Pressure:       [] lo [] med [] hi   Temperature: [] lo [] med [] hi   [x] Skin assessment post-treatment:  [x]intact []redness- no adverse reaction       []redness - adverse reaction:     46/41 min Therapeutic Exercise:  [x] See flow sheet:    Rationale: increase ROM, increase strength and improve coordination to improve the patients ability to perform ADLs    8 min Manual Therapy: supine c/s PA mobes C5-C6,  REIL/PROM with therapist off table   Rationale: decrease pain, increase ROM, increase tissue extensibility and decrease trigger points to perform ADLs          X min Patient Education: [x] Review HEP      Other Objective/Functional Measures:   B ER strength: 4/5  % of sx reduction in hand: no reduction since initiating therapy  Progressed to 1# for SL ABD with mild increase in Pain above 90deg: modified to 90deg. Pain Level (0-10 scale) post treatment: still numb in fingers, mild disomfort  C/S and (R) UE    ASSESSMENT/Changes in Function: Patient continues ot report L finger sx consistent with C6 distribution with no significant change in sx since initiating therapy. Increase in sx today in c/s with PA glides at C5/C6 with no increase in finger sx noted. PROM c/s extension with no increase in sx as well. Patient has reassessment by Aug 5th. Look at sending back to [facility] for dx testing as no change in sx since starting therapy. Patient will continue to benefit from skilled PT services to modify and progress therapeutic interventions, address functional mobility deficits, address ROM deficits, address strength deficits, analyze and address soft tissue restrictions, analyze and cue movement patterns, analyze and modify body mechanics/ergonomics and assess and modify postural abnormalities to attain remaining goals. [x]  See Plan of Care  []  See progress note/recertification  []  See Discharge Summary         Progress towards goals / Updated goals:  Short Term Goals: To be accomplished in  1  weeks:  1. Pt will be independent and compliant with HEP. -Goal met; pt notes compliance (7/16/18)  Long Term Goals: To be accomplished in  8-12  treatments:  1. Patient will increase FOTO score to 73/100 for indications of increased functional mobility. 2.  Patient will demo 4+/5 ER strength B shoulder for improved GH stability with lifting. -Goal progressing 4/5 for B ER (7/31/18)  3.   Patient will report 50% improvement in L hand N/T symptoms for ease with writing and gripping. -Goal unmet as patient reports mild reduction while on Tx but return soon after. 0% improvement reported by pt overall. (7/31/18)  4.   Patient will demo 4/5 L LT strength for improved postural strength with ADLs  Began prone Ys today with significant fatigue  7/26/18    PLAN  [x]  Upgrade activities as tolerated     [x]  Continue plan of care  []  Update interventions per flow sheet       []  Discharge due to:_  [x]  Other:  PN Needed NV or by Aug 5th    Ayesha Jc, PT 7/31/2018

## 2018-08-02 ENCOUNTER — HOSPITAL ENCOUNTER (OUTPATIENT)
Dept: PHYSICAL THERAPY | Age: 48
Discharge: HOME | End: 2018-08-02
Payer: MEDICAID

## 2018-08-02 PROCEDURE — 97110 THERAPEUTIC EXERCISES: CPT

## 2018-08-02 PROCEDURE — 97012 MECHANICAL TRACTION THERAPY: CPT

## 2018-08-02 NOTE — PROGRESS NOTES
PT DAILY TREATMENT NOTE  Patient Name: Columba Bess Date:2018 : 1970 [x]  Patient  Verified Payor: [facility] MEDICAID / Plan: [address] [place] [address] CCCP / Product Type: Managed Care Medicaid / In time:222    Out time: 315 Total Treatment Time (min): 53 Visit #: 8 of 8-10 Treatment Area: Chronic right shoulder pain [M25.511, G89.29] SUBJECTIVE Pain Level (0-10 scale): 5 Any medication changes, allergies to medications, adverse drug reactions, diagnosis change, or new procedure performed?: [x] No    [] Yes (see summary sheet for update) Subjective functional status/changes:   [x] No changes reported \"Nothing is working. I already had 10 weeks of PT before and that didn't help. \" OBJECTIVE Modality rationale: decrease pain and increase tissue extensibility to improve the patients ability to perform ADLs Min Type Additional Details  
 [] Estim: []Att   []Unatt        []TENS instruct []IFC  []Premod   []NMES []Other:  []w/US   []w/ice   []w/heat Position: Location:  
15 [x]  Traction: [x] Cervical       []Lumbar 
                     [] Prone          [x]Supine 
                     [x]Intermittent   []Continuous Lbs: 25/15# [] before manual 
[x] after manual  
 []  Ultrasound: []Continuous   [] Pulsed []1MHz   []3MHz Location: 
W/cm2:  
 []  Iontophoresis with dexamethasone Location: [] Take home patch  
[] In clinic  
 []  Ice     []  heat 
[]  Ice massage Position: Location:  
 []  Vasopneumatic Device Pressure:       [] lo [] med [] hi  
Temperature: [] lo [] med [] hi  
[x] Skin assessment post-treatment:  [x]intact []redness- no adverse reaction 
     []redness  adverse reaction:  
 
38 min Therapeutic Exercise:  [x] See flow sheet: REASSESS - COMPLETED FOTO WITH PATIENT Rationale: increase ROM, increase strength and improve coordination to improve the patients ability to perform ADLs 
 
0 min Manual Therapy: NT   
Rationale: decrease pain, increase ROM, increase tissue extensibility and decrease trigger points to perform ADLs X min Patient Education: [x] Review HEP for DC including radial n glides , patient denies questions Other Objective/Functional Measures:  
 
 Goals assessed for DC sec to lack of progress Pain at best 5/10, at worst 8/10 - C6 dermatome - N/T Subjective % improvement 0% Objective:  
B ER strength: 4/5 B shoulder flexion strength R 4, L 4- 
Postural strength : MT 4+, LT 4 
% of sx reduction in hand: no reduction since initiating therapy Improvements: nothing reported by patient, patient reports 3x per HEP compliance Deficits gripping and grasping, lifting , writing Pain Level (0-10 scale) post treatment: 0/10 - co N/T 
 
ASSESSMENT/Changes in Function: Patient will be DC sec to lack of progress . Patient reports short term relief with traction - 1 hour [x]  See Discharge Summary Progress towards goals / Updated goals: 
Short Term Goals: To be accomplished in  1  weeks: 1. Pt will be independent and compliant with HEP. -Goal met; pt notes compliance Long Term Goals: To be accomplished in  8-12  treatments: 1. Patient will increase FOTO score to 73/100 for indications of increased functional mobility.  goal not met - FOTO worsened from IE 
2. Patient will demo 4+/5 ER strength B shoulder for improved GH stability with lifting. -Goalnot met  - 4/5 for B ER  
3. Patient will report 50% improvement in L hand N/T symptoms for ease with writing and gripping. -Goal unmet as patient reports mild reduction while on Tx but return soon after. 0% improvement reported by pt overall. 4.  Patient will demo 4/5 L LT strength for improved postural strength with ADLs  Goal met at 4/5 PLAN [x]  Discharge due to:_lack of progress * Wilfred Eckert, PT 8/2/2018

## 2018-08-02 NOTE — PROGRESS NOTES
[id number] [facility] [address] [facility] AT [address] Phone: [phone] Fax: [phone] PROGRESS NOTE / DISCHARGE NOTE Patient Name: Jolene Doty : 1970 Treatment/Medical Diagnosis: Chronic right shoulder pain [M25.511, G89.29] Referral Source: Allison Keys MD    
Date of Initial Visit: 18 Attended Visits: 8 Missed Visits: 0  
 
SUMMARY OF TREATMENT Pt is a 50y.o. year old male who presents with co right shoulder and neck pain with hx s/p right shoulder arthroscopy by Dr. Mary Sanderson in . Treatment has included progressive therx for CS and GH strength, manual and CS traction for radiating sx. CURRENT STATUS Paitent has made limited progress with PT. Patient was skeptical regarding benefits of PT form the beginning and was mainly concerned with getting MRI approved of CS. Patient reports compliance with HEP 3x per day. Traction treatment resulted in 1 hour symptom relief only. Continued main co C6 radiculopathy. Other assessment as follows: 
Pain at best 5/10, at worst 8/10 - C6 dermatome - N/T Subjective % improvement 0% Objective:  
B ER strength: 4/5 B shoulder flexion strength R 4, L 4- 
Postural strength : MT 4+, LT 4 
% of sx reduction in hand: no reduction since initiating therapy Improvements: nothing reported by patient, patient reports 3x per HEP compliance Deficits gripping and grasping, lifting , writing Progress towards goals / Updated goals: 
Short Term Goals: To be accomplished in  1  weeks: 1.  Pt will be independent and compliant with HEP. -Goal met; pt notes compliance Long Term Goals: To be accomplished in  8-12  treatments: 1.  Patient will increase FOTO score to 73/100 for indications of increased functional mobility.  goal not met - FOTO worsened from IE 2.  Patient will demo 4+/5 ER strength B shoulder for improved GH stability with lifting. -Goalnot met  - 4/5 for B ER  
3. Patient will report 50% improvement in L hand N/T symptoms for ease with writing and gripping. -Goal unmet as patient reports mild reduction while on Tx but return soon after. 0% improvement reported by pt overall. 4.  Patient will demo 4/5 L LT strength for improved postural strength with ADLs  Goal met at 4/5  
 
G-Codes: NA 
RECOMMENDATIONS Patient will be DC sec to lack of appreciable progress to goals. Refer back to referring MD.  
 
If you have any questions/comments please contact us directly at [phone] Thank you for allowing us to assist in the care of your patient. Therapist Signature: Mary Abreu, PT Date: 8/2/2018 Time: 305pm   
NOTE TO PHYSICIAN:  PLEASE COMPLETE THE ORDERS BELOW AND FAX TO [facility]: [phone]. If you are unable to process this request in 24 hours please contact our office: [phone]. 
___ I have read the above report and request that my patient continue as recommended.  
___ I have read the above report and request that my patient continue therapy with the following changes/special instructions:_________________________________________________________  
___ I have read the above report and request that my patient be discharged from therapy.   
 
Physician Signature:        Date:       Time:

## 2018-08-06 ENCOUNTER — OFFICE VISIT (OUTPATIENT)
Dept: ORTHOPEDIC SURGERY | Facility: CLINIC | Age: 48
End: 2018-08-06

## 2018-08-06 VITALS
HEART RATE: 74 BPM | DIASTOLIC BLOOD PRESSURE: 71 MMHG | TEMPERATURE: 98 F | SYSTOLIC BLOOD PRESSURE: 135 MMHG | BODY MASS INDEX: 21.97 KG/M2 | WEIGHT: 140 LBS | OXYGEN SATURATION: 90 % | HEIGHT: 67 IN | RESPIRATION RATE: 16 BRPM

## 2018-08-06 DIAGNOSIS — M54.2 NECK PAIN: ICD-10-CM

## 2018-08-06 DIAGNOSIS — M75.52 BURSITIS OF SHOULDER, LEFT: ICD-10-CM

## 2018-08-06 DIAGNOSIS — Z98.890 S/P ARTHROSCOPY OF RIGHT SHOULDER: Primary | ICD-10-CM

## 2018-08-06 DIAGNOSIS — M25.511 RIGHT SHOULDER PAIN, UNSPECIFIED CHRONICITY: ICD-10-CM

## 2018-08-06 DIAGNOSIS — G89.29 CHRONIC LEFT SHOULDER PAIN: ICD-10-CM

## 2018-08-06 DIAGNOSIS — M25.511 CHRONIC RIGHT SHOULDER PAIN: ICD-10-CM

## 2018-08-06 DIAGNOSIS — G89.29 CHRONIC RIGHT SHOULDER PAIN: ICD-10-CM

## 2018-08-06 DIAGNOSIS — M25.512 CHRONIC LEFT SHOULDER PAIN: ICD-10-CM

## 2018-08-06 NOTE — PROGRESS NOTES
Patient: Veronica Montero                MRN: [id number]       SSN: [id number]  YOB: 1970        AGE: 50 y.o. SEX: male    PCP: Manish Blackwell MD  08/06/18    Chief Complaint   Patient presents with    Shoulder Pain     SATNMA SHOULDER PAIN, F/U APPT. HISTORY:  Veronica Montero is a 50 y.o. male who is seen for increased right shoulder and neck pain. He reports his pain has moved from his left side to his right side recently. He states that when he lays down at night, he is paralyzed and is not able to move. He states he has numbness in his left hand. He notes right shoulder pain with overhead activities. He is s/p right shoulder arthroscopy by Dr. Tabby Moreno in 2006. He denies a h/o right shoulder injury or trauma. He had temporary response to previous shoulder cortisone injections. He has a h/o gout. He recently completed a successful course of PT with some relief. He is seeing Dr. Lamine Pavon for his neck and back pain. He was previously seen for right shoulder and left knee pain. He has a h/o hip AVN and gout. He reports increased knee pains with descending stairs. He denies any previous knee injury or trauma. He notes pain with standing and walking. Pain Assessment  8/6/2018   Location of Pain Shoulder   Location Modifiers Left;Right   Severity of Pain 0   Quality of Pain (No Data)   Quality of Pain Comment N/A   Duration of Pain (No Data)   Duration of Pain Comment N/A   Frequency of Pain -   Aggravating Factors Other (Comment)   Aggravating Factors Comment P[employer]   Limiting Behavior No   Relieving Factors (No Data)   Relieving Factors Comment NO    Result of Injury No     Occupation, etc:  Mr. Yeni Beckford receives social security disability benefits for sickle cell anemia. He states that he was hospitalized for 2 days for sickle cell crisis in January of 2017. He does not exercise regularly. He has been researching shoulder support garments online.   He is right-handed. He reports that his weight is stable. Current weight is 135 pounds. He is 5'7\" tall. He is not diabetic. He stays busy going to the park and doing push-ups, pull-ups, and other home exercises. He lives with his parents and brother in [place]. He is left handed. He is s/p cholecystectomy on 3/27/18. Weight Metrics 8/6/2018 6/25/2018 6/22/2018 5/17/2018 5/7/2018 5/4/2018 4/26/2018   Weight 140 lb 138 lb 6.4 oz 139 lb 136 lb 136 lb 136 lb 12.8 oz 137 lb   BMI 21.93 kg/m2 21.68 kg/m2 21.77 kg/m2 21.3 kg/m2 21.3 kg/m2 21.43 kg/m2 21.46 kg/m2     Patient Active Problem List   Diagnosis Code    Bursitis of shoulder, left M75.52    DJD (degenerative joint disease) M19.90    Abnormal liver function tests R94.5    Abnormal liver function test R94.5    Acute pharyngitis J02.9    Acute upper respiratory infection J06.9    Atopic rhinitis J30.9    Allergic rhinitis J30.9    Anemia D64.9    Pain of upper extremity M79.603    Pain in axilla M79.629    Arthritis M19.90    Atypical chest pain R07.89    Avascular necrosis of bone (HCC) M87.00    Avascular necrosis (HCC) M87.00    Benign paroxysmal positional vertigo H81.10    Carpal tunnel syndrome G56.00    Central perforation of tympanic membrane H72.00    Cervical radiculopathy M54.12    Neck pain M54.2    Cholelithiasis K80.20    Sleep-wake schedule disorder, delayed phase type G47.21    Coital headache G44.82    Conductive hearing loss, unilateral H90.2    Constipation K59.00    Dark urine R82.99    Difficult or painful urination R30.0    Need for immunization against influenza Z23    Gout M10.9    Hb-SS disease without crisis ([address] 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 9XXA    Knee pain M25.569    Thoracic back pain M54.6    Bicipital tendinitis M75.20    Cervical spondylosis without myelopathy M47.812    Community acquired pneumonia J18.9    Conductive hearing loss in right ear H90.11    Contusion of rib on right side S20.211A    Exposure to chickenpox Z20.820    Hb-SS disease with acute chest syndrome (HCC) D57.01    Headache R51    History of hyperkalemia Z86.39    History of osteonecrosis Z87.39    History of perforated ear drum Z86.69   [facility] discharge follow-up Z09    Hyperlipidemia E78.5    Hypoxia R09.02    Impingement syndrome of left shoulder M75.42    Leukocytosis D72.829    Lymphadenopathy R59.1    Male erectile dysfunction, unspecified N52.9    Male fertility problem N46.9    Nerve compression T14. 8XXA    Opioid contract exists Z02.89    DANIELA (obstructive sleep apnea) G47.33    Paresthesia of left upper extremity R20.2    Pneumonia J18.9    Pulmonary hypertension ([facility]) I27.20    Recurrent cold sores B00.1    Sickle-cell anemia ([facility]) D57.1    Sickle cell anemia with pain ([facility]) D57.00    Sickle cell crisis ([facility]) D57.00    Sickle cell pain crisis ([facility]) D57.00    SOB (shortness of breath) R06.02    Sternum pain R07.89    Supraspinatus tendon tear S46.819A    Thrombocytosis ([facility]) D47.3    Vertigo, peripheral H81.399    Degenerative disc disease, cervical M50.30    Spondylosis of cervical region without myelopathy or radiculopathy M47.812    Chronic pain syndrome G89.4     REVIEW OF SYSTEMS: All Below are Negative except: See HPI   Constitutional: negative for fever, chills, and weight loss. Cardiovascular: negative for chest pain, claudication, leg swelling, SOB, LEAVITT   Gastrointestinal: Negative for pain, N/V/C/D, Blood in stool or urine, dysuria,  hematuria, incontinence, pelvic pain. Musculoskeletal: See HPI   Neurological: Negative for dizziness and weakness. Negative for headaches, Visual changes, confusion, seizures   Phychiatric/Behavioral: Negative for depression, memory loss, substance  abuse. Extremities: Negative for hair changes, rash, or skin lesion changes.    Hematologic: Negative for bleeding problems, bruising, pallor or swollen lymph  nodes   Peripheral Vascular: No calf pain, no circulation deficits. Social History     Social History    Marital status: SINGLE     Spouse name: N/A    Number of children: N/A    Years of education: N/A     Occupational History    Not on file. Social History Main Topics    Smoking status: Former Smoker     Types: Cigarettes     Quit date: 7/12/2003    Smokeless tobacco: Never Used    Alcohol use No    Drug use: No    Sexual activity: Yes     Partners: Female     Other Topics Concern    Not on file     Social History Narrative      Allergies   Allergen Reactions    Other Food Other (comments)     Walnuts. Gets headaches    Corn Other (comments)     Told by  allergic    Shellfish Derived Swelling     Swelling.  States okay with betadine         PHYSICAL EXAMINATION:  Visit Vitals    /71    Pulse 74    Temp 98 °F (36.7 °C) (Oral)    Resp 16    Ht 5' 7\" (1.702 m)    Wt 140 lb (63.5 kg)    SpO2 90%    BMI 21.93 kg/m2      ORTHO EXAMINATION:  Examination Neck   Skin Intact   Tenderness +, left paracervical   Tightness +, left paracervical   Flexion Decreased 25%   Extension Decreased 25%   Lateral bend left Normal   Lateral bend right Normal   Masses -   Biceps reflex Normal   Triceps reflex Normal   Brachioradialis reflex Normal     Examination Right shoulder Left shoulder   Skin Intact Intact   Effusion - -   Biceps deformity - -   Atrophy - -   AC joint tenderness - -   Acromial tenderness - -   Biceps tenderness - -   Forward flexion/Elevation  170   Active abduction  160   External rotation ROM 30 30   Internal rotation  70   Apprehension - -   Impingement - -   Drop Arm Test - -   Neurovascular Intact Intact     Examination Right knee Left knee   Skin Intact Intact   Range of motion 130-0 135-0   Effusion - -   Medial joint line tenderness + +, anterior   Lateral joint line tenderness - -   Popliteal tenderness - -   Osteophytes palpable - - Gamas - -   Patella crepitus - +   Anterior drawer - -   Lateral laxity - -   Medial laxity - -   Varus deformity - -   Valgus deformity - -   Pretibial edema - -   Calf tenderness - -     MRI LEFT SHOULDER WO CONT 3/12/18  IMPRESSION:   1. Suboptimal visualization intra-articular biceps tendon, not confirmed intact.   2. Slight, linear insertional supraspinatus tendon tearing. Mild inferior offset of the acromion which can predispose to supraspinatus outlet impingement.   3. Overall decreased T1 marrow signal, again which can be seen in the setting of marrow expansion/anemia. MRI RIGHT SHOULDER WO CONT 6/22/15  Impression:   1. Moderate supraspinatus, mild infraspinatus, and moderate subscapularis insertional tendinosis. Degenerative fibrillation along the bursal sided supraspinatus critical zone fibers. No focal rotator cuff tear. 2. Mild degenerative osteoarthropathy of the right acromioclavicular joint without morphology or secondary findings of subacromial impingement. Mild subacromial-subdeltoid bursitis. 3. Unchanged humeral head medullary infarct; no findings of avascular necrosis.     RADIOGRAPHS:  XR RT SHOULDER 6/25/2018  IMPRESSION:  Three views - No fractures, mild acromioclavicular narrowing, mild glenohumeral narrowing, no calcific densities. XR LEFT KNEE 3/31/17  IMPRESSION:  No fractures, no effusion, mild medial joint space narrowing, small lateral osteophytes present. IMPRESSION:      ICD-10-CM ICD-9-CM    1. S/P arthroscopy of right shoulder Z98.890 V45.89    2. Chronic right shoulder pain M25.511 719.41     G89.29 338.29    3. Right shoulder pain, unspecified chronicity M25.511 719.41    4. Chronic left shoulder pain M25.512 719.41     G89.29 338.29    5. Bursitis of shoulder, left M75.52 726.10    6. Neck pain M54.2 723.1 MRI CERV SPINE WO CONT     PLAN:  MRI cerv spine was ordered. He will follow up with Dr. Dragan Laws with the results of his MRI.      Scribed by Otelic Konstantin Garcia ([address]) as dictated by Starr Whitehead MD

## 2018-08-07 ENCOUNTER — APPOINTMENT (OUTPATIENT)
Dept: PHYSICAL THERAPY | Age: 48
End: 2018-08-07
Payer: MEDICAID

## 2018-08-09 ENCOUNTER — APPOINTMENT (OUTPATIENT)
Dept: PHYSICAL THERAPY | Age: 48
End: 2018-08-09
Payer: MEDICAID

## 2018-08-15 ENCOUNTER — HOSPITAL ENCOUNTER (OUTPATIENT)
Dept: MRI IMAGING | Age: 48
Discharge: HOME | End: 2018-08-15
Attending: SPECIALIST
Payer: MEDICAID

## 2018-08-15 DIAGNOSIS — M54.2 NECK PAIN: ICD-10-CM

## 2018-08-15 PROCEDURE — 72141 MRI NECK SPINE W/O DYE: CPT

## 2018-08-21 ENCOUNTER — OFFICE VISIT (OUTPATIENT)
Dept: ORTHOPEDIC SURGERY | Age: 48
End: 2018-08-21

## 2018-08-21 VITALS
RESPIRATION RATE: 16 BRPM | HEIGHT: 67 IN | HEART RATE: 78 BPM | WEIGHT: 140 LBS | SYSTOLIC BLOOD PRESSURE: 128 MMHG | BODY MASS INDEX: 21.97 KG/M2 | TEMPERATURE: 98.4 F | OXYGEN SATURATION: 95 % | DIASTOLIC BLOOD PRESSURE: 67 MMHG

## 2018-08-21 DIAGNOSIS — M54.12 CERVICAL RADICULOPATHY: Primary | ICD-10-CM

## 2018-08-21 RX ORDER — NAPROXEN 500 MG/1
TABLET ORAL
COMMUNITY
Start: 2018-08-07 | End: 2018-09-18

## 2018-08-21 RX ORDER — METHYLPREDNISOLONE 4 MG/1
TABLET ORAL
Refills: 0 | COMMUNITY
Start: 2018-05-21 | End: 2018-09-06

## 2018-08-21 RX ORDER — SUMATRIPTAN 50 MG/1
TABLET, FILM COATED ORAL
COMMUNITY
Start: 2018-08-08

## 2018-08-21 NOTE — PATIENT INSTRUCTIONS
Electromyogram (EMG) and Nerve Conduction Studies: About These Tests  What are they? An electromyogram (EMG) measures the electrical activity of your muscles when you are not using them (at rest) and when you tighten them (muscle contraction). Nerve conduction studies (NCS) measure how well and how fast the nerves can send electrical signals. EMG and nerve conduction studies are often done together. If they are done together, the nerve conduction studies are done before the EMG. Why are they done? You may need an EMG to find diseases that damage your muscles or nerves or to find why you cannot move your muscles (paralysis), why they feel weak, or why they twitch. You may need nerve conduction studies to find damage to the nerves that lead from the brain and spinal cord to the rest of the body (peripheral nervous system). Nerve conduction studies are often used to help find nerve disorders, such as carpal tunnel syndrome. How can you prepare for these tests? · Tell your doctors ALL the medicines, vitamins, supplements, and herbal remedies you take. Some medicines can affect the test results. You may need to stop taking some medicines before you have this test.     · If you take aspirin or some other blood thinner, be sure to talk to your doctor. He or she will tell you if you should stop taking it before your test. Make sure that you understand exactly what your doctor wants you to do.     · Wear loose-fitting clothing. You may be given a hospital gown to wear.     · The electrodes for the test are attached to your skin. Your skin needs to be clean and free of sprays, oils, creams, and lotions. What happens during the tests? You lie on a table or bed or sit in a reclining chair so your muscles are relaxed. For an EMG:  · Your doctor will insert a needle electrode into a muscle.  This will record the electrical activity while the muscle is at rest. You may feel a quick, sharp pain when the needle electrode is put into a muscle. · Your doctor will ask you to tighten the same muscle slowly and steadily while the electrical activity is recorded. · Your doctor may move the electrode to a different area of the muscle or a different muscle. For nerve conduction studies:  · Your doctor will attach two types of electrodes to your skin. ¨ One type of electrode is placed over a nerve and will give the nerve an electrical pulse. ¨ The other type of electrode is placed over the muscle that the nerve controls. It will record how long it takes the muscle to react to the electrical pulse. · You will be able to feel the electrical pulses. They are small shocks and are safe. What else should you know about these tests? · After an EMG, you may be sore and have a tingling feeling in your muscles for up to 2 days. You may have small bruises or swelling at the needle site. · For an EMG, you may be asked to sign a consent form. Talk to your doctor about any concerns you have about the need for the test, its risks, how it will be done, or what the results will mean. How long do they take? · An EMG may take 30 to 60 minutes. · Nerve conduction tests may take from 15 minutes to 1 hour or more. It depends on how many nerves and muscles your doctor tests. What happens after these tests? · If any of the test areas are sore:  ¨ Put ice or a cold pack on the area for 10 to 20 minutes at a time. Put a thin cloth between the ice and your skin. ¨ Take an over-the-counter pain medicine, such as acetaminophen (Tylenol), ibuprofen (Advil, Motrin), or naproxen (Aleve). Be safe with medicines. Read and follow all instructions on the label. · You will probably be able to go home right away. · You can go back to your usual activities right away. When should you call for help?   Watch closely for changes in your health, and be sure to contact your doctor if:  · Muscle pain from an EMG test gets worse or you have swelling, tenderness, or pus at any of the needle sites. · You have any problems that you think may be from the test.  · You have any questions about the test or have not received your results. Follow-up care is a key part of your treatment and safety. Be sure to make and go to all appointments, and call your doctor if you are having problems. It's also a good idea to keep a list of the medicines you take. Ask your doctor when you can expect to have your test results. Where can you learn more? Go to http://davy-earl.info/. Enter D474 in the search box to learn more about \"Electromyogram (EMG) and Nerve Conduction Studies: About These Tests. \"  Current as of: October 9, 2017  Content Version: 11.7  © 4005-3856 [employer]. Care instructions adapted under license by [employer] (which disclaims liability or warranty for this information). If you have questions about a medical condition or this instruction, always ask your healthcare professional. [address] any warranty or liability for your use of this information.

## 2018-08-22 ENCOUNTER — OFFICE VISIT (OUTPATIENT)
Dept: ORTHOPEDIC SURGERY | Age: 48
End: 2018-08-22

## 2018-08-22 VITALS
HEART RATE: 89 BPM | BODY MASS INDEX: 22.13 KG/M2 | RESPIRATION RATE: 18 BRPM | TEMPERATURE: 98.1 F | DIASTOLIC BLOOD PRESSURE: 69 MMHG | HEIGHT: 67 IN | WEIGHT: 141 LBS | OXYGEN SATURATION: 95 % | SYSTOLIC BLOOD PRESSURE: 127 MMHG

## 2018-08-22 DIAGNOSIS — R94.131 ABNORMAL EMG: ICD-10-CM

## 2018-08-22 DIAGNOSIS — R20.0 NUMBNESS AND TINGLING IN LEFT ARM: Primary | ICD-10-CM

## 2018-08-22 DIAGNOSIS — R20.2 NUMBNESS AND TINGLING IN LEFT ARM: Primary | ICD-10-CM

## 2018-08-22 NOTE — PROGRESS NOTES
[address], [address]  [place], [address] [place] [address] Street  Phone: [phone]  Fax: [phone]        Barb Pickering  : 1970  PCP: Rossi Talbot MD  2018    ELECTROMYOGRAPHY AND NERVE CONDUCTION STUDIES      ASSESSMENT AND PLAN    Oli Montero comes in to the office today for LUE EMG for numbness and tingling in a left C7 distribution. He continues to have neck pain that he states is progressively worsening. He rates his pain as a 5/10 today. This is an abnormal electrodiagnostic examination. These findings are most consistent with a left C7 radiculopathy. This is evidenced by electrodiagnostic signs of muscle membrane instability in C7 innervated muscles(pronator teres, triceps) with some signs of neurorecovery within this myotome as well and in the [facility]. Cervical paraspinal testing is limited due to difficulty with relaxation. Pt will f/u with Dr. Chuckie Ahn for surgical consult. HISTORY OF PRESENT ILLNESS  George Wynn is a 50 y.o. male. He continues to c/o numbness and tingling in his LUE in a C7 distribution. His cervical MRI has correlating evidence for a left-sided C7 radiculopathy. He did not find any relief from PT or cervical interlaminar injections. He found some relief from Lyrica, but with undesirable side effects. He has been maintaining an HEP at the gym consisting of walking. PAST MEDICAL HISTORY   Past Medical History:   Diagnosis Date    Anemia NEC     Bursitis of shoulder, left 2009    MRI revealed tendinitis and bursitis of the rotator cuff.       DJD (degenerative joint disease) 2009    Early djd, left radiocarpal joint with associated ganglion cyst.      Elevated white blood cell count 2009    GERD (gastroesophageal reflux disease)     H/O: rotator cuff tear 2006    Chronic    Hyperlipidemia     Pain in joint, upper arm 2009    Pain in shoulder, elbow and wrist.    Sickle cell anemia (HCC)     Sleep apnea     Does not use CPAP    Testicular failure     Vertigo 07/20/2009       Past Surgical History:   Procedure Laterality Date    HX CHOLECYSTECTOMY  03/27/2018    HX HEENT      eardrum repair    HX SHOULDER ARTHROSCOPY Right    . MEDICATIONS    Current Outpatient Prescriptions   Medication Sig Dispense Refill    methylPREDNISolone (MEDROL DOSEPACK) 4 mg tablet take as directed on pack  0    naproxen (NAPROSYN) 500 mg tablet       SUMAtriptan (IMITREX) 50 mg tablet       loratadine (ALAVERT PO) Take  by mouth.  pregabalin (LYRICA) 75 mg capsule Take 1 Cap by mouth two (2) times a day. Max Daily Amount: 150 mg. 14 Cap 0    pregabalin (LYRICA) 150 mg capsule Take 1 Cap by mouth two (2) times a day. Max Daily Amount: 300 mg. 60 Cap 2    ketorolac (TORADOL) 10 mg tablet take 1 tablet by mouth every 6 hours if needed for pain 30 Tab 0    loratadine (CLARITIN REDITABS) 10 mg dissolvable tablet Take 10 mg by mouth daily.  propranolol LA (INDERAL LA) 80 mg SR capsule Take 80 mg by mouth daily. Headache prevention      valACYclovir (VALTREX) 1 gram tablet Take 1 Tab by mouth as needed. 0    aspirin  mg tablet Take 1,000 mg by mouth every twelve (12) hours as needed for Pain.  multivitamin (ONE A DAY) tablet Take 1 Tab by mouth daily.  meclizine (ANTIVERT) 25 mg tablet Take  by mouth three (3) times daily as needed.  naproxen (NAPROSYN) 250 mg tablet Take  by mouth two (2) times daily (with meals).  hydrocodone-acetaminophen 5-500 mg Cap Take 1 Tab by mouth every six (6) hours as needed. ALLERGIES  Allergies   Allergen Reactions    Other Food Other (comments)     Walnuts. Gets headaches    Corn Other (comments)     Told by  allergic    Shellfish Derived Swelling     Swelling.  States okay with betadine          SOCIAL HISTORY    Social History     Social History    Marital status: SINGLE     Spouse name: N/A    Number of children: N/A    Years of education: N/A     Social History Main Topics    Smoking status: Former Smoker     Types: Cigarettes     Quit date: 7/12/2003    Smokeless tobacco: Never Used    Alcohol use No    Drug use: No    Sexual activity: Yes     Partners: Female     Other Topics Concern    Not on file     Social History Narrative       FAMILY HISTORY  Family History   Problem Relation Age of Onset    Diabetes Mother     Cancer Father          PHYSICAL EXAMINATION  Visit Vitals    /69    Pulse 89    Temp 98.1 °F (36.7 °C) (Oral)    Resp 18    Ht 5' 7\" (1.702 m)    Wt 141 lb (64 kg)    SpO2 95%    BMI 22.08 kg/m2       Pain Assessment  8/21/2018   Location of Pain Neck   Location Modifiers Medial   Severity of Pain 8   Quality of Pain Dull;Aching   Quality of Pain Comment -   Duration of Pain Persistent   Duration of Pain Comment -   Frequency of Pain Constant   Aggravating Factors Bending   Aggravating Factors Comment -   Limiting Behavior No   Relieving Factors Rest   Relieving Factors Comment -   Result of Injury No           Constitutional:  Well developed, well nourished, in no acute distress. Psychiatric: Affect and mood are appropriate. Integumentary: No rashes or abrasions noted on exposed areas. SPINE/MUSCULOSKELETAL EXAM    DTRs are 2+ biceps, triceps, brachioradialis, patella, and Achilles. On brief examination:     Cervical spine:  Neck is midline. Normal muscle tone. No focal atrophy is noted. ROM painful. Shoulder ROM intact. Tenderness to palpation. Negative Spurling's sign. Negative Tinel's sign. Negative Tomas's sign.       Sensation in the bilateral arms grossly intact to light touch.       Lumbar spine:  No rash, ecchymosis, or gross obliquity. No fasciculations. No focal atrophy is noted. No pain with hip ROM. Full range of motion. Tenderness to palpation. No tenderness to palpation at the sciatic notch.    SI joints non-tender. Trochanters non tender.      Sensation in the bilateral legs grossly intact to light touch. MOTOR:      Biceps  Triceps Deltoids Wrist Ext Wrist Flex Hand Intrin   Right 5/5 5/5 5/5 5/5 5/5 5/5   Left 5/5 5/5 5/5 5/5 5/5 5/5             Hip Flex  Quads Hamstrings Ankle DF EHL Ankle PF   Right 5/5 5/5 5/5 5/5 5/5 5/5   Left 5/5 5/5 5/5 5/5 5/5 5/5     NCV & EMG Findings:  All nerve conduction studies (as indicated in the following tables) were within normal limits. Needle evaluation of the left triceps muscle showed increased insertional activity, slightly increased spontaneous activity, increased motor unit amplitude, and increased polyphasic potentials. The left pronator teres muscle showed increased insertional activity, increased spontaneous activity, and increased polyphasic potentials. The left Ext Digitorum muscle showed slightly increased polyphasic potentials. All remaining muscles (as indicated in the following table) showed no evidence of electrical instability.       Nerve Conduction Studies  Anti Sensory Summary Table     Stim Site NR Peak (ms) Norm Peak (ms) O-P Amp (µV) Norm O-P Amp Site1 Site2 Delta-P (ms) Dist (cm) Abraham (m/s) Norm Abraham (m/s)   Left Median Anti Sensory (2nd Digit)   Wrist    2.8 <3.6 20.9 >10 Wrist 2nd Digit 2.8 14.0 50 >39   Left Radial Anti Sensory (Base 1st Digit)   Wrist    2.1 <3.1 21.8  Wrist Base 1st Digit 2.1 0.0     Left Ulnar Anti Sensory (5th Digit)   Wrist    3.0 <3.7 15.5 >15.0 Wrist 5th Digit 3.0 14.0 47 >38     Motor Summary Table     Stim Site NR Onset (ms) Norm Onset (ms) O-P Amp (mV) Norm O-P Amp Site1 Site2 Delta-0 (ms) Dist (cm) Abraham (m/s) Norm Abraham (m/s)   Left Median Motor (Abd Poll Brev)   Wrist    3.2 <4.2 5.5 >5 Elbow Wrist 4.0 21.5 54 >50   Elbow    7.2  5.1          Left Ulnar Motor (Abd Dig Min)   Wrist    2.7 <4.2 9.7 >3 B Elbow Wrist 3.2 20.0 63 >53   B Elbow    5.9  9.3  A Elbow B Elbow 1.5 10.0 67 >53   A Elbow    7.4  9.2 EMG     Side Muscle Nerve Root Ins Act Fibs Psw Amp Dur Poly Recrt Int Isaak Arana Comment   Left Biceps Musculocut C5-6 Nml Nml Nml Nml Nml 0 Nml Nml    Left Triceps Radial C6-7-8 Incr Nml 1+ Incr Nml 3+ Nml Nml    Left PronatorTeres Median C6-7 Incr 2+ 3+ Nml Nml 3+ Nml Nml    Left Ext Digitorum Radial (Post Int) C7-8 Nml Nml Nml Nml Nml 1+ Nml Nml    Left Abd Dig Min Ulnar C8-T1 Nml Nml Nml Nml Nml 0 Nml Nml    Left Cervical Parasp Up Rami C1-3 Nml Nml Nml      difficult to relax   Left Cervical Parasp Mid Rami C4-6 Nml Nml Nml      difficult to relax   Left Cervical Parasp Low Rami C7-8 Nml Nml Nml      difficult to relax       Nerve Conduction Studies  Anti Sensory Left/Right Comparison     Stim Site L Lat (ms) R Lat (ms) L-R Lat (ms) L Amp (µV) R Amp (µV) L-R Amp (%) Site1 Site2 L Abraham (m/s) R Abraham (m/s) L-R Abraham (m/s)   Median Anti Sensory (2nd Digit)   Wrist 2.8   20.9   Wrist 2nd Digit 50     Radial Anti Sensory (Base 1st Digit)   Wrist 2.1   21.8   Wrist Base 1st Digit      Ulnar Anti Sensory (5th Digit)   Wrist 3.0   15.5   Wrist 5th Digit 47       Motor Left/Right Comparison     Stim Site L Lat (ms) R Lat (ms) L-R Lat (ms) L Amp (mV) R Amp (mV) L-R Amp (%) Site1 Site2 L Abraham (m/s) R Abraham (m/s) L-R Abraham (m/s)   Median Motor (Abd Poll Brev)   Wrist 3.2   5.5   Elbow Wrist 54     Elbow 7.2   5.1          Ulnar Motor (Abd Dig Min)   Wrist 2.7   9.7   B Elbow Wrist 63     B Elbow 5.9   9.3   A Elbow B Elbow 67     A Elbow 7.4   9.2                Waveforms:                     [facility]  OFFICE PROCEDURE PROGRESS NOTE        Chart reviewed for the following:   IFermín, have reviewed the History, Physical and updated the Allergic reactions for [employer]     TIME OUT performed immediately prior to start of procedure:   I, Fermín Garay, have performed the following reviews on [employer] prior to the start of the procedure:            * Patient was identified by name and date of birth   * Agreement on procedure being performed was verified  * Risks and Benefits explained to the patient  * Procedure site verified and marked as necessary  * Patient was positioned for comfort  * Consent was signed and verified     Time: 3:39pm      Date of procedure: 8/22/2018    Procedure performed by:  Dc Oh MD    Provider accompanied by: Gina. Patient accompanied by: Girlfriend.     How tolerated by patient: tolerated the procedure well with no complications    Post Procedural Pain Scale: 0 - No Hurt    Comments: none    Written by Marylu Rahman, [facility] as dictated by Quoc Blum MD

## 2018-08-24 ENCOUNTER — OFFICE VISIT (OUTPATIENT)
Dept: ORTHOPEDIC SURGERY | Age: 48
End: 2018-08-24

## 2018-08-24 VITALS
BODY MASS INDEX: 22.03 KG/M2 | RESPIRATION RATE: 16 BRPM | WEIGHT: 140.4 LBS | DIASTOLIC BLOOD PRESSURE: 72 MMHG | HEIGHT: 67 IN | SYSTOLIC BLOOD PRESSURE: 116 MMHG | HEART RATE: 72 BPM | OXYGEN SATURATION: 93 % | TEMPERATURE: 98.4 F

## 2018-08-24 DIAGNOSIS — M50.20 HNP (HERNIATED NUCLEUS PULPOSUS), CERVICAL: Primary | ICD-10-CM

## 2018-08-24 NOTE — PROGRESS NOTES
[address]  [address], [address]  [address]  Phone: [phone]  Fax: [phone]  INITIAL CONSULTATION  Patient: Emeterio De Los Santos                MRN: [id number]       SSN: [id number]  YOB: 1970        AGE: 50 y.o. SEX: male  Body mass index is 21.99 kg/(m^2). PCP: Naomi Arias MD  08/24/18    Chief Complaint   Patient presents with    Neck Pain    Hand Pain     Left    Follow-up     Surgery Consult          HISTORY OF PRESENT ILLNESS, RADIOGRAPHS, and PLAN:         HISTORY OF PRESENT ILLNESS:  Surgical consultation. Mr. Milton Cristobal is seen today at the request of Dr. Pamela Devries and Dr. Naomi Arias. Mr. Deshaun Holcomb is a 51-year-old male, who has nonsmoker history of sickle cell. He says since 12/2017 he has had this ongoing radicular pain in his left arm in C7 distribution. He has had a positive EMG and unresponsive to conservative treatments. He has had injections and medications. He rates his pain as an 8. MRI demonstrates a C6-7 lateral foraminal disc herniation with extruded fragment at that level. He has degenerative changes at other segments. The patient is quite miserable. He says sometimes he feels like he wants to cut his finger off. On manual motor testing, he has normal strength and sensation. Mild antalgic range of motion to his neck. He does not smoke. He does not work. ASSESSMENT/PLAN:  I discussed the  nature of surgery to him. The risks, benefits, complications, and alternatives to surgery. He wishes to proceed. We will proceed with cervical discectomy and fusion at C6-7 once the appropriate approvals and clearances take place. cc:  Naomi Arias M.D. Past Medical History:   Diagnosis Date    Anemia NEC     Bursitis of shoulder, left 08/03/2009    MRI revealed tendinitis and bursitis of the rotator cuff.       DJD (degenerative joint disease) 08/03/2009 Early djd, left radiocarpal joint with associated ganglion cyst.      Elevated white blood cell count 07/20/2009    GERD (gastroesophageal reflux disease)     H/O: rotator cuff tear 06/07/2006    Chronic    Hyperlipidemia     Pain in joint, upper arm 07/2009    Pain in shoulder, elbow and wrist.    Sickle cell anemia (HCC)     Sleep apnea     Does not use CPAP    Testicular failure     Vertigo 07/20/2009       Family History   Problem Relation Age of Onset    Diabetes Mother     Cancer Father        Current Outpatient Prescriptions   Medication Sig Dispense Refill    naproxen (NAPROSYN) 500 mg tablet       SUMAtriptan (IMITREX) 50 mg tablet       loratadine (ALAVERT PO) Take  by mouth.  ketorolac (TORADOL) 10 mg tablet take 1 tablet by mouth every 6 hours if needed for pain 30 Tab 0    loratadine (CLARITIN REDITABS) 10 mg dissolvable tablet Take 10 mg by mouth daily.  propranolol LA (INDERAL LA) 80 mg SR capsule Take 80 mg by mouth daily. Headache prevention      valACYclovir (VALTREX) 1 gram tablet Take 1 Tab by mouth as needed. 0    aspirin  mg tablet Take 1,000 mg by mouth every twelve (12) hours as needed for Pain.  multivitamin (ONE A DAY) tablet Take 1 Tab by mouth daily.  meclizine (ANTIVERT) 25 mg tablet Take  by mouth three (3) times daily as needed.  hydrocodone-acetaminophen 5-500 mg Cap Take 1 Tab by mouth every six (6) hours as needed.  methylPREDNISolone (MEDROL DOSEPACK) 4 mg tablet take as directed on pack  0    pregabalin (LYRICA) 75 mg capsule Take 1 Cap by mouth two (2) times a day. Max Daily Amount: 150 mg. 14 Cap 0    pregabalin (LYRICA) 150 mg capsule Take 1 Cap by mouth two (2) times a day. Max Daily Amount: 300 mg. 60 Cap 2    naproxen (NAPROSYN) 250 mg tablet Take  by mouth two (2) times daily (with meals). Allergies   Allergen Reactions    Other Food Other (comments)     Walnuts.  Gets headaches    Corn Other (comments) Told by Dr kim Borges.[place] Shellfish Derived Swelling     Swelling. States okay with betadine       Past Surgical History:   Procedure Laterality Date    HX CHOLECYSTECTOMY  03/27/2018    HX HEENT      eardrum repair    HX SHOULDER ARTHROSCOPY Right        Past Medical History:   Diagnosis Date    Anemia NEC     Bursitis of shoulder, left 08/03/2009    MRI revealed tendinitis and bursitis of the rotator cuff.  DJD (degenerative joint disease) 08/03/2009    Early djd, left radiocarpal joint with associated ganglion cyst.      Elevated white blood cell count 07/20/2009    GERD (gastroesophageal reflux disease)     H/O: rotator cuff tear 06/07/2006    Chronic    Hyperlipidemia     Pain in joint, upper arm 07/2009    Pain in shoulder, elbow and wrist.    Sickle cell anemia (HCC)     Sleep apnea     Does not use CPAP    Testicular failure     Vertigo 07/20/2009       Social History     Social History    Marital status: SINGLE     Spouse name: N/A    Number of children: N/A    Years of education: N/A     Occupational History    Not on file. Social History Main Topics    Smoking status: Former Smoker     Types: Cigarettes     Quit date: 7/12/2003    Smokeless tobacco: Never Used    Alcohol use No    Drug use: No    Sexual activity: Yes     Partners: Female     Other Topics Concern    Not on file     Social History Narrative           REVIEW OF SYSTEMS:   CONSTITUTIONAL SYMPTOMS:  Negative. EYES:  Negative. EARS, NOSE, THROAT AND MOUTH:  Negative. CARDIOVASCULAR:  Negative. RESPIRATORY:  Negative. GENITOURINARY: Per HPI. GASTROINTESTINAL:  Per HPI. INTEGUMENTARY (SKIN AND/OR BREAST):  Negative. MUSCULOSKELETAL: Per HPI.   ENDOCRINE/RHEUMATOLOGIC:  Negative. NEUROLOGICAL:  Per HPI. HEMATOLOGIC/LYMPHATIC:  Negative. ALLERGIC/IMMUNOLOGIC:  Negative. PSYCHIATRIC:  Negative.     PHYSICAL EXAMINATION:   Visit Vitals    /72    Pulse 72    Temp 98.4 °F (36.9 °C)    Resp 16    Ht 5' 7\" (1.702 m)    Wt 140 lb 6.4 oz (63.7 kg)    SpO2 93%    BMI 21.99 kg/m2    PAIN SCALE: 7/10    CONSTITUTIONAL: The patient is in no apparent distress and is alert and oriented x 3. HEENT: Normocephalic. Hearing grossly intact. NECK: Supple and symmetric. no tenderness, or masses were felt. RESPIRATORY: No labored breathing. CARDIOVASCULAR: The carotid pulses were normal. Peripheral pulses were 2+. CHEST: Normal AP diameter and normal contour without any kyphoscoliosis. LYMPHATIC: No lymphadenopathy was appreciated in the neck, axillae or groin. SKIN:  Negative for scars, rashes, lesions, or ulcers on the right upper, right lower, left upper, left lower and trunk. NEUROLOGICAL: Alert and oriented x 3. Ambulation without assistive device. FWB. EXTREMITIES:  See musculoskeletal.  MUSCULOSKELETAL:   Head and Neck: Neck pain. Negative for misalignment, asymmetry, crepitation, defects, tenderness masses or effusions.  Left Upper Extremity: Numbness in digits 1-3. Inspection, percussion and palpation performed. Tomass sign is negative.  Right Upper Extremity: Inspection, percussion and palpation performed. Tomass sign is negative.  Spine, Ribs and Pelvis: Inspection, percussion and palpation performed. Negative for misalignment, asymmetry, crepitation, defects, tenderness masses or effusions.  Left Lower Extremity: Inspection, percussion and palpation performed. Negative straight leg raise.  Right Lower Extremity: Inspection, percussion and palpation performed. Negative straight leg raise. SPINE EXAM:     Cervical spine: Neck is midline. Normal muscle tone. No focal atrophy is noted. Lumbar spine: No rash, ecchymosis, or gross obliquity. No focal atrophy is noted. ASSESSMENT    ICD-10-CM ICD-9-CM    1.  HNP (herniated nucleus pulposus), cervical M50.20 722.0        Written by Renée Murphy, as dictated by Korey Branch MD.    I, Dr. Korey Branch, MD, confirm that all documentation is accurate.

## 2018-08-24 NOTE — PROGRESS NOTES
[address] [facility]   Pre-Surgical Worksheet    Patient: Esa Marin                         MRN: [id number]     Age:  50 y.o.,      Sex: male    YOB: 1970           C/ [address]: August 24, 2018  PCP: Leeroy Wright MD    Allergies   Allergen Reactions    Other Food Other (comments)     Walnuts. Gets headaches    Corn Other (comments)     Told by  allergic    Shellfish Derived Swelling     Swelling. States okay with betadine         ICD-10-CM ICD-9-CM    1. HNP (herniated nucleus pulposus), cervical M50.20 722.0        Surgery: ACDF C6-7. Pain Assessment   Pain Assessment  8/24/2018   Location of Pain Neck;Hand   Location Modifiers Left   Severity of Pain 7   Quality of Pain Sharp   Quality of Pain Comment numbness, tingling   Duration of Pain -   Duration of Pain Comment -   Frequency of Pain Constant   Aggravating Factors (No Data)   Aggravating Factors Comment pain is just there   Limiting Behavior -   Relieving Factors Nothing   Relieving Factors Comment -   Result of Injury -       Visit Vitals    /72    Pulse 72    Temp 98.4 °F (36.9 °C)    Resp 16    Ht 5' 7\" (1.702 m)    Wt 140 lb 6.4 oz (63.7 kg)    SpO2 93%    BMI 21.99 kg/m2       ADL Limits: Patient states pain is a constant. Inability to complete ADLs without assistance at times due to the pain. Spine Surgery?: No:  When ? [facility] Where? .    Spinal Injections?: Yes  When May 2018. Where [facility]. Physical Therapy?: Yes  When In [place]. Where 2 weeks ago. NSAID's?: Yes    Pain Medications?: No:   Type: ? [facility] In Pain Management: NO, Where: ?    Current Outpatient Prescriptions   Medication Sig    naproxen (NAPROSYN) 500 mg tablet     SUMAtriptan (IMITREX) 50 mg tablet     loratadine (ALAVERT PO) Take  by mouth.  ketorolac (TORADOL) 10 mg tablet take 1 tablet by mouth every 6 hours if needed for pain    loratadine (CLARITIN REDITABS) 10 mg dissolvable tablet Take 10 mg by mouth daily.     propranolol LA (INDERAL LA) 80 mg SR capsule Take 80 mg by mouth daily. Headache prevention    valACYclovir (VALTREX) 1 gram tablet Take 1 Tab by mouth as needed.  aspirin  mg tablet Take 1,000 mg by mouth every twelve (12) hours as needed for Pain.  multivitamin (ONE A DAY) tablet Take 1 Tab by mouth daily.  meclizine (ANTIVERT) 25 mg tablet Take  by mouth three (3) times daily as needed.  hydrocodone-acetaminophen 5-500 mg Cap Take 1 Tab by mouth every six (6) hours as needed.  methylPREDNISolone (MEDROL DOSEPACK) 4 mg tablet take as directed on pack    pregabalin (LYRICA) 75 mg capsule Take 1 Cap by mouth two (2) times a day. Max Daily Amount: 150 mg.  pregabalin (LYRICA) 150 mg capsule Take 1 Cap by mouth two (2) times a day. Max Daily Amount: 300 mg.    naproxen (NAPROSYN) 250 mg tablet Take  by mouth two (2) times daily (with meals). No current facility-administered medications for this visit. Past Medical History:   Diagnosis Date    Anemia NEC     Bursitis of shoulder, left 08/03/2009    MRI revealed tendinitis and bursitis of the rotator cuff.       DJD (degenerative joint disease) 08/03/2009    Early djd, left radiocarpal joint with associated ganglion cyst.      Elevated white blood cell count 07/20/2009    GERD (gastroesophageal reflux disease)     H/O: rotator cuff tear 06/07/2006    Chronic    Hyperlipidemia     Pain in joint, upper arm 07/2009    Pain in shoulder, elbow and wrist.    Sickle cell anemia (HCC)     Sleep apnea     Does not use CPAP    Testicular failure     Vertigo 07/20/2009       Past Surgical History:   Procedure Laterality Date    HX CHOLECYSTECTOMY  03/27/2018    HX HEENT      eardrum repair    HX SHOULDER ARTHROSCOPY Right        Social History     Social History    Marital status: SINGLE     Spouse name: N/A    Number of children: N/A    Years of education: N/A     Social History Main Topics    Smoking status: Former Smoker Types: Cigarettes     Quit date: 7/12/2003    Smokeless tobacco: Never Used    Alcohol use No    Drug use: No    Sexual activity: Yes     Partners: Female     Other Topics Concern    None     Social History Narrative

## 2018-08-24 NOTE — PATIENT INSTRUCTIONS
Neck Arthritis: Exercises  Your Care Instructions  Here are some examples of typical rehabilitation exercises for your condition. Start each exercise slowly. Ease off the exercise if you start to have pain. Your doctor or physical therapist will tell you when you can start these exercises and which ones will work best for you. How to do the exercises  Neck stretches to the side    1. This stretch works best if you keep your shoulder down as you lean away from it. To help you remember to do this, start by relaxing your shoulders and lightly holding on to your thighs or your chair. 2. Tilt your head toward your shoulder and hold for 15 to 30 seconds. Let the weight of your head stretch your muscles. 3. Repeat 2 to 4 times toward each shoulder. Chin tuck    1. Lie on the floor with a rolled-up towel under your neck. Your head should be touching the floor. 2. Slowly bring your chin toward your chest.  3. Hold for a count of 6, and then relax for up to 10 seconds. 4. Repeat 8 to 12 times. Active cervical rotation    1. Sit in a firm chair, or stand up straight. 2. Keeping your chin level, turn your head to the right, and hold for 15 to 30 seconds. 3. Turn your head to the left and hold for 15 to 30 seconds. 4. Repeat 2 to 4 times to each side. Shoulder blade squeeze    1. While standing, squeeze your shoulder blades together. 2. Do not raise your shoulders up as you are squeezing. 3. Hold for 6 seconds. 4. Repeat 8 to 12 times. Shoulder rolls    1. Sit comfortably with your feet shoulder-width apart. You can also do this exercise standing up. 2. Roll your shoulders up, then back, and then down in a smooth, circular motion. 3. Repeat 2 to 4 times. Follow-up care is a key part of your treatment and safety. Be sure to make and go to all appointments, and call your doctor if you are having problems. It's also a good idea to know your test results and keep a list of the medicines you take.   Where can you learn more? Go to http://davy-earl.info/. Enter P300 in the search box to learn more about \"Neck Arthritis: Exercises. \"  Current as of: November 29, 2017  Content Version: 11.7  © 0187-5816 [employer]. Care instructions adapted under license by [employer] (which disclaims liability or warranty for this information). If you have questions about a medical condition or this instruction, always ask your healthcare professional. [address] any warranty or liability for your use of this information.

## 2018-09-06 ENCOUNTER — HOSPITAL ENCOUNTER (OUTPATIENT)
Dept: PREADMISSION TESTING | Age: 48
Discharge: HOME | End: 2018-09-06
Payer: MEDICAID

## 2018-09-06 DIAGNOSIS — M50.20 HNP (HERNIATED NUCLEUS PULPOSUS), CERVICAL: ICD-10-CM

## 2018-09-06 LAB
ALBUMIN SERPL-MCNC: 3.8 G/DL (ref 3.4–5)
ALBUMIN/GLOB SERPL: 0.9 {RATIO} (ref 0.8–1.7)
ALP SERPL-CCNC: 234 U/L (ref 45–117)
ALT SERPL-CCNC: 30 U/L (ref 16–61)
ANION GAP SERPL CALC-SCNC: 10 MMOL/L (ref 3–18)
AST SERPL-CCNC: 50 U/L (ref 15–37)
ATRIAL RATE: 76 BPM
BILIRUB SERPL-MCNC: 5.3 MG/DL (ref 0.2–1)
BUN SERPL-MCNC: 10 MG/DL (ref 7–18)
BUN/CREAT SERPL: 9 (ref 12–20)
CALCIUM SERPL-MCNC: 9.5 MG/DL (ref 8.5–10.1)
CALCULATED P AXIS, ECG09: 79 DEGREES
CALCULATED R AXIS, ECG10: 79 DEGREES
CALCULATED T AXIS, ECG11: 53 DEGREES
CHLORIDE SERPL-SCNC: 108 MMOL/L (ref 100–108)
CO2 SERPL-SCNC: 23 MMOL/L (ref 21–32)
CREAT SERPL-MCNC: 1.07 MG/DL (ref 0.6–1.3)
DIAGNOSIS, 93000: NORMAL
ERYTHROCYTE [DISTWIDTH] IN BLOOD BY AUTOMATED COUNT: 29.1 % (ref 11.6–14.5)
GLOBULIN SER CALC-MCNC: 4.3 G/DL (ref 2–4)
GLUCOSE SERPL-MCNC: 95 MG/DL (ref 74–99)
HCT VFR BLD AUTO: 20.4 % (ref 36–48)
HGB BLD-MCNC: 7 G/DL (ref 13–16)
MCH RBC QN AUTO: 24.9 PG (ref 24–34)
MCHC RBC AUTO-ENTMCNC: 34.3 G/DL (ref 31–37)
MCV RBC AUTO: 72.6 FL (ref 74–97)
P-R INTERVAL, ECG05: 172 MS
PLATELET # BLD AUTO: 643 K/UL (ref 135–420)
PMV BLD AUTO: 9.4 FL (ref 9.2–11.8)
POTASSIUM SERPL-SCNC: 5.4 MMOL/L (ref 3.5–5.5)
PROT SERPL-MCNC: 8.1 G/DL (ref 6.4–8.2)
Q-T INTERVAL, ECG07: 388 MS
QRS DURATION, ECG06: 94 MS
QTC CALCULATION (BEZET), ECG08: 436 MS
RBC # BLD AUTO: 2.81 M/UL (ref 4.7–5.5)
SODIUM SERPL-SCNC: 141 MMOL/L (ref 136–145)
VENTRICULAR RATE, ECG03: 76 BPM
WBC # BLD AUTO: 15.2 K/UL (ref 4.6–13.2)

## 2018-09-06 PROCEDURE — 36415 COLL VENOUS BLD VENIPUNCTURE: CPT | Performed by: ORTHOPAEDIC SURGERY

## 2018-09-06 PROCEDURE — 93005 ELECTROCARDIOGRAM TRACING: CPT

## 2018-09-06 PROCEDURE — 85027 COMPLETE CBC AUTOMATED: CPT | Performed by: ORTHOPAEDIC SURGERY

## 2018-09-06 PROCEDURE — 80053 COMPREHEN METABOLIC PANEL: CPT | Performed by: ORTHOPAEDIC SURGERY

## 2018-09-06 NOTE — PROGRESS NOTES
Mr Jermaine Elam and family attended the Cervical/Spine Surgery Mandatory Pre-Operative class on  9/6/2018. Topics discussed included surgery preparation, what to expect the day of surgery, medications, physical and occupational therapy, and discharge planning. It was discussed that this is considered an elective surgery and that prior to the surgery he needs to make decisions such as arranging for help at home once he is discharged. Mr Jermaine Elam was given a Cervical Spine mandatory patient education notebook to take home. Opportunity was given to ask questions and phone number of the Orthopaedic   was given for any questions or concerns that may arise later. He identified Henry Longoria and Edilia Triplett as their .

## 2018-09-10 NOTE — H&P
Pre-Admission History and Physical 
 
Patient: Dk Chatman   MRN: [id number]   SSN: [id number] YOB: 1970   Age: 50 y.o. Sex: male Patient scheduled for: ACDF C6-7. Date of surgery: 9/17/18. [facility] Surgeon: Iwona Messina MD 
 
HPI:  Dk Chatman is a 50 y.o. male with ongoing radicular pain in his left arm in C7 distribution. He has had a positive EMG and unresponsive to conservative treatments. He has had injections and medications. He rates his pain as an 8. MRI demonstrates a C6-7 lateral foraminal disc herniation with extruded fragment at that level. He has degenerative changes at other segments. The patient is quite miserable. He says sometimes he feels like he wants to cut his finger off. This patient has failed the presurgical conservative treatments  including physical therapy, spinal block injections and medications. Pain has impacted the patient's functional ability to complete his ADLs without assistance due to his constant pain. He is being admitted for surgical intervention. Past Medical History:  
Diagnosis Date  Anemia NEC  Bursitis of shoulder, left 08/03/2009 MRI revealed tendinitis and bursitis of the rotator cuff.  DJD (degenerative joint disease) 08/03/2009 Early djd, left radiocarpal joint with associated ganglion cyst.    
 Elevated white blood cell count 07/20/2009  GERD (gastroesophageal reflux disease)  H/O: rotator cuff tear 06/07/2006 Chronic  Hyperlipidemia  Pain in joint, upper arm 07/2009 Pain in shoulder, elbow and wrist.  
 Sickle cell anemia (HCC)  Sleep apnea Does not use CPAP  Testicular failure  Vertigo 07/20/2009 Social History Social History  Marital status:  Spouse name: N/A  
 Number of children: N/A  
 Years of education: N/A Social History Main Topics  Smoking status: Former Smoker Types: Cigarettes Quit date: 7/12/2003  Smokeless tobacco: Never Used  Alcohol use No  
 Drug use: No  
 Sexual activity: Yes  
  Partners: Female Other Topics Concern  Not on file Social History Narrative Past Surgical History:  
Procedure Laterality Date  HX ADENOIDECTOMY  HX ADENOIDECTOMY  HX CHOLECYSTECTOMY  03/27/2018  HX HEENT    
 eardrum repair  HX SHOULDER ARTHROSCOPY Right Family History Problem Relation Age of Onset  Diabetes Mother  Cancer Father Allergies Allergen Reactions  Other Food Other (comments) Walnuts. Gets headaches  Corn Other (comments) Told by  allergic  Shellfish Derived Swelling Swelling. States okay with betadine Current Outpatient Prescriptions Medication Sig Dispense Refill  naproxen (NAPROSYN) 500 mg tablet  SUMAtriptan (IMITREX) 50 mg tablet  pregabalin (LYRICA) 75 mg capsule Take 1 Cap by mouth two (2) times a day. Max Daily Amount: 150 mg. 14 Cap 0  
 valACYclovir (VALTREX) 1 gram tablet Take 1 Tab by mouth as needed. 0  
 multivitamin (ONE A DAY) tablet Take 1 Tab by mouth daily.  naproxen (NAPROSYN) 250 mg tablet Take  by mouth two (2) times daily (with meals).  loratadine (ALAVERT PO) Take  by mouth.  pregabalin (LYRICA) 150 mg capsule Take 1 Cap by mouth two (2) times a day. Max Daily Amount: 300 mg. 60 Cap 2  
 
 
ROS:  Denies chills, fever,night sweats,  bowel or bladder dysfunction, unexplained weight loss/weight gain, chest pain, sob or anxiety. Physical Examination Gen: Well developed, well nourished 50 y.o. male /72  Pulse 72  Temp 98.4 °F (36.9 °C)  Resp 16  
 Ht 5' 7\" (1.702 m)  Wt 140 lb 6.4 oz (63.7 kg)  SpO2 93%  BMI 21.99 kg/m2 PAIN SCALE: 7/10 
  
CONSTITUTIONAL: The patient is in no apparent distress and is alert and oriented x 3. HEENT: Normocephalic. Hearing grossly intact. NECK: Supple and symmetric. no tenderness, or masses were felt. RESPIRATORY: No labored breathing. CARDIOVASCULAR: The carotid pulses were normal. Peripheral pulses were 2+. CHEST: Normal AP diameter and normal contour without any kyphoscoliosis. LYMPHATIC: No lymphadenopathy was appreciated in the neck, axillae or groin. SKIN:  Negative for scars, rashes, lesions, or ulcers on the right upper, right lower, left upper, left lower and trunk. NEUROLOGICAL: Alert and oriented x 3. Ambulation without assistive device. FWB. EXTREMITIES:  See musculoskeletal. 
MUSCULOSKELETAL: 
· Head and Neck: Neck pain. Negative for misalignment, asymmetry, crepitation, defects, tenderness masses or effusions. · Left Upper Extremity: Numbness in digits 1-3. Inspection, percussion and palpation performed. Tomass sign is negative. · Right Upper Extremity: Inspection, percussion and palpation performed. Tomass sign is negative. · Spine, Ribs and Pelvis: Inspection, percussion and palpation performed. Negative for misalignment, asymmetry, crepitation, defects, tenderness masses or effusions. · Left Lower Extremity: Inspection, percussion and palpation performed. Negative straight leg raise. · Right Lower Extremity: Inspection, percussion and palpation performed. Negative straight leg raise. 
  
  
SPINE EXAM:  
  
Cervical spine: Neck is midline. Normal muscle tone. No focal atrophy is noted. 
  
Lumbar spine: No rash, ecchymosis Assessment and Plan Due to the pt's persistent symptoms unrelieved by conservative measure Torres Wood is being [facility] to undergo surgical intervention. The post-operative plan of care consists of physical therapy, home health and a 2 week f/u office visit. We are pending medical clearance by Dr Charlie Estrella.   The risks, benefits, complications and alternatives to surgery have been discussed in detail with the patient. The patient understands and agrees to proceed.   
 
AJ GodinezC dictating for Carlo Allen MD

## 2018-09-14 ENCOUNTER — ANESTHESIA EVENT (OUTPATIENT)
Dept: SURGERY | Age: 48
End: 2018-09-14
Payer: MEDICAID

## 2018-09-17 ENCOUNTER — ANESTHESIA (OUTPATIENT)
Dept: SURGERY | Age: 48
End: 2018-09-17
Payer: MEDICAID

## 2018-09-17 ENCOUNTER — APPOINTMENT (OUTPATIENT)
Dept: GENERAL RADIOLOGY | Age: 48
End: 2018-09-17
Attending: ORTHOPAEDIC SURGERY
Payer: MEDICAID

## 2018-09-17 ENCOUNTER — HOSPITAL ENCOUNTER (OUTPATIENT)
Age: 48
Setting detail: OBSERVATION
LOS: 1 days | Discharge: HOME | End: 2018-09-18
Attending: ORTHOPAEDIC SURGERY | Admitting: ORTHOPAEDIC SURGERY
Payer: MEDICAID

## 2018-09-17 DIAGNOSIS — M50.20 HNP (HERNIATED NUCLEUS PULPOSUS), CERVICAL: Primary | ICD-10-CM

## 2018-09-17 PROCEDURE — 76210000016 HC OR PH I REC 1 TO 1.5 HR: Performed by: ORTHOPAEDIC SURGERY

## 2018-09-17 PROCEDURE — 74011250636 HC RX REV CODE- 250/636: Performed by: NURSE ANESTHETIST, CERTIFIED REGISTERED

## 2018-09-17 PROCEDURE — 77030012406 HC DRN WND PENRS BARD -A: Performed by: ORTHOPAEDIC SURGERY

## 2018-09-17 PROCEDURE — 77030019605: Performed by: ORTHOPAEDIC SURGERY

## 2018-09-17 PROCEDURE — 76060000034 HC ANESTHESIA 1.5 TO 2 HR: Performed by: ORTHOPAEDIC SURGERY

## 2018-09-17 PROCEDURE — C1763 CONN TISS, NON-HUMAN: HCPCS | Performed by: ORTHOPAEDIC SURGERY

## 2018-09-17 PROCEDURE — 77030008683 HC TU ET CUF COVD -A: Performed by: ANESTHESIOLOGY

## 2018-09-17 PROCEDURE — 74011250636 HC RX REV CODE- 250/636

## 2018-09-17 PROCEDURE — 74011250637 HC RX REV CODE- 250/637: Performed by: ORTHOPAEDIC SURGERY

## 2018-09-17 PROCEDURE — 99218 HC RM OBSERVATION: CPT

## 2018-09-17 PROCEDURE — 77030013567 HC DRN WND RESERV BARD -A: Performed by: ORTHOPAEDIC SURGERY

## 2018-09-17 PROCEDURE — 74011250636 HC RX REV CODE- 250/636: Performed by: ORTHOPAEDIC SURGERY

## 2018-09-17 PROCEDURE — 77030011267 HC ELECTRD BLD COVD -A: Performed by: ORTHOPAEDIC SURGERY

## 2018-09-17 PROCEDURE — 97530 THERAPEUTIC ACTIVITIES: CPT

## 2018-09-17 PROCEDURE — 77030039266 HC ADH SKN EXOFIN S2SG -A: Performed by: ORTHOPAEDIC SURGERY

## 2018-09-17 PROCEDURE — 77030020782 HC GWN BAIR PAWS FLX 3M -B: Performed by: ORTHOPAEDIC SURGERY

## 2018-09-17 PROCEDURE — 77030013079 HC BLNKT BAIR HGGR 3M -A: Performed by: ANESTHESIOLOGY

## 2018-09-17 PROCEDURE — 74011000250 HC RX REV CODE- 250: Performed by: ORTHOPAEDIC SURGERY

## 2018-09-17 PROCEDURE — 77030029099 HC BN WAX SSPC -A: Performed by: ORTHOPAEDIC SURGERY

## 2018-09-17 PROCEDURE — C1713 ANCHOR/SCREW BN/BN,TIS/BN: HCPCS | Performed by: ORTHOPAEDIC SURGERY

## 2018-09-17 PROCEDURE — 77030002933 HC SUT MCRYL J&J -A: Performed by: ORTHOPAEDIC SURGERY

## 2018-09-17 PROCEDURE — 74011000250 HC RX REV CODE- 250

## 2018-09-17 PROCEDURE — 97161 PT EVAL LOW COMPLEX 20 MIN: CPT

## 2018-09-17 PROCEDURE — 77030004402 HC BUR NEUR STRY -C: Performed by: ORTHOPAEDIC SURGERY

## 2018-09-17 PROCEDURE — 74011000272 HC RX REV CODE- 272: Performed by: ORTHOPAEDIC SURGERY

## 2018-09-17 PROCEDURE — 74011250636 HC RX REV CODE- 250/636: Performed by: NURSE PRACTITIONER

## 2018-09-17 PROCEDURE — 77030031139 HC SUT VCRL2 J&J -A: Performed by: ORTHOPAEDIC SURGERY

## 2018-09-17 PROCEDURE — 77030012893: Performed by: ORTHOPAEDIC SURGERY

## 2018-09-17 PROCEDURE — L0120 CERV FLEX N/ADJ FOAM PRE OTS: HCPCS | Performed by: ORTHOPAEDIC SURGERY

## 2018-09-17 PROCEDURE — 76010000153 HC OR TIME 1.5 TO 2 HR: Performed by: ORTHOPAEDIC SURGERY

## 2018-09-17 PROCEDURE — 74011250637 HC RX REV CODE- 250/637: Performed by: NURSE ANESTHETIST, CERTIFIED REGISTERED

## 2018-09-17 PROCEDURE — 77030032490 HC SLV COMPR SCD KNE COVD -B: Performed by: ORTHOPAEDIC SURGERY

## 2018-09-17 PROCEDURE — 77030019908 HC STETH ESOPH SIMS -A: Performed by: ANESTHESIOLOGY

## 2018-09-17 PROCEDURE — 77010033678 HC OXYGEN DAILY

## 2018-09-17 PROCEDURE — 77030018836 HC SOL IRR NACL ICUM -A: Performed by: ORTHOPAEDIC SURGERY

## 2018-09-17 PROCEDURE — 77030031879 HC SPCR SPN LORDTC CRNT NUVA -H1: Performed by: ORTHOPAEDIC SURGERY

## 2018-09-17 PROCEDURE — 77030010514 HC APPL CLP LIG COVD -B: Performed by: ORTHOPAEDIC SURGERY

## 2018-09-17 DEVICE — SCREW SPNL L12MM DIA4MM ANTR CERV ST FOR SM INTLOK SYS: Type: IMPLANTABLE DEVICE | Site: SPINE CERVICAL | Status: FUNCTIONAL

## 2018-09-17 DEVICE — CAGE SPNL SM W14XH7XL17MM 7DEG ANTR CERV INTBDY FUS LORDTC: Type: IMPLANTABLE DEVICE | Site: SPINE CERVICAL | Status: FUNCTIONAL

## 2018-09-17 DEVICE — GRAFT BONE SUB 1ML PUTTY CA PHOS SYNTH GRAN BIOABSRB ATTRAX: Type: IMPLANTABLE DEVICE | Site: SPINE CERVICAL | Status: FUNCTIONAL

## 2018-09-17 RX ORDER — CEFAZOLIN SODIUM 2 G/50ML
2 SOLUTION INTRAVENOUS EVERY 8 HOURS
Status: DISCONTINUED | OUTPATIENT
Start: 2018-09-17 | End: 2018-09-18 | Stop reason: HOSPADM

## 2018-09-17 RX ORDER — SODIUM CHLORIDE 0.9 % (FLUSH) 0.9 %
5-10 SYRINGE (ML) INJECTION EVERY 8 HOURS
Status: DISCONTINUED | OUTPATIENT
Start: 2018-09-17 | End: 2018-09-17 | Stop reason: HOSPADM

## 2018-09-17 RX ORDER — GLYCOPYRROLATE 0.2 MG/ML
INJECTION INTRAMUSCULAR; INTRAVENOUS AS NEEDED
Status: DISCONTINUED | OUTPATIENT
Start: 2018-09-17 | End: 2018-09-17 | Stop reason: HOSPADM

## 2018-09-17 RX ORDER — MORPHINE SULFATE 4 MG/ML
1 INJECTION INTRAVENOUS
Status: DISCONTINUED | OUTPATIENT
Start: 2018-09-17 | End: 2018-09-18 | Stop reason: HOSPADM

## 2018-09-17 RX ORDER — LIDOCAINE HYDROCHLORIDE 20 MG/ML
INJECTION, SOLUTION EPIDURAL; INFILTRATION; INTRACAUDAL; PERINEURAL AS NEEDED
Status: DISCONTINUED | OUTPATIENT
Start: 2018-09-17 | End: 2018-09-17 | Stop reason: HOSPADM

## 2018-09-17 RX ORDER — ONDANSETRON 2 MG/ML
INJECTION INTRAMUSCULAR; INTRAVENOUS AS NEEDED
Status: DISCONTINUED | OUTPATIENT
Start: 2018-09-17 | End: 2018-09-17 | Stop reason: HOSPADM

## 2018-09-17 RX ORDER — DEXTROSE, SODIUM CHLORIDE, AND POTASSIUM CHLORIDE 5; .45; .15 G/100ML; G/100ML; G/100ML
25 INJECTION INTRAVENOUS CONTINUOUS
Status: DISCONTINUED | OUTPATIENT
Start: 2018-09-17 | End: 2018-09-18

## 2018-09-17 RX ORDER — PHENYLEPHRINE HCL IN 0.9% NACL 1 MG/10 ML
SYRINGE (ML) INTRAVENOUS AS NEEDED
Status: DISCONTINUED | OUTPATIENT
Start: 2018-09-17 | End: 2018-09-17 | Stop reason: HOSPADM

## 2018-09-17 RX ORDER — ROCURONIUM BROMIDE 10 MG/ML
INJECTION, SOLUTION INTRAVENOUS AS NEEDED
Status: DISCONTINUED | OUTPATIENT
Start: 2018-09-17 | End: 2018-09-17 | Stop reason: HOSPADM

## 2018-09-17 RX ORDER — PROPOFOL 10 MG/ML
INJECTION, EMULSION INTRAVENOUS AS NEEDED
Status: DISCONTINUED | OUTPATIENT
Start: 2018-09-17 | End: 2018-09-17 | Stop reason: HOSPADM

## 2018-09-17 RX ORDER — SODIUM CHLORIDE 0.9 % (FLUSH) 0.9 %
5-10 SYRINGE (ML) INJECTION AS NEEDED
Status: DISCONTINUED | OUTPATIENT
Start: 2018-09-17 | End: 2018-09-17 | Stop reason: HOSPADM

## 2018-09-17 RX ORDER — LORAZEPAM 2 MG/ML
1 INJECTION INTRAMUSCULAR
Status: DISCONTINUED | OUTPATIENT
Start: 2018-09-17 | End: 2018-09-18 | Stop reason: HOSPADM

## 2018-09-17 RX ORDER — MORPHINE SULFATE 4 MG/ML
4 INJECTION, SOLUTION INTRAMUSCULAR; INTRAVENOUS
Status: DISCONTINUED | OUTPATIENT
Start: 2018-09-17 | End: 2018-09-17 | Stop reason: HOSPADM

## 2018-09-17 RX ORDER — DIPHENHYDRAMINE HYDROCHLORIDE 50 MG/ML
12.5 INJECTION, SOLUTION INTRAMUSCULAR; INTRAVENOUS
Status: DISCONTINUED | OUTPATIENT
Start: 2018-09-17 | End: 2018-09-18 | Stop reason: HOSPADM

## 2018-09-17 RX ORDER — CEFAZOLIN SODIUM 2 G/50ML
2 SOLUTION INTRAVENOUS ONCE
Status: COMPLETED | OUTPATIENT
Start: 2018-09-17 | End: 2018-09-17

## 2018-09-17 RX ORDER — SODIUM CHLORIDE 9 MG/ML
INJECTION, SOLUTION INTRAVENOUS
Status: DISCONTINUED | OUTPATIENT
Start: 2018-09-17 | End: 2018-09-17 | Stop reason: HOSPADM

## 2018-09-17 RX ORDER — MIDAZOLAM HYDROCHLORIDE 1 MG/ML
INJECTION, SOLUTION INTRAMUSCULAR; INTRAVENOUS AS NEEDED
Status: DISCONTINUED | OUTPATIENT
Start: 2018-09-17 | End: 2018-09-17 | Stop reason: HOSPADM

## 2018-09-17 RX ORDER — NALOXONE HYDROCHLORIDE 0.4 MG/ML
0.04 INJECTION, SOLUTION INTRAMUSCULAR; INTRAVENOUS; SUBCUTANEOUS AS NEEDED
Status: DISCONTINUED | OUTPATIENT
Start: 2018-09-17 | End: 2018-09-17 | Stop reason: HOSPADM

## 2018-09-17 RX ORDER — VANCOMYCIN HYDROCHLORIDE 1 G/20ML
INJECTION, POWDER, LYOPHILIZED, FOR SOLUTION INTRAVENOUS AS NEEDED
Status: DISCONTINUED | OUTPATIENT
Start: 2018-09-17 | End: 2018-09-17 | Stop reason: HOSPADM

## 2018-09-17 RX ORDER — FENTANYL CITRATE 50 UG/ML
INJECTION, SOLUTION INTRAMUSCULAR; INTRAVENOUS AS NEEDED
Status: DISCONTINUED | OUTPATIENT
Start: 2018-09-17 | End: 2018-09-17 | Stop reason: HOSPADM

## 2018-09-17 RX ORDER — DIPHENHYDRAMINE HCL 25 MG
25 CAPSULE ORAL
Status: DISCONTINUED | OUTPATIENT
Start: 2018-09-17 | End: 2018-09-18 | Stop reason: HOSPADM

## 2018-09-17 RX ORDER — SODIUM CHLORIDE, SODIUM LACTATE, POTASSIUM CHLORIDE, CALCIUM CHLORIDE 600; 310; 30; 20 MG/100ML; MG/100ML; MG/100ML; MG/100ML
75 INJECTION, SOLUTION INTRAVENOUS CONTINUOUS
Status: DISCONTINUED | OUTPATIENT
Start: 2018-09-17 | End: 2018-09-17 | Stop reason: HOSPADM

## 2018-09-17 RX ORDER — MORPHINE SULFATE 10 MG/ML
INJECTION, SOLUTION INTRAMUSCULAR; INTRAVENOUS AS NEEDED
Status: DISCONTINUED | OUTPATIENT
Start: 2018-09-17 | End: 2018-09-17 | Stop reason: HOSPADM

## 2018-09-17 RX ORDER — ACETAMINOPHEN 500 MG
1000 TABLET ORAL EVERY 6 HOURS
Status: DISCONTINUED | OUTPATIENT
Start: 2018-09-17 | End: 2018-09-18 | Stop reason: HOSPADM

## 2018-09-17 RX ORDER — NEOSTIGMINE METHYLSULFATE 1 MG/ML
INJECTION INTRAVENOUS AS NEEDED
Status: DISCONTINUED | OUTPATIENT
Start: 2018-09-17 | End: 2018-09-17 | Stop reason: HOSPADM

## 2018-09-17 RX ORDER — FAMOTIDINE 20 MG/1
20 TABLET, FILM COATED ORAL ONCE
Status: COMPLETED | OUTPATIENT
Start: 2018-09-17 | End: 2018-09-17

## 2018-09-17 RX ORDER — NALOXONE HYDROCHLORIDE 0.4 MG/ML
0.4 INJECTION, SOLUTION INTRAMUSCULAR; INTRAVENOUS; SUBCUTANEOUS AS NEEDED
Status: DISCONTINUED | OUTPATIENT
Start: 2018-09-17 | End: 2018-09-18 | Stop reason: HOSPADM

## 2018-09-17 RX ORDER — ONDANSETRON 2 MG/ML
4 INJECTION INTRAMUSCULAR; INTRAVENOUS ONCE
Status: DISCONTINUED | OUTPATIENT
Start: 2018-09-17 | End: 2018-09-17 | Stop reason: HOSPADM

## 2018-09-17 RX ORDER — DEXAMETHASONE SODIUM PHOSPHATE 4 MG/ML
INJECTION, SOLUTION INTRA-ARTICULAR; INTRALESIONAL; INTRAMUSCULAR; INTRAVENOUS; SOFT TISSUE AS NEEDED
Status: DISCONTINUED | OUTPATIENT
Start: 2018-09-17 | End: 2018-09-17 | Stop reason: HOSPADM

## 2018-09-17 RX ORDER — METOPROLOL TARTRATE 5 MG/5ML
INJECTION INTRAVENOUS AS NEEDED
Status: DISCONTINUED | OUTPATIENT
Start: 2018-09-17 | End: 2018-09-17 | Stop reason: HOSPADM

## 2018-09-17 RX ORDER — OXYCODONE HYDROCHLORIDE 5 MG/1
5-10 TABLET ORAL
Status: DISCONTINUED | OUTPATIENT
Start: 2018-09-17 | End: 2018-09-18 | Stop reason: HOSPADM

## 2018-09-17 RX ORDER — DIPHENHYDRAMINE HYDROCHLORIDE 50 MG/ML
12.5 INJECTION, SOLUTION INTRAMUSCULAR; INTRAVENOUS
Status: DISCONTINUED | OUTPATIENT
Start: 2018-09-17 | End: 2018-09-17 | Stop reason: HOSPADM

## 2018-09-17 RX ORDER — ALBUTEROL SULFATE 0.83 MG/ML
2.5 SOLUTION RESPIRATORY (INHALATION) AS NEEDED
Status: DISCONTINUED | OUTPATIENT
Start: 2018-09-17 | End: 2018-09-17 | Stop reason: HOSPADM

## 2018-09-17 RX ORDER — PREGABALIN 75 MG/1
150 CAPSULE ORAL 2 TIMES DAILY
Status: DISCONTINUED | OUTPATIENT
Start: 2018-09-17 | End: 2018-09-18 | Stop reason: HOSPADM

## 2018-09-17 RX ORDER — SUCCINYLCHOLINE CHLORIDE 20 MG/ML
INJECTION INTRAMUSCULAR; INTRAVENOUS AS NEEDED
Status: DISCONTINUED | OUTPATIENT
Start: 2018-09-17 | End: 2018-09-17 | Stop reason: HOSPADM

## 2018-09-17 RX ORDER — DIAZEPAM 5 MG/1
5 TABLET ORAL
Status: DISCONTINUED | OUTPATIENT
Start: 2018-09-17 | End: 2018-09-18 | Stop reason: HOSPADM

## 2018-09-17 RX ORDER — BUPIVACAINE HYDROCHLORIDE 5 MG/ML
INJECTION, SOLUTION EPIDURAL; INTRACAUDAL AS NEEDED
Status: DISCONTINUED | OUTPATIENT
Start: 2018-09-17 | End: 2018-09-17 | Stop reason: HOSPADM

## 2018-09-17 RX ORDER — SODIUM CHLORIDE 0.9 % (FLUSH) 0.9 %
5-10 SYRINGE (ML) INJECTION AS NEEDED
Status: DISCONTINUED | OUTPATIENT
Start: 2018-09-17 | End: 2018-09-18 | Stop reason: HOSPADM

## 2018-09-17 RX ORDER — METHYLENE BLUE 10 MG/ML
INJECTION INTRAVENOUS AS NEEDED
Status: DISCONTINUED | OUTPATIENT
Start: 2018-09-17 | End: 2018-09-17 | Stop reason: HOSPADM

## 2018-09-17 RX ORDER — SODIUM CHLORIDE, SODIUM LACTATE, POTASSIUM CHLORIDE, CALCIUM CHLORIDE 600; 310; 30; 20 MG/100ML; MG/100ML; MG/100ML; MG/100ML
50 INJECTION, SOLUTION INTRAVENOUS CONTINUOUS
Status: DISCONTINUED | OUTPATIENT
Start: 2018-09-17 | End: 2018-09-17 | Stop reason: HOSPADM

## 2018-09-17 RX ORDER — SODIUM CHLORIDE 0.9 % (FLUSH) 0.9 %
5-10 SYRINGE (ML) INJECTION EVERY 8 HOURS
Status: DISCONTINUED | OUTPATIENT
Start: 2018-09-17 | End: 2018-09-18 | Stop reason: HOSPADM

## 2018-09-17 RX ORDER — DOCUSATE SODIUM 100 MG/1
100 CAPSULE, LIQUID FILLED ORAL DAILY
Status: DISCONTINUED | OUTPATIENT
Start: 2018-09-18 | End: 2018-09-18 | Stop reason: HOSPADM

## 2018-09-17 RX ORDER — ONDANSETRON 2 MG/ML
4 INJECTION INTRAMUSCULAR; INTRAVENOUS
Status: DISCONTINUED | OUTPATIENT
Start: 2018-09-17 | End: 2018-09-18 | Stop reason: HOSPADM

## 2018-09-17 RX ADMIN — ROCURONIUM BROMIDE 5 MG: 10 INJECTION, SOLUTION INTRAVENOUS at 11:56

## 2018-09-17 RX ADMIN — SODIUM CHLORIDE, SODIUM LACTATE, POTASSIUM CHLORIDE, AND CALCIUM CHLORIDE: 600; 310; 30; 20 INJECTION, SOLUTION INTRAVENOUS at 11:54

## 2018-09-17 RX ADMIN — ONDANSETRON 4 MG: 2 INJECTION INTRAMUSCULAR; INTRAVENOUS at 17:00

## 2018-09-17 RX ADMIN — FENTANYL CITRATE 50 MCG: 50 INJECTION, SOLUTION INTRAMUSCULAR; INTRAVENOUS at 12:58

## 2018-09-17 RX ADMIN — NEOSTIGMINE METHYLSULFATE 4 MG: 1 INJECTION INTRAVENOUS at 13:20

## 2018-09-17 RX ADMIN — FENTANYL CITRATE 50 MCG: 50 INJECTION, SOLUTION INTRAMUSCULAR; INTRAVENOUS at 12:32

## 2018-09-17 RX ADMIN — LIDOCAINE HYDROCHLORIDE 80 MG: 20 INJECTION, SOLUTION EPIDURAL; INFILTRATION; INTRACAUDAL; PERINEURAL at 11:56

## 2018-09-17 RX ADMIN — ROCURONIUM BROMIDE 35 MG: 10 INJECTION, SOLUTION INTRAVENOUS at 12:05

## 2018-09-17 RX ADMIN — FENTANYL CITRATE 150 MCG: 50 INJECTION, SOLUTION INTRAMUSCULAR; INTRAVENOUS at 11:56

## 2018-09-17 RX ADMIN — MORPHINE SULFATE 1 MG: 4 INJECTION INTRAVENOUS at 16:53

## 2018-09-17 RX ADMIN — CEFAZOLIN SODIUM 2 G: 2 SOLUTION INTRAVENOUS at 11:54

## 2018-09-17 RX ADMIN — ONDANSETRON 4 MG: 2 INJECTION INTRAMUSCULAR; INTRAVENOUS at 21:00

## 2018-09-17 RX ADMIN — FAMOTIDINE 20 MG: 20 TABLET ORAL at 11:07

## 2018-09-17 RX ADMIN — SODIUM CHLORIDE, SODIUM LACTATE, POTASSIUM CHLORIDE, AND CALCIUM CHLORIDE 75 ML/HR: 600; 310; 30; 20 INJECTION, SOLUTION INTRAVENOUS at 11:07

## 2018-09-17 RX ADMIN — ONDANSETRON 4 MG: 2 INJECTION INTRAMUSCULAR; INTRAVENOUS at 11:54

## 2018-09-17 RX ADMIN — MIDAZOLAM HYDROCHLORIDE 2 MG: 1 INJECTION, SOLUTION INTRAMUSCULAR; INTRAVENOUS at 11:54

## 2018-09-17 RX ADMIN — DEXAMETHASONE SODIUM PHOSPHATE 10 MG: 4 INJECTION, SOLUTION INTRA-ARTICULAR; INTRALESIONAL; INTRAMUSCULAR; INTRAVENOUS; SOFT TISSUE at 11:56

## 2018-09-17 RX ADMIN — Medication 100 MCG: at 12:45

## 2018-09-17 RX ADMIN — MORPHINE SULFATE 5 MG: 10 INJECTION, SOLUTION INTRAMUSCULAR; INTRAVENOUS at 13:41

## 2018-09-17 RX ADMIN — SUCCINYLCHOLINE CHLORIDE 140 MG: 20 INJECTION INTRAMUSCULAR; INTRAVENOUS at 11:56

## 2018-09-17 RX ADMIN — MORPHINE SULFATE 1 MG: 4 INJECTION INTRAVENOUS at 20:54

## 2018-09-17 RX ADMIN — Medication 10 ML: at 11:08

## 2018-09-17 RX ADMIN — PROPOFOL 160 MG: 10 INJECTION, EMULSION INTRAVENOUS at 11:56

## 2018-09-17 RX ADMIN — DEXTROSE MONOHYDRATE, SODIUM CHLORIDE, AND POTASSIUM CHLORIDE 25 ML/HR: 50; 4.5; 1.49 INJECTION, SOLUTION INTRAVENOUS at 18:21

## 2018-09-17 RX ADMIN — ACETAMINOPHEN 1000 MG: 500 TABLET, FILM COATED ORAL at 16:54

## 2018-09-17 RX ADMIN — Medication 10 ML: at 18:22

## 2018-09-17 RX ADMIN — METOPROLOL TARTRATE 2 MG: 5 INJECTION INTRAVENOUS at 13:25

## 2018-09-17 RX ADMIN — Medication 100 MCG: at 12:13

## 2018-09-17 RX ADMIN — FENTANYL CITRATE 50 MCG: 50 INJECTION, SOLUTION INTRAMUSCULAR; INTRAVENOUS at 13:18

## 2018-09-17 RX ADMIN — CEFAZOLIN SODIUM 2 G: 2 SOLUTION INTRAVENOUS at 20:55

## 2018-09-17 RX ADMIN — SODIUM CHLORIDE, SODIUM LACTATE, POTASSIUM CHLORIDE, AND CALCIUM CHLORIDE: 600; 310; 30; 20 INJECTION, SOLUTION INTRAVENOUS at 13:07

## 2018-09-17 RX ADMIN — GLYCOPYRROLATE 0.6 MG: 0.2 INJECTION INTRAMUSCULAR; INTRAVENOUS at 13:20

## 2018-09-17 RX ADMIN — FENTANYL CITRATE 50 MCG: 50 INJECTION, SOLUTION INTRAMUSCULAR; INTRAVENOUS at 13:27

## 2018-09-17 RX ADMIN — MORPHINE SULFATE 4 MG: 4 INJECTION, SOLUTION INTRAMUSCULAR; INTRAVENOUS at 14:21

## 2018-09-17 RX ADMIN — DIAZEPAM 5 MG: 5 TABLET ORAL at 18:19

## 2018-09-17 RX ADMIN — SODIUM CHLORIDE: 9 INJECTION, SOLUTION INTRAVENOUS at 11:55

## 2018-09-17 RX ADMIN — Medication 100 MCG: at 12:23

## 2018-09-17 RX ADMIN — GLYCOPYRROLATE 0.2 MG: 0.2 INJECTION INTRAMUSCULAR; INTRAVENOUS at 11:54

## 2018-09-17 RX ADMIN — MORPHINE SULFATE 5 MG: 10 INJECTION, SOLUTION INTRAMUSCULAR; INTRAVENOUS at 13:33

## 2018-09-17 NOTE — PROGRESS NOTES
OR today  Anterior cervical diskectomy and fusion C6-7, NuVasive PEEK cage with demineralized bone matrix anterior screw fixation C6-7 with screws through the cervical cage. VSS Swallowing well States has pain at incision site States left sided chest/arm pain resolved Colace for bowel prophylaxis as requested by pharmacist 
Neuro intact KRIS with no drainage in bulb from OR Has not voided yet Dmitri Leung, NP

## 2018-09-17 NOTE — IP AVS SNAPSHOT
[address] [address] [facility] [address] [facility] Patient: Iesha Gilbert MRN: [id number] EEE:[id number] About your hospitalization You were admitted on:  September 17, 2018 You last received care in the:  [facility] [address] [facility] [address] You were discharged on:  September 18, 2018 Why you were hospitalized Your primary diagnosis was:  Not on File Your diagnoses also included:  Hnp (Herniated Nucleus Pulposus), Cervical  
  
Follow-up Information Follow up With Details Comments Contact Info Diana Lugo MD On 9/26/2018 Appointment at 8:20am [address] [facility] [address] [facility] [id number] 
[phone] Alice Caldwell MD On 10/1/2018 Appointment at 10:50am with Katelyn Delacruz NP [address] [facility] [address] 
[phone] Your Scheduled Appointments Monday October 01, 2018 10:50 AM EDT  
POST OP with Etienne Flood NP  
[facility] ([facility]) [address] [facility] [address]  
[phone] Tuesday October 30, 2018 11:15 AM EDT  
POST OP with Alice Caldwell MD  
[address] [facility], [address] [facility]) [address] [facility] [address]  
[phone] Discharge Orders None A check rogelio indicates which time of day the medication should be taken. My Medications START taking these medications Instructions Each Dose to Equal  
 Morning Noon Evening Bedtime  
 oxyCODONE-acetaminophen  mg per tablet Commonly known as:  PERCOCET Your last dose was: Your next dose is: Take 1 Tab by mouth every six (6) hours as needed for Pain. Max Daily Amount: 4 Tabs. 1 Tab CHANGE how you take these medications Instructions Each Dose to Equal  
 Morning Noon Evening Bedtime naproxen 250 mg tablet Commonly known as:  NAPROSYN What changed:  Another medication with the same name was removed. Continue taking this medication, and follow the directions you see here. Your last dose was: Your next dose is: Take  by mouth two (2) times daily (with meals). CONTINUE taking these medications Instructions Each Dose to Equal  
 Morning Noon Evening Bedtime ALAVERT PO Your last dose was: Your next dose is: Take  by mouth.  
     
   
   
   
  
 multivitamin tablet Commonly known as:  ONE A DAY Your last dose was: Your next dose is: Take 1 Tab by mouth daily. 1 Tab * pregabalin 75 mg capsule Commonly known as:  Trini Griffin Your last dose was: Your next dose is: Take 1 Cap by mouth two (2) times a day. Max Daily Amount: 150 mg.  
 75 mg  
    
   
   
   
  
 * pregabalin 150 mg capsule Commonly known as:  Trini Griffin Your last dose was: Your next dose is: Take 1 Cap by mouth two (2) times a day. Max Daily Amount: 300 mg.  
 150 mg  
    
   
   
   
  
 SUMAtriptan 50 mg tablet Commonly known as:  IMITREX Your last dose was: Your next dose is:    
   
   
      
   
   
   
  
 valACYclovir 1 gram tablet Commonly known as:  VALTREX Your last dose was: Your next dose is: Take 1 Tab by mouth as needed. 1 Tab * Notice: This list has 2 medication(s) that are the same as other medications prescribed for you. Read the directions carefully, and ask your doctor or other care provider to review them with you. Where to Get Your Medications Information on where to get these meds will be given to you by the nurse or doctor. ! Ask your nurse or doctor about these medications  
  oxyCODONE-acetaminophen  mg per tablet Opioid Education Prescription Opioids: What You Need to Know: 
 
Prescription opioids can be used to help relieve moderate-to-severe pain and are often prescribed following a surgery or injury, or for certain health conditions. These medications can be an important part of treatment but also come with serious risks. Opioids are strong pain medicines. Examples include hydrocodone, oxycodone, fentanyl, and morphine. Heroin is an example of an illegal opioid. It is important to work with your health care provider to make sure you are getting the safest, most effective care. WHAT ARE THE RISKS AND SIDE EFFECTS OF OPIOID USE? Prescription opioids carry serious risks of addiction and overdose, especially with prolonged use. An opioid overdose, often marked by slow breathing, can cause sudden death. The use of prescription opioids can have a number of side effects as well, even when taken as directed. · Tolerance-meaning you might need to take more of a medication for the same pain relief · Physical dependence-meaning you have symptoms of withdrawal when the medication is stopped. Withdrawal symptoms can include nausea, sweating, chills, diarrhea, stomach cramps, and muscle aches. Withdrawal can last up to several weeks, depending on which drug you took and how long you took it. · Increased sensitivity to pain · Constipation · Nausea, vomiting, and dry mouth · Sleepiness and dizziness · Confusion · Depression · Low levels of testosterone that can result in lower sex drive, energy, and strength · Itching and sweating RISKS ARE GREATER WITH:      
· History of drug misuse, substance use disorder, or overdose · Mental health conditions (such as depression or anxiety) · Sleep apnea · Older age (72 years or older) · Pregnancy Avoid alcohol while taking prescription opioids. Also, unless specifically advised by your health care provider, medications to avoid include: · Benzodiazepines (such as Xanax or Valium) · Muscle relaxants (such as Soma or Flexeril) · Hypnotics (such as Ambien or Lunesta) · Other prescription opioids KNOW YOUR OPTIONS Talk to your health care provider about ways to manage your pain that don't involve prescription opioids. Some of these options may actually work better and have fewer risks and side effects. Options may include: 
· Pain relievers such as acetaminophen, ibuprofen, and naproxen · Some medications that are also used for depression or seizures · Physical therapy and exercise · Counseling to help patients learn how to cope better with triggers of pain and stress. · Application of heat or cold compress · Massage therapy · Relaxation techniques Be Informed Make sure you know the name of your medication, how much and how often to take it, and its potential risks & side effects. IF YOU ARE PRESCRIBED OPIOIDS FOR PAIN: 
· Never take opioids in greater amounts or more often than prescribed. Remember the goal is not to be pain-free but to manage your pain at a tolerable level. · Follow up with your primary care provider to: · Work together to create a plan on how to manage your pain. · Talk about ways to help manage your pain that don't involve prescription opioids. · Talk about any and all concerns and side effects. · Help prevent misuse and abuse. · Never sell or share prescription opioids · Help prevent misuse and abuse. · Store prescription opioids in a secure place and out of reach of others (this may include visitors, children, friends, and family). · Safely dispose of unused/unwanted prescription opioids: Find your community drug take-back program or your pharmacy mail-back program, or flush them down the toilet, following guidance from the Food and Drug Administration (www.fda.gov/Drugs/ResourcesForYou). · Visit www.cdc.gov/drugoverdose to learn about the risks of opioid abuse and overdose. · If you believe you may be struggling with addiction, tell your health care provider and ask for guidance or call Harrison Leon at 2-096-288-SPGQ. Discharge Instructions None [employer] Announcement We are excited to announce that we are making your provider's discharge notes available to you in [employer]. You will see these notes when they are completed and signed by the physician that discharged you from your recent hospital stay. If you have any questions or concerns about any information you see in [employer], please call the Health Information Department where you were seen or reach out to your Primary Care Provider for more information about your plan of care. Introducing [facility]! [employer] introduces [employer] patient portal. Now you can access parts of your medical record, email your doctor's office, and request medication refills online. 1. In your internet browser, go to [email or web address] 2. Click on the First Time User? Click Here link in the Sign In box. You will see the New Member Sign Up page. 3. Enter your [employer] Access Code exactly as it appears below. You will not need to use this code after youve completed the sign-up process. If you do not sign up before the expiration date, you must request a new code. · [employer] Access Code: [email or web address] Expires: 10/14/2018  3:13 PM 
 
4. Enter the last four digits of your Social Security Number (xxxx) and Date of Birth (mm/dd/yyyy) as indicated and click Submit. You will be taken to the next sign-up page. 5. Create a [employer] ID. This will be your [employer] login ID and cannot be changed, so think of one that is secure and easy to remember. 6. Create a [employer] password. You can change your password at any time. 7. Enter your Password Reset Question and Answer.  This can be used at a later time if you forget your password. 8. Enter your e-mail address. You will receive e-mail notification when new information is available in [address] 9. Click Sign Up. You can now view and download portions of your medical record. 10. Click the Download Summary menu link to download a portable copy of your medical information. If you have questions, please visit the Frequently Asked Questions section of the [employer] website. Remember, [employer] is NOT to be used for urgent needs. For medical emergencies, dial 911. Now available from your iPhone and Android! Introducing Bashir Walters As a [facility] patient, I wanted to make you aware of our electronic visit tool called Bashir Walters. [facility] 24/7 allows you to connect within minutes with a medical provider 24 hours a day, seven days a week via a mobile device or tablet or logging into a secure website from your computer. You can access Bashir Vicenteperfin from anywhere in the [place]. A virtual visit might be right for you when you have a simple condition and feel like you just dont want to get out of bed, or cant get away from work for an appointment, when your regular [facility] provider is not available (evenings, weekends or holidays), or when youre out of town and need minor care. Electronic visits cost only $49 and if the [facility] 24/7 provider determines a prescription is needed to treat your condition, one can be electronically transmitted to a nearby pharmacy*. Please take a moment to enroll today if you have not already done so. The enrollment process is free and takes just a few minutes. To enroll, please download the [facility] 24/SIVI bret to your tablet or phone, or visit [email or web address] to enroll on your computer.    
And, as an [address] [place] [address] patient with a [employer] account, the results of your visits will be scanned into your electronic medical record and your primary care provider will be able to view the scanned results. We urge you to continue to see your regular [employer] provider for your ongoing medical care. And while your primary care provider may not be the one available when you seek a Bashir Shorefin virtual visit, the peace of mind you get from getting a real diagnosis real time can be priceless. For more information on bewarketperfin, view our Frequently Asked Questions (FAQs) at [email or web address]. Sincerely, 
 
Helio Li MD 
Chief Medical Officer Yannick8 Carmen Segovia *:  certain medications cannot be prescribed via [employer] Unresulted Labs-Please follow up with your PCP about these lab tests Order Current Status NC XR TECHNOLOGIST SERVICE In process Providers Seen During Your Hospitalization Provider Specialty Primary office phone Carlo Allen MD Orthopedic Surgery [phone] Your Primary Care Physician (PCP) Primary Care Physician Office Phone Office Fax Patito Dorita [phone] You are allergic to the following Allergen Reactions Other Food Other (comments) Walnuts. Gets headaches Corn Other (comments) Told by  allergic Shellfish Derived Swelling Swelling. States okay with betadine Recent Documentation Height Weight BMI Smoking Status 1.702 m 61.2 kg 21.14 kg/m2 Former Smoker Emergency Contacts Name Discharge Info Relation Home Work Mobile [address] [facility] CAREGIVER [3] Spouse [3] [phone] [address] CAREGIVER [3] Mother [14] [phone] ColtonChristian graham DISCHARGE CAREGIVER [3] Other Relative [6] [phone] Patient Belongings  The following personal items are in your possession at time of discharge: 
  Dental Appliances: None  Visual Aid: Glasses, With patient      Home Medications: None   Jewelry: None  Clothing: Sent home    Other Valuables: Sent home Please provide this summary of care documentation to your next provider. Signatures-by signing, you are acknowledging that this After Visit Summary has been reviewed with you and you have received a copy. Patient Signature:  ____________________________________________________________ Date:  ____________________________________________________________  
  
Aloma Snellen Provider Signature:  ____________________________________________________________ Date:  ____________________________________________________________

## 2018-09-17 NOTE — ADDENDUM NOTE
Addendum  created 09/17/18 1427 by Olga Vázquez CRNA Anesthesia Intra Meds edited, Orders acknowledged in Narrator

## 2018-09-17 NOTE — PROGRESS NOTES
Problem: Mobility Impaired (Adult and Pediatric) Goal: *Acute Goals and Plan of Care (Insert Text) Physical Therapy Goals Initiated 9/17/2018 and to be accomplished within 7 day(s) 1. Patient will move from supine to sit and sit to supine , scoot up and down and roll side to side in bed with supervision/set-up. 2.  Patient will transfer from bed to chair and chair to bed with supervision/set-up using the least restrictive device. 3.  Patient will perform sit to stand with supervision/set-up. 4.  Patient will ambulate with supervision/set-up for 250 feet with the least restrictive device. 5.  Patient will ascend/descend 10 stairs with 0-1 handrail(s) with minimal assistance/contact guard assist. (0 handrails for 5 steps but may use a cane if needed) Outcome: Progressing Towards Goal 
physical Therapy EVALUATION Patient: Sandra Manriquez (30 y.o. male) Date: 9/17/2018 Primary Diagnosis: HNP (herniated nucleus pulposus), cervical [M50.20] Procedure(s) (LRB): ANTERIOR CERVICAL DISCECTOMY WITH FUSION C6-7 C-ARM/NUVASIVE (N/A) Day of Surgery Precautions:   Fall (cervical, needs O2, sickle cell anemia) ASSESSMENT : 
PT orders received and patient cleared by nursing to participate with therapy. Patient is a 50 y.o. male admitted to the hospital due to ACDF C6/7 Dr. Pascual Generous 9/17/18. Patient consents to PT evaluation and treatment. Pt supine in bed with HOB elevated upon arrival. Educated pt on cervical precautions and OOB with assistance 3-5 times a day. Pt performed supine to sit with HOB raised CGA. Pt uses O2 2 L at home at all times. Pt has history of sickle cell anemia. Pt's sitting balance static/dynamic fair/poor. Pt had 3-4 LOB in sitting requiring min/mod A for recovery. Pt loses his balance to the R and slightly posterior. Pt's SaO2 decreased to 83/84% in sitting and required cues for breathing technique.  Nursing Fe increased O2 from 2L to 3L and stayed on 3 L rest of therapy. Sit to stands CGA. Gait to the chair 7 feet with RW min A especially with turning and cues for sequencing. Pt has unsteady gait pattern and required assistance moving walker with turning. Pt ended therapy sitting in recliner with all needs met. Pt's SaO2 stayed in low 90% with activities with O2 donned and needed cues for breathing technique. Patient will benefit from skilled intervention to address the above impairments and increase functional independence. Patients rehabilitation potential is considered to be Good Factors which may influence rehabilitation potential include:  
[]         None noted 
[]         Mental ability/status [x]         Medical condition 
[]         Home/family situation and support systems 
[]         Safety awareness 
[]         Pain tolerance/management 
[]         Other: PLAN : 
Recommendations and Planned Interventions: 
[x]           Bed Mobility Training             [x]    Neuromuscular Re-Education 
[x]           Transfer Training                   []    Orthotic/Prosthetic Training 
[x]           Gait Training                          []    Modalities [x]           Therapeutic Exercises          []    Edema Management/Control 
[x]           Therapeutic Activities            [x]    Patient and Family Training/Education 
[]           Other (comment): Frequency/Duration: Patient will be followed by physical therapy 1-2 times per day to address goals. Discharge Recommendations: Home Health Further Equipment Recommendations for Discharge: rolling walker SUBJECTIVE:  
Patient stated I feel sick.  OBJECTIVE DATA SUMMARY:  
 
Past Medical History:  
Diagnosis Date  Anemia NEC  Bursitis of shoulder, left 08/03/2009 MRI revealed tendinitis and bursitis of the rotator cuff.  DJD (degenerative joint disease) 08/03/2009  Early djd, left radiocarpal joint with associated ganglion cyst.    
  Elevated white blood cell count 07/20/2009  GERD (gastroesophageal reflux disease)  H/O: rotator cuff tear 06/07/2006 Chronic  Hyperlipidemia  Pain in joint, upper arm 07/2009 Pain in shoulder, elbow and wrist.  
 Sickle cell anemia (HCC)  Sleep apnea Does not use CPAP  Testicular failure  Vertigo 07/20/2009 Past Surgical History:  
Procedure Laterality Date  HX ADENOIDECTOMY  HX ADENOIDECTOMY  HX CHOLECYSTECTOMY  03/27/2018  HX HEENT    
 eardrum repair  HX SHOULDER ARTHROSCOPY Right Barriers to Learning/Limitations: yes;  altered mental status (i.e.Sedation, Confusion) Compensate with: Visual Cues, Verbal Cues and Tactile Cues Prior Level of Function/Home Situation: Independent with mobility including gait no AD. Home Situation Home Environment: Private residence # Steps to Enter: 5 Rails to Enter: No 
One/Two Story Residence: Two story # of Interior Steps: 13 Interior Rails: Right Living Alone: No 
Support Systems:  (lives with parents and brother, girlfriend will be assisting) Patient Expects to be Discharged to[de-identified] Private residence Current DME Used/Available at Home: Cane, straight Critical Behavior: 
Neurologic State: Alert;Drowsy Psychosocial 
Patient Behaviors: Calm; Cooperative Family  Behaviors: Calm; Cooperative Strength:   
Strength: Within functional limits (B LE) Tone & Sensation:  
Tone: Normal (B LE) Sensation: Intact (B LE) Range Of Motion: 
AROM: Within functional limits (B LE) Functional Mobility: 
Bed Mobility: 
Supine to Sit: Contact guard assistance Scooting: Contact guard assistance Transfers: 
Sit to Stand: Contact guard assistance Stand to Sit: Contact guard assistance Balance:  
Sitting: Impaired; With support Sitting - Static: Fair (occasional) Sitting - Dynamic: Poor (constant support) Standing: Impaired; With support Standing - Static: Fair Standing - Dynamic : Poor Ambulation/Gait Training: Distance (ft): 7 Feet (ft) Assistive Device: Walker, rolling Ambulation - Level of Assistance: Minimal assistance Base of Support: Center of gravity altered Speed/Alice: Pace decreased (<100 feet/min); Slow Therapeutic Exercises:  
Reviewed ankle pumps Pain: 
Pre: 7/10 neck Post: 7/10 neck Activity Tolerance:  
Fair-/poor Please refer to the flowsheet for vital signs taken during this treatment. After treatment:  
[x] Patient left in no apparent distress sitting up in chair 
[] Patient left sitting on EOB [] Patient left in no apparent distress in bed 
[] Patient declined to be OOB at this time due to   
[x] Call bell left within reach [x] Nursing notified(Fe) 
[x] Caregiver present 
[] Bed alarm activated 
[x] Personal items in reach COMMUNICATION/EDUCATION:  
[x]         Fall prevention education was provided and the patient/caregiver indicated understanding. [x]         Patient/family have participated as able in goal setting and plan of care. [x]         Patient/family agree to work toward stated goals and plan of care. []         Patient understands intent and goals of therapy, but is neutral about his/her participation. []         Patient is unable to participate in goal setting and plan of care. Thank you for this referral. 
Virgie Briggs, PT, DPT Time Calculation: 31 mins Mobility [id number] Current  CK= 40-59%   Goal  CI= 1-19%. The severity rating is based on the Level of Assistance required for Functional Mobility and ADLs. Eval Complexity: History: MEDIUM  Complexity : 1-2 comorbidities / personal factors will impact the outcome/ POC Exam:HIGH Complexity : 4+ Standardized tests and measures addressing body structure, function, activity limitation and / or participation in recreation  Presentation: LOW Complexity : Stable, uncomplicated  Clinical Decision Making:Low Complexity   Overall Complexity:LOW

## 2018-09-17 NOTE — IP AVS SNAPSHOT
[address] Patient: Yogi Gonzalez MRN: [id number] YZX:[id number] A check rogelio indicates which time of day the medication should be taken. My Medications START taking these medications Instructions Each Dose to Equal  
 Morning Noon Evening Bedtime  
 oxyCODONE-acetaminophen  mg per tablet Commonly known as:  PERCOCET Your last dose was: Your next dose is: Take 1 Tab by mouth every six (6) hours as needed for Pain. Max Daily Amount: 4 Tabs. 1 Tab CHANGE how you take these medications Instructions Each Dose to Equal  
 Morning Noon Evening Bedtime  
 naproxen 250 mg tablet Commonly known as:  NAPROSYN What changed:  Another medication with the same name was removed. Continue taking this medication, and follow the directions you see here. Your last dose was: Your next dose is: Take  by mouth two (2) times daily (with meals). CONTINUE taking these medications Instructions Each Dose to Equal  
 Morning Noon Evening Bedtime ALAVERT PO Your last dose was: Your next dose is: Take  by mouth.  
     
   
   
   
  
 multivitamin tablet Commonly known as:  ONE A DAY Your last dose was: Your next dose is: Take 1 Tab by mouth daily. 1 Tab * pregabalin 75 mg capsule Commonly known as:  Marko Brown Your last dose was: Your next dose is: Take 1 Cap by mouth two (2) times a day. Max Daily Amount: 150 mg.  
 75 mg  
    
   
   
   
  
 * pregabalin 150 mg capsule Commonly known as:  Marko Stephanie Your last dose was: Your next dose is: Take 1 Cap by mouth two (2) times a day.  Max Daily Amount: 300 mg.  
 150 mg  
    
   
   
   
  
 SUMAtriptan 50 mg tablet Commonly known as:  IMITREX Your last dose was: Your next dose is:    
   
   
      
   
   
   
  
 valACYclovir 1 gram tablet Commonly known as:  VALTREX Your last dose was: Your next dose is: Take 1 Tab by mouth as needed. 1 Tab * Notice: This list has 2 medication(s) that are the same as other medications prescribed for you. Read the directions carefully, and ask your doctor or other care provider to review them with you. Where to Get Your Medications Information on where to get these meds will be given to you by the nurse or doctor. ! Ask your nurse or doctor about these medications  
  oxyCODONE-acetaminophen  mg per tablet

## 2018-09-17 NOTE — OP NOTES
[facility] REPORT    Shoaib Carr  MR#: [id number]  : 1970  ACCOUNT #: [de-identified]   DATE OF SERVICE: 2018    PREOPERATIVE DIAGNOSIS:  Herniated nucleus pulposus C6-7. POSTOPERATIVE DIAGNOSIS:  Herniated nucleus pulposus C6-7. PROCEDURE PERFORMED:  Anterior cervical diskectomy and fusion C6-7, NuVasive PEEK cage with demineralized bone matrix anterior screw fixation C6-7 with screws through the cervical cage. SURGEON:  Becky Saunders MD    ASSISTANT:  0.     ANESTHESIA:  gea. FINDINGS:  The patient had a disk herniation of the left paracentral position causing cord compression. OPERATION:  Following induction of endotracheal anesthesia, the patient's head was placed in neutral position in Babb headrest and was placed prepped and draped in usual fashion. Right-sided approach was utilized. The sternocleidomastoid and great vessels mobilized laterally. Esophagus and trachea mobilized medially with blunt finger dissection. Prevertebral fascia was entered and C-arm image verified at surgical level. Tchula pin was placed in the bodies of C6 and C7. A Cloward self-retraining retractor was placed with on the longus colli musculature bilaterally. Annulotomy incision was made and a radical diskectomy back to the posterior margin. Defect was found in the left posterolateral region of the annulus. Thorough diskectomy was done. The dura was visualized and decompressed. Endplates prepared. Trials were utilized and a #7 trial utilized. The device had good fit and fill. The 7 device filled demineralized bone matrix and firmly placed with excellent bony apposition. Fixation screws were placed in C6 and C7 and the locking device engaged. No evidence of bleeding. The Tchula pins removed. Bone wax placed within the holes. A deep drain was placed as per routine. The wound was dry.   Vancomycin powder instilled for infection prophylaxis and it was closed in layers and the skin closed with subcuticular suture and Dermabond. Sterile dressing placed upon the wound. All counts were correct. ESTIMATED BLOOD LOSS:  5-10 mL. COMPLICATIONS:  No complications. SPECIMENS REMOVED:  No specimens. IMPLANTS:  nuvasive.       Hui Nayak MD       [address] / [facility]  D: 09/17/2018 13:40     T: 09/17/2018 14:13  JOB #: [id number]

## 2018-09-17 NOTE — PROGRESS NOTES
conducted an initial consultation and Spiritual Assessment for [facility], who is a 50 y.o.,male. Patients Primary Language is: [place]. According to the patients EMR Faith Affiliation is: No preference. The reason the Patient came to the hospital is:  
Patient Active Problem List  
 Diagnosis Date Noted  HNP (herniated nucleus pulposus), cervical 09/17/2018  Degenerative disc disease, cervical 04/26/2018  Spondylosis of cervical region without myelopathy or radiculopathy 04/26/2018  Chronic pain syndrome 04/26/2018  Bicipital tendinitis 04/10/2018  Conductive hearing loss in right ear 04/10/2018  Contusion of rib on right side 04/10/2018  Exposure to chickenpox 04/10/2018  Headache 04/10/2018  History of hyperkalemia 04/10/2018  History of osteonecrosis 04/10/2018  History of perforated ear drum 04/10/2018 [facility] discharge follow-up 04/10/2018  Hyperlipidemia 04/10/2018  Impingement syndrome of left shoulder 04/10/2018  Leukocytosis 04/10/2018  Lymphadenopathy 04/10/2018  Male erectile dysfunction, unspecified 04/10/2018  Male fertility problem 04/10/2018  Nerve compression 04/10/2018  Opioid contract exists 04/10/2018  DANIELA (obstructive sleep apnea) 04/10/2018  Paresthesia of left upper extremity 04/10/2018  Pulmonary hypertension ([address] 04/10/2018  Recurrent cold sores 04/10/2018  Sternum pain 04/10/2018  Supraspinatus tendon tear 04/10/2018  Thrombocytosis ([address] 04/10/2018  Vertigo, peripheral 04/10/2018  Abnormal liver function tests 01/16/2018  Abnormal liver function test 01/16/2018  Acute pharyngitis 01/16/2018  Acute upper respiratory infection 01/16/2018  Atopic rhinitis 01/16/2018  Allergic rhinitis 01/16/2018  Anemia 01/16/2018  Pain of upper extremity 01/16/2018  Pain in axilla 01/16/2018  Arthritis 01/16/2018  Atypical chest pain 01/16/2018  Avascular necrosis of bone ([facility] 01/16/2018  Avascular necrosis ([facility] 01/16/2018  Benign paroxysmal positional vertigo 01/16/2018  Carpal tunnel syndrome 01/16/2018  Central perforation of tympanic membrane 01/16/2018  Cervical radiculopathy 01/16/2018  Neck pain 01/16/2018  Cholelithiasis 01/16/2018  Sleep-wake schedule disorder, delayed phase type 01/16/2018  Coital headache 01/16/2018  Conductive hearing loss, unilateral 01/16/2018  Constipation 01/16/2018  Dark urine 01/16/2018  Difficult or painful urination 01/16/2018  Need for immunization against influenza 01/16/2018  Gout 01/16/2018  Hb-SS disease without crisis ([address] 01/16/2018  Pain of left lower extremity 01/16/2018  Injury to chest wall 01/16/2018  Knee pain 01/16/2018  Thoracic back pain 01/16/2018  Community acquired pneumonia 12/09/2017  Sickle cell anemia with pain ([address] 12/09/2017  Sickle cell crisis ([address] 01/03/2017  Sickle cell pain crisis ([address] 01/03/2017  Hb-SS disease with acute chest syndrome ([facility] 08/14/2014  Hypoxia 08/14/2014  
 SOB (shortness of breath) 08/13/2014  Cervical spondylosis without myelopathy 10/19/2012  Sickle-cell anemia ([address] 07/20/2012  Bursitis of shoulder, left 08/03/2009  DJD (degenerative joint disease) 08/03/2009  Pneumonia 06/17/2009 The  provided the following Interventions: 
Initiated a relationship of care and support. Explored issues of rustam, belief, spirituality and Protestant/ritual needs while hospitalized. Listened empathically. Provided chaplaincy education. Provided information about Spiritual Care Services. Offered prayer and assurance of continued prayers on patient's behalf. Chart reviewed. The following outcomes where achieved: 
Patient shared limited information about both their medical narrative and spiritual journey/beliefs.  confirmed Patient's Restorationism Affiliation. Patient processed feeling about current hospitalization. Patient expressed gratitude for 's visit. Assessment: 
Patient does not have any Mu-ism/cultural needs that will affect patients preferences in health care. There are no spiritual or Mu-ism issues which require intervention at this time. Plan: 
Chaplains will continue to follow and will provide pastoral care on an as needed/requested basis.  recommends bedside caregivers page  on duty if patient shows signs of acute spiritual or emotional distress. Chaplain Dalila Potter Spiritual Care  
[phone]

## 2018-09-17 NOTE — ANESTHESIA PREPROCEDURE EVALUATION
Anesthetic History No history of anesthetic complications Review of Systems / Medical History Patient summary reviewed and pertinent labs reviewed Pulmonary Sleep apnea: CPAP Comments: Home O2 prn to prevent sickle cell crisis Current room air sat 88% Neuro/Psych Comments: neuropathy Cardiovascular Exercise tolerance: >4 METS Comments: Recurrent atypical chest pain - neg work up multiple times GI/Hepatic/Renal 
  
GERD Endo/Other Arthritis and anemia Comments: Sickle cell disease Other Findings Comments: Documentation of current medication Current medications obtained, documented and obtained? YES Risk Factors for Postoperative nausea/vomiting: 
     History of postoperative nausea/vomiting? NO Female? NO Motion sickness? NO Intended opioid administration for postoperative analgesia? YES Smoking Abstinence: 
Current Smoker? NO Elective Surgery? YES Seen preoperatively by anesthesiologist or proxy prior to day of surgery? YES Pt abstained from smoking 24 hours prior to anesthesia? YES Preventive care/screening for High Blood Pressure: 
Aged 18 years and older: YES Screened for high blood pressure: YES Patients with high blood pressure referred to primary care provider 
 for BP management: YES Physical Exam 
 
Airway Mallampati: II 
TM Distance: > 6 cm Neck ROM: normal range of motion Mouth opening: Normal 
 
 Cardiovascular Regular rate and rhythm,  S1 and S2 normal,  no murmur, click, rub, or gallop Dental 
No notable dental hx Pulmonary Breath sounds clear to auscultation Abdominal 
GI exam deferred Other Findings Anesthetic Plan ASA: 3 Anesthesia type: general 
 
 
Post-op pain plan if not by surgeon: IV PCA Induction: Intravenous Anesthetic plan and risks discussed with: Patient

## 2018-09-17 NOTE — BRIEF OP NOTE
BRIEF OPERATIVE NOTE Date of Procedure: 9/17/2018 Preoperative Diagnosis: HNP (herniated nucleus pulposus), cervical [M50.20] Postoperative Diagnosis: HNP (herniated nucleus pulposus), cervical [M50.20] Procedure(s): ANTERIOR CERVICAL DISCECTOMY WITH FUSION C6-7 C-ARM/NUVASIVE Surgeon(s) and Role: Swetha Nelson MD - Primary Surgical Assistant: 0 Surgical Staff: 
Circ-1: Barney Cain RN Radiology Technician: Prateek Woody Scrub Tech-1: Izabella Ndiaye Surg Asst-1: Jaye Hutchinson Event Time In Incision Start [id number] Incision Close Anesthesia: General  
Estimated Blood Loss: 5 Specimens: * No specimens in log * Findings: hn-p Complications: 0 Implants:  
Implant Name Type Inv. Item Serial No.  Lot No. LRB No. Used Action GRAFT PTTY BNE SYNTH 1CC -- ATTRAX - [id number]  GRAFT PTTY BNE SYNTH 1CC -- ATTRAX  NUVASIVE [id number] N/A 1 Implanted

## 2018-09-17 NOTE — ANESTHESIA POSTPROCEDURE EVALUATION
Post-Anesthesia Evaluation and Assessment Patient: Lisa Chou MRN: [id number]  SSN: [id number] YOB: 1970  Age: 50 y.o. Sex: male Cardiovascular Function/Vital Signs Visit Vitals  /69 (BP 1 Location: Left arm, BP Patient Position: At rest)  Pulse (!) 102  Temp 37.4 °C (99.4 °F)  Resp 20  
 Ht 5' 7\" (1.702 m)  Wt 61.2 kg (135 lb)  SpO2 98%  BMI 21.14 kg/m2 Patient is status post general anesthesia for Procedure(s): ANTERIOR CERVICAL DISCECTOMY WITH FUSION C6-7 C-ARM/NUVASIVE. Nausea/Vomiting: None Postoperative hydration reviewed and adequate. Pain: 
Pain Scale 1: Numeric (0 - 10) (09/17/18 1338) Pain Intensity 1: 6 (09/17/18 1338) Managed Neurological Status:  
Neuro (WDL): Within Defined Limits (09/17/18 1338) Neuro LUE Motor Response: Weak (09/17/18 1338) LLE Motor Response: Purposeful (09/17/18 1338) RUE Motor Response: Weak (09/17/18 1338) RLE Motor Response: Purposeful (09/17/18 1338) At baseline Mental Status and Level of Consciousness: Arousable Pulmonary Status:  
O2 Device: Oxygen mask (09/17/18 1338) Adequate oxygenation and airway patent Complications related to anesthesia: None Post-anesthesia assessment completed. No concerns Signed By: Sudeep Boyd MD   
 September 17, 2018

## 2018-09-18 ENCOUNTER — HOME HEALTH ADMISSION (OUTPATIENT)
Dept: HOME HEALTH SERVICES | Facility: HOME HEALTH | Age: 48
End: 2018-09-18
Payer: MEDICAID

## 2018-09-18 VITALS
OXYGEN SATURATION: 90 % | WEIGHT: 135 LBS | RESPIRATION RATE: 16 BRPM | DIASTOLIC BLOOD PRESSURE: 65 MMHG | TEMPERATURE: 98 F | HEIGHT: 67 IN | BODY MASS INDEX: 21.19 KG/M2 | HEART RATE: 73 BPM | SYSTOLIC BLOOD PRESSURE: 118 MMHG

## 2018-09-18 LAB
ANION GAP SERPL CALC-SCNC: 9 MMOL/L (ref 3–18)
BUN SERPL-MCNC: 14 MG/DL (ref 7–18)
BUN/CREAT SERPL: 10 (ref 12–20)
CALCIUM SERPL-MCNC: 8.4 MG/DL (ref 8.5–10.1)
CHLORIDE SERPL-SCNC: 107 MMOL/L (ref 100–108)
CO2 SERPL-SCNC: 21 MMOL/L (ref 21–32)
CREAT SERPL-MCNC: 1.39 MG/DL (ref 0.6–1.3)
GLUCOSE SERPL-MCNC: 119 MG/DL (ref 74–99)
POTASSIUM SERPL-SCNC: 4.8 MMOL/L (ref 3.5–5.5)
SODIUM SERPL-SCNC: 137 MMOL/L (ref 136–145)

## 2018-09-18 PROCEDURE — 97535 SELF CARE MNGMENT TRAINING: CPT

## 2018-09-18 PROCEDURE — 74011250636 HC RX REV CODE- 250/636: Performed by: ORTHOPAEDIC SURGERY

## 2018-09-18 PROCEDURE — G8989 SELF CARE D/C STATUS: HCPCS

## 2018-09-18 PROCEDURE — 74011250637 HC RX REV CODE- 250/637: Performed by: NURSE PRACTITIONER

## 2018-09-18 PROCEDURE — 97116 GAIT TRAINING THERAPY: CPT

## 2018-09-18 PROCEDURE — 99218 HC RM OBSERVATION: CPT

## 2018-09-18 PROCEDURE — G8987 SELF CARE CURRENT STATUS: HCPCS

## 2018-09-18 PROCEDURE — 97165 OT EVAL LOW COMPLEX 30 MIN: CPT

## 2018-09-18 PROCEDURE — 36415 COLL VENOUS BLD VENIPUNCTURE: CPT | Performed by: ORTHOPAEDIC SURGERY

## 2018-09-18 PROCEDURE — 74011250637 HC RX REV CODE- 250/637: Performed by: ORTHOPAEDIC SURGERY

## 2018-09-18 PROCEDURE — 80048 BASIC METABOLIC PNL TOTAL CA: CPT | Performed by: ORTHOPAEDIC SURGERY

## 2018-09-18 PROCEDURE — 77030027138 HC INCENT SPIROMETER -A

## 2018-09-18 PROCEDURE — 77030018729 HC ELECTRD DEFIB PAD CARD -B

## 2018-09-18 PROCEDURE — G8988 SELF CARE GOAL STATUS: HCPCS

## 2018-09-18 RX ORDER — OXYCODONE AND ACETAMINOPHEN 10; 325 MG/1; MG/1
1 TABLET ORAL
Qty: 20 TAB | Refills: 0 | Status: SHIPPED | OUTPATIENT
Start: 2018-09-18

## 2018-09-18 RX ADMIN — OXYCODONE HYDROCHLORIDE 5 MG: 5 TABLET ORAL at 08:31

## 2018-09-18 RX ADMIN — ONDANSETRON 4 MG: 2 INJECTION INTRAMUSCULAR; INTRAVENOUS at 01:07

## 2018-09-18 RX ADMIN — MORPHINE SULFATE 1 MG: 4 INJECTION INTRAVENOUS at 01:01

## 2018-09-18 RX ADMIN — MORPHINE SULFATE 1 MG: 4 INJECTION INTRAVENOUS at 06:53

## 2018-09-18 RX ADMIN — ACETAMINOPHEN 1000 MG: 500 TABLET, FILM COATED ORAL at 00:27

## 2018-09-18 RX ADMIN — Medication 10 ML: at 01:01

## 2018-09-18 RX ADMIN — ACETAMINOPHEN 1000 MG: 500 TABLET, FILM COATED ORAL at 05:25

## 2018-09-18 RX ADMIN — ONDANSETRON 4 MG: 2 INJECTION INTRAMUSCULAR; INTRAVENOUS at 06:53

## 2018-09-18 RX ADMIN — DOCUSATE SODIUM 100 MG: 100 CAPSULE, LIQUID FILLED ORAL at 08:14

## 2018-09-18 RX ADMIN — CEFAZOLIN SODIUM 2 G: 2 SOLUTION INTRAVENOUS at 03:43

## 2018-09-18 NOTE — PROGRESS NOTES
Problem: Mobility Impaired (Adult and Pediatric) Goal: *Acute Goals and Plan of Care (Insert Text) Physical Therapy Goals Initiated 9/17/2018 and to be accomplished within 7 day(s) 1. Patient will move from supine to sit and sit to supine , scoot up and down and roll side to side in bed with supervision/set-up. 2.  Patient will transfer from bed to chair and chair to bed with supervision/set-up using the least restrictive device. 3.  Patient will perform sit to stand with supervision/set-up. 4.  Patient will ambulate with supervision/set-up for 250 feet with the least restrictive device. 5.  Patient will ascend/descend 10 stairs with 0-1 handrail(s) with minimal assistance/contact guard assist. (0 handrails for 5 steps but may use a cane if needed) Outcome: Progressing Towards Goal 
physical Therapy TREATMENT/DISCHARGE Patient: Bipni Maurer (37 y.o. male) Date: 9/18/2018 Diagnosis: HNP (herniated nucleus pulposus), cervical [M50.20] <principal problem not specified> Procedure(s) (LRB): ANTERIOR CERVICAL DISCECTOMY WITH FUSION C6-7 C-ARM/NUVASIVE (N/A) 1 Day Post-Op Precautions: Spinal (cervical) Chart, physical therapy assessment, plan of care and goals were reviewed. OBJECTIVE / ASSESSMENT: 
Patient found seated in room in street clothes willing to work with PT w/o NC O2 applied. Pt stated that he only req supp O2 at home PRN when symptomatic. Upon arrival, pt asymptomatic, thus decision to cont tx w/o use of NC O2 made, however was accessible t/o tx if pt became symptomatic. Pt performed all sit <> stands mod I w/o AD, amb for a total of 300' only req cueing for slight path deviation and to take larger steps 2/2 inability to have full functional use of C/S, however pt did not present w/ any safety concerns during amb w/o AD.  Pt performed stair training to mimic home performing 22 stairs step to step descending and step over step ascending req supervision w/o any safety concerns. Pt returned to room to chair, remaining asymptomatic t/o tx, and left in room w/ all needs in reach. At this time, per pt performance, pt does not req further acute care physical therapy needs, however would cont to benefit from further therapy outside of hospital to cont to improve strenghtening and flexibility and neck mm as well to better prepare pt for return to PLOF. Pt would cont to benefit from amb w/ assistance (clincial staff or family/friends) while he remains in care of hospital to reduce the affects of immobility. Education:safety, importance of cont mobility and strengthening Progression toward goals: 
[x]      Goals met 
[]      Improving appropriately and progressing toward goals 
[]      Improving slowly and progressing toward goals 
[]      Not making progress toward goals and plan of care will be adjusted PLAN: 
Recommend discharge from PT at this time as pt has met all goals. Rationale for discharge: 
[x] Goals Achieved 
[] [address] 
[] Patient not participating in therapy 
[] Other: 
Discharge Recommendations:  [address] [facility] Further Equipment Recommendations for Discharge:  Has RW if needed for long distances or unstable SUBJECTIVE:  
Patient stated I haven't felt this good in a long time.  OBJECTIVE DATA SUMMARY:  
Critical Behavior: 
Neurologic State: Alert Orientation Level: Oriented X4 Cognition: Appropriate decision making, Follows commands Safety/Judgement: Awareness of environment, Home safety Functional Mobility Training: 
Bed Mobility: 
Supine to Sit:  (Pt up in chair upon arrival.) Transfers: 
Sit to Stand: Modified independent Stand to Sit: Modified independent Balance: 
Sitting: Intact Sitting - Static: Good (unsupported) Sitting - Dynamic: Good (unsupported) Standing: Intact Standing - Static: Good Standing - Dynamic : Fair (w/o support ) Ambulation/Gait Training: 
Distance (ft): 300 Feet (ft) Assistive Device:  (none) Ambulation - Level of Assistance: Supervision Gait Abnormalities: Decreased step clearance; Path deviations Base of Support: Center of gravity altered Speed/Alice: Pace decreased (<100 feet/min) Step Length: Right shortened;Left shortened Stairs: 
Number of Stairs Trained: 22 Stairs - Level of Assistance: Supervision Rail Use: Left Pain: 4/10 surgical pain, constant Pre tx pain: 4/10 Post tx Pain: 4/10 Pain Scale 1: Numeric (0 - 10) Pain Intensity 1: 0 Pain Location 1: Neck Pain Orientation 1: Anterior Pain Intervention(s) 1: Medication (see MAR) Please refer to the flowsheet for vital signs taken during this treatment. After treatment:  
[x] Patient left in no apparent distress sitting up in chair 
[] Patient left in no apparent distress in bed 
[x] Call bell left within reach [x] Nursing notified 
[x] Caregiver present 
[] Bed alarm activated 
[] SCDs applied 
[] Ice applied Lawerance Pillion, PTA Time Calculation: 23 mins Mobility [id number] Current  CI= 1-19%. The severity rating is based on the Level of Assistance required for Functional Mobility and ADLs. Mobility   Goal  CI= 1-19%. The severity rating is based on the Level of Assistance required for Functional Mobility and ADLs. Mobility [id number] D/C  CI= 1-19%. The severity rating is based on the Level of Assistance required for Functional Mobility and ADLs.

## 2018-09-18 NOTE — ROUTINE PROCESS
Bedside and Verbal shift change report given to Kristal Dalton RN (oncoming nurse) by US Ritter RN (offgoing nurse). Report included the following information SBAR, Kardex, Intake/Output and MAR.

## 2018-09-18 NOTE — PROGRESS NOTES
I have reviewed discharge instructions with the patient. The patient verbalized understanding. Patient armband removed and shredded Prescriptions filled by [address] [place]

## 2018-09-18 NOTE — HOME CARE
Spoke to patient. Verified demographics and explained services. Stated Drain was removed. Will set up homecare for PT per [facility] Protocol . Patient states he has a rolling walker.    
Micaela Howard RN, BSN

## 2018-09-18 NOTE — PROGRESS NOTES
Rounded on patient post cervical surgery. Activity: Reinforced importance of getting OOB for all meals, going to bathroom to help prevent blood clots. Educated to turn head slowly from side to side for ROM. Also reminded to sleep with HOB elevated for the next 3 nights to prevent swelling in neck. VTE prophylaxis: Instructed patient to use their SCD's when not up and walking. To use while in bed and in the chair. Educated re: ankle pumps to assist with circulation when in hospital and at home. Medications: Reviewed pain medications patient is taking and the importance of keeping pain under control to help with getting OOB and therapy. Reminded the patient to always eat a snack with their pain medication to help to prevent nausea. Encouraged patient to monitor for constipation and to take a stool softner/laxative while recovering and on pain medication. Instructed not to go over 3 day without a bowel movement. Encouraged to stay on a stool softener and or laxative while on narcotic. Also encouraged to drink 8 glasses of water a day(unless on a fluid restriction). Incentive Spirometry: Reinforced use of incentive spirometer with return demonstration by patient. 2250 ml x3 Wound Care: Dressing to surgical site dry and intact. Patient instructed not to take dressing off at home. Patient given CHG wash to use in hospital and at home. Stressed importance of using a clean towel and washcloth daily. Reminded to put on clean clothes and night clothes daily. Patient Safety: Call light and personal belongings in reach. Patient and spouse reminded to call for help toget OOB or when leaving bathroom for safety. Phone and other items also within reach per patient's request.  Soft collar in place for comfort. Patient educated to report to staff or surgeon any trouble swallowing.   
Diet:  Educated patient on the importance of eating three well balanced meals a day with protein to promote bone/muscle healing. Patient educated about following a soft diet, twice with each bite of food. Reminded to advance diet after a few days if able to swallow with no trouble but instructed to eat small bites. Reminded patient to drink lots of fluids with a straw to protect kidneys from all the medications being taken currently with recovery. Soft collar on 
KRIS drain out Patient given post care educational material to remind them what to do at home for a successful recovery and to prevent post op complications. Patient and spouse verbalized understand of all information and education discussed. Patient and spouse given the opportunity for asking questions. . Ambulating in room and voiding with no complaints. Denies any trouble swallowing

## 2018-09-18 NOTE — PROGRESS NOTES
Per Charity (KAILA) sent home health referral to [facility] in [address] Informed Micaela (Liaison) referral was placed in [facility]. Informed Charity referral has been sent.

## 2018-09-18 NOTE — PROGRESS NOTES
Observation notice provided in writing to patient and/or caregiver as well as verbal explanation of the policy. Patients who are in outpatient status also receive the Observation notice Sakshi Yoo CM Specialist

## 2018-09-18 NOTE — DISCHARGE SUMMARY
Discharge  Summary     Patient: Jose Alberto Hua MRN: [id number]  SSN: [id number]    YOB: 1970  Age: 50 y.o.   Sex: male       Admit Date: 9/17/2018    Discharge Date: 9/18/2018      Admission Diagnoses: HNP (herniated nucleus pulposus), cervical [M50.20]    Discharge Diagnoses:   Problem List as of 9/18/2018  Date Reviewed: 8/24/2018          Codes Class Noted - Resolved    HNP (herniated nucleus pulposus), cervical ICD-10-CM: M50.20  ICD-9-CM: 722.0  9/17/2018 - Present        Degenerative disc disease, cervical ICD-10-CM: M50.30  ICD-9-CM: 722.4  4/26/2018 - Present        Spondylosis of cervical region without myelopathy or radiculopathy ICD-10-CM: M47.812  ICD-9-CM: 721.0  4/26/2018 - Present        Chronic pain syndrome ICD-10-CM: G89.4  ICD-9-CM: 338.4  4/26/2018 - Present        Bicipital tendinitis ICD-10-CM: M75.20  ICD-9-CM: 726.12  4/10/2018 - Present        Conductive hearing loss in right ear ICD-10-CM: H90.11  ICD-9-CM: 389.05  4/10/2018 - Present        Contusion of rib on right side ICD-10-CM: V36.502C  ICD-9-CM: 922.1  4/10/2018 - Present        Exposure to chickenpox ICD-10-CM: Z20.820  ICD-9-CM: V01.71  4/10/2018 - Present        Headache ICD-10-CM: R51  ICD-9-CM: 784.0  4/10/2018 - Present        History of hyperkalemia ICD-10-CM: Z86.39  ICD-9-CM: V12.29  4/10/2018 - Present        History of osteonecrosis ICD-10-CM: Z87.39  ICD-9-CM: V13.59  4/10/2018 - Present        History of perforated ear drum ICD-10-CM: Z86.69  ICD-9-CM: V12.49  4/10/2018 - Present        Hospital discharge follow-up ICD-10-CM: Viann Fey  ICD-9-CM: V67.59  4/10/2018 - Present        Hyperlipidemia ICD-10-CM: E78.5  ICD-9-CM: 272.4  4/10/2018 - Present        Impingement syndrome of left shoulder ICD-10-CM: M75.42  ICD-9-CM: 726.2  4/10/2018 - Present        Leukocytosis ICD-10-CM: D72.829  ICD-9-CM: 288.60  4/10/2018 - Present        Lymphadenopathy ICD-10-CM: R59.1  ICD-9-CM: 785.6  4/10/2018 - Present        Male erectile dysfunction, unspecified ICD-10-CM: N52.9  ICD-9-CM: 607.84  4/10/2018 - Present        Male fertility problem ICD-10-CM: N46.9  ICD-9-CM: 606.9  4/10/2018 - Present        Nerve compression ICD-10-CM: T14. 8XXA  ICD-9-CM: 957.9  4/10/2018 - Present        Opioid contract exists ICD-10-CM: Z02.89  ICD-9-CM: V68.89  4/10/2018 - Present        DANIELA (obstructive sleep apnea) ICD-10-CM: G47.33  ICD-9-CM: 327.23  4/10/2018 - Present        Paresthesia of left upper extremity ICD-10-CM: R20.2  ICD-9-CM: 782.0  4/10/2018 - Present        Pulmonary hypertension ([facility] ICD-10-CM: I27.20  ICD-9-CM: 416.8  4/10/2018 - Present        Recurrent cold sores ICD-10-CM: B00.1  ICD-9-CM: 054.9  4/10/2018 - Present        Sternum pain ICD-10-CM: R07.89  ICD-9-CM: 786.50  4/10/2018 - Present        Supraspinatus tendon tear ICD-10-CM: C74.944K  ICD-9-CM: 840.6  4/10/2018 - Present        Thrombocytosis ([facility] ICD-10-CM: D47.3  ICD-9-CM: 238.71  4/10/2018 - Present        Vertigo, peripheral ICD-10-CM: H81.399  ICD-9-CM: 386.10  4/10/2018 - Present        Abnormal liver function tests ICD-10-CM: R94.5  ICD-9-CM: 790.6  1/16/2018 - Present        Abnormal liver function test ICD-10-CM: R94.5  ICD-9-CM: 790.6  1/16/2018 - Present        Acute pharyngitis ICD-10-CM: J02.9  ICD-9-CM: 462  1/16/2018 - Present        Acute upper respiratory infection ICD-10-CM: J06.9  ICD-9-CM: 465.9  1/16/2018 - Present        Atopic rhinitis ICD-10-CM: J30.9  ICD-9-CM: 477.9  1/16/2018 - Present        Allergic rhinitis ICD-10-CM: J30.9  ICD-9-CM: 477.9  1/16/2018 - Present        Anemia ICD-10-CM: D64.9  ICD-9-CM: 285.9  1/16/2018 - Present        Pain of upper extremity ICD-10-CM: M79.603  ICD-9-CM: 729.5  1/16/2018 - Present        Pain in axilla ICD-10-CM: M79.629  ICD-9-CM: 729.5  1/16/2018 - Present        Arthritis ICD-10-CM: M19.90  ICD-9-CM: 716.90  1/16/2018 - Present        Atypical chest pain ICD-10-CM: R07.89  ICD-9-CM: 786.59  1/16/2018 - Present        Avascular necrosis of bone ([facility] ICD-10-CM: M87.00  ICD-9-CM: 733.40  1/16/2018 - Present        Avascular necrosis ([facility] ICD-10-CM: M87.00  ICD-9-CM: 733.40  1/16/2018 - Present        Benign paroxysmal positional vertigo ICD-10-CM: H81.10  ICD-9-CM: 386.11  1/16/2018 - Present        Carpal tunnel syndrome ICD-10-CM: G56.00  ICD-9-CM: 354.0  1/16/2018 - Present        Central perforation of tympanic membrane ICD-10-CM: H72.00  ICD-9-CM: 384.21  1/16/2018 - Present        Cervical radiculopathy ICD-10-CM: M54.12  ICD-9-CM: 723.4  1/16/2018 - Present        Neck pain ICD-10-CM: M54.2  ICD-9-CM: 723.1  1/16/2018 - Present        Cholelithiasis ICD-10-CM: K80.20  ICD-9-CM: 574.20  1/16/2018 - Present        Sleep-wake schedule disorder, delayed phase type ICD-10-CM: G47.21  ICD-9-CM: 327.31  1/16/2018 - Present        Coital headache ICD-10-CM: G44.82  ICD-9-CM: 339.82  1/16/2018 - Present        Conductive hearing loss, unilateral ICD-10-CM: H90.2  ICD-9-CM: 389.05  1/16/2018 - Present        Constipation ICD-10-CM: K59.00  ICD-9-CM: 564.00  1/16/2018 - Present        Dark urine ICD-10-CM: R82.99  ICD-9-CM: 791.9  1/16/2018 - Present        Difficult or painful urination ICD-10-CM: R30.0  ICD-9-CM: 788.1  1/16/2018 - Present        Need for immunization against influenza ICD-10-CM: Z23  ICD-9-CM: V04.81  1/16/2018 - Present        Gout ICD-10-CM: M10.9  ICD-9-CM: 274.9  1/16/2018 - Present        Hb-SS disease without crisis ([facility] ICD-10-CM: D57.1  ICD-9-CM: 282.61  1/16/2018 - Present        Pain of left lower extremity ICD-10-CM: M79.605  ICD-9-CM: 729.5  1/16/2018 - Present        Injury to chest wall ICD-10-CM: S29. 9XXA  ICD-9-CM: 959.11  1/16/2018 - Present        Knee pain ICD-10-CM: M25.569  ICD-9-CM: 719.46  1/16/2018 - Present        Thoracic back pain ICD-10-CM: M54.6  ICD-9-CM: 724.1  1/16/2018 - Present        Community acquired pneumonia ICD-10-CM: J18.9  ICD-9-CM: 080 12/9/2017 - Present        Sickle cell anemia with pain (HCC) ICD-10-CM: D57.00  ICD-9-CM: 282.62  12/9/2017 - Present        Sickle cell crisis ([facility] ICD-10-CM: D57.00  ICD-9-CM: 282.62  1/3/2017 - Present        Sickle cell pain crisis ([facility] ICD-10-CM: D57.00  ICD-9-CM: 282.62  1/3/2017 - Present        Hb-SS disease with acute chest syndrome [facility]) ICD-10-CM: D57.01  ICD-9-CM: 282.62, 517.3  8/14/2014 - Present        Hypoxia ICD-10-CM: R09.02  ICD-9-CM: 799.02  8/14/2014 - Present        SOB (shortness of breath) ICD-10-CM: R06.02  ICD-9-CM: 786.05  8/13/2014 - Present        Cervical spondylosis without myelopathy ICD-10-CM: M47.812  ICD-9-CM: 721.0  10/19/2012 - Present        Sickle-cell anemia (HCC) ICD-10-CM: D57.1  ICD-9-CM: 282.60  7/20/2012 - Present        Bursitis of shoulder, left ICD-10-CM: M75.52  ICD-9-CM: 726.10  8/3/2009 - Present    Overview Signed 12/3/2010 11:44 AM by Jason Guaman     MRI revealed tendinitis and bursitis of the rotator cuff. DJD (degenerative joint disease) ICD-10-CM: M19.90  ICD-9-CM: 715.90  8/3/2009 - Present    Overview Signed 12/3/2010 11:46 AM by Jason Guaman     Early djd, left radiocarpal joint with associated ganglion cyst.               Pneumonia ICD-10-CM: J18.9  ICD-9-CM: 735  6/17/2009 - Present               Discharge Condition: Good    Procedure: Anterior cervical diskectomy and fusion C6-7, NuVasive PEEK cage with demineralized bone matrix anterior screw fixation C6-7 with screws through the cervical cage.       Hospital Course: Normal hospital course for this procedure. Tolerated surgical intervention well. Incision dry and intact. Ambulatory. Swallowing well. Disposition: home    Discharge Medications:   Current Discharge Medication List      START taking these medications    Details   oxyCODONE-acetaminophen (PERCOCET)  mg per tablet Take 1 Tab by mouth every six (6) hours as needed for Pain.  Max Daily Amount: 4 Tabs.  Qty: 20 Tab, Refills: 0    Associated Diagnoses: HNP (herniated nucleus pulposus), cervical         CONTINUE these medications which have NOT CHANGED    Details   loratadine (ALAVERT PO) Take  by mouth. naproxen (NAPROSYN) 250 mg tablet Take  by mouth two (2) times daily (with meals). SUMAtriptan (IMITREX) 50 mg tablet       !! pregabalin (LYRICA) 75 mg capsule Take 1 Cap by mouth two (2) times a day. Max Daily Amount: 150 mg.  Qty: 14 Cap, Refills: 0    Associated Diagnoses: Cervical radiculopathy      !! pregabalin (LYRICA) 150 mg capsule Take 1 Cap by mouth two (2) times a day. Max Daily Amount: 300 mg. Qty: 60 Cap, Refills: 2    Associated Diagnoses: Cervical radiculopathy      valACYclovir (VALTREX) 1 gram tablet Take 1 Tab by mouth as needed. Refills: 0      multivitamin (ONE A DAY) tablet Take 1 Tab by mouth daily. !! - Potential duplicate medications found. Please discuss with provider. Follow-up Appointments   Procedures    FOLLOW UP VISIT Appointment in: Two Weeks     Standing Status:   Standing     Number of Occurrences:   1     Order Specific Question:   Appointment in     Answer:    Two Weeks       Signed By: Mary Bennett NP     September 18, 2018

## 2018-09-18 NOTE — PROGRESS NOTES
Discharge planning: 
 
Care Management Interventions PCP Verified by CM: Yes 
Palliative Care Criteria Met (RRAT>21 & CHF Dx)?: No 
Mode of Transport at Discharge: Self Transition of Care Consult (CM Consult): Home Health [address] Yes Discharge Durable Medical Equipment: No 
Physical Therapy Consult: Yes Occupational Therapy Consult: Yes Current Support Network: Lives with Spouse, Own Home Confirm Follow Up Transport: Family Plan discussed with Pt/Family/Caregiver: Yes Freedom of Choice Offered: Yes Patient will likely be discharged home today. Patient has been set up with [facility], per his request. Patient has family supports who will help provide care post discharge. Patient's family will also provide transportation home at the time of discharge. Patient has all needed DME to help ensure a safe transition home. There are no other concerns, from a Care Management standpoint, that will hinder a safe discharge home. This writer will continue to closely monitor for discharge planning to ensure a safe discharge home from [facility].

## 2018-09-18 NOTE — NURSE NAVIGATOR
Reason for Admission:   Cervical Spine surgery RRAT Score:         5 Plan for utilizing home health:      Home with [facility] - INPATIENT, [facility] signed and Ashanti Santana will notify [facility]. Likelihood of Readmission:  low Transition of Care Plan:     
 
Patient lives with his spouse in a 2 story home in [place]. He was independent in his self care/ADL's prior to admission and doesn't anticipate any problems upon discharge. He has already walked up and down the stairs here with the therapist.  His PCP is Dr. Letitia Berman at [facility]. The patient still drives. Wife is at bedside and will be transporting the patient home today. They have oxygen and a walker at home. Care Management Interventions PCP Verified by CM: Yes 
Palliative Care Criteria Met (RRAT>21 & CHF Dx)?: No 
Mode of Transport at Discharge: Self Transition of Care Consult (CM Consult): Home Health [facility] - INPATIENT: Yes Discharge Durable Medical Equipment: No 
Physical Therapy Consult: Yes Occupational Therapy Consult: Yes Current Support Network: Lives with Spouse, Own Home Confirm Follow Up Transport: Family Plan discussed with Pt/Family/Caregiver: Yes Freedom of Choice Offered:  Yes

## 2018-09-18 NOTE — PROGRESS NOTES
Problem: Self Care Deficits Care Plan (Adult) Goal: *Acute Goals and Plan of Care (Insert Text) Outcome: Resolved/Met Date Met: 09/18/18 Occupational Therapy EVALUATION/discharge Patient: Iesha Gilbert (29 y.o. male) Date: 9/18/2018 Primary Diagnosis: HNP (herniated nucleus pulposus), cervical [M50.20] Procedure(s) (LRB): ANTERIOR CERVICAL DISCECTOMY WITH FUSION C6-7 C-ARM/NUVASIVE (N/A) 1 Day Post-Op Precautions:   Spinal (cervical); cervical collar ASSESSMENT AND RECOMMENDATIONS: 
Based on the objective data described below, the patient is able to perform basic self care tasks and functional transfers without assistance. Cervical spine precautions reviewed and patient verbalized/demonstrated understanding. Supportive girlfriend in room will assist patient at home prn as well as other family. No DME needs at this time. Skilled occupational therapy is not indicated at this time. Discharge Recommendations: None Further Equipment Recommendations for Discharge: N/A Barriers to Learning/Limitations: None Compensate with: visual, verbal, tactile, kinesthetic cues/model COMPLEXITY Eval Complexity: History: LOW Complexity : Brief history review ; Examination: LOW Complexity : 1-3 performance deficits relating to physical, cognitive , or psychosocial skils that result in activity limitations and / or participation restrictions ; Decision Making:LOW Complexity : No comorbidities that affect functional and no verbal or physical assistance needed to complete eval tasks  Assessment: Low Complexity G-CODES:  
 
Self Care  Current  CI= 1-19%  Goal  CI= 1-19%  D/C  CI= 1-19%. The severity rating is based on the Level of Assistance required for Functional Mobility and ADLs. SUBJECTIVE:  
Patient stated I have my clothes over there.  OBJECTIVE DATA SUMMARY:  
 
Past Medical History:  
Diagnosis Date  Anemia NEC   
  Bursitis of shoulder, left 08/03/2009 MRI revealed tendinitis and bursitis of the rotator cuff.  DJD (degenerative joint disease) 08/03/2009 Early djd, left radiocarpal joint with associated ganglion cyst.    
 Elevated white blood cell count 07/20/2009  GERD (gastroesophageal reflux disease)  H/O: rotator cuff tear 06/07/2006 Chronic  Hyperlipidemia  Pain in joint, upper arm 07/2009 Pain in shoulder, elbow and wrist.  
 Sickle cell anemia (HCC)  Sleep apnea Does not use CPAP  Testicular failure  Vertigo 07/20/2009 Past Surgical History:  
Procedure Laterality Date  HX ADENOIDECTOMY  HX ADENOIDECTOMY  HX CHOLECYSTECTOMY  03/27/2018  HX HEENT    
 eardrum repair  HX SHOULDER ARTHROSCOPY Right Prior Level of Function/Home Situation: Pt was independent with basic self care tasks and functional mobility PTA. Home Situation Home Environment: Private residence # Steps to Enter: 5 Rails to Enter: No 
One/Two Story Residence: Two story # of Interior Steps: 13 Interior Rails: Right Living Alone: No 
Support Systems:  (lives with parents and brother, girlfriend will be assisting) Patient Expects to be Discharged to[de-identified] Private residence Current DME Used/Available at Home: Cane, straight Tub or Shower Type: Tub/Shower combination []     Right hand dominant   [x]     Left hand dominant Cognitive/Behavioral Status: 
Neurologic State: Alert Orientation Level: Oriented X4 Cognition: Appropriate decision making; Follows commands Safety/Judgement: Awareness of environment;Home safety Skin: Intact on UEs Edema: None noted in UEs Vision/Perceptual:   
Acuity: Within Defined Limits Corrective Lenses: Glasses Coordination: 
Fine Motor Skills-Upper: Left Intact; Right Intact Gross Motor Skills-Upper: Left Intact; Right Intact Balance: 
Sitting: Intact Standing: Intact Strength: 
 Strength: Within functional limits (UEs; within spinal precautions) Tone & Sensation: 
Tone: Normal (UEs) Sensation: Intact (UEs; Mild numbness noted in left hand) Range of Motion: 
AROM: Within functional limits (UEs) Functional Mobility and Transfers for ADLs: 
Bed Mobility: 
Supine to Sit:  (Pt up in chair upon arrival.) Transfers: 
Sit to Stand: Modified independent Toilet Transfer : Modified independent ADL Assessment: 
Feeding: Independent Oral Facial Hygiene/Grooming: Independent Bathing: Modified independent Upper Body Dressing: Independent Lower Body Dressing: Modified independent Toileting: Independent ADL Intervention: 
Patient practiced UB/LB dressing while seated and in standing. No assist given after initial VCs for safety/ease of performance but extra time needed. Mod I given for standing balance. No LOB noted. Pain: 
Pt reports 4/10 pain or discomfort prior to treatment, in neck. Pain meds given prior to session.   
Pt reports 4/10 pain or discomfort post treatment, in neck. Patient resting in bed at end of session. Activity Tolerance:  
Good Please refer to the flowsheet for vital signs taken during this treatment. After treatment:  
[x]  Patient left in no apparent distress sitting up in chair 
[]  Patient left in no apparent distress in bed 
[x]  Call bell left within reach 
[]  Nursing notified 
[x]  Caregiver (girlfriend) present 
[]  Bed alarm activated COMMUNICATION/EDUCATION:  
Communication/Collaboration: 
[x]      Home safety education was provided and the patient/caregiver indicated understanding. [x]      Patient/family have participated as able and agree with findings and recommendations. []      Patient is unable to participate in plan of care at this time. Shaye Fragoso MS OTR/L Time Calculation: 25 mins

## 2018-09-19 ENCOUNTER — HOME CARE VISIT (OUTPATIENT)
Dept: SCHEDULING | Facility: HOME HEALTH | Age: 48
End: 2018-09-19
Payer: MEDICAID

## 2018-09-19 PROCEDURE — G0151 HHCP-SERV OF PT,EA 15 MIN: HCPCS

## 2018-09-19 PROCEDURE — 400013 HH SOC

## 2018-09-20 VITALS
SYSTOLIC BLOOD PRESSURE: 122 MMHG | OXYGEN SATURATION: 88 % | HEART RATE: 101 BPM | DIASTOLIC BLOOD PRESSURE: 54 MMHG | TEMPERATURE: 99.5 F

## 2018-09-21 ENCOUNTER — HOME CARE VISIT (OUTPATIENT)
Dept: HOME HEALTH SERVICES | Facility: HOME HEALTH | Age: 48
End: 2018-09-21
Payer: MEDICAID

## 2018-10-01 ENCOUNTER — OFFICE VISIT (OUTPATIENT)
Dept: ORTHOPEDIC SURGERY | Age: 48
End: 2018-10-01

## 2018-10-01 VITALS
OXYGEN SATURATION: 93 % | HEART RATE: 89 BPM | HEIGHT: 67 IN | TEMPERATURE: 98.3 F | BODY MASS INDEX: 21.6 KG/M2 | WEIGHT: 137.6 LBS | SYSTOLIC BLOOD PRESSURE: 118 MMHG | RESPIRATION RATE: 18 BRPM | DIASTOLIC BLOOD PRESSURE: 68 MMHG

## 2018-10-01 DIAGNOSIS — M79.2 NEURITIS: ICD-10-CM

## 2018-10-01 DIAGNOSIS — Z98.1 S/P CERVICAL SPINAL FUSION: Primary | ICD-10-CM

## 2018-10-01 RX ORDER — METHYLPREDNISOLONE 4 MG/1
TABLET ORAL
Qty: 1 DOSE PACK | Refills: 0 | Status: SHIPPED | OUTPATIENT
Start: 2018-10-01 | End: 2018-10-09 | Stop reason: ALTCHOICE

## 2018-10-01 RX ORDER — TIAGABINE HYDROCHLORIDE 4 MG/1
TABLET, FILM COATED ORAL
Qty: 60 TAB | Refills: 1 | Status: SHIPPED | OUTPATIENT
Start: 2018-10-01

## 2018-10-01 RX ORDER — CELECOXIB 200 MG/1
200 CAPSULE ORAL
Qty: 30 CAP | Refills: 0 | Status: SHIPPED | OUTPATIENT
Start: 2018-10-01 | End: 2018-12-30

## 2018-10-01 NOTE — PROGRESS NOTES
Chief complaint/History of Present Illness:  Chief Complaint   Patient presents with    Neck Pain    Hand Pain     Left     Surgical Follow-up     2 WKS      HPI  George Tran is a  50 y.o.  male      HISTORY OF PRESENT ILLNESS:  The patient comes in today, 2 seeks status post his ACDF C6-7 done 09/17/2018. He states his left arm pain is unchanged. He does not see any difference in it at all and he has more pressure in his left index finger that throbs all the time now. He also has some left anterior chest pain that comes and goes. Right now it is not bothering him. He was in the ER on 09/16/2018 where they ruled out MI and decided it was more muscular. He is swallowing okay. He has tried Lyrica in the past. However, it gave him sexual side effects so he did not really want to take it. Gabapentin made him too sleepy and really did not help. His girlfriend has had side effects from Topamax in the past so he really did not wish to try that. He has been on amitriptyline in the past without much success. He does have sickle cell anemia. He said Tylenol really does not help him that much. He is on Social Security Disability. Kevin Blankenship He is a nonsmoker. PHYSICAL EXAMINATION:  Mr. Chaim Haskins is a 78-year-old male. He is alert and oriented. Normal mood and affect. He has a full weightbearing, nonantalgic gait. No assistive device. He has a normal tandem gait. He has 4/5 strength in bilateral upper extremities. Negative Fran. His anterior cervical incision is healing. It has well-approximated edges. On the medial end of it,  you can tell where it opened up some. It is filling in by secondary intention. There are no signs of infection. I do not feel any stitches sticking out. ASSESSMENT/PLAN:  This is a patient who is 2 weeks out from his ACDF C6-7. He continues to have his left arm pain unchanged from prior to surgery.   He feels like his left index finger is actually throbbing more since surgery. We are going to give him a prednisone pack. We would prefer he not be on NSAID for the first 3 months after surgery, but he needs an anti-inflammatory for his sickle cell anemia. I will start him on Celebrex. I am also going to start him on Gabitril 4 mg and increase it to b.i.d over a week. We will see him back in 4 weeks with Dr. Mohsen Luu at which time we will get a cervical AP and lateral x-ray. Review of systems:    Past Medical History:   Diagnosis Date    Anemia NEC     Bursitis of shoulder, left 08/03/2009    MRI revealed tendinitis and bursitis of the rotator cuff.  DJD (degenerative joint disease) 08/03/2009    Early djd, left radiocarpal joint with associated ganglion cyst.      Elevated white blood cell count 07/20/2009    GERD (gastroesophageal reflux disease)     H/O: rotator cuff tear 06/07/2006    Chronic    Hyperlipidemia     Pain in joint, upper arm 07/2009    Pain in shoulder, elbow and wrist.    Sickle cell anemia (HCC)     Sleep apnea     Does not use CPAP    Testicular failure     Vertigo 07/20/2009     Past Surgical History:   Procedure Laterality Date    HX ADENOIDECTOMY      HX ADENOIDECTOMY      HX CHOLECYSTECTOMY  03/27/2018    HX HEENT      eardrum repair    HX SHOULDER ARTHROSCOPY Right      Social History     Social History    Marital status: SINGLE     Spouse name: N/A    Number of children: N/A    Years of education: N/A     Occupational History    Not on file.      Social History Main Topics    Smoking status: Former Smoker     Types: Cigarettes     Quit date: 7/12/2003    Smokeless tobacco: Never Used    Alcohol use No    Drug use: No    Sexual activity: Yes     Partners: Female     Other Topics Concern    Not on file     Social History Narrative     Family History   Problem Relation Age of Onset    Diabetes Mother     Cancer Father        Physical Exam:  Visit Vitals    /68    Pulse 89    Temp 98.3 °F (36.8 °C)    Resp 18    Ht 5' 7\" (1.702 m)    Wt 137 lb 9.6 oz (62.4 kg)    SpO2 93%    BMI 21.55 kg/m2     Pain Scale: 7/10            has been . reviewed and is appropriate      Diagnoses and all orders for this visit:    1. S/P cervical spinal fusion  -     celecoxib (CELEBREX) 200 mg capsule; Take 1 Cap by mouth daily as needed for Pain for up to 90 days. 2. Neuritis    Other orders  -     tiaGABine (GABITRIL) 4 mg tablet; 1 tab po q hs x 1 week, then 1 tab po bid  -     methylPREDNISolone (MEDROL DOSEPACK) 4 mg tablet; Per dose pack instructions            Follow-up Disposition:  Return in about 4 weeks (around 10/29/2018) for with Dr. Shabana Carballo.         We have informed Maeve  to notify us for immediate appointment if he has any worsening neurogical symptoms or if an emergency situation presents, then call 911

## 2018-10-09 ENCOUNTER — HOSPITAL ENCOUNTER (OUTPATIENT)
Dept: LAB | Age: 48
Discharge: HOME | End: 2018-10-09
Payer: MEDICAID

## 2018-10-09 ENCOUNTER — OFFICE VISIT (OUTPATIENT)
Dept: ORTHOPEDIC SURGERY | Age: 48
End: 2018-10-09

## 2018-10-09 ENCOUNTER — TELEPHONE (OUTPATIENT)
Dept: ORTHOPEDIC SURGERY | Age: 48
End: 2018-10-09

## 2018-10-09 VITALS
SYSTOLIC BLOOD PRESSURE: 127 MMHG | WEIGHT: 136.2 LBS | TEMPERATURE: 98.3 F | RESPIRATION RATE: 18 BRPM | HEART RATE: 80 BPM | DIASTOLIC BLOOD PRESSURE: 74 MMHG | OXYGEN SATURATION: 93 % | BODY MASS INDEX: 21.38 KG/M2 | HEIGHT: 67 IN

## 2018-10-09 DIAGNOSIS — Z98.1 S/P CERVICAL SPINAL FUSION: Primary | ICD-10-CM

## 2018-10-09 DIAGNOSIS — Z98.1 S/P CERVICAL SPINAL FUSION: ICD-10-CM

## 2018-10-09 LAB
BASOPHILS # BLD: 0 K/UL (ref 0–0.06)
BASOPHILS NFR BLD: 0 % (ref 0–3)
CRP SERPL-MCNC: 2.3 MG/DL (ref 0–0.3)
DIFFERENTIAL METHOD BLD: ABNORMAL
EOSINOPHIL # BLD: 1 K/UL (ref 0–0.4)
EOSINOPHIL NFR BLD: 8 % (ref 0–5)
ERYTHROCYTE [DISTWIDTH] IN BLOOD BY AUTOMATED COUNT: 29 % (ref 11.6–14.5)
ERYTHROCYTE [SEDIMENTATION RATE] IN BLOOD: 2 MM/HR (ref 0–15)
HCT VFR BLD AUTO: 21.3 % (ref 36–48)
HGB BLD-MCNC: 7 G/DL (ref 13–16)
LYMPHOCYTES # BLD: 3.2 K/UL (ref 0.8–3.5)
LYMPHOCYTES NFR BLD: 25 % (ref 20–51)
MCH RBC QN AUTO: 25.5 PG (ref 24–34)
MCHC RBC AUTO-ENTMCNC: 32.9 G/DL (ref 31–37)
MCV RBC AUTO: 77.5 FL (ref 74–97)
METAMYELOCYTES NFR BLD MANUAL: 1 %
MONOCYTES # BLD: 1.5 K/UL (ref 0–1)
MONOCYTES NFR BLD: 12 % (ref 2–9)
NEUTS BAND NFR BLD MANUAL: 3 % (ref 0–5)
NEUTS SEG # BLD: 6.3 K/UL (ref 1.8–8)
NEUTS SEG NFR BLD: 46 % (ref 42–75)
NRBC BLD-RTO: 9 PER 100 WBC
OTHER CELLS NFR BLD MANUAL: 5 %
PLATELET # BLD AUTO: 724 K/UL (ref 135–420)
PLATELET COMMENTS,PCOM: ABNORMAL
PMV BLD AUTO: 9.9 FL (ref 9.2–11.8)
RBC # BLD AUTO: 2.75 M/UL (ref 4.7–5.5)
RBC MORPH BLD: ABNORMAL
WBC # BLD AUTO: 12.9 K/UL (ref 4.6–13.2)

## 2018-10-09 PROCEDURE — 85025 COMPLETE CBC W/AUTO DIFF WBC: CPT | Performed by: NURSE PRACTITIONER

## 2018-10-09 PROCEDURE — 36415 COLL VENOUS BLD VENIPUNCTURE: CPT | Performed by: NURSE PRACTITIONER

## 2018-10-09 PROCEDURE — 86140 C-REACTIVE PROTEIN: CPT | Performed by: NURSE PRACTITIONER

## 2018-10-09 PROCEDURE — 85652 RBC SED RATE AUTOMATED: CPT | Performed by: NURSE PRACTITIONER

## 2018-10-09 RX ORDER — MECLIZINE HYDROCHLORIDE 25 MG/1
TABLET ORAL
Refills: 0 | COMMUNITY
Start: 2018-10-03

## 2018-10-09 RX ORDER — CEPHALEXIN 750 MG/1
750 CAPSULE ORAL 3 TIMES DAILY
Qty: 21 CAP | Refills: 0 | Status: SHIPPED | OUTPATIENT
Start: 2018-10-09 | End: 2018-10-09 | Stop reason: DRUGHIGH

## 2018-10-09 RX ORDER — CEPHALEXIN 500 MG/1
500 CAPSULE ORAL 4 TIMES DAILY
Qty: 28 CAP | Refills: 0 | Status: SHIPPED | OUTPATIENT
Start: 2018-10-09 | End: 2018-10-16 | Stop reason: SDUPTHER

## 2018-10-09 NOTE — TELEPHONE ENCOUNTER
-called patient and voicemail did not identify patient. A message was left for patient to call [phone] at [address] [place] [address] in regards to the patient's message.

## 2018-10-09 NOTE — MR AVS SNAPSHOT
[address] [facility] 
 
 
 [address] [place] [address] 
[phone] Patient: Randall Pratt MRN: [id number] FZO:[id number] Visit Information Date & Time Provider Department Dept. Phone Encounter #  
 10/9/2018  9:30 AM Audra Klinefelter, NP [facility] [phone] [id number] Follow-up Instructions Return in about 1 week (around 10/16/2018) for I have a 1010 next tuesday . Follow-up and Disposition History Your Appointments 10/30/2018 11:15 AM  
POST OP with Larene Simmonds, MD  
[address] [facility], [address] [facility] ([facility]) Appt Note: 6wk fu sx 9-17  
 [address] [facility] [id number] [phone]  
  
   
 [address] [facility] [address] Upcoming Health Maintenance Date Due Pneumococcal 19-64 Highest Risk (3 of 3 - PCV13) 10/15/2017 Influenza Age 5 to Adult 8/1/2018 DTaP/Tdap/Td series (2 - Td) 10/15/2022 Allergies as of 10/9/2018  Review Complete On: 10/9/2018 By: Merlinda Motes, LPN Severity Noted Reaction Type Reactions Other Food  01/16/2018    Other (comments) Walnuts. Gets headaches [place]  01/16/2018    Other (comments) Told by  allergic Shellfish Derived  01/16/2018    Swelling Swelling. States okay with betadine Current Immunizations  Never Reviewed Name Date  Hib (PRP-OMP) 10/19/2007 12:00 AM  
 Influenza Vaccine 11/24/2017 12:00 AM, 10/14/2016 12:00 AM, 10/15/2015 12:00 AM, 10/17/2014 12:00 AM, 10/19/2007 12:00 AM, 10/25/2006 12:00 AM, 10/25/2005 12:00 AM  
 Meningococcal (MCV4P) Vaccine 8/31/2016 12:00 AM, 10/19/2007 12:00 AM  
 Pneumococcal Conjugate (PCV-7) 10/17/2014 12:00 AM  
 Pneumococcal Polysaccharide (PPSV-23) 10/15/2016 12:00 AM, 8/31/2016 12:00 AM, 10/19/2007 12:00 AM  
 TB Skin Test (PPD) Intradermal 4/16/2012 12:00 AM  
 Tdap 10/15/2012 12:00 AM  
 Not reviewed this visit You Were Diagnosed With   
  
 Codes Comments S/P cervical spinal fusion    -  Primary ICD-10-CM: Z98.1 ICD-9-CM: V45.4 Vitals BP Pulse Temp Resp Height(growth percentile) Weight(growth percentile) 127/74 80 98.3 °F (36.8 °C) (Oral) 18 5' 7\" (1.702 m) 136 lb 3.2 oz (61.8 kg) SpO2 BMI Smoking Status 93% 21.33 kg/m2 Former Smoker Vitals History BMI and BSA Data Body Mass Index Body Surface Area  
 21.33 kg/m 2 1.71 m 2 Preferred Pharmacy Pharmacy Name Phone [facility]  [facility] [phone] Your Updated Medication List  
  
   
This list is accurate as of 10/9/18  9:42 AM.  Always use your most recent med list.  
  
  
  
  
 acetaminophen 325 mg tablet Commonly known as:  TYLENOL Take 650 mg by mouth every six (6) hours as needed for Pain or Fever. ALAVERT PO Take  by mouth. celecoxib 200 mg capsule Commonly known as:  CeleBREX Take 1 Cap by mouth daily as needed for Pain for up to 90 days. cephalexin 750 mg capsule Commonly known as:  Karsten Pile Take 1 Cap by mouth three (3) times daily. Indications: Skin and Skin Structure Infection  
  
 meclizine 25 mg tablet Commonly known as:  ANTIVERT  
take 1 tablet by mouth every 8 hours if needed for VERTIGO  
  
 multivitamin tablet Commonly known as:  ONE A DAY Take 1 Tab by mouth daily. naproxen 250 mg tablet Commonly known as:  NAPROSYN Take  by mouth two (2) times daily (with meals). oxyCODONE-acetaminophen  mg per tablet Commonly known as:  PERCOCET Take 1 Tab by mouth every six (6) hours as needed for Pain. Max Daily Amount: 4 Tabs. OXYGEN-AIR DELIVERY SYSTEMS  
2 L by Nasal route as needed (shortness of breath). P-COL RITE 8.6-50 mg per tablet Generic drug:  senna-docusate Take 1 Tab by mouth daily as needed for Constipation. * pregabalin 75 mg capsule Commonly known as:  Kolby Min Take 1 Cap by mouth two (2) times a day. Max Daily Amount: 150 mg.  
  
 * pregabalin 150 mg capsule Commonly known as:  Kolby Lirodger Take 1 Cap by mouth two (2) times a day. Max Daily Amount: 300 mg. SUMAtriptan 50 mg tablet Commonly known as:  IMITREX  
  
 tiaGABine 4 mg tablet Commonly known as:  GABITRIL  
1 tab po q hs x 1 week, then 1 tab po bid  
  
 valACYclovir 1 gram tablet Commonly known as:  VALTREX Take 1 Tab by mouth daily as needed (cold sores). * Notice: This list has 2 medication(s) that are the same as other medications prescribed for you. Read the directions carefully, and ask your doctor or other care provider to review them with you. Prescriptions Sent to Pharmacy Refills  
 cephalexin (KEFLEX) 750 mg capsule 0 Sig: Take 1 Cap by mouth three (3) times daily. Indications: Skin and Skin Structure Infection Class: Normal  
 Pharmacy: [facility] , [address] [place] [address] #: [phone] Route: Oral  
  
Follow-up Instructions Return in about 1 week (around 10/16/2018) for I have a 1010 next tuesday . To-Do List   
 10/09/2018 Lab:  C REACTIVE PROTEIN, QT   
  
 10/09/2018 Lab:  CBC WITH AUTOMATED DIFF   
  
 10/09/2018 Lab:  SED RATE (ESR) Introducing [facility]! [employer] introduces mygola patient portal. Now you can access parts of your medical record, email your doctor's office, and request medication refills online. 1. In your internet browser, go to [email or web address] 2. Click on the First Time User? Click Here link in the Sign In box. You will see the New Member Sign Up page. 3. Enter your mygola Access Code exactly as it appears below. You will not need to use this code after youve completed the sign-up process. If you do not sign up before the expiration date, you must request a new code. · Your Policy Manager Access Code: [email or web address] Expires: 10/14/2018  3:13 PM 
 
4. Enter the last four digits of your Social Security Number (xxxx) and Date of Birth (mm/dd/yyyy) as indicated and click Submit. You will be taken to the next sign-up page. 5. Create a Your Policy Manager ID. This will be your Your Policy Manager login ID and cannot be changed, so think of one that is secure and easy to remember. 6. Create a Your Policy Manager password. You can change your password at any time. 7. Enter your Password Reset Question and Answer. This can be used at a later time if you forget your password. 8. Enter your e-mail address. You will receive e-mail notification when new information is available in [address] 9. Click Sign Up. You can now view and download portions of your medical record. 10. Click the Download Summary menu link to download a portable copy of your medical information. If you have questions, please visit the Frequently Asked Questions section of the Your Policy Manager website. Remember, Your Policy Manager is NOT to be used for urgent needs. For medical emergencies, dial 911. Now available from your iPhone and Android! Please provide this summary of care documentation to your next provider. Your primary care clinician is listed as Shoaib Prieto. If you have any questions after today's visit, please call [phone].

## 2018-10-09 NOTE — PROGRESS NOTES
[address]  [place], [address] [place] [address] Street  Phone: [phone]  Fax: [phone]    Spine Post-Op Office Visit Note    Patient: Effie Spence   MRN: [id number]     Age:  50 y.o.,      Sex: male    YOB: 1970     PCP: Ellis Fitzgerald MD    HISTORY OF PRESENT ILLNESS:  Chief Complaint   Patient presents with    Neck Pain    Hand Pain     Left     Surgical Follow-up     ACDF C6/7 09/17/18     Grace Del Toro is a 50 y.o.  male with history of an ACDF C6-7 on 9/17/18. He was last seen with Alice. His left arm pain had not canged. He could not see any difference. He had more pressure in his left index finger which he described as throbbing. He had been seen in the ER for chest pain. They ruled out a MI and decided it was more muscular. He has tried Lyrica and Gabapentin in the past. He did not wish to try Topamax. He has been on amitriptyline in the past without much success. He does have sickle cell anemia. He was given a Prednisone pack at his last visit. He was to Avoid NSAIDS for 3 months. He was started on Gabitril 4 mg BID. Today, he states his incision opened up 1 week ago when the glue fell off. He went to the ER over the weekend and they said FU with us. The incision is open on the left side. No drainage today although the patient states it will drain yellow. He denies a fever. The incision is tender. Patient denies any bladder/bowel dysfunction, new onset weakness, or other neurological deficits. ASSESSMENT   Diagnoses and all orders for this visit:    1. S/P cervical spinal fusion  -     cephalexin (KEFLEX) 750 mg capsule; Take 1 Cap by mouth three (3) times daily. Indications: Skin and Skin Structure Infection          IMPRESSION AND PLAN   1) Pt was given information on cervical fusion. 2) One week of Keflex, Dr. Joaquin Avila into see the patient.  We will give one week of Keflex, get lab work, CBC, C reactive protein, ESR, FU in one week. 3) Wound care and activity level reviewed. 4) Mr. Silvia Leong has a reminder for a \"due or due soon\" health maintenance. I have asked that he contact his primary care provider, Leatha Hartley MD, for follow-up on this health maintenance. 5) We have informed the patient to notify us for immediate appointment if he has any worsening neurogical symptoms or if an emergency situation presents, then call 911  6)  demonstrated consistency with prescribing. 7) Pt will follow-up in 1 week. SUBJECTIVE    Pain Scale: 4/10    Pain Assessment  10/9/2018   Location of Pain Neck   Location Modifiers -   Severity of Pain 6   Quality of Pain Sharp;Burning; Other (Comment)   Quality of Pain Comment \"numbness/tingling\"   Duration of Pain Persistent   Duration of Pain Comment -   Frequency of Pain Constant   Aggravating Factors -   Aggravating Factors Comment -   Limiting Behavior -   Relieving Factors -   Relieving Factors Comment -   Result of Injury No     Reports symptoms have improved since surgery. Patient denies  difficulties swallowing. Reports no fever. Review of systems  Constitutional: Negative for fever, chills, or weight change. Respiratory: Negative for cough or shortness of breath. Cardiovascular: Negative for chest pain or palpitations. Gastrointestinal: Negative for acid reflux, change in bowel habits, or constipation. Genitourinary: Negative for dysuria and flank pain. Musculoskeletal: Positive for incisional pain. Skin: Negative for rash. Neurological: Negative for headaches, dizziness, or numbness. Endo/Heme/Allergies: Negative for increased bruising. Psychiatric/Behavioral: Negative for difficulty with sleep. Past Medical History:   Diagnosis Date    Anemia NEC     Bursitis of shoulder, left 08/03/2009    MRI revealed tendinitis and bursitis of the rotator cuff.       DJD (degenerative joint disease) 08/03/2009    Early djd, left radiocarpal joint with associated ganglion cyst.      Elevated white blood cell count 07/20/2009    GERD (gastroesophageal reflux disease)     H/O: rotator cuff tear 06/07/2006    Chronic    Hyperlipidemia     Pain in joint, upper arm 07/2009    Pain in shoulder, elbow and wrist.    Sickle cell anemia (HCC)     Sleep apnea     Does not use CPAP    Testicular failure     Vertigo 07/20/2009       Past Surgical History:   Procedure Laterality Date    HX ADENOIDECTOMY      HX ADENOIDECTOMY      HX CERVICAL FUSION  09/17/2018    ACDF C6/7    HX CHOLECYSTECTOMY  03/27/2018    HX HEENT      eardrum repair    HX SHOULDER ARTHROSCOPY Right        Current Outpatient Prescriptions   Medication Sig Dispense Refill    meclizine (ANTIVERT) 25 mg tablet take 1 tablet by mouth every 8 hours if needed for VERTIGO  0    cephalexin (KEFLEX) 750 mg capsule Take 1 Cap by mouth three (3) times daily. Indications: Skin and Skin Structure Infection 21 Cap 0    celecoxib (CELEBREX) 200 mg capsule Take 1 Cap by mouth daily as needed for Pain for up to 90 days. 30 Cap 0    tiaGABine (GABITRIL) 4 mg tablet 1 tab po q hs x 1 week, then 1 tab po bid 60 Tab 1    OXYGEN-AIR DELIVERY SYSTEMS 2 L by Nasal route as needed (shortness of breath).  acetaminophen (TYLENOL) 325 mg tablet Take 650 mg by mouth every six (6) hours as needed for Pain or Fever.  senna-docusate (P-COL RITE) 8.6-50 mg per tablet Take 1 Tab by mouth daily as needed for Constipation.  SUMAtriptan (IMITREX) 50 mg tablet       loratadine (ALAVERT PO) Take  by mouth.  valACYclovir (VALTREX) 1 gram tablet Take 1 Tab by mouth daily as needed (cold sores). 0    naproxen (NAPROSYN) 250 mg tablet Take  by mouth two (2) times daily (with meals).  oxyCODONE-acetaminophen (PERCOCET)  mg per tablet Take 1 Tab by mouth every six (6) hours as needed for Pain. Max Daily Amount: 4 Tabs.  20 Tab 0    pregabalin (LYRICA) 75 mg capsule Take 1 Cap by mouth two (2) times a day. Max Daily Amount: 150 mg. 14 Cap 0    pregabalin (LYRICA) 150 mg capsule Take 1 Cap by mouth two (2) times a day. Max Daily Amount: 300 mg. 60 Cap 2    multivitamin (ONE A DAY) tablet Take 1 Tab by mouth daily. Allergies   Allergen Reactions    Other Food Other (comments)     Walnuts. Gets headaches    Corn Other (comments)     Told by  allergic    Shellfish Derived Swelling     Swelling. States okay with betadine            OBJECTIVE    Vitals:    10/09/18 0913   BP: 127/74   Pulse: 80   Resp: 18   Temp: 98.3 °F (36.8 °C)   TempSrc: Oral   SpO2: 93%   Weight: 136 lb 3.2 oz (61.8 kg)   Height: 5' 7\" (1.702 m)   PainSc:   4   PainLoc: Neck       Physical Exam:  General: alert, cooperative, no distress, appears stated age  Constitutional:  Well developed, well nourished, in no acute distress. Psychiatric: Affect and mood are appropriate. Integumentary: No rashes or abrasions noted on exposed areas. Wound: Incision: no erythema, warmth, drainage or signs of infection. It is opened on the left side. There is yellow-white tissue. No drainage today. The area is about 1 cm wide by 2 cm long. Cardiovascular/Peripheral Vascular: No peripheral edema is noted. Lymphatic:  No evidence of lymphedema. No cervical lymphadenopathy. MOTOR    Biceps  Triceps Deltoids Wrist Ext Wrist Flex Hand Intrin   Right +4/5 +4/5 +4/5 +4/5 +4/5 +4/5   Left +4/5 +4/5 +4/5 +4/5 +4/5 +4/5     normal gait and station      Ambulation without assistive device. full weight bearing, non-antalgic gait. Accompanied by spouse.       Isaías Manning NP  October 9, 2018  9:14 AM

## 2018-10-09 NOTE — TELEPHONE ENCOUNTER
Post op pt:    Pt was seen today per pharmacist prior auth is needed for the Cephalexin 750 mg due to the strength. She said if it was the lower mg's it would be covered with no problem.     Please call pharmacy [facility] [phone]      Pt M#[phone]

## 2018-10-16 ENCOUNTER — OFFICE VISIT (OUTPATIENT)
Dept: ORTHOPEDIC SURGERY | Age: 48
End: 2018-10-16

## 2018-10-16 VITALS
SYSTOLIC BLOOD PRESSURE: 123 MMHG | BODY MASS INDEX: 21.86 KG/M2 | RESPIRATION RATE: 19 BRPM | DIASTOLIC BLOOD PRESSURE: 65 MMHG | WEIGHT: 139.6 LBS | OXYGEN SATURATION: 93 % | TEMPERATURE: 98.6 F | HEART RATE: 85 BPM

## 2018-10-16 DIAGNOSIS — Z98.1 S/P CERVICAL SPINAL FUSION: Primary | ICD-10-CM

## 2018-10-16 DIAGNOSIS — M50.223 HERNIATED NUCLEUS PULPOSUS, C6-7: ICD-10-CM

## 2018-10-16 RX ORDER — CEPHALEXIN 500 MG/1
500 CAPSULE ORAL 4 TIMES DAILY
Qty: 28 CAP | Refills: 0 | Status: SHIPPED | OUTPATIENT
Start: 2018-10-16 | End: 2020-01-13

## 2018-10-16 NOTE — MR AVS SNAPSHOT
[address] [place] [address] 
 
 
 [address] [facility] [address] 
[phone] Patient: Christian Ribeiro MRN: [id number] THQ:[birth year] Visit Information Date & Time Provider Department Dept. Phone Encounter #  
 10/16/2018 10:10 AM Fredo Carlin NP [facility] [phone] [id number] Follow-up Instructions Return in about 1 week (around 10/23/2018) for fly lawsonesday . Follow-up and Disposition History Your Appointments 10/30/2018 11:15 AM  
POST OP with Summer Meehan MD  
[facility] [facility] ([facility]) Appt Note: 6wk fu sx 9-17  
 [address] [facility] [address]  
[phone]  
  
   
 [address] [facility] [address] Upcoming Health Maintenance Date Due Pneumococcal 19-64 Highest Risk (3 of 3 - PCV13) 10/15/2017 Influenza Age 5 to Adult 8/1/2018 DTaP/Tdap/Td series (2 - Td) 10/15/2022 Allergies as of 10/16/2018  Review Complete On: 10/16/2018 By: Priya Herrera Severity Noted Reaction Type Reactions Other Food  01/16/2018    Other (comments) Walnuts. Gets headaches [place]  01/16/2018    Other (comments) Told by  allergic Shellfish Derived  01/16/2018    Swelling Swelling. States okay with betadine Current Immunizations  Never Reviewed Name Date  Hib (PRP-OMP) 10/19/2007 12:00 AM  
 Influenza Vaccine 11/24/2017 12:00 AM, 10/14/2016 12:00 AM, 10/15/2015 12:00 AM, 10/17/2014 12:00 AM, 10/19/2007 12:00 AM, 10/25/2006 12:00 AM, 10/25/2005 12:00 AM  
 Meningococcal (MCV4P) Vaccine 8/31/2016 12:00 AM, 10/19/2007 12:00 AM  
 Pneumococcal Conjugate (PCV-7) 10/17/2014 12:00 AM  
 Pneumococcal Polysaccharide (PPSV-23) 10/15/2016 12:00 AM, 8/31/2016 12:00 AM, 10/19/2007 12:00 AM  
 TB Skin Test (PPD) Intradermal 4/16/2012 12:00 AM  
 Tdap 10/15/2012 12:00 AM  
  
 Not reviewed this visit You Were Diagnosed With   
  
 Codes Comments HNP (herniated nucleus pulposus), cervical    -  Primary ICD-10-CM: M50.20 ICD-9-CM: 722.0 S/P cervical spinal fusion     ICD-10-CM: Z98.1 ICD-9-CM: V45.4 Vitals BP Pulse Temp Resp Weight(growth percentile) SpO2  
 123/65 (BP 1 Location: Left arm, BP Patient Position: Sitting) 85 98.6 °F (37 °C) (Oral) 19 139 lb 9.6 oz (63.3 kg) 93% BMI Smoking Status 21.86 kg/m2 Former Smoker BMI and BSA Data Body Mass Index Body Surface Area  
 21.86 kg/m 2 1.73 m 2 Preferred Pharmacy Pharmacy Name Phone [facility]  [facility] [phone] Your Updated Medication List  
  
   
This list is accurate as of 10/16/18 11:08 AM.  Always use your most recent med list.  
  
  
  
  
 acetaminophen 325 mg tablet Commonly known as:  TYLENOL Take 650 mg by mouth every six (6) hours as needed for Pain or Fever. ALAVERT PO Take  by mouth. celecoxib 200 mg capsule Commonly known as:  CeleBREX Take 1 Cap by mouth daily as needed for Pain for up to 90 days. cephALEXin 500 mg capsule Commonly known as:  Lucie Heady Take 1 Cap by mouth four (4) times daily. Indications: Skin and Skin Structure Infection  
  
 meclizine 25 mg tablet Commonly known as:  ANTIVERT  
take 1 tablet by mouth every 8 hours if needed for VERTIGO  
  
 multivitamin tablet Commonly known as:  ONE A DAY Take 1 Tab by mouth daily. naproxen 250 mg tablet Commonly known as:  NAPROSYN Take  by mouth two (2) times daily (with meals). oxyCODONE-acetaminophen  mg per tablet Commonly known as:  PERCOCET Take 1 Tab by mouth every six (6) hours as needed for Pain. Max Daily Amount: 4 Tabs. OXYGEN-AIR DELIVERY SYSTEMS  
2 L by Nasal route as needed (shortness of breath). P-COL RITE 8.6-50 mg per tablet Generic drug:  senna-docusate Take 1 Tab by mouth daily as needed for Constipation. * pregabalin 75 mg capsule Commonly known as:  Donata Shackleton Take 1 Cap by mouth two (2) times a day. Max Daily Amount: 150 mg.  
  
 * pregabalin 150 mg capsule Commonly known as:  Donata Shackleton Take 1 Cap by mouth two (2) times a day. Max Daily Amount: 300 mg. SUMAtriptan 50 mg tablet Commonly known as:  IMITREX  
  
 tiaGABine 4 mg tablet Commonly known as:  GABITRIL  
1 tab po q hs x 1 week, then 1 tab po bid  
  
 valACYclovir 1 gram tablet Commonly known as:  VALTREX Take 1 Tab by mouth daily as needed (cold sores). * Notice: This list has 2 medication(s) that are the same as other medications prescribed for you. Read the directions carefully, and ask your doctor or other care provider to review them with you. Prescriptions Sent to Pharmacy Refills  
 cephALEXin (KEFLEX) 500 mg capsule 0 Sig: Take 1 Cap by mouth four (4) times daily. Indications: Skin and Skin Structure Infection Class: Normal  
 Pharmacy: [facility] #: [phone] Route: Oral  
  
We Performed the Following AMB POC XRAY, SPINE, CERVICAL; 2 OR 3 [04161 CPT(R)] Follow-up Instructions Return in about 1 week (around 10/23/2018) for buttery, tuesday . To-Do List   
 10/16/2018 Imaging:  CT NECK SOFT TISSUE WO CONT   
  
 10/16/2018 2:00 PM  
  Appointment with [facility] CT RM 1 at [facility] CT ([phone]) GENERAL INSTRUCTIONS  This study does not require you to drink contrast prior to your study. Drink plenty of fluids prior to your exam.   RELATED STUDY INFORMATION  Bring any films, CD's, and reports related with you on the day of your exam.  This only includes studies done outside of [address] [place] [address], [facility], [place], and [place]. QUESTIONS  Notify the CT Department if you have any questions concerning your study. Nadya Anthony - [phone] [facility] John Solorio - [phone] Introducing [facility]! Jayda Samuels introduces [employer] patient portal. Now you can access parts of your medical record, email your doctor's office, and request medication refills online. 1. In your internet browser, go to [email or web address] 2. Click on the First Time User? Click Here link in the Sign In box. You will see the New Member Sign Up page. 3. Enter your [employer] Access Code exactly as it appears below. You will not need to use this code after youve completed the sign-up process. If you do not sign up before the expiration date, you must request a new code. · [employer] Access Code: [email or web address] Expires: 1/13/2019 11:14 AM 
 
4. Enter the last four digits of your Social Security Number (xxxx) and Date of Birth (mm/dd/yyyy) as indicated and click Submit. You will be taken to the next sign-up page. 5. Create a [employer] ID. This will be your [employer] login ID and cannot be changed, so think of one that is secure and easy to remember. 6. Create a [employer] password. You can change your password at any time. 7. Enter your Password Reset Question and Answer. This can be used at a later time if you forget your password. 8. Enter your e-mail address. You will receive e-mail notification when new information is available in [address] 9. Click Sign Up. You can now view and download portions of your medical record. 10. Click the Download Summary menu link to download a portable copy of your medical information. If you have questions, please visit the Frequently Asked Questions section of the [employer] website. Remember, [employer] is NOT to be used for urgent needs. For medical emergencies, dial 911. Now available from your iPhone and Android! Please provide this summary of care documentation to your next provider. Your primary care clinician is listed as Ralph [facility]. If you have any questions after today's visit, please call [phone].

## 2018-10-16 NOTE — PROGRESS NOTES
[address], [address]  [place], [address] [place] [address] Street  Phone: [phone]  Fax: [phone]    Viji Zapata  : 1970  PCP: Marolyn Fabry, MD    PROGRESS NOTE    HISTORY OF PRESENT ILLNESS:  Chief Complaint   Patient presents with    Neck Pain     fu    Back Pain      Yolanda Gayle is a 50 y.o.  male with history of an ACDF C6-7 on 18. His left arm pain had not canged. He could not see any difference. He had more pressure in his left index finger which he described as throbbing. He had been seen in the ER for chest pain. They ruled out a MI and decided it was more muscular. He has tried Lyrica and Gabapentin in the past. He did not wish to try Topamax. He has been on amitriptyline in the past without much success.   He does have sickle cell anemia. He was given a Prednisone pack at his last visit. He was to Avoid NSAIDS for 3 months. He was started on Gabitril 4 mg BID. I saw him one week ago for his incision. The glue had fallen off and the incision was slightly open on the left side. He was given Keflex and was to have lab work done. Today, his WBC was 12.9, sed rate 2 and C reactive protein 2.3. The incision remains open on the left. He states it does drain a bloody drainage when he showers. I do not see any drainage today. The area is tender. he has been on Kelfelx for one week. He denies any swallowing issues or dysphagia. Denies bladder/bowel dysfunction, saddle paresthesia, weakness, gait disturbance, or other neurological deficit. Pt at this time desires to  continue with current care/proceed with medication evaluation. ASSESSMENT  50 y.o. male with cervical pain. Diagnoses and all orders for this visit:    1. HNP (herniated nucleus pulposus), cervical  -     AMB POC XRAY, SPINE, CERVICAL; 2 OR 3    2.  S/P cervical spinal fusion  -     AMB POC XRAY, SPINE, CERVICAL; 2 OR 3       IMPRESSION/PLAN    1) Pt was given information on cervical pain. 2) Dr. aGrrett Koch into see the patient. 2 view C xray obtained, fusion looks good. Appropriate alignment and fixation. There is some post operative swelling and the patient has an elevated C reactive protein at 2.3. For this reason, we are ordering a CT of the neck soft tissue w/o contrast as patient as a shellfish allergy. He is not having any trouble swallowing, no fever, normal white blood cell count and diff, normal ESR. We got a PA and an apt scheduled for today at 2 pm for the CT. After telling the patient, he states he can not guarantee he will make this apt as they are hungry and need to eat. His ride stated she has to  some things for work that have to be kept cold and they have to be dropped off in . They are unsure if they will be able to get to the appointment at 1:30. I stressed the importance of this scan and that we did want it stat and had it scheduled for today at 2 pm. Patient is going out of town on Thursday. 3) continue Keflex. 4) Mr. Ashlyn Jalloh has a reminder for a \"due or due soon\" health maintenance. I have asked that he contact his primary care provider, Elizabeth Edmonds MD, for follow-up on this health maintenance. 5) We have informed patient to notify us for immediate appointment if he has any worsening neurogical symptoms or if an emergency situation presents, then call 911  6) Pt will follow-up in 1 week.  is appropriate. PAST MEDICAL HISTORY  Past Medical History:   Diagnosis Date    Anemia NEC     Bursitis of shoulder, left 08/03/2009    MRI revealed tendinitis and bursitis of the rotator cuff.       DJD (degenerative joint disease) 08/03/2009    Early djd, left radiocarpal joint with associated ganglion cyst.      Elevated white blood cell count 07/20/2009    GERD (gastroesophageal reflux disease)     H/O: rotator cuff tear 06/07/2006    Chronic    Hyperlipidemia     Pain in joint, upper arm 07/2009    Pain in shoulder, elbow and wrist.    Sickle cell anemia (HCC)     Sleep apnea     Does not use CPAP    Testicular failure     Vertigo 07/20/2009        MEDICATIONS  Current Outpatient Prescriptions   Medication Sig Dispense Refill    cephALEXin (KEFLEX) 500 mg capsule Take 1 Cap by mouth four (4) times daily. 28 Cap 0    celecoxib (CELEBREX) 200 mg capsule Take 1 Cap by mouth daily as needed for Pain for up to 90 days. 30 Cap 0    tiaGABine (GABITRIL) 4 mg tablet 1 tab po q hs x 1 week, then 1 tab po bid 60 Tab 1    OXYGEN-AIR DELIVERY SYSTEMS 2 L by Nasal route as needed (shortness of breath).  acetaminophen (TYLENOL) 325 mg tablet Take 650 mg by mouth every six (6) hours as needed for Pain or Fever.  senna-docusate (P-COL RITE) 8.6-50 mg per tablet Take 1 Tab by mouth daily as needed for Constipation.  SUMAtriptan (IMITREX) 50 mg tablet       valACYclovir (VALTREX) 1 gram tablet Take 1 Tab by mouth daily as needed (cold sores). 0    multivitamin (ONE A DAY) tablet Take 1 Tab by mouth daily.  meclizine (ANTIVERT) 25 mg tablet take 1 tablet by mouth every 8 hours if needed for VERTIGO  0    oxyCODONE-acetaminophen (PERCOCET)  mg per tablet Take 1 Tab by mouth every six (6) hours as needed for Pain. Max Daily Amount: 4 Tabs. 20 Tab 0    loratadine (ALAVERT PO) Take  by mouth.  pregabalin (LYRICA) 75 mg capsule Take 1 Cap by mouth two (2) times a day. Max Daily Amount: 150 mg. 14 Cap 0    pregabalin (LYRICA) 150 mg capsule Take 1 Cap by mouth two (2) times a day. Max Daily Amount: 300 mg. 60 Cap 2    naproxen (NAPROSYN) 250 mg tablet Take  by mouth two (2) times daily (with meals). ALLERGIES  Allergies   Allergen Reactions    Other Food Other (comments)     Walnuts. Gets headaches    Corn Other (comments)     Told by  allergic    Shellfish Derived Swelling     Swelling.  States okay with betadine       SOCIAL HISTORY    Social History     Social History    Marital status: SINGLE     Spouse name: N/A    Number of children: N/A    Years of education: N/A     Occupational History    Not on file. Social History Main Topics    Smoking status: Former Smoker     Types: Cigarettes     Quit date: 7/12/2003    Smokeless tobacco: Never Used    Alcohol use No    Drug use: No    Sexual activity: Yes     Partners: Female     Other Topics Concern    Not on file     Social History Narrative       SUBJECTIVE      Pain Scale: 5/10    Pain Assessment  10/16/2018   Location of Pain Back;Neck;Hand   Location Modifiers Left   Severity of Pain 5   Quality of Pain Aching   Quality of Pain Comment -   Duration of Pain -   Duration of Pain Comment -   Frequency of Pain Constant   Aggravating Factors (No Data)   Aggravating Factors Comment pain is just there   Limiting Behavior -   Relieving Factors Nothing   Relieving Factors Comment -   Result of Injury -       Accompanied by family member. REVIEW OF SYSTEMS  ROS    Constitutional: Negative for fever, chills, or weight change. Respiratory: Negative for cough or shortness of breath. Cardiovascular: Negative for chest pain or palpitations. Gastrointestinal: Negative for acid reflux, change in bowel habits, or constipation. Genitourinary: Negative for incontinence, dysuria and flank pain. Musculoskeletal: Positive for cervical pain. Skin: Negative for rash. Neurological: Negative for headaches, dizziness, or numbness. Endo/Heme/Allergies: Negative . Psychiatric/Behavioral: Negative. PHYSICAL EXAMINATION  Visit Vitals    /65 (BP 1 Location: Left arm, BP Patient Position: Sitting)    Pulse 85    Temp 98.6 °F (37 °C) (Oral)    Resp 19    Wt 139 lb 9.6 oz (63.3 kg)    SpO2 93%  Comment: o2 93%    BMI 21.86 kg/m2       Constitutional: Well developed,  well nourished,  awake, alert, and in no acute distress. Neurological:  Sensation to light touch is intact. Psychiatric: Affect and mood are appropriate. Integumentary: No rashes or abrasions noted on exposed areas,  warm, dry and intact. Cardiovascular/Peripheral Vascular:  No peripheral edema is noted. Lymphatic:  No evidence of lymphedema. No cervical lymphadenopathy. SPINE/MUSCULOSKELETAL EXAM    Cervical spine:  Neck is midline. Normal muscle tone. No focal atrophy is noted. Shoulder ROM intact. Tenderness to palpation to his cervical incision. Negative Spurling's sign. Negative Tinel's sign. Negative Tomas's sign. MOTOR    Biceps  Triceps Deltoids Wrist Ext Wrist Flex Hand Intrin   Right +4/5 +4/5 +4/5 +4/5 +4/5 +4/5   Left +4/5 +4/5 +4/5 +4/5 +4/5 +4/5     normal gait and station    Ambulation without assistive device. full weight bearing, non-antalgic gait. Wound: Incision: no erythema, warmth, drainage or signs of infection. It is opened on the left side. There is yellow-white tissue. No drainage today. The area is about 1 cm wide by 2 cm long. No change from last week.     Adeola Linton, NP

## 2018-10-24 ENCOUNTER — DOCUMENTATION ONLY (OUTPATIENT)
Dept: ORTHOPEDIC SURGERY | Age: 48
End: 2018-10-24

## 2018-10-24 ENCOUNTER — HOSPITAL ENCOUNTER (OUTPATIENT)
Dept: CT IMAGING | Age: 48
Discharge: HOME | End: 2018-10-24
Attending: NURSE PRACTITIONER
Payer: MEDICAID

## 2018-10-24 DIAGNOSIS — Z98.1 S/P CERVICAL SPINAL FUSION: ICD-10-CM

## 2018-10-24 DIAGNOSIS — M50.223 HERNIATED NUCLEUS PULPOSUS, C6-7: ICD-10-CM

## 2018-10-24 PROCEDURE — 70490 CT SOFT TISSUE NECK W/O DYE: CPT

## 2018-10-24 NOTE — PROGRESS NOTES
Correction, patient should keep FU tomorrow for wound check. I thought he had an apt on Friday but it is on 10/30. Should keep both apts. No action needed.

## 2018-10-24 NOTE — PROGRESS NOTES
[facility]. Will FU as scheduled Friday.

## 2018-10-24 NOTE — PROGRESS NOTES
Reviewed CT with Dr. Lalit Khan. Patient can follow up as scheduled this week. Please cancel tomorrows apt and keep Fridays apt.

## 2018-10-25 ENCOUNTER — OFFICE VISIT (OUTPATIENT)
Dept: ORTHOPEDIC SURGERY | Age: 48
End: 2018-10-25

## 2018-10-25 VITALS
TEMPERATURE: 98.6 F | RESPIRATION RATE: 18 BRPM | WEIGHT: 140.4 LBS | SYSTOLIC BLOOD PRESSURE: 136 MMHG | DIASTOLIC BLOOD PRESSURE: 79 MMHG | HEIGHT: 67 IN | BODY MASS INDEX: 22.03 KG/M2 | OXYGEN SATURATION: 94 % | HEART RATE: 93 BPM

## 2018-10-25 DIAGNOSIS — M50.223 HERNIATED NUCLEUS PULPOSUS, C6-7: Primary | ICD-10-CM

## 2018-10-25 DIAGNOSIS — Z98.1 S/P CERVICAL SPINAL FUSION: ICD-10-CM

## 2018-10-25 NOTE — PROGRESS NOTES
[address]  [address], [address]  [place]  Phone: [phone]  Fax: [phone]  PROGRESS NOTE-Post-op  Patient: Mars Castillo                MRN: [id number]       SSN: [id number]  YOB: 1970        AGE: [address] y.o. SEX: male  Body mass index is 21.99 kg/m². PCP: Rey Cabrera MD  10/25/18    Chief Complaint   Patient presents with    Neck Pain    Surgical Follow-up       HISTORY OF PRESENT ILLNESS:  Mars Castillo is a [address] y.o. male with history of neck pain and arm pain LUE who had an ACDF C6-7 surgery 5 weeks for HNP. Lovetta Blaze Pain prior to surgery was in the LUE in C7 distribution from neck to hand. He is here for a wound check. His incision has been open with some drainage. He got some lab work a couple weeks ago with slightly elevated WBC. He had a CT and x-rays, which looked ok. He was given some Keflex and completed that. Today, the incision looks good. It is closed with a small area of thin skin that is still healing. There is no drainage or pain. He is afebrile and feeling well. Pain is described as numbing. Pain is worse with looking up, looking down and affects recreational activities. Pain is better with relaxation. Neck pain and arm pain has improved since surgery. His pain is now some residual left hand numbness. Patient denies any fevers, wound drainage, bladder/bowel dysfunction, new onset weakness, saddle paresthesia, new onset radiculopathy or nerve pain, or other neurological deficits. Reports that he is swallowing without difficulty. Pt desires to continue with evaluation of neck pain and postoperative care. Current Medications: Gabitril 4mg BID, Celebrex 200mg PRN with moderate, relief. He is on Meclizine for vertigo    ASSESSMENT   Diagnoses and all orders for this visit:    1. Herniated nucleus pulposus, C6-7    2.  S/P cervical spinal fusion         IMPRESSION AND PLAN:  This is a pt who had a ACDF surgery 5 weeks ago. He is doing well. He no longer has any radicular pain, just some hand numbness. His incision is healing, it is now closed. No drainage. There's an area of thin skin that is healing up, but it is closed. Alexis Sparks 1) Pt was given information on  post-operative and wound care. 2) Reviewed activity and to avoid NSAIDs x 3 months. 3) Continue Gabitril and Celebrex  4) Mr. Naz Lundberg has a reminder for a \"due or due soon\" health maintenance. I have asked that he contact his primary care provider, Chelsea Goldsmith MD, for follow-up on this health maintenance. 5) We have informed the patient to notify us for immediate appointment if he has any worsening neurogical symptoms or if an emergency situation presents, then call 911  6)  demonstrated consistency with prescribing. 7) Pt will follow-up in 5 DAYS. SUBJECTIVE    Pain Scale: 0 - No pain/10    Pain Assessment  10/25/2018   Location of Pain Neck;Hand   Location Modifiers Left   Severity of Pain 0   Quality of Pain (No Data)   Quality of Pain Comment numbness   Duration of Pain -   Duration of Pain Comment -   Frequency of Pain -   Aggravating Factors -   Aggravating Factors Comment -   Limiting Behavior -   Relieving Factors -   Relieving Factors Comment -   Result of Injury -           REVIEW OF SYSTEMS  Constitutional: Negative for fever, chills, or weight change. Respiratory: Negative for cough or shortness of breath. Cardiovascular: Negative for chest pain or palpitations. Gastrointestinal: Negative for incontinence, acid reflux, change in bowel habits, or constipation. Genitourinary: Negative for incontinence, dysuria and flank pain. Musculoskeletal: Positive for Neck pain. See HPI. Skin: Negative for rash. Neurological:left hand numbness, no radiculopathy. See HPI. Endo/Heme/Allergies: Negative. Psychiatric/Behavioral: Negative.       PHYSICAL EXAMINATION  Visit Vitals  /79   Pulse 93   Temp 98.6 °F (37 °C) Resp 18   Ht 5' 7\" (1.702 m)   Wt 140 lb 6.4 oz (63.7 kg)   SpO2 94%   BMI 21.99 kg/m²         Accompanied by fRIEND. Constitutional:  Well developed, well nourished, in no acute distress. Psychiatric: Affect and mood are appropriate. Integumentary: No rashes or abrasions noted on exposed areas. Wound: Anterior neck Incision healing well, no drainage, no erythema, no hernia, no seroma, no swelling, no dehiscence, incision well approximated. Cardiovascular/Peripheral Vascular: +2 radial & pedal pulses. No peripheral edema is noted. Lymphatic:  No evidence of lymphedema. No cervical lymphadenopathy. SPINE/MUSCULOSKELETAL EXAM    Cervical spine:    Neck is midline. Normal muscle tone. No focal atrophy is noted. Shoulder ROM intact. Neck ROM stiffness with flexion, extension, turning right, turning left. Tenderness to palpation none. Negative Tomas's sign. Sensation grossly intact to light touch. MOTOR:     Biceps Triceps Deltoids Wrist Ext Wrist Flex Hand Intrin   Right 5/5 5/5 5/5 5/5 5/5 5/5   Left 5/5 5/5 5/5 5/5 5/5 5/5       normal gait and station    Ambulation without assistive device. FWB. PAST MEDICAL HISTORY   Past Medical History:   Diagnosis Date    Anemia NEC     Bursitis of shoulder, left 08/03/2009    MRI revealed tendinitis and bursitis of the rotator cuff.       DJD (degenerative joint disease) 08/03/2009    Early djd, left radiocarpal joint with associated ganglion cyst.      Elevated white blood cell count 07/20/2009    GERD (gastroesophageal reflux disease)     H/O: rotator cuff tear 06/07/2006    Chronic    Hyperlipidemia     Pain in joint, upper arm 07/2009    Pain in shoulder, elbow and wrist.    Sickle cell anemia (HCC)     Sleep apnea     Does not use CPAP    Testicular failure     Vertigo 07/20/2009       Past Surgical History:   Procedure Laterality Date    HX ADENOIDECTOMY      HX ADENOIDECTOMY      HX CERVICAL FUSION  09/17/2018    ACDF C6/7  HX CHOLECYSTECTOMY  03/27/2018    HX HEENT      eardrum repair    HX SHOULDER ARTHROSCOPY Right    . MEDICATIONS      Current Outpatient Medications   Medication Sig Dispense Refill    cephALEXin (KEFLEX) 500 mg capsule Take 1 Cap by mouth four (4) times daily. Indications: Skin and Skin Structure Infection 28 Cap 0    meclizine (ANTIVERT) 25 mg tablet take 1 tablet by mouth every 8 hours if needed for VERTIGO  0    celecoxib (CELEBREX) 200 mg capsule Take 1 Cap by mouth daily as needed for Pain for up to 90 days. 30 Cap 0    tiaGABine (GABITRIL) 4 mg tablet 1 tab po q hs x 1 week, then 1 tab po bid 60 Tab 1    OXYGEN-AIR DELIVERY SYSTEMS 2 L by Nasal route as needed (shortness of breath).  acetaminophen (TYLENOL) 325 mg tablet Take 650 mg by mouth every six (6) hours as needed for Pain or Fever.  senna-docusate (P-COL RITE) 8.6-50 mg per tablet Take 1 Tab by mouth daily as needed for Constipation.  loratadine (ALAVERT PO) Take  by mouth.  valACYclovir (VALTREX) 1 gram tablet Take 1 Tab by mouth daily as needed (cold sores). 0    multivitamin (ONE A DAY) tablet Take 1 Tab by mouth daily.  oxyCODONE-acetaminophen (PERCOCET)  mg per tablet Take 1 Tab by mouth every six (6) hours as needed for Pain. Max Daily Amount: 4 Tabs. 20 Tab 0    SUMAtriptan (IMITREX) 50 mg tablet       pregabalin (LYRICA) 75 mg capsule Take 1 Cap by mouth two (2) times a day. Max Daily Amount: 150 mg. 14 Cap 0    pregabalin (LYRICA) 150 mg capsule Take 1 Cap by mouth two (2) times a day. Max Daily Amount: 300 mg. 60 Cap 2    naproxen (NAPROSYN) 250 mg tablet Take  by mouth two (2) times daily (with meals). ALLERGIES    Allergies   Allergen Reactions    Other Food Other (comments)     Walnuts. Gets headaches    Corn Other (comments)     Told by  allergic    Shellfish Derived Swelling     Swelling.  States okay with betadine          SOCIAL HISTORY    Social History Socioeconomic History    Marital status: SINGLE     Spouse name: Not on file    Number of children: Not on file    Years of education: Not on file    Highest education level: Not on file   Social Needs    Financial resource strain: Not on file    Food insecurity - worry: Not on file    Food insecurity - inability: Not on file    Transportation needs - medical: Not on file   [place] needs - non-medical: Not on file   Occupational History    Not on file   Tobacco Use    Smoking status: Former Smoker     Types: Cigarettes     Last attempt to quit: 7/12/2003     Years since quitting: 15.2    Smokeless tobacco: Never Used   Substance and Sexual Activity    Alcohol use: No    Drug use: No    Sexual activity: Yes     Partners: Female   Other Topics Concern    Not on file   Social History Narrative    Not on file       FAMILY HISTORY    Family History   Problem Relation Age of Onset    Diabetes Mother     Cancer Father          Martell Escamilla NP

## 2018-10-30 ENCOUNTER — OFFICE VISIT (OUTPATIENT)
Dept: ORTHOPEDIC SURGERY | Age: 48
End: 2018-10-30

## 2018-10-30 VITALS
RESPIRATION RATE: 18 BRPM | HEART RATE: 90 BPM | BODY MASS INDEX: 21.35 KG/M2 | SYSTOLIC BLOOD PRESSURE: 113 MMHG | OXYGEN SATURATION: 91 % | TEMPERATURE: 98.4 F | HEIGHT: 67 IN | WEIGHT: 136 LBS | DIASTOLIC BLOOD PRESSURE: 76 MMHG

## 2018-10-30 DIAGNOSIS — Z98.1 S/P CERVICAL SPINAL FUSION: Primary | ICD-10-CM

## 2018-10-30 DIAGNOSIS — M50.20 HNP (HERNIATED NUCLEUS PULPOSUS), CERVICAL: ICD-10-CM

## 2018-10-30 NOTE — PROGRESS NOTES
[address]  [address], [address]  [place], [address] [place] [address] Street  Phone: [phone]  Fax: [phone]  PROGRESS NOTE  Patient: Judy Dominique                MRN: [id number]       SSN: [id number]  YOB: 1970        AGE: 50 y.o. SEX: male  Body mass index is 21.3 kg/m². PCP: Neda Tolentino MD  10/30/18    Chief Complaint   Patient presents with    Neck Pain    Hand Pain     Left     Surgical Follow-up     6 WKS        HISTORY OF PRESENT ILLNESS, RADIOGRAPHS, and PLAN:     HISTORY OF PRESENT ILLNESS:  Mr. Elicia Cruz returns today. He is 6 weeks out from his cervical discectomy and fusion. He had some delayed healing of his incision. PHYSICAL EXAMINATION:  He just has a small area of granulation tissue now. No infection. Wounds are normal.    Labs are normal.     IMAGING:  CAT scan is normal, and never anything. ASSESSMENT/PLAN:  I am not really certain if it is related to his sickle disease or if he just did not react well to the suture material.  In any case, he is asymptomatic. His extremity pain is gone. X-rays have looked good. We will see him back again in 6 weeks' time. cc:  Dr. Jonnathan Castelan           Past Medical History:   Diagnosis Date    Anemia NEC     Bursitis of shoulder, left 08/03/2009    MRI revealed tendinitis and bursitis of the rotator cuff.       DJD (degenerative joint disease) 08/03/2009    Early djd, left radiocarpal joint with associated ganglion cyst.      Elevated white blood cell count 07/20/2009    GERD (gastroesophageal reflux disease)     H/O: rotator cuff tear 06/07/2006    Chronic    Hyperlipidemia     Pain in joint, upper arm 07/2009    Pain in shoulder, elbow and wrist.    Sickle cell anemia (HCC)     Sleep apnea     Does not use CPAP    Testicular failure     Vertigo 07/20/2009       Family History   Problem Relation Age of Onset    Diabetes Mother     Cancer Father        Current Outpatient Medications   Medication Sig Dispense Refill    meclizine (ANTIVERT) 25 mg tablet take 1 tablet by mouth every 8 hours if needed for VERTIGO  0    celecoxib (CELEBREX) 200 mg capsule Take 1 Cap by mouth daily as needed for Pain for up to 90 days. 30 Cap 0    tiaGABine (GABITRIL) 4 mg tablet 1 tab po q hs x 1 week, then 1 tab po bid 60 Tab 1    OXYGEN-AIR DELIVERY SYSTEMS 2 L by Nasal route as needed (shortness of breath).  acetaminophen (TYLENOL) 325 mg tablet Take 650 mg by mouth every six (6) hours as needed for Pain or Fever.  senna-docusate (P-COL RITE) 8.6-50 mg per tablet Take 1 Tab by mouth daily as needed for Constipation.  oxyCODONE-acetaminophen (PERCOCET)  mg per tablet Take 1 Tab by mouth every six (6) hours as needed for Pain. Max Daily Amount: 4 Tabs. 20 Tab 0    SUMAtriptan (IMITREX) 50 mg tablet       loratadine (ALAVERT PO) Take  by mouth.  pregabalin (LYRICA) 150 mg capsule Take 1 Cap by mouth two (2) times a day. Max Daily Amount: 300 mg. 60 Cap 2    multivitamin (ONE A DAY) tablet Take 1 Tab by mouth daily.  naproxen (NAPROSYN) 250 mg tablet Take  by mouth two (2) times daily (with meals).  cephALEXin (KEFLEX) 500 mg capsule Take 1 Cap by mouth four (4) times daily. Indications: Skin and Skin Structure Infection 28 Cap 0    pregabalin (LYRICA) 75 mg capsule Take 1 Cap by mouth two (2) times a day. Max Daily Amount: 150 mg. 14 Cap 0    valACYclovir (VALTREX) 1 gram tablet Take 1 Tab by mouth daily as needed (cold sores). 0       Allergies   Allergen Reactions    Other Food Other (comments)     Walnuts. Gets headaches    Corn Other (comments)     Told by  allergic    Shellfish Derived Swelling     Swelling.  States okay with betadine       Past Surgical History:   Procedure Laterality Date    HX ADENOIDECTOMY      HX ADENOIDECTOMY      HX CERVICAL FUSION  09/17/2018    ACDF C6/7    HX CHOLECYSTECTOMY  03/27/2018    HX HEENT eardrum repair    HX SHOULDER ARTHROSCOPY Right        Past Medical History:   Diagnosis Date    Anemia NEC     Bursitis of shoulder, left 08/03/2009    MRI revealed tendinitis and bursitis of the rotator cuff.  DJD (degenerative joint disease) 08/03/2009    Early djd, left radiocarpal joint with associated ganglion cyst.      Elevated white blood cell count 07/20/2009    GERD (gastroesophageal reflux disease)     H/O: rotator cuff tear 06/07/2006    Chronic    Hyperlipidemia     Pain in joint, upper arm 07/2009    Pain in shoulder, elbow and wrist.    Sickle cell anemia (HCC)     Sleep apnea     Does not use CPAP    Testicular failure     Vertigo 07/20/2009       Social History     Socioeconomic History    Marital status: SINGLE     Spouse name: Not on file    Number of children: Not on file    Years of education: Not on file    Highest education level: Not on file   Social Needs    Financial resource strain: Not on file    Food insecurity - worry: Not on file    Food insecurity - inability: Not on file   [employer] needs - medical: Not on file   [employer] needs - non-medical: Not on file   Occupational History    Not on file   Tobacco Use    Smoking status: Former Smoker     Types: Cigarettes     Last attempt to quit: 7/12/2003     Years since quitting: 15.3    Smokeless tobacco: Never Used   Substance and Sexual Activity    Alcohol use: No    Drug use: No    Sexual activity: Yes     Partners: Female   Other Topics Concern    Not on file   Social History Narrative    Not on file         REVIEW OF SYSTEMS:   CONSTITUTIONAL SYMPTOMS:  Negative. EYES:  Negative. EARS, NOSE, THROAT AND MOUTH:  Negative. CARDIOVASCULAR:  Negative. RESPIRATORY:  Negative. GENITOURINARY: Per HPI. GASTROINTESTINAL:  Per HPI. INTEGUMENTARY (SKIN AND/OR BREAST):  Negative. MUSCULOSKELETAL: Per HPI.   ENDOCRINE/RHEUMATOLOGIC:  Negative.    NEUROLOGICAL:  Per HPI.   HEMATOLOGIC/LYMPHATIC:  Negative. ALLERGIC/IMMUNOLOGIC:  Negative. PSYCHIATRIC:  Negative. PHYSICAL EXAMINATION:   Visit Vitals  /76   Pulse 90   Temp 98.4 °F (36.9 °C)   Resp 18   Ht 5' 7\" (1.702 m)   Wt 136 lb (61.7 kg)   SpO2 91%   BMI 21.30 kg/m²    PAIN SCALE: 5/10    CONSTITUTIONAL: The patient is in no apparent distress and is alert and oriented x 3. HEENT: Normocephalic. Hearing grossly intact. NECK: Supple and symmetric. no tenderness, or masses were felt. RESPIRATORY: No labored breathing. CARDIOVASCULAR: The carotid pulses were normal. Peripheral pulses were 2+. CHEST: Normal AP diameter and normal contour without any kyphoscoliosis. LYMPHATIC: No lymphadenopathy was appreciated in the neck, axillae or groin. SKIN: Incision healing well, no drainage, no erythema, no hernia, no seroma, no swelling, no dehiscence, incision well approximated. Negative for scars, rashes, lesions, or ulcers on the right upper, right lower, left upper, left lower and trunk. NEUROLOGICAL: Alert and oriented x 3. Ambulation without assistive device. FWB. EXTREMITIES: See musculoskeletal.  MUSCULOSKELETAL:   Head and Neck:  Negative for misalignment, asymmetry, crepitation, defects, tenderness masses or effusions.  Left Upper Extremity: Inspection, percussion and palpation performed. Tomass sign is negative.  Right Upper Extremity: Inspection, percussion and palpation performed. Tomass sign is negative.  Spine, Ribs and Pelvis: Inspection, percussion and palpation performed. Negative for misalignment, asymmetry, crepitation, defects, tenderness masses or effusions.  Left Lower Extremity: Inspection, percussion and palpation performed. Negative straight leg raise.  Right Lower Extremity: Inspection, percussion and palpation performed. Negative straight leg raise. SPINE EXAM:     Cervical spine: Neck is midline. Normal muscle tone.  No focal atrophy is noted.        ASSESSMENT    ICD-10-CM ICD-9-CM    1. S/P cervical spinal fusion Z98.1 V45.4    2. HNP (herniated nucleus pulposus), cervical M50.20 722.0        Written by Risa Graves, as dictated by Khang Washington MD.    I, Dr. Khang Washington MD, confirm that all documentation is accurate.

## 2018-10-30 NOTE — PATIENT INSTRUCTIONS
Neck: Exercises  Your Care Instructions  Here are some examples of typical rehabilitation exercises for your condition. Start each exercise slowly. Ease off the exercise if you start to have pain. Your doctor or physical therapist will tell you when you can start these exercises and which ones will work best for you. How to do the exercises  Neck stretch    1. This stretch works best if you keep your shoulder down as you lean away from it. To help you remember to do this, start by relaxing your shoulders and lightly holding on to your thighs or your chair. 2. Tilt your head toward your shoulder and hold for 15 to 30 seconds. Let the weight of your head stretch your muscles. 3. If you would like a little added stretch, use your hand to gently and steadily pull your head toward your shoulder. For example, keeping your right shoulder down, lean your head to the left. 4. Repeat 2 to 4 times toward each shoulder. Diagonal neck stretch    1. Turn your head slightly toward the direction you will be stretching, and tilt your head diagonally toward your chest and hold for 15 to 30 seconds. 2. If you would like a little added stretch, use your hand to gently and steadily pull your head forward on the diagonal.  3. Repeat 2 to 4 times toward each side. Dorsal glide stretch    1. Sit or stand tall and look straight ahead. 2. Slowly tuck your chin as you glide your head backward over your body  3. Hold for a count of 6, and then relax for up to 10 seconds. 4. Repeat 8 to 12 times. Chest and shoulder stretch    1. Sit or stand tall and glide your head backward as in the dorsal glide stretch. 2. Raise both arms so that your hands are next to your ears. 3. Take a deep breath, and as you breathe out, lower your elbows down and behind your back. You will feel your shoulder blades slide down and together, and at the same time you will feel a stretch across your chest and the front of your shoulders.   4. Hold for about 6 seconds, and then relax for up to 10 seconds. 5. Repeat 8 to 12 times. Strengthening: Hands on head    1. Move your head backward, forward, and side to side against gentle pressure from your hands, holding each position for about 6 seconds. 2. Repeat 8 to 12 times. Follow-up care is a key part of your treatment and safety. Be sure to make and go to all appointments, and call your doctor if you are having problems. It's also a good idea to know your test results and keep a list of the medicines you take. Where can you learn more? Go to http://davy-earl.info/. Enter P975 in the search box to learn more about \"Neck: Exercises. \"  Current as of: November 29, 2017  Content Version: 11.8  © 3546-2423 [employer]. Care instructions adapted under license by [employer] (which disclaims liability or warranty for this information). If you have questions about a medical condition or this instruction, always ask your healthcare professional. [address] any warranty or liability for your use of this information.

## 2018-12-11 ENCOUNTER — OFFICE VISIT (OUTPATIENT)
Dept: ORTHOPEDIC SURGERY | Age: 48
End: 2018-12-11

## 2018-12-11 VITALS
TEMPERATURE: 98.1 F | HEART RATE: 74 BPM | BODY MASS INDEX: 21.41 KG/M2 | SYSTOLIC BLOOD PRESSURE: 122 MMHG | OXYGEN SATURATION: 96 % | DIASTOLIC BLOOD PRESSURE: 69 MMHG | RESPIRATION RATE: 16 BRPM | WEIGHT: 136.4 LBS | HEIGHT: 67 IN

## 2018-12-11 DIAGNOSIS — M54.12 CERVICAL RADICULOPATHY: Primary | ICD-10-CM

## 2018-12-11 DIAGNOSIS — Z98.1 S/P CERVICAL SPINAL FUSION: ICD-10-CM

## 2018-12-11 RX ORDER — DULOXETIN HYDROCHLORIDE 20 MG/1
20 CAPSULE, DELAYED RELEASE ORAL DAILY
Qty: 30 CAP | Refills: 1 | Status: SHIPPED | OUTPATIENT
Start: 2018-12-11

## 2018-12-11 NOTE — PROGRESS NOTES
[address]  [address], [address]  [place], [address] [place] [address] Street  Phone: [phone]  Fax: [phone]    Neli Beatty  : 1970  PCP: Miguel Howard MD    PROGRESS NOTE    HISTORY OF PRESENT ILLNESS:  Chief Complaint   Patient presents with    Neck Pain    Hand Pain     Left     Surgical Follow-up     6 WKS      George Orozco is a 50 y.o.  male with history of a cervical discectomy and fusion about 12 weeks ago. He was last seen with Dr. Pramod Stallings. He had some delayed healing of his incision. He had some granulation tissue. No infection. CT was normal. He has known sickle cell disease. He was asymptomatic and his pian was improved. Today, his incision is healed. It looks great. He states a couple of days ago he slept wrong and when he woke up he had some neck pain and stiffness. He took tylenol and this was helpful. The pain has not resolved completely. He still has slight pain with rotation of his neck. He is in a flare of pain. He has continued numbness in his left pointer finger. This never resolved after surgery but the majority of the arm pain did. Denies bladder/bowel dysfunction, saddle paresthesia, weakness, gait disturbance, or other neurological deficit. Pt at this time desires to  continue with current care/proceed with medication evaluation/proceed with HEP. ASSESSMENT  50 y.o. male with cervical pain. Diagnoses and all orders for this visit:    1. Cervical radiculopathy  -     DULoxetine (CYMBALTA) 20 mg capsule; Take 1 Cap by mouth daily. 2. S/P cervical spinal fusion  -     DULoxetine (CYMBALTA) 20 mg capsule; Take 1 Cap by mouth daily. IMPRESSION/PLAN    1) Pt was given information on cercical exercises. 2) offered PT, patient declined. Avoid MDP due to sickle cell. Has caused crisis in the past.   3) trial of Cymbalta for residual left finger pain/ neck pain.   4) Mr. Batsheva Villagomez has a reminder for a \"due or due soon\" health maintenance. I have asked that he contact his primary care provider, Jennifer Ochoa MD, for follow-up on this health maintenance. 5) We have informed patient to notify us for immediate appointment if he has any worsening neurogical symptoms or if an emergency situation presents, then call 911  6) Pt will follow-up in 4 weeks for med fu. Risks and benefits of ongoing therapy have been reviewed with the patient.  is appropriate. PAST MEDICAL HISTORY  Past Medical History:   Diagnosis Date    Anemia NEC     Bursitis of shoulder, left 08/03/2009    MRI revealed tendinitis and bursitis of the rotator cuff.  DJD (degenerative joint disease) 08/03/2009    Early djd, left radiocarpal joint with associated ganglion cyst.      Elevated white blood cell count 07/20/2009    GERD (gastroesophageal reflux disease)     H/O: rotator cuff tear 06/07/2006    Chronic    Hyperlipidemia     Pain in joint, upper arm 07/2009    Pain in shoulder, elbow and wrist.    Sickle cell anemia (HCC)     Sleep apnea     Does not use CPAP    Testicular failure     Vertigo 07/20/2009        MEDICATIONS  Current Outpatient Medications   Medication Sig Dispense Refill    DULoxetine (CYMBALTA) 20 mg capsule Take 1 Cap by mouth daily. 30 Cap 1    meclizine (ANTIVERT) 25 mg tablet take 1 tablet by mouth every 8 hours if needed for VERTIGO  0    OXYGEN-AIR DELIVERY SYSTEMS 2 L by Nasal route as needed (shortness of breath).  acetaminophen (TYLENOL) 325 mg tablet Take 650 mg by mouth every six (6) hours as needed for Pain or Fever.  senna-docusate (P-COL RITE) 8.6-50 mg per tablet Take 1 Tab by mouth daily as needed for Constipation.  SUMAtriptan (IMITREX) 50 mg tablet       loratadine (ALAVERT PO) Take  by mouth.  valACYclovir (VALTREX) 1 gram tablet Take 1 Tab by mouth daily as needed (cold sores). 0    multivitamin (ONE A DAY) tablet Take 1 Tab by mouth daily.       cephALEXin (KEFLEX) 500 mg capsule Take 1 Cap by mouth four (4) times daily. Indications: Skin and Skin Structure Infection 28 Cap 0    celecoxib (CELEBREX) 200 mg capsule Take 1 Cap by mouth daily as needed for Pain for up to 90 days. 30 Cap 0    tiaGABine (GABITRIL) 4 mg tablet 1 tab po q hs x 1 week, then 1 tab po bid 60 Tab 1    oxyCODONE-acetaminophen (PERCOCET)  mg per tablet Take 1 Tab by mouth every six (6) hours as needed for Pain. Max Daily Amount: 4 Tabs. 20 Tab 0    pregabalin (LYRICA) 75 mg capsule Take 1 Cap by mouth two (2) times a day. Max Daily Amount: 150 mg. 14 Cap 0    pregabalin (LYRICA) 150 mg capsule Take 1 Cap by mouth two (2) times a day. Max Daily Amount: 300 mg. 60 Cap 2    naproxen (NAPROSYN) 250 mg tablet Take  by mouth two (2) times daily (with meals). ALLERGIES  Allergies   Allergen Reactions    Other Food Other (comments)     Walnuts. Gets headaches    Corn Other (comments)     Told by  allergic    Shellfish Derived Swelling     Swelling.  States okay with betadine       SOCIAL HISTORY    Social History     Socioeconomic History    Marital status: SINGLE     Spouse name: Not on file    Number of children: Not on file    Years of education: Not on file    Highest education level: Not on file   Social Needs    Financial resource strain: Not on file    Food insecurity - worry: Not on file    Food insecurity - inability: Not on file    Transportation needs - medical: Not on file   [employer] needs - non-medical: Not on file   Occupational History    Not on file   Tobacco Use    Smoking status: Former Smoker     Types: Cigarettes     Last attempt to quit: 7/12/2003     Years since quitting: 15.4    Smokeless tobacco: Never Used   Substance and Sexual Activity    Alcohol use: No    Drug use: No    Sexual activity: Yes     Partners: Female   Other Topics Concern    Not on file   Social History Narrative    Not on file       SUBJECTIVE    Pain Scale: 7/10    Pain Assessment 12/11/2018   Location of Pain Neck;Hand   Location Modifiers Left   Severity of Pain 7   Quality of Pain Sharp   Quality of Pain Comment numbness, tingling   Duration of Pain -   Duration of Pain Comment -   Frequency of Pain Intermittent   Aggravating Factors (No Data)   Aggravating Factors Comment moving and turning head   Limiting Behavior -   Relieving Factors Rest   Relieving Factors Comment -   Result of Injury -       Accompanied by self. REVIEW OF SYSTEMS  ROS    Constitutional: Negative for fever, chills, or weight change. Respiratory: Negative for cough or shortness of breath. Cardiovascular: Negative for chest pain or palpitations. Gastrointestinal: Negative for acid reflux, change in bowel habits, or constipation. Genitourinary: Negative for incontinence, dysuria and flank pain. Musculoskeletal: Positive for cervical pain. Skin: Negative for rash. Neurological: Negative for headaches, dizziness, or numbness. Endo/Heme/Allergies: Negative . Psychiatric/Behavioral: Negative. PHYSICAL EXAMINATION  Visit Vitals  /69   Pulse 74   Temp 98.1 °F (36.7 °C)   Resp 16   Ht 5' 7\" (1.702 m)   Wt 136 lb 6.4 oz (61.9 kg)   SpO2 96%   BMI 21.36 kg/m²       Constitutional: Well developed,  well nourished,  awake, alert, and in no acute distress. Neurological:  Sensation to light touch is intact. Psychiatric: Affect and mood are appropriate. Integumentary: No rashes or abrasions noted on exposed areas,  warm, dry and intact. Cardiovascular/Peripheral Vascular:  No peripheral edema is noted. Lymphatic:  No evidence of lymphedema. No cervical lymphadenopathy. SPINE/MUSCULOSKELETAL EXAM    Cervical spine:  Neck is midline. Normal muscle tone. No focal atrophy is noted. Shoulder ROM intact. Mild Tenderness to palpation to posterieror cervical spine. Negative Spurling's sign. Negative Tinel's sign. Negative Tomas's sign.        MOTOR    Biceps  Triceps Deltoids Wrist Ext Wrist Flex Hand Intrin   Right +4/5 +4/5 +4/5 +4/5 +4/5 +4/5   Left +4/5 +4/5 +4/5 +4/5 +4/5 +4/5       normal gait and station    Ambulation without assistive device. full weight bearing, non-antalgic gait.     Adeola Linton, NP

## 2018-12-11 NOTE — PATIENT INSTRUCTIONS
Neck Arthritis: Exercises  Your Care Instructions  Here are some examples of typical rehabilitation exercises for your condition. Start each exercise slowly. Ease off the exercise if you start to have pain. Your doctor or physical therapist will tell you when you can start these exercises and which ones will work best for you. How to do the exercises  Neck stretches to the side    1. This stretch works best if you keep your shoulder down as you lean away from it. To help you remember to do this, start by relaxing your shoulders and lightly holding on to your thighs or your chair. 2. Tilt your head toward your shoulder and hold for 15 to 30 seconds. Let the weight of your head stretch your muscles. 3. Repeat 2 to 4 times toward each shoulder. Chin tuck    1. Lie on the floor with a rolled-up towel under your neck. Your head should be touching the floor. 2. Slowly bring your chin toward your chest.  3. Hold for a count of 6, and then relax for up to 10 seconds. 4. Repeat 8 to 12 times. Active cervical rotation    1. Sit in a firm chair, or stand up straight. 2. Keeping your chin level, turn your head to the right, and hold for 15 to 30 seconds. 3. Turn your head to the left and hold for 15 to 30 seconds. 4. Repeat 2 to 4 times to each side. Shoulder blade squeeze    1. While standing, squeeze your shoulder blades together. 2. Do not raise your shoulders up as you are squeezing. 3. Hold for 6 seconds. 4. Repeat 8 to 12 times. Shoulder rolls    1. Sit comfortably with your feet shoulder-width apart. You can also do this exercise standing up. 2. Roll your shoulders up, then back, and then down in a smooth, circular motion. 3. Repeat 2 to 4 times. Follow-up care is a key part of your treatment and safety. Be sure to make and go to all appointments, and call your doctor if you are having problems.  It's also a good idea to know your test results and keep a list of the medicines you take.  Where can you learn more? Go to http://davy-earl.info/. Enter F523 in the search box to learn more about \"Neck Arthritis: Exercises. \"  Current as of: November 29, 2017  Content Version: 11.8  © 9566-7114 [employer]. Care instructions adapted under license by [employer] (which disclaims liability or warranty for this information). If you have questions about a medical condition or this instruction, always ask your healthcare professional. [address] any warranty or liability for your use of this information. Neck Spasm: Exercises  Your Care Instructions  Here are some examples of typical rehabilitation exercises for your condition. Start each exercise slowly. Ease off the exercise if you start to have pain. Your doctor or physical therapist will tell you when you can start these exercises and which ones will work best for you. How to do the exercises  Levator scapula stretch    4. Sit in a firm chair, or stand up straight. 5. Gently tilt your head toward your left shoulder. 6. Turn your head to look down into your armpit, bending your head slightly forward. Let the weight of your head stretch your neck muscles. 7. Hold for 15 to 30 seconds. 8. Return to your starting position. 9. Follow the same instructions above, but tilt your head toward your right shoulder. 10. Repeat 2 to 4 times toward each shoulder. Upper trapezius stretch    5. Sit in a firm chair, or stand up straight. 6. This stretch works best if you keep your shoulder down as you lean away from it. To help you remember to do this, start by relaxing your shoulders and lightly holding on to your thighs or your chair. 7. Tilt your head toward your shoulder and hold for 15 to 30 seconds. Let the weight of your head stretch your muscles. 8. If you would like a little added stretch, place your arm behind your back.  Use the arm opposite of the direction you are tilting your head. For example, if you are tilting your head to the left, place your right arm behind your back. 9. Repeat 2 to 4 times toward each shoulder. Neck rotation    5. Sit in a firm chair, or stand up straight. 6. Keeping your chin level, turn your head to the right, and hold for 15 to 30 seconds. 7. Turn your head to the left, and hold for 15 to 30 seconds. 8. Repeat 2 to 4 times to each side. Chin tuck    5. Lie on the floor with a rolled-up towel under your neck. Your head should be touching the floor. 6. Slowly bring your chin toward the front of your neck. 7. Hold for a count of 6, and then relax for up to 10 seconds. 8. Repeat 8 to 12 times. Forward neck flexion    4. Sit in a firm chair, or stand up straight. 5. Bend your head forward. 6. Hold for 15 to 30 seconds, then return to your starting position. 7. Repeat 2 to 4 times. Follow-up care is a key part of your treatment and safety. Be sure to make and go to all appointments, and call your doctor if you are having problems. It's also a good idea to know your test results and keep a list of the medicines you take. Where can you learn more? Go to http://davy-earl.info/. Enter P962 in the search box to learn more about \"Neck Spasm: Exercises. \"  Current as of: November 29, 2017  Content Version: 11.8  © 0235-6623 [employer]. Care instructions adapted under license by [employer] (which disclaims liability or warranty for this information). If you have questions about a medical condition or this instruction, always ask your healthcare professional. [place] [address] any warranty or liability for your use of this information. Neck Strain or Sprain: Rehab Exercises  Your Care Instructions  Here are some examples of typical rehabilitation exercises for your condition. Start each exercise slowly. Ease off the exercise if you start to have pain.   Your doctor or physical therapist will tell you when you can start these exercises and which ones will work best for you. How to do the exercises  Neck rotation    11. Sit in a firm chair, or stand up straight. 12. Keeping your chin level, turn your head to the right, and hold for 15 to 30 seconds. 13. Turn your head to the left and hold for 15 to 30 seconds. 14. Repeat 2 to 4 times to each side. Neck stretches    10. Look straight ahead, and tip your right ear to your right shoulder. Do not let your left shoulder rise up as you tip your head to the right. 11. Hold for 15 to 30 seconds. 12. Tilt your head to the left. Do not let your right shoulder rise up as you tip your head to the left. 13. Hold for 15 to 30 seconds. 14. Repeat 2 to 4 times to each side. Forward neck flexion    9. Sit in a firm chair, or stand up straight. [address] your head forward. 11. Hold for 15 to 30 seconds. 12. Repeat 2 to 4 times. Lateral (side) bend strengthening    9. With your right hand, place your first two fingers on your right temple. 10. Start to bend your head to the side while using gentle pressure from your fingers to keep your head from bending. 11. Hold for about 6 seconds. 12. Repeat 8 to 12 times. 13. Switch hands and repeat the same exercise on your left side. Forward bend strengthening    8. Place your first two fingers of either hand on your forehead. 9. Start to bend your head forward while using gentle pressure from your fingers to keep your head from bending. 10. Hold for about 6 seconds. 11. Repeat 8 to 12 times. Neutral position strengthening    1. Using one hand, place your fingertips on the back of your head at the top of your neck. 2. Start to bend your head backward while using gentle pressure from your fingers to keep your head from bending. 3. Hold for about 6 seconds. 4. Repeat 8 to 12 times. Chin tuck    1. Lie on the floor with a rolled-up towel under your neck.  Your head should be touching the floor. 2. Slowly bring your chin toward your chest.  3. Hold for a count of 6, and then relax for up to 10 seconds. 4. Repeat 8 to 12 times. Follow-up care is a key part of your treatment and safety. Be sure to make and go to all appointments, and call your doctor if you are having problems. It's also a good idea to know your test results and keep a list of the medicines you take. Where can you learn more? Go to http://davy-earl.info/. Enter M679 in the search box to learn more about \"Neck Strain or Sprain: Rehab Exercises. \"  Current as of: November 29, 2017  Content Version: 11.8  © 0530-3678 [employer]. Care instructions adapted under license by [employer] (which disclaims liability or warranty for this information). If you have questions about a medical condition or this instruction, always ask your healthcare professional. [place] [address] any warranty or liability for your use of this information. Neck: Exercises  Your Care Instructions  Here are some examples of typical rehabilitation exercises for your condition. Start each exercise slowly. Ease off the exercise if you start to have pain. Your doctor or physical therapist will tell you when you can start these exercises and which ones will work best for you. How to do the exercises  Neck stretch    15. This stretch works best if you keep your shoulder down as you lean away from it. To help you remember to do this, start by relaxing your shoulders and lightly holding on to your thighs or your chair. 16. Tilt your head toward your shoulder and hold for 15 to 30 seconds. Let the weight of your head stretch your muscles. 17. If you would like a little added stretch, use your hand to gently and steadily pull your head toward your shoulder. For example, keeping your right shoulder down, lean your head to the left.   18. Repeat 2 to 4 times toward each shoulder. Diagonal neck stretch    15. Turn your head slightly toward the direction you will be stretching, and tilt your head diagonally toward your chest and hold for 15 to 30 seconds. 16. If you would like a little added stretch, use your hand to gently and steadily pull your head forward on the diagonal.  17. Repeat 2 to 4 times toward each side. Dorsal glide stretch    13. Sit or stand tall and look straight ahead. 14. Slowly tuck your chin as you glide your head backward over your body  15. Hold for a count of 6, and then relax for up to 10 seconds. 16. Repeat 8 to 12 times. Chest and shoulder stretch    14. Sit or stand tall and glide your head backward as in the dorsal glide stretch. 15. Raise both arms so that your hands are next to your ears. 16. Take a deep breath, and as you breathe out, lower your elbows down and behind your back. You will feel your shoulder blades slide down and together, and at the same time you will feel a stretch across your chest and the front of your shoulders. 17. Hold for about 6 seconds, and then relax for up to 10 seconds. 18. Repeat 8 to 12 times. Strengthening: Hands on head    12. Move your head backward, forward, and side to side against gentle pressure from your hands, holding each position for about 6 seconds. 13. Repeat 8 to 12 times. Follow-up care is a key part of your treatment and safety. Be sure to make and go to all appointments, and call your doctor if you are having problems. It's also a good idea to know your test results and keep a list of the medicines you take. Where can you learn more? Go to http://davy-earl.info/. Enter P975 in the search box to learn more about \"Neck: Exercises. \"  Current as of: November 29, 2017  Content Version: 11.8  © 7482-9354 [employer]. Care instructions adapted under license by [employer] (which disclaims liability or warranty for this information).  If you have questions about a medical condition or this instruction, always ask your healthcare professional. [address] any warranty or liability for your use of this information. Shoulder Blade: Exercises  Your Care Instructions  Here are some examples of typical exercises for your condition. Start each exercise slowly. Ease off the exercise if you start to have pain. Your doctor or physical therapist will tell you when you can start these exercises and which ones will work best for you. How to do the exercises  Shoulder roll    19. Stand tall with your chin slightly tucked. Imagine that a string at the top of your head is pulling you straight up. 20. Keep your arms relaxed. All motion will be in your shoulders. 21. Shrug your shoulders up toward your ears, then up and back. [place] your shoulders down and back, like you're sliding your hands down into your back pants pockets. 22. Repeat the circles at least 2 to 4 times. 23. This exercise is also helpful anytime you want to relax. Lower neck and upper back stretch    18. With your arms about shoulder height, clasp your hands in front of you. 19. Drop your chin toward your chest.  20. Reach straight forward so you are rounding your upper back. Think about pulling your shoulder blades apart. [place] Moscoso feel a stretch across your upper back and shoulders. Hold for at least 6 seconds. 21. Repeat 2 to 4 times. Triceps stretch    17. Reach your arm straight up. 25. Keeping your elbow in place, bend your arm and reach your hand down behind your back. 19. With your other hand, apply gentle pressure to the bent elbow. [place] feel a stretch at the back of your upper arm and shoulder. Hold about 6 seconds. 20. Repeat 2 to 4 times with each arm. Shoulder stretch    19. Relax your shoulders. 20. Raise one arm to shoulder height, and reach it across your chest.  21. Pull the arm slightly toward you with your other arm.  This will help you get a gentle stretch. Hold for about 6 seconds. 22. Repeat 2 to 4 times. Shoulder blade squeeze    14. Sit or stand up tall with your arms at your sides. 15. Keep your shoulders relaxed and down, not shrugged. 16. Squeeze your shoulder blades together. Hold for 6 seconds, then relax. 17. Repeat 8 to 12 times. Straight-arm shoulder blade squeeze    5. Sit or stand tall. Relax your shoulders. 6. With palms down, hold your elastic tubing or band straight out in front of you. 7. Start with slight tension in the tubing or band, with your hands about shoulder-width apart. 8. Slowly pull straight out to the sides, squeezing your shoulder blades together. Keep your arms straight and at shoulder height. Slowly release. 9. Repeat 8 to 12 times. Rowing    5. [place] your elastic tubing or band at about waist height. Take one end in each hand. 6. Sit or stand with your feet hip-width apart. 7. Hold your arms straight in front of you. Adjust your distance to create slight tension in the tubing or band. 8. Slightly tuck your chin. Relax your shoulders. 9. Without shrugging your shoulders, pull straight back. Your elbows will pass alongside your waist.    Pull-downs    1. [place] your elastic tubing or band in the top of a closed door. Take one end in each hand. 2. Either sit or stand, depending on what is more comfortable. If you feel unsteady, sit on a chair. 3. Start with your arms up and comfortably apart, elbows straight. There should be a slight tension in the tubing or band. 4. Slightly tuck your chin, and look straight ahead. 5. Keeping your back straight, slowly pull down and back, bending your elbows. 6. Stop where your hands are level with your chin, in a \"goalpost\" position. 7. Repeat 8 to 12 times. Chest T stretch    1. Lie on your back. Raise your knees so they are bent. Plant your feet on the floor, hip-width apart. 2. Tuck your chin, and relax your shoulders.   3. Reach your arms straight out to the sides. If you don't feel a mild stretch in your shoulders and across your chest, use a foam roll or a tightly rolled blanket under your spine, from your tailbone to your head. 4. Relax in this position for at least 15 to 30 seconds while you breathe normally. Repeat 2 to 4 times. 5. As you get used to this stretch, keep adding a little more time until you are able relax in this position for 2 or 3 minutes. When you can relax for at least 2 minutes, you only need to do the exercise 1 time per session. Chest goalpost stretch    1. Lie on your back. Raise your knees so they are bent. Plant your feet on the floor, hip-width apart. 2. Tuck your chin, and relax your shoulders. 3. Reach your arms straight out to the sides. 4. Bend your arms at the elbows, with your hands pointed toward the top of your head. Your arms should make an L on either side of your head. Your palms should be facing up. 5. If you don't feel a mild stretch in your shoulders and across your chest, use a foam roll or tightly rolled blanket under your spine, from your tailbone to your head. 6. Relax in this position for at least 15 to 30 seconds while you breathe normally. Repeat 2 to 4 times. 7. Each day you do this exercise, add a little more time until you can relax in this position for 2 or 3 minutes. When you can relax for at least 2 minutes, you only need to do the exercise 1 time per session. Follow-up care is a key part of your treatment and safety. Be sure to make and go to all appointments, and call your doctor if you are having problems. It's also a good idea to know your test results and keep a list of the medicines you take. Where can you learn more? Go to http://davy-earl.info/. Enter [phone] in the search box to learn more about \"Shoulder Blade: Exercises. \"  Current as of: November 29, 2017  Content Version: 11.8  © 1803-6785 [employer]wise[employer].  Care instructions adapted under license by [address] (which disclaims liability or warranty for this information). If you have questions about a medical condition or this instruction, always ask your healthcare professional. [address] any warranty or liability for your use of this information.

## 2019-02-05 ENCOUNTER — OFFICE VISIT (OUTPATIENT)
Dept: ORTHOPEDIC SURGERY | Age: 49
End: 2019-02-05

## 2019-02-05 VITALS
SYSTOLIC BLOOD PRESSURE: 110 MMHG | BODY MASS INDEX: 21.19 KG/M2 | HEART RATE: 78 BPM | HEIGHT: 67 IN | WEIGHT: 135 LBS | DIASTOLIC BLOOD PRESSURE: 53 MMHG

## 2019-02-05 DIAGNOSIS — M62.838 MUSCLE SPASM: ICD-10-CM

## 2019-02-05 DIAGNOSIS — M79.2 NEURITIS: ICD-10-CM

## 2019-02-05 DIAGNOSIS — Z98.1 S/P CERVICAL SPINAL FUSION: Primary | ICD-10-CM

## 2019-02-05 RX ORDER — CYCLOBENZAPRINE HCL 10 MG
10 TABLET ORAL
Qty: 90 TAB | Refills: 1 | Status: SHIPPED | OUTPATIENT
Start: 2019-02-05 | End: 2020-01-13

## 2019-02-05 NOTE — PROGRESS NOTES
1. Have you been to the ER, urgent care clinic since your last visit? Hospitalized since your last visit? No    2. Have you seen or consulted any other health care providers outside of the [address] [place] [address] since your last visit? Include any pap smears or colon screening.  No

## 2019-02-05 NOTE — PROGRESS NOTES
Chief complaint/History of Present Illness:  Chief Complaint   Patient presents with    Surgical Follow-up     increased neck pain x 2 weeks     HPI  [place] Major Horse is a  50 y.o.  male      HISTORY OF PRESENT ILLNESS:  The patient comes in today almost 5 months out from his ACDF C6-7. He states his left arm pain is still better since surgery. He is happy with that. At his last visit, Sagarivette Mejía started him on Cymbalta 20 mg daily for some left index finger pain he still has. He states it gave him erectile dysfunction. He has tried and failed Lyrica, Gabitril and gabapentin. The Lyrica caused erectile dysfunction. The Gabitril and gabapentin make him too sleepy. It does not look like he can tolerate the antiarrhythmics. He is also complaining of some posterior pain that radiates to his shoulders for the last couple of weeks. No injury. He denies fever or bowel or bladder dysfunction. He is a nonsmoker. He is on Social Security Disability for his sickle cell anemia. PHYSICAL EXAMINATION:  Mr. Richelle Puckett is a 78-year-old male. He is alert and oriented with normal mood and affect. He has a full weightbearing nonantalgic gait. No assistive device. He has 5/5 strength bilateral upper extremities. Negative Fran. Normal tandem gait. His trapezius muscles are rather tight. ASSESSMENT/PLAN:  This is a patient who is 5 weeks out from his ACDF C6-7. He is going to restart his Naprosyn 500 b.i.d. that he was taking prior to surgery. I think that will help with his leg pain. I am going to give him some Flexeril for his muscle tightness. We will see him back in 3 months, sooner if needed. Review of systems:    Past Medical History:   Diagnosis Date    Anemia NEC     Bursitis of shoulder, left 08/03/2009    MRI revealed tendinitis and bursitis of the rotator cuff.       DJD (degenerative joint disease) 08/03/2009    Early djd, left radiocarpal joint with associated ganglion cyst.      Elevated white blood cell count 07/20/2009    GERD (gastroesophageal reflux disease)     H/O: rotator cuff tear 06/07/2006    Chronic    Hyperlipidemia     Pain in joint, upper arm 07/2009    Pain in shoulder, elbow and wrist.    Sickle cell anemia (HCC)     Sleep apnea     Does not use CPAP    Testicular failure     Vertigo 07/20/2009     Past Surgical History:   Procedure Laterality Date    HX ADENOIDECTOMY      HX ADENOIDECTOMY      HX CERVICAL FUSION  09/17/2018    ACDF C6/7    HX CHOLECYSTECTOMY  03/27/2018    HX HEENT      eardrum repair    HX SHOULDER ARTHROSCOPY Right      Social History     Socioeconomic History    Marital status: SINGLE     Spouse name: Not on file    Number of children: Not on file    Years of education: Not on file    Highest education level: Not on file   Social Needs    Financial resource strain: Not on file    Food insecurity - worry: Not on file    Food insecurity - inability: Not on file   Swag Of The Month needs - medical: Not on file   Swag Of The Month needs - non-medical: Not on file   Occupational History    Not on file   Tobacco Use    Smoking status: Former Smoker     Types: Cigarettes     Last attempt to quit: 7/12/2003     Years since quitting: 15.5    Smokeless tobacco: Never Used   Substance and Sexual Activity    Alcohol use: No    Drug use: No    Sexual activity: Yes     Partners: Female   Other Topics Concern    Not on file   Social History Narrative    Not on file     Family History   Problem Relation Age of Onset    Diabetes Mother     Cancer Father        Physical Exam:  Visit Vitals  /53   Pulse 78   Ht 5' 7\" (1.702 m)   Wt 135 lb (61.2 kg)   BMI 21.14 kg/m²     Pain Scale: 8/10          has been . reviewed and is appropriate      Diagnoses and all orders for this visit:    1. S/P cervical spinal fusion    2. Neuritis    3. Muscle spasm    Other orders  -     cyclobenzaprine (FLEXERIL) 10 mg tablet;  Take 1 Tab by mouth three (3) times daily as needed for Muscle Spasm(s). Follow-up Disposition:  Return in about 3 months (around 5/5/2019) for with NP.         We have informed Isaak Arjunjena to notify us for immediate appointment if he has any worsening neurogical symptoms or if an emergency situation presents, then call [phone]

## 2019-04-01 ENCOUNTER — OFFICE VISIT (OUTPATIENT)
Dept: ORTHOPEDIC SURGERY | Age: 49
End: 2019-04-01

## 2019-04-01 VITALS
TEMPERATURE: 98.3 F | RESPIRATION RATE: 16 BRPM | WEIGHT: 135 LBS | SYSTOLIC BLOOD PRESSURE: 130 MMHG | DIASTOLIC BLOOD PRESSURE: 69 MMHG | OXYGEN SATURATION: 95 % | HEART RATE: 79 BPM | HEIGHT: 67 IN | BODY MASS INDEX: 21.19 KG/M2

## 2019-04-01 DIAGNOSIS — M54.2 NECK PAIN: Primary | ICD-10-CM

## 2019-04-01 DIAGNOSIS — M54.12 CERVICAL RADICULAR PAIN: ICD-10-CM

## 2019-04-01 RX ORDER — METHYLPREDNISOLONE 4 MG/1
TABLET ORAL
Qty: 1 DOSE PACK | Refills: 0 | Status: SHIPPED | OUTPATIENT
Start: 2019-04-01 | End: 2020-01-13

## 2019-04-01 NOTE — PROGRESS NOTES
Chief complaint/History of Present Illness:  Chief Complaint   Patient presents with    Neck Pain    Back Pain     Upper    Hand Pain     Left     Follow-up     3 mo f/u      HPI  George Rachel Nix is a  50 y.o.  male      HISTORY OF PRESENT ILLNESS:  The patient comes in today 6-1/2 months status post his ACDF, C6-C7. He states about a month and a half ago he started having severe neck pain posteriorly that shoots down to between his shoulder blades and then at times can shoot towards both shoulders. The shooting down to between his shoulder blades is constant. The shooting to his bilateral shoulders is more off and on. He is getting some numbness in his second and third digits of his left hand. He went to his PCP on 03/05/2019 who ordered x-rays. I reviewed them here in the office. The report says there may be some lucency around the C6 screw. He has tried heat, ice, Flexeril, Mobic, lidocaine patches. Nothing seems to help. He states he continues to have good relief of his left arm pain since his surgery but is having this shooting pain from his neck to between his shoulder blades. He is a nonsmoker. He is on Social Security disability. He does have sickle cell anemia. He has failed most all the antineuritics, including Cymbalta, Lyrica, gabapentin, and Gabitril. He does not wish to be on any further ones. PHYSICAL EXAMINATION:  Mr. Jack Pozo is a 49-year-old male. He is alert and oriented. Normal mood and affect. He has a full weightbearing, nonantalgic gait. No assistive device. 4/5 strength, bilateral upper extremities. Negative Fran's. Normal tandem gait. ASSESSMENT/PLAN:  This is a patient who underwent an anterior cervical discectomy and fusion (ACDF), C6-C7, on 09/17/2018. He is now having a radicular pain and posterior neck pain. He has had an x-ray. He has been on anti-inflammatories.   I am going to give him some prednisone to see if we can calm this down, but I think he will need a new MRI of his neck given the lucency and his symptoms. We will see him back after the MRI with one of the MDs. Review of systems:    Past Medical History:   Diagnosis Date    Anemia NEC     Bursitis of shoulder, left 08/03/2009    MRI revealed tendinitis and bursitis of the rotator cuff.       DJD (degenerative joint disease) 08/03/2009    Early djd, left radiocarpal joint with associated ganglion cyst.      Elevated white blood cell count 07/20/2009    GERD (gastroesophageal reflux disease)     H/O: rotator cuff tear 06/07/2006    Chronic    Hyperlipidemia     Pain in joint, upper arm 07/2009    Pain in shoulder, elbow and wrist.    Sickle cell anemia (HCC)     Sleep apnea     Does not use CPAP    Testicular failure     Vertigo 07/20/2009     Past Surgical History:   Procedure Laterality Date    HX ADENOIDECTOMY      HX ADENOIDECTOMY      HX CERVICAL FUSION  09/17/2018    ACDF C6/7    HX CHOLECYSTECTOMY  03/27/2018    HX HEENT      eardrum repair    HX SHOULDER ARTHROSCOPY Right      Social History     Socioeconomic History    Marital status: SINGLE     Spouse name: Not on file    Number of children: Not on file    Years of education: Not on file    Highest education level: Not on file   Occupational History    Not on file   Social Needs    Financial resource strain: Not on file    Food insecurity:     Worry: Not on file     Inability: Not on file    Transportation needs:     Medical: Not on file     Non-medical: Not on file   Tobacco Use    Smoking status: Former Smoker     Types: Cigarettes     Last attempt to quit: 7/12/2003     Years since quitting: 15.7    Smokeless tobacco: Never Used   Substance and Sexual Activity    Alcohol use: No    Drug use: No    Sexual activity: Yes     Partners: Female   Lifestyle    Physical activity:     Days per week: Not on file     Minutes per session: Not on file    Stress: Not on file Relationships    Social connections:     Talks on phone: Not on file     Gets together: Not on file     Attends [employer] service: Not on file     Active member of club or organization: Not on file     Attends meetings of clubs or organizations: Not on file     Relationship status: Not on file    Intimate partner violence:     Fear of current or ex partner: Not on file     Emotionally abused: Not on file     Physically abused: Not on file     Forced sexual activity: Not on file   Other Topics Concern    Not on file   Social History Narrative    Not on file     Family History   Problem Relation Age of Onset    Diabetes Mother     Cancer Father        Physical Exam:  Visit Vitals  /69   Pulse 79   Temp 98.3 °F (36.8 °C)   Resp 16   Ht 5' 7\" (1.702 m)   Wt 135 lb (61.2 kg)   SpO2 95%   BMI 21.14 kg/m²     Pain Scale: 8/10       has been . reviewed and is appropriate        Diagnoses and all orders for this visit:    1. Neck pain  -     methylPREDNISolone (MEDROL, SHU,) 4 mg tablet; Per dose pack instructions  -     MRI CERV SPINE WO CONT; Future    2. Cervical radicular pain  -     methylPREDNISolone (MEDROL, SHU,) 4 mg tablet; Per dose pack instructions  -     MRI CERV SPINE WO CONT; Future            Follow-up and Dispositions    · Return for after MRI julian PEARL ( not Donavan Pagan).              We have informed Veronica Montero to notify us for immediate appointment if he has any worsening neurogical symptoms or if an emergency situation presents, then call 911

## 2019-04-11 ENCOUNTER — HOSPITAL ENCOUNTER (OUTPATIENT)
Dept: MRI IMAGING | Age: 49
Discharge: HOME | End: 2019-04-11
Attending: NURSE PRACTITIONER
Payer: MEDICAID

## 2019-04-11 DIAGNOSIS — M54.12 CERVICAL RADICULAR PAIN: ICD-10-CM

## 2019-04-11 DIAGNOSIS — M54.2 NECK PAIN: ICD-10-CM

## 2019-04-11 PROCEDURE — 72141 MRI NECK SPINE W/O DYE: CPT

## 2019-05-02 ENCOUNTER — OFFICE VISIT (OUTPATIENT)
Dept: ORTHOPEDIC SURGERY | Age: 49
End: 2019-05-02

## 2019-05-02 VITALS
TEMPERATURE: 98.6 F | DIASTOLIC BLOOD PRESSURE: 50 MMHG | HEIGHT: 67 IN | BODY MASS INDEX: 21.19 KG/M2 | RESPIRATION RATE: 14 BRPM | WEIGHT: 135 LBS | HEART RATE: 67 BPM | OXYGEN SATURATION: 86 % | SYSTOLIC BLOOD PRESSURE: 102 MMHG

## 2019-05-02 DIAGNOSIS — M79.18 CERVICAL MYOFASCIAL PAIN SYNDROME: ICD-10-CM

## 2019-05-02 DIAGNOSIS — M54.2 NECK PAIN: Primary | ICD-10-CM

## 2019-05-02 RX ORDER — BUPIVACAINE HYDROCHLORIDE 2.5 MG/ML
4 INJECTION, SOLUTION INFILTRATION; PERINEURAL ONCE
Qty: 4 ML | Refills: 0
Start: 2019-05-02 | End: 2019-05-02

## 2019-05-02 RX ORDER — BETAMETHASONE SODIUM PHOSPHATE AND BETAMETHASONE ACETATE 3; 3 MG/ML; MG/ML
12 INJECTION, SUSPENSION INTRA-ARTICULAR; INTRALESIONAL; INTRAMUSCULAR; SOFT TISSUE ONCE
Qty: 2 ML | Refills: 0
Start: 2019-05-02 | End: 2019-05-02

## 2019-05-02 RX ORDER — LIDOCAINE HYDROCHLORIDE 20 MG/ML
4 INJECTION, SOLUTION EPIDURAL; INFILTRATION; INTRACAUDAL; PERINEURAL ONCE
Qty: 4 ML | Refills: 0
Start: 2019-05-02 | End: 2019-05-02

## 2019-05-02 NOTE — PATIENT INSTRUCTIONS
Neck Spasm: Exercises  Your Care Instructions  Here are some examples of typical rehabilitation exercises for your condition. Start each exercise slowly. Ease off the exercise if you start to have pain. Your doctor or physical therapist will tell you when you can start these exercises and which ones will work best for you. How to do the exercises  Levator scapula stretch    1. Sit in a firm chair, or stand up straight. 2. Gently tilt your head toward your left shoulder. 3. Turn your head to look down into your armpit, bending your head slightly forward. Let the weight of your head stretch your neck muscles. 4. Hold for 15 to 30 seconds. 5. Return to your starting position. 6. Follow the same instructions above, but tilt your head toward your right shoulder. 7. Repeat 2 to 4 times toward each shoulder. Upper trapezius stretch    1. Sit in a firm chair, or stand up straight. 2. This stretch works best if you keep your shoulder down as you lean away from it. To help you remember to do this, start by relaxing your shoulders and lightly holding on to your thighs or your chair. 3. Tilt your head toward your shoulder and hold for 15 to 30 seconds. Let the weight of your head stretch your muscles. 4. If you would like a little added stretch, place your arm behind your back. Use the arm opposite of the direction you are tilting your head. For example, if you are tilting your head to the left, place your right arm behind your back. 5. Repeat 2 to 4 times toward each shoulder. Neck rotation    1. Sit in a firm chair, or stand up straight. 2. Keeping your chin level, turn your head to the right, and hold for 15 to 30 seconds. 3. Turn your head to the left, and hold for 15 to 30 seconds. 4. Repeat 2 to 4 times to each side. Chin tuck    1. Lie on the floor with a rolled-up towel under your neck. Your head should be touching the floor. 2. Slowly bring your chin toward the front of your neck.   3. Hold for a count of 6, and then relax for up to 10 seconds. 4. Repeat 8 to 12 times. Forward neck flexion    1. Sit in a firm chair, or stand up straight. 2. Bend your head forward. 3. Hold for 15 to 30 seconds, then return to your starting position. 4. Repeat 2 to 4 times. Follow-up care is a key part of your treatment and safety. Be sure to make and go to all appointments, and call your doctor if you are having problems. It's also a good idea to know your test results and keep a list of the medicines you take. Where can you learn more? Go to http://davy-earl.info/. Enter P962 in the search box to learn more about \"Neck Spasm: Exercises. \"  Current as of: September 20, 2018  Content Version: 11.9  © 5935-5513 [employer]. Care instructions adapted under license by [employer] (which disclaims liability or warranty for this information). If you have questions about a medical condition or this instruction, always ask your healthcare professional. [place] [address] any warranty or liability for your use of this information. Neck: Exercises  Your Care Instructions  Here are some examples of typical rehabilitation exercises for your condition. Start each exercise slowly. Ease off the exercise if you start to have pain. Your doctor or physical therapist will tell you when you can start these exercises and which ones will work best for you. How to do the exercises  Neck stretch    1. This stretch works best if you keep your shoulder down as you lean away from it. To help you remember to do this, start by relaxing your shoulders and lightly holding on to your thighs or your chair. 2. Tilt your head toward your shoulder and hold for 15 to 30 seconds. Let the weight of your head stretch your muscles. 3. If you would like a little added stretch, use your hand to gently and steadily pull your head toward your shoulder.  For example, keeping your right shoulder down, lean your head to the left. 4. Repeat 2 to 4 times toward each shoulder. Diagonal neck stretch    1. Turn your head slightly toward the direction you will be stretching, and tilt your head diagonally toward your chest and hold for 15 to 30 seconds. 2. If you would like a little added stretch, use your hand to gently and steadily pull your head forward on the diagonal.  3. Repeat 2 to 4 times toward each side. Dorsal glide stretch    1. Sit or stand tall and look straight ahead. 2. Slowly tuck your chin as you glide your head backward over your body  3. Hold for a count of 6, and then relax for up to 10 seconds. 4. Repeat 8 to 12 times. Chest and shoulder stretch    1. Sit or stand tall and glide your head backward as in the dorsal glide stretch. 2. Raise both arms so that your hands are next to your ears. 3. Take a deep breath, and as you breathe out, lower your elbows down and behind your back. You will feel your shoulder blades slide down and together, and at the same time you will feel a stretch across your chest and the front of your shoulders. 4. Hold for about 6 seconds, and then relax for up to 10 seconds. 5. Repeat 8 to 12 times. Strengthening: Hands on head    1. Move your head backward, forward, and side to side against gentle pressure from your hands, holding each position for about 6 seconds. 2. Repeat 8 to 12 times. Follow-up care is a key part of your treatment and safety. Be sure to make and go to all appointments, and call your doctor if you are having problems. It's also a good idea to know your test results and keep a list of the medicines you take. Where can you learn more? Go to http://davy-earl.info/. Enter P975 in the search box to learn more about \"Neck: Exercises. \"  Current as of: September 20, 2018  Content Version: 11.9  © 2755-3386 [employer].  Care instructions adapted under license by [facility] (which disclaims liability or warranty for this information). If you have questions about a medical condition or this instruction, always ask your healthcare professional. [address] any warranty or liability for your use of this information. Shoulder Blade: Exercises  Your Care Instructions  Here are some examples of typical exercises for your condition. Start each exercise slowly. Ease off the exercise if you start to have pain. Your doctor or physical therapist will tell you when you can start these exercises and which ones will work best for you. How to do the exercises  Shoulder roll    1. Stand tall with your chin slightly tucked. Imagine that a string at the top of your head is pulling you straight up. 2. Keep your arms relaxed. All motion will be in your shoulders. 3. Shrug your shoulders up toward your ears, then up and back. Todd your shoulders down and back, like you're sliding your hands down into your back pants pockets. 4. Repeat the circles at least 2 to 4 times. 5. This exercise is also helpful anytime you want to relax. Lower neck and upper back stretch    1. With your arms about shoulder height, clasp your hands in front of you. 2. Drop your chin toward your chest.  3. Reach straight forward so you are rounding your upper back. Think about pulling your shoulder blades apart. Goyo Stephen feel a stretch across your upper back and shoulders. Hold for at least 6 seconds. 4. Repeat 2 to 4 times. Triceps stretch    1. Reach your arm straight up. 2. Keeping your elbow in place, bend your arm and reach your hand down behind your back. 3. With your other hand, apply gentle pressure to the bent elbow. Goyo Stephen feel a stretch at the back of your upper arm and shoulder. Hold about 6 seconds. 4. Repeat 2 to 4 times with each arm. Shoulder stretch    1. Relax your shoulders.   2. Raise one arm to shoulder height, and reach it across your chest.  3. Pull the arm slightly toward you with your other arm. This will help you get a gentle stretch. Hold for about 6 seconds. 4. Repeat 2 to 4 times. Shoulder blade squeeze    1. Sit or stand up tall with your arms at your sides. 2. Keep your shoulders relaxed and down, not shrugged. 3. Squeeze your shoulder blades together. Hold for 6 seconds, then relax. 4. Repeat 8 to 12 times. Straight-arm shoulder blade squeeze    1. Sit or stand tall. Relax your shoulders. 2. With palms down, hold your elastic tubing or band straight out in front of you. 3. Start with slight tension in the tubing or band, with your hands about shoulder-width apart. 4. Slowly pull straight out to the sides, squeezing your shoulder blades together. Keep your arms straight and at shoulder height. Slowly release. 5. Repeat 8 to 12 times. Rowing    1. Calvin your elastic tubing or band at about waist height. Take one end in each hand. 2. Sit or stand with your feet hip-width apart. 3. Hold your arms straight in front of you. Adjust your distance to create slight tension in the tubing or band. 4. Slightly tuck your chin. Relax your shoulders. 5. Without shrugging your shoulders, pull straight back. Your elbows will pass alongside your waist.    Pull-downs    1. Calvin your elastic tubing or band in the top of a closed door. Take one end in each hand. 2. Either sit or stand, depending on what is more comfortable. If you feel unsteady, sit on a chair. 3. Start with your arms up and comfortably apart, elbows straight. There should be a slight tension in the tubing or band. 4. Slightly tuck your chin, and look straight ahead. 5. Keeping your back straight, slowly pull down and back, bending your elbows. 6. Stop where your hands are level with your chin, in a \"goalpost\" position. 7. Repeat 8 to 12 times. Chest T stretch    1. Lie on your back. Raise your knees so they are bent. Plant your feet on the floor, hip-width apart.   2. Jo Salcedo your chin, and relax your shoulders. 3. Reach your arms straight out to the sides. If you don't feel a mild stretch in your shoulders and across your chest, use a foam roll or a tightly rolled blanket under your spine, from your tailbone to your head. 4. Relax in this position for at least 15 to 30 seconds while you breathe normally. Repeat 2 to 4 times. 5. As you get used to this stretch, keep adding a little more time until you are able relax in this position for 2 or 3 minutes. When you can relax for at least 2 minutes, you only need to do the exercise 1 time per session. Chest goalpost stretch    1. Lie on your back. Raise your knees so they are bent. Plant your feet on the floor, hip-width apart. 2. Tuck your chin, and relax your shoulders. 3. Reach your arms straight out to the sides. 4. Bend your arms at the elbows, with your hands pointed toward the top of your head. Your arms should make an L on either side of your head. Your palms should be facing up. 5. If you don't feel a mild stretch in your shoulders and across your chest, use a foam roll or tightly rolled blanket under your spine, from your tailbone to your head. 6. Relax in this position for at least 15 to 30 seconds while you breathe normally. Repeat 2 to 4 times. 7. Each day you do this exercise, add a little more time until you can relax in this position for 2 or 3 minutes. When you can relax for at least 2 minutes, you only need to do the exercise 1 time per session. Follow-up care is a key part of your treatment and safety. Be sure to make and go to all appointments, and call your doctor if you are having problems. It's also a good idea to know your test results and keep a list of the medicines you take. Where can you learn more? Go to http://davy-earl.info/. Enter [phone] in the search box to learn more about \"Shoulder Blade: Exercises. \"  Current as of: September 20, 2018  Content Version: 11.9  © 0912-3109 [employer]. Care instructions adapted under license by [employer] (which disclaims liability or warranty for this information). If you have questions about a medical condition or this instruction, always ask your healthcare professional. [place] [address] any warranty or liability for your use of this information. Healthy Upper Back: Exercises  Your Care Instructions  Here are some examples of exercises for your upper back. Start each exercise slowly. Ease off the exercise if you start to have pain. Your doctor or physical therapist will tell you when you can start these exercises and which ones will work best for you. How to do the exercises  Lower neck and upper back stretch    5. Stretch your arms out in front of your body. Clasp one hand on top of your other hand. 6. Gently reach out so that you feel your shoulder blades stretching away from each other. 7. Gently bend your head forward. 8. Hold for 15 to 30 seconds. 9. Repeat 2 to 4 times. Midback stretch    4. Kneel on the floor, and sit back on your ankles. 5. Lean forward, place your hands on the floor, and stretch your arms out in front of you. Rest your head between your arms. 6. Gently push your chest toward the floor, reaching as far in front of you as possible. 7. Hold for 15 to 30 seconds. 8. Repeat 2 to 4 times. Shoulder rolls    5. Sit comfortably with your feet shoulder-width apart. You can also do this exercise while standing. 6. Roll your shoulders up, then back, and then down in a smooth, circular motion. 7. Repeat 2 to 4 times. Wall push-up    6. Stand against a wall with your feet about 12 to 24 inches back from the wall. If you feel any pain when you do this exercise, stand closer to the wall. 7. Place your hands on the wall slightly wider apart than your shoulders, and lean forward. 8. Gently lean your body toward the wall. Then push back to your starting position.  Keep the motion smooth and controlled. 9. Repeat 8 to 12 times. Resisted shoulder blade squeeze    3. Sit or stand, holding the band in both hands in front of you. Keep your elbows close to your sides, bent at a 90-degree angle. Your palms should face up. 4. Squeeze your shoulder blades together, and move your arms to the outside, stretching the band. Be sure to keep your elbows at your sides while you do this. 5. Relax. 6. Repeat 8 to 12 times. Resisted rows    1. Put the band around a solid object, such as a bedpost, at about waist level. Hold one end of the band in each hand. 2. With your elbows at your sides and bent to 90 degrees, pull the band back to move your shoulder blades toward each other. Return to the starting position. 3. Repeat 8 to 12 times. Follow-up care is a key part of your treatment and safety. Be sure to make and go to all appointments, and call your doctor if you are having problems. It's also a good idea to know your test results and keep a list of the medicines you take. Where can you learn more? Go to http://davy-earl.info/. Enter A620 in the search box to learn more about \"Healthy Upper Back: Exercises. \"  Current as of: September 20, 2018  Content Version: 11.9  © 3097-4996 [employer]. Care instructions adapted under license by [employer] (which disclaims liability or warranty for this information). If you have questions about a medical condition or this instruction, always ask your healthcare professional. [place] [address] any warranty or liability for your use of this information.

## 2019-05-02 NOTE — PROGRESS NOTES
[address], [address]  [place], [address] [place] [address] Street  Phone: [phone]  Fax: [phone]        Carmencita Maradiaga  : 1970  PCP: Reji Solo MD  2019    PROGRESS NOTE      HISTORY OF PRESENT ILLNESS  Teetee Bruce is a 50 y.o. male. He was seen initially 2018 for c/o chronic neck pain radiating into his LUE and numbness in his fingers that has worsened over the last 4 months with the new symptoms being radicular. He recently completed 10 visits to PT and has 6 remaining. He notes when he lays down, his whole body gets paralyzed. He has seen a sleep doctor who states he had sleep apnea, but no sleep paralysis. The only thing that has provided him any relief has been pain medication, but it has had less of an effect recently. He previously had neck pain for which he had an MRI in , but he did not begin to have numbness in his fingers on his left hand until recently. He has a dx of sickle cell, but notes he does not have a pain management or hematologist. His PCP has been managing his sickle cell for him. He notes he did not find any relief from the cervical injections, and his neck pain and LUE paraesthesia has worsened over the last month. He notes he now has a right-sided neck pain associated with his right shoulder pain. He is scheduled to see Dr. Krystal Boucher on Monday for his right shoulder pain. He did not tolerate Gabapentin well as he experienced somnolence. Pt is interested in chiropractic care. He found no relief from PT, so Dr. Krystal Boucher ordered a cervical spine MRI. He continues to have neck pain radiating into his first three digits in his left hand. He found some mild relief from Lyrica but it caused a side effect of ED so he has chosen to discontinue the medication. Cervical MRI 8/15/18: Multilevel central stenosis involving the C3-4 through the C6-7 levels.  Stenosis is greatest at C4-5 level, mild to moderate in degree, produced by left  central/subarticular disc protrusion superimposed on disc bulge. Although there is mild cord deformity at these levels of stenosis, no abnormal cord signal is seen. Left posterolateral C6-7 disc herniation superposed upon disc bulge including focal subarticular extrusion component extending inferiorly with left axillary recess narrowing. Exiting left C7 nerve root may be affected. Clinical correlation is recommended. Mild C3-4 and C5-C6 levels of mild central stenosis are produced by disc bulge and left central disc protrusion, respectively. Slight reversal of normal cervical lordosis. No subluxation seen. Prominent nasopharyngeal and oropharyngeal tonsillar soft tissue as well as several scattered normal sized cervical nodes. These findings are nonspecific and could reflect reactive lymphoid tissue. Cannot exclude lymphoproliferative process or adenopathy from other etiologies. Clinical correlation recommended. He saw Dr. Lisandro Shane, who recommended a surgical solution. He saw Sheyla Chen NP in 2/19 and he was 5 month s/p ACDF C6-7 (9/17/18) that improved his LUE pain. He did not tolerate Cymbalta as it gave him erectile dysfunction. He has tried and failed Lyrica (ED), Gabitril (somnolence), and Gabapentin (somnolence). He also c/o some numbness in the second and third digits of his left hand. He did not find relief from heat, ice, Flexeril, Mobic, or lidocaine patches. George Zamora comes in to the office today for MRI f/u. He c/o right-sided neck pain radiating down his back to between his shoulder blades. He denies new radicular symptoms, but continues to have residual LUE numbness. He notes that he had to discontinue the Prednisone dose pack because it caused a sickle cell crisis. He continues to have neck and back pain, but it is not as severe as it was previously.  He denies any injury, but noted that he had to lift his mother with his dad to help get her dressed when EMTs came. She recently passed away a week and a half ago due to septic shock. He notes that his vertigo also came back since he has been stressed. Cervical MRI 4/11/19: Evidence of ACDF C6-C7. No recurrent or residual central or foraminal stenosis at this level. Previously there was a broad-based protrusion/extrusion on the left. Degenerative changes with reversal of the cervical lordosis as described above. Central stenosis with mild cord distortion at C3-C4 and C4-C5. No cord signal change and this would not with complete confidence correlate with a cervical myelopathy. No new high-grade foraminal stenosis. ASSESSMENT  His pain today is likely myofascial in nature. PLAN  1. Trigger point injections into cervical region that significantly improved his pain. 2. Referral to PT with dry needling (Kenisha). Pt will f/u in 6 weeks or sooner as needed. Diagnoses and all orders for this visit:    1. Neck pain  -     bupivacaine (MARCAINE) 0.25 % (2.5 mg/mL) soln injection; 4 mL by IntraMUSCular route once for 1 dose. -     INJECTION, BUPIVICAINE HYDRO  -     LIDOCAINE INJECTION  -     lidocaine, PF, (XYLOCAINE) 20 mg/mL (2 %) injection; 4 mL by Other route once for 1 dose. -     betamethasone (CELESTONE SOLUSPAN) 6 mg/mL injection; 2 mL by IntraMUSCular route once for 1 dose.  -     BETAMETHASONE ACETATE & SODIUM PHOSPHATE INJECTION 3 MG EA.  -     [place] INJECT TRIGGER POINTS, > 3  -     REFERRAL TO PHYSICAL THERAPY    2. Cervical myofascial pain syndrome  -     bupivacaine (MARCAINE) 0.25 % (2.5 mg/mL) soln injection; 4 mL by IntraMUSCular route once for 1 dose. -     INJECTION, BUPIVICAINE HYDRO  -     LIDOCAINE INJECTION  -     lidocaine, PF, (XYLOCAINE) 20 mg/mL (2 %) injection; 4 mL by Other route once for 1 dose.   -     betamethasone (CELESTONE SOLUSPAN) 6 mg/mL injection; 2 mL by IntraMUSCular route once for 1 dose.  -     BETAMETHASONE ACETATE & SODIUM PHOSPHATE INJECTION 3 MG EA.  -     [place] INJECT TRIGGER POINTS, > 3  -     REFERRAL TO PHYSICAL THERAPY         PAST MEDICAL HISTORY   Past Medical History:   Diagnosis Date    Anemia NEC     Bursitis of shoulder, left 08/03/2009    MRI revealed tendinitis and bursitis of the rotator cuff.  DJD (degenerative joint disease) 08/03/2009    Early djd, left radiocarpal joint with associated ganglion cyst.      Elevated white blood cell count 07/20/2009    GERD (gastroesophageal reflux disease)     H/O: rotator cuff tear 06/07/2006    Chronic    Hyperlipidemia     Pain in joint, upper arm 07/2009    Pain in shoulder, elbow and wrist.    Sickle cell anemia (HCC)     Sleep apnea     Does not use CPAP    Testicular failure     Vertigo 07/20/2009       Past Surgical History:   Procedure Laterality Date    HX ADENOIDECTOMY      HX ADENOIDECTOMY      HX CERVICAL FUSION  09/17/2018    ACDF C6/7    HX CHOLECYSTECTOMY  03/27/2018    HX HEENT      eardrum repair    HX SHOULDER ARTHROSCOPY Right    . MEDICATIONS    Current Outpatient Medications   Medication Sig Dispense Refill    methylPREDNISolone (MEDROL, SHU,) 4 mg tablet Per dose pack instructions 1 Dose Pack 0    cyclobenzaprine (FLEXERIL) 10 mg tablet Take 1 Tab by mouth three (3) times daily as needed for Muscle Spasm(s). 90 Tab 1    DULoxetine (CYMBALTA) 20 mg capsule Take 1 Cap by mouth daily. 30 Cap 1    cephALEXin (KEFLEX) 500 mg capsule Take 1 Cap by mouth four (4) times daily. Indications: Skin and Skin Structure Infection 28 Cap 0    meclizine (ANTIVERT) 25 mg tablet take 1 tablet by mouth every 8 hours if needed for VERTIGO  0    tiaGABine (GABITRIL) 4 mg tablet 1 tab po q hs x 1 week, then 1 tab po bid 60 Tab 1    OXYGEN-AIR DELIVERY SYSTEMS 2 L by Nasal route as needed (shortness of breath).  acetaminophen (TYLENOL) 325 mg tablet Take 650 mg by mouth every six (6) hours as needed for Pain or Fever.       senna-docusate (P-COL RITE) 8.6-50 mg per tablet Take 1 Tab by mouth daily as needed for Constipation.  oxyCODONE-acetaminophen (PERCOCET)  mg per tablet Take 1 Tab by mouth every six (6) hours as needed for Pain. Max Daily Amount: 4 Tabs. 20 Tab 0    SUMAtriptan (IMITREX) 50 mg tablet       loratadine (ALAVERT PO) Take  by mouth.  pregabalin (LYRICA) 75 mg capsule Take 1 Cap by mouth two (2) times a day. Max Daily Amount: 150 mg. 14 Cap 0    pregabalin (LYRICA) 150 mg capsule Take 1 Cap by mouth two (2) times a day. Max Daily Amount: 300 mg. 60 Cap 2    valACYclovir (VALTREX) 1 gram tablet Take 1 Tab by mouth daily as needed (cold sores). 0    multivitamin (ONE A DAY) tablet Take 1 Tab by mouth daily.  naproxen (NAPROSYN) 250 mg tablet Take  by mouth two (2) times daily (with meals). ALLERGIES  Allergies   Allergen Reactions    Other Food Other (comments)     Walnuts. Gets headaches    Corn Other (comments)     Told by  allergic    Shellfish Derived Swelling     Swelling. States okay with betadine          SOCIAL HISTORY    Social History     Socioeconomic History    Marital status: SINGLE     Spouse name: Not on file    Number of children: Not on file    Years of education: Not on file    Highest education level: Not on file   Tobacco Use    Smoking status: Former Smoker     Types: Cigarettes     Last attempt to quit: 7/12/2003     Years since quitting: 15.8    Smokeless tobacco: Never Used   Substance and Sexual Activity    Alcohol use: No    Drug use: No    Sexual activity: Yes     Partners: Female       FAMILY HISTORY  Family History   Problem Relation Age of Onset    Diabetes Mother     Cancer Father          REVIEW OF SYSTEMS  Review of Systems   Musculoskeletal: Positive for neck pain. PHYSICAL EXAMINATION  There were no vitals taken for this visit.     Pain Assessment  4/1/2019   Location of Pain Neck;Back;Hand   Location Modifiers Left   Severity of Pain 8   Quality of Pain Sharp   Quality of Pain Comment stabbing, numbness, tingling, weakness, shooting   Duration of Pain -   Duration of Pain Comment -   Frequency of Pain Constant   Aggravating Factors (No Data)   Aggravating Factors Comment pain is just there    Limiting Behavior -   Relieving Factors Nothing   Relieving Factors Comment robaxin & mobic eases slightly   Result of Injury -           Constitutional:  Well developed, well nourished, in no acute distress. Psychiatric: Affect and mood are appropriate. Integumentary: No rashes or abrasions noted on exposed areas. SPINE/MUSCULOSKELETAL EXAM    Cervical spine:  Neck is midline. Normal muscle tone. No focal atrophy is noted. ROM painful. Shoulder ROM intact. Tenderness to palpation. Negative Spurling's sign. Negative Tinel's sign. Negative Tomas's sign.       Sensation in the bilateral arms grossly intact to light touch.       Lumbar spine:  No rash, ecchymosis, or gross obliquity. No fasciculations. No focal atrophy is noted. No pain with hip ROM. Full range of motion. Tenderness to palpation. No tenderness to palpation at the sciatic notch. SI joints non-tender. Trochanters non tender.      Sensation in the bilateral legs grossly intact to light touch. Updates from 5/2/19:  Tenderness to palpation of trapezii (R>L)  No myelopathic signs    MOTOR:      Biceps  Triceps Deltoids Wrist Ext Wrist Flex Hand Intrin   Right 5/5 5/5 5/5 5/5 5/5 5/5   Left 5/5 5/5 5/5 5/5 5/5 5/5             Hip Flex  Quads Hamstrings Ankle DF EHL Ankle PF   Right 5/5 5/5 5/5 5/5 5/5 5/5   Left 5/5 5/5 5/5 5/5 5/5 5/5     DTRs are 2+ biceps, triceps, brachioradialis, patella, and Achilles.      Negative Straight Leg raise. Squat not tested. No difficulty with tandem gait.       Ambulation without assistive device. FWB.         RADIOGRAPHS  Cervical MRI images taken on 8/15/18 personally reviewed with patient:  FINDINGS:      Slight reversal of normal cervical lordosis is present with minimal kyphosis at  the C4 level. No subluxation is seen.  Osseous marrow signal is within normal  limits.  The cervicomedullary junction is unremarkable.  No abnormal signal is  seen in the cervical spinal cord.     C2/3 level: There is no central or foraminal stenosis.     C3/4 level: Disc bulge is present slightly effacing ventral cord. No abnormal  cord signal is seen. Mild central stenosis is present with estimated midline AP  diameter of thecal sac measuring 8-9 mm. Slight foraminal narrowing is present  bilaterally.     C4/5 level: Disc bulge is present with superimposed left central/subarticular  disc protrusion. The left ventral cord is mildly flattened. No abnormal cord  signal is seen. Mild to moderate central stenosis is present with estimated  midline AP diameter of thecal sac measuring 7-8 mm. Uncovertebral joint  hypertrophy is present with moderate right and mild to moderate left foraminal  stenosis. There is no central or foraminal stenosis.     C5/6 level: Disc bulge is present with tiny left central superimposed disc  protrusion. Ventral cord is effaced. No abnormal cord signal is seen. Mild  central stenosis is present with estimated midline AP diameter of thecal sac  measuring 8-9 mm. Mild foraminal stenosis is seen bilaterally.     C6/7 level: Disc bulge is present. Superimposed left posterolateral disc  herniation is present including small subarticular component extending  inferiorly. There is effacement of the left ventral cord and moderate narrowing  of the left axillary recess. Disc herniation may slightly extend into the  foramen. Moderate left and mild right foraminal stenosis is seen. Central canal  narrowing is milder at the midline with mild central stenosis and estimated  midline AP diameter of thecal sac measuring 8-9 mm.     C7/T1 level:  There is no central or foraminal stenosis.     The visualized intracranial contents are unremarkable.      Punctate susceptibility artifact suggested along prominent sized bilateral  oropharyngeal tonsillar pillars, nonspecific but potentially calcifications  related to prior infection. Prominent nasopharyngeal soft tissue is seen without  definite discrete focal lesion. There are several scattered subcentimeter  anterior and posterior cervical chain nodes bilaterally, not meeting size  criteria for adenopathy.           ________________________     IMPRESSION  IMPRESSION:     1. Multilevel central stenosis involving the C3-4 through the C6-7 levels. Stenosis is greatest at C4-5 level, mild to moderate in degree, produced by left  central/subarticular disc protrusion superimposed on disc bulge. Although there  is mild cord deformity at these levels of stenosis, no abnormal cord signal is  seen.     2. Left posterolateral C6-7 disc herniation superposed upon disc bulge including  focal subarticular extrusion component extending inferiorly with left axillary  recess narrowing. Exiting left C7 nerve root may be affected. Clinical  correlation is recommended.     3. Mild C3-4 and C5-C6 levels of mild central stenosis are produced by disc  bulge and left central disc protrusion, respectively.     4. Slight reversal of normal cervical lordosis. No subluxation seen.     5. Prominent nasopharyngeal and oropharyngeal tonsillar soft tissue as well as  several scattered normal sized cervical nodes. These findings are nonspecific  and could reflect reactive lymphoid tissue. Cannot exclude lymphoproliferative  process or adenopathy from other etiologies. Clinical correlation recommended.     2V Cervical XR images taken on 4/16/18 personally reviewed with patient:  Straightening of the cervical lordosis  Endplate osteophytes  Slight lean to right  Normal disc spacing  No obvious compression fractures or instabilities          Cervical MRI images taken on 1/14/16 personally reviewed with patient:  COMPARISON: MRI cervical spine 2/8/12    TECHNIQUE: Cervical spine scanned with axial and sagittal T2W scans, sagittal T1W scans. FINDINGS:  Straightening of the cervical spine. Slight retrolisthesis of C4 on C5. Remainder of vertebral bodies maintain normal alignment. The cervical cord appears normal. Diffuse hypointense marrow signal, similar to prior studies. The visualized posterior fossa structures are unremarkable. C2/C3: No significant spinal stenosis or neural foraminal narrowing. C3/C4: Mild disc bulge with posterior central annular tear. Mild/moderate right and mild left foraminal stenosis. Mild narrowing of the central canal, midline AP diameter is 8.4 mm.      C4/C5: Mild disc bulge. Mild left foraminal stenosis. Mild central canal stenosis, midline AP diameter is 8 mm. C5/C6: No significant spinal stenosis or neural foraminal narrowing. C6/C7: Small left paracentral disc extrusion with annular tear. Disc material extends slightly below the disc space. Mild narrowing of the left central canal. Neural foramen are patent. C7/T1: No significant spinal stenosis or neural foraminal narrowing. Vertebral artery flow voids present. Similar appearance of prominent adenoid tissue and cervical adenopathy.      Impression:    1. Mild/moderate multilevel degenerative discogenic disease. Marginal change in comparison to prior. 2. Similar appearance of small left paracentral disc extrusion at C6-C7. 3. Similar appearance of adenopathy and prominent adenoid tissue. 4. Diffuse hypointense marrow signal, similar to prior. Could be related to anemia in this patient with history of sickle cell disease. [facility]  OFFICE PROCEDURE PROGRESS NOTE      PROCEDURE: In the office today after informed consent using aseptic technique, the patient was injected with a total of 2 cc of Celestone, 4 cc each of Lidocaine and Marcaine into his cervical region.      Chart reviewed for the following:   I, Dr. Asia Baptiste, have reviewed the History, Physical and updated the Allergic reactions for [place] performed immediately prior to start of procedure:   I, Dr. Stefania Hough, have performed the following reviews on [place] prior to the start of the procedure:            * Patient was identified by name and date of birth   * Agreement on procedure being performed was verified  * Risks and Benefits explained to the patient  * Procedure site verified and marked as necessary  * Patient was positioned for comfort  * Consent was signed and verified     Time: 1:55pm    Date of procedure: 5/2/2019    Procedure performed by:  Norm Madrigal MD    Provider assisted by: None    Patient assisted by: Self    How tolerated by patient: Pt tolerated the procedure well with no complications. 20 minutes of face-to-face contact were spent with the patient during today's visit extensively discussing symptoms and treatment plan. All questions were answered. More than half of this visit today was spent on counseling.      Written by Chirag Velazquez as dictated by Stefania Hough MD

## 2019-05-13 ENCOUNTER — HOSPITAL ENCOUNTER (OUTPATIENT)
Dept: PHYSICAL THERAPY | Age: 49
Discharge: HOME | End: 2019-05-13
Payer: MEDICAID

## 2019-05-13 PROCEDURE — 97014 ELECTRIC STIMULATION THERAPY: CPT

## 2019-05-13 PROCEDURE — 97162 PT EVAL MOD COMPLEX 30 MIN: CPT

## 2019-05-13 PROCEDURE — 97140 MANUAL THERAPY 1/> REGIONS: CPT

## 2019-05-13 PROCEDURE — 97110 THERAPEUTIC EXERCISES: CPT

## 2019-05-13 NOTE — PROGRESS NOTES
PT CERVICAL EVAL AND TREATMENT     Patient Name: Esme Self  Date:2019  : 1970  [x]  Patient  Verified  Payor: [facility] MEDICAID / Plan: [facility] PLUS / Product Type: Managed Care Medicaid /    In [id number]  Out time:1202  Total Treatment Time (min): 50  Visit #: 1 of     Treatment Area: Chronic neck pain [M54.2, G89.29]    SUBJECTIVE  Pain Level (0-10 scale): (C): 0  (B):0  (W): 9   Any medication changes, allergies to medications, diagnosis change, or new procedure performed: see summary sheet for update    Chief Complaint:  Green Tasneem reports with CS/TS pain with long history of pain and radiculopathy with hx of Sickle Cell Anemia. Resultant C6-7 fusion and discectomy 18 by Dr Yesenia Lugo. Trigger point injections on 19 aided in pain relief but ended up going to ER the day after sec to Sickle Cell crisis  and just recently discharged. Past PT treatment prior to surgery unsuccessful. Pain management via prescribed meds and heat or ice. Mechanism of injury: Sickle Cell      Functional Status  Present functional limitations:  Walking tolerance limited by TS pain - 1 mile,  daily household chores, pain at night - prevents comfort     Symptoms  Aggravated by: see limitations    Eased by: heat , ice     Past History/Treatments: PT    Diagnostic Tests: None noted     Headaches: Do you have headaches?  No     OBJECTIVE  Posture: fwd shoulder and head    Shoulder/Scapular Screen: WNL   Active Movements: CS WNL - mild tightness at end range   Thoracic Spine: seated rotation - WNL     Palpation: Severe TTP/ light touch/ pressure  B CS and TS paraspinals UT, LS, rhomboids : + jump sign     Muscle Flexibility: WNL   Global Muscular Weakness:    Shoulder : Flexion 4/5, ABD 4/5, ER 4/5, IR 4/5    Middle Trapezius/ Rhomboids 4-/5   Lower Trapezius 3+/5   SA 4-/5     Patient Education/ Therapeutic Exercise : [x] Discussed POT including PT expectation, established HEP with pictures and instruction  (minutes) : 8    Manual: 10  min  Not well tolerated - DTM B UT , LS CS and TS paraspinal     Modality (rationale): decrease myofascial pain   [x]  E-Stim: type HV B CS and TS 10 min with MHP in semi reclined    Pain Level (0-10 scale) post treatment: 0 at rest     ASSESSMENT  [x]  See Plan of Care    PLAN  [x]  Upgrade activities as tolerated      [x] Other:_POC  2-3 x 8-12     Kamala Egan, PT 5/13/2019    Justification for Eval Code Complexity:  Patient History : past patient for same condition without resolution, past surgery   Examination see exam   Clinical Presentation: evolving sec to radicular sx and myofascial pain   Clinical Decision Making : FOTO : 57 /100

## 2019-05-13 NOTE — PROGRESS NOTES
[address] [facility] AT [address] [place], [place]  Phone: [phone] Fax: [phone] / [address] [address] PHYSICAL THERAPY SERVICES  Patient Name: Aziza Isaacs : 1970   Medical   Diagnosis: Chronic neck pain [M54.2, G89.29] Treatment Diagnosis: CS myofascial pain    Onset Date: Ongoing, DOS 18     Referral Source: Kenzie Engle MD Start of [facility]): 2019   Prior Hospitalization: See medical history Provider #: [id number]   Prior Level of Function: Activity limitation sec to Sickle Cell    Comorbidities: Sickle cell, arthritis    Medications: Verified on Patient Summary List   The Plan of Care and following information is based on the information from the initial evaluation.   ========================================================================  Assessment / key information:  Pt is a 50y.o. year old male who presents with CS/TS pain with long history of pain and radiculopathy with hx of Sickle Cell Anemia. Resultant C6-7 fusion and discectomy 18 by Dr Pieter Hand. Trigger point injections on 19 aided in pain relief but ended up going to ER the day after sec to Sickle Cell crisis  and just recently discharged. Past PT treatment prior to surgery unsuccessful. Pain management via prescribed meds and heat or ice. .  Current deficits include: increased pain to 9/10 at worst,  Posture: fwd shoulder and head     Shoulder/Scapular Screen: WNL   Active Movements: CS WNL - mild tightness at end range   Thoracic Spine: seated rotation - WNL      Palpation: Severe TTP/ light touch/ pressure  B CS and TS paraspinals UT, LS, rhomboids : + jump sign      Muscle Flexibility: WNL   Global Muscular Weakness:               Shoulder : Flexion 4/5, ABD 4/5, ER 4/5, IR 4/5               Middle Trapezius/ Rhomboids 4-/5              Lower Trapezius 3+/5              SA 4-/5      Functional deficits include: Walking tolerance limited by TS pain - 1 mile,  daily household chores, pain at night - prevents comfort. Home exercise program initiated on initial evaluation to address these deficits. Pt would benefit from PT to address these deficits for increased functional mobility and QOL.  ========================================================================  Eval Complexity: History: MEDIUM  Complexity : 1-2 comorbidities / personal factors will impact the outcome/ POC Exam:HIGH Complexity : 4+ Standardized tests and measures addressing body structure, function, activity limitation and / or participation in recreation  Presentation: MEDIUM Complexity : Evolving with changing characteristics  Clinical Decision Making:MEDIUM Complexity : FOTO score of 26-74Overall Complexity:MEDIUM  Problem List: pain affecting function, decrease strength, decrease ADL/ functional abilitiies, decrease activity tolerance and other FOTO 57/100   Treatment Plan may include any combination of the following: Therapeutic exercise, Therapeutic activities, Neuromuscular re-education, Physical agent/modality, Gait/balance training, Manual therapy, Aquatic therapy, Patient education, Self Care training, Functional mobility training, Home safety training and Stair training  Patient / Family readiness to learn indicated by: asking questions, trying to perform skills and interest  Persons(s) to be included in education: patient (P)  Barriers to Learning/Limitations: None  Measures taken:    Patient Goal (s): \"Less pain \"   Patient self reported health status: good  Rehabilitation Potential: fair   Short Term Goals: To be accomplished in  1  weeks:  1. Pt will be independent and compliant with HEP   Long Term Goals: To be accomplished in  8-12  treatments:  1. Patient will increase FOTO score to 63/100 for indications of increased functional mobility. 2.  Patient will demo 4+/5 shoulder flexion strength for ease with lifting ADLs   3. Patient will demo 4/5 MT strength for progression of postural strength to decrease reoccurrence of myofasical TRP   4. Patient will report 50% overall improvement in sx for imrpoved activity tolerance and QOL   Frequency / Duration:   Patient to be seen  2-3  times per week for 8-12  treatments:  Patient / Caregiver education and instruction: self care, activity modification and exercises  Therapist Signature: Delgado Martins PT Date: 5/67/3790   Certification Period: NA Time: 1222pm    ========================================================================  I certify that the above Physical Therapy Services are being furnished while the patient is under my care. I agree with the treatment plan and certify that this therapy is necessary. Physician Signature:        Date:       Time:   Please sign and return to [facility] [address] or you may fax the signed copy to [phone]. Thank you.

## 2019-05-15 ENCOUNTER — APPOINTMENT (OUTPATIENT)
Dept: PHYSICAL THERAPY | Age: 49
End: 2019-05-15
Payer: MEDICAID

## 2019-05-17 ENCOUNTER — HOSPITAL ENCOUNTER (OUTPATIENT)
Dept: PHYSICAL THERAPY | Age: 49
Discharge: HOME | End: 2019-05-17
Payer: MEDICAID

## 2019-05-17 PROCEDURE — 97014 ELECTRIC STIMULATION THERAPY: CPT

## 2019-05-17 PROCEDURE — 97110 THERAPEUTIC EXERCISES: CPT

## 2019-05-17 PROCEDURE — 97140 MANUAL THERAPY 1/> REGIONS: CPT

## 2019-05-17 NOTE — PROGRESS NOTES
PT DAILY TREATMENT NOTE     Patient Name: Wil Fitzgerald  Date:2019  : 1970  [x]  Patient  Verified  Payor: [facility] MEDICAID / Plan: [place] / Product Type: Managed Care Medicaid /    In time: 1:28 pm        Out time: 2:19 pm  Total Treatment Time (min): 51  Visit #: 2 of     Treatment Area: Chronic neck pain [M54.2, G89.29]    SUBJECTIVE  Pain Level (0-10 scale): 0 at rest  Any medication changes, allergies to medications, adverse drug reactions, diagnosis change, or new procedure performed?: [x] No    [] Yes (see summary sheet for update)  Subjective functional status/changes:   [] No changes reported  Patient notes no trouble with HEP. OBJECTIVE  Modality rationale: decrease pain to improve the patients ability to perform ADLs     Min Type Additional Details   10 [x] Estim: []Att   [x]Unatt                  []IFC  []Premod                                            []NMES    [x]HVES []w/ice   [x]w/heat  Position: prone  Location: (B) C/S and T/S   [x] Skin assessment post-treatment:  [x]intact    []redness- no adverse reaction                                                                                 []redness - adverse reaction:     32 min Therapeutic Exercise:  [x] See flow sheet: initiated therex per IE   Rationale: increase ROM, increase strength and improve coordination to improve the patients ability to perform ADLs     9 min Manual Therapy:  prone MFR to C/S and T/S   Rationale: decrease pain, increase ROM, increase tissue extensibility and reduce myofascial stiffness to improve the patients ability to perform ADLs          with TE min Patient Education: [x] Review HEP from IE     Other Objective/Functional Measures:   STG met.     Pain Level (0-10 scale) post treatment: 0    ASSESSMENT/Changes in Function:   Good tolerance to treatment today with patient req 100% verbal/tactile cueing and demo for proper form/technique with all newly introduced therex. Patient able to complete all therex without adverse reaction in clinic. Patient will continue to benefit from skilled PT services to modify and progress therapeutic interventions, address functional mobility deficits, address ROM deficits, address strength deficits, analyze and address soft tissue restrictions, analyze and cue movement patterns, analyze and modify body mechanics/ergonomics and assess and modify postural abnormalities to attain remaining goals. []  See Plan of Care  []  See progress note/recertification  []  See Discharge Summary         Progress towards goals / Updated goals: · Short Term Goals: To be accomplished in  1  weeks:  1. Pt will be independent and compliant with HEP. -Goal met; pt notes compliance (5/17/19)  · Long Term Goals: To be accomplished in  8-12  treatments:  1. Patient will increase FOTO score to 63/100 for indications of increased functional mobility. 2.  Patient will demo 4+/5 shoulder flexion strength for ease with lifting ADLs   3. Patient will demo 4/5 MT strength for progression of postural strength to decrease reoccurrence of myofasical TRP   4.   Patient will report 50% overall improvement in sx for imrpoved activity tolerance and QOL    PLAN  [x]  Upgrade activities as tolerated     [x]  Continue plan of care  []  Update interventions per flow sheet       []  Discharge due to:_  [x]  Other: assess response to treatment    Seda Madrigal, PTA 5/17/2019

## 2019-05-20 ENCOUNTER — HOSPITAL ENCOUNTER (OUTPATIENT)
Dept: PHYSICAL THERAPY | Age: 49
Discharge: HOME | End: 2019-05-20
Payer: MEDICAID

## 2019-05-20 PROCEDURE — 97140 MANUAL THERAPY 1/> REGIONS: CPT

## 2019-05-20 PROCEDURE — 97014 ELECTRIC STIMULATION THERAPY: CPT

## 2019-05-20 PROCEDURE — 97110 THERAPEUTIC EXERCISES: CPT

## 2019-05-20 NOTE — PROGRESS NOTES
PT DAILY TREATMENT NOTE     Patient Name: Opal Oconnor  Date:2019  : 1970  [x]  Patient  Verified  Payor: [facility] MEDICAID / Plan: [facility] [place] [employer] / Product Type: Managed Care Medicaid /    In time: 5:58 pm        Out time: 6:52 pm  Total Treatment Time (min): 64  Visit #: 3 of     Treatment Area: Chronic neck pain [M54.2, G89.29]    SUBJECTIVE  Pain Level (0-10 scale): 0  Any medication changes, allergies to medications, adverse drug reactions, diagnosis change, or new procedure performed?: [x] No    [] Yes (see summary sheet for update)  Subjective functional status/changes:   [] No changes reported  \"I did okay after last time. \"    OBJECTIVE  Modality rationale: decrease pain to improve the patients ability to perform ADLs     Min Type Additional Details   10 [x] Estim: []Att   [x]Unatt                  []IFC  []Premod                                            []NMES    [x]HVES []w/ice   [x]w/heat  Position: prone  Location: (B) C/S and T/S   [x] Skin assessment post-treatment:  [x]intact    []redness- no adverse reaction                                                                                 []redness - adverse reaction:     38 min Therapeutic Exercise:  [x] See flow sheet: added standing PNF diagonal patterns D1 and D2 with YTB, elbow planks   Rationale: increase ROM, increase strength and improve coordination to improve the patients ability to perform ADLs     8 min Manual Therapy:  prone MFR to C/S and T/S   Rationale: decrease pain, increase ROM, increase tissue extensibility and reduce myofascial stiffness to improve the patients ability to perform ADLs          with TE min Patient Education: [x] Review HEP     Other Objective/Functional Measures:   Core strength: 16\" elbow plank    Pain Level (0-10 scale) post treatment: 0    ASSESSMENT/Changes in Function:   Patient notes first f/u treatment did not cause any adverse reaction. Fair core strength. Improved dissociation between lumbar and thoracic extension. Patient will continue to benefit from skilled PT services to modify and progress therapeutic interventions, address functional mobility deficits, address ROM deficits, address strength deficits, analyze and address soft tissue restrictions, analyze and cue movement patterns, analyze and modify body mechanics/ergonomics and assess and modify postural abnormalities to attain remaining goals. [x]  See Plan of Care  []  See progress note/recertification  []  See Discharge Summary         Progress towards goals / Updated goals: · Short Term Goals: To be accomplished in  1  weeks:  1. Pt will be independent and compliant with HEP. -Goal met; pt notes compliance (5/17/19)  · Long Term Goals: To be accomplished in  8-12  treatments:  1. Patient will increase FOTO score to 63/100 for indications of increased functional mobility. 2.  Patient will demo 4+/5 shoulder flexion strength for ease with lifting ADLs. 3.  Patient will demo 4/5 MT strength for progression of postural strength to decrease reoccurrence of myofasical TRP. -Good tolerance to PNF diagonal patterns (5/20/19)  4.   Patient will report 50% overall improvement in sx for imrpoved activity tolerance and QOL    PLAN  [x]  Upgrade activities as tolerated     [x]  Continue plan of care  []  Update interventions per flow sheet       []  Discharge due to:_  []  Other:_    Cynthia Doll, PTA 5/20/2019

## 2019-05-22 ENCOUNTER — HOSPITAL ENCOUNTER (OUTPATIENT)
Dept: PHYSICAL THERAPY | Age: 49
Discharge: HOME | End: 2019-05-22
Payer: MEDICAID

## 2019-05-22 PROCEDURE — 97110 THERAPEUTIC EXERCISES: CPT

## 2019-05-22 PROCEDURE — 97140 MANUAL THERAPY 1/> REGIONS: CPT

## 2019-05-22 NOTE — PROGRESS NOTES
PT DAILY TREATMENT NOTE     Patient Name: Esa Marin  Date:2019  : 1970  [x]  Patient  Verified  Payor: [facility] MEDICAID / Plan: [facility] / Product Type: Managed Care Medicaid /    In time: 3171    Out time: 1226  Total Treatment Time (min): 56  Visit #: 4 of     Treatment Area: Chronic neck pain [M54.2, G89.29]    SUBJECTIVE  Pain Level (0-10 scale): 0  Any medication changes, allergies to medications, adverse drug reactions, diagnosis change, or new procedure performed?: [x] No    [] Yes (see summary sheet for update)  Subjective functional status/changes:   [] No changes reported  \"Not really any pain. \"    OBJECTIVE  Modality rationale: decrease pain to improve the patients ability to perform ADLs     Min Type Additional Details   10 MHP    Position: prone  Location: (B) C/S and T/S   [x] Skin assessment post-treatment:  [x]intact    []redness- no adverse reaction                                                                                 []redness - adverse reaction:     36 min Therapeutic Exercise:  [x] See flow sheet:    Rationale: increase ROM, increase strength and improve coordination to improve the patients ability to perform ADLs     10 min Manual Therapy:  prone MFR to C/S and T/S/ periscapulars (L greater than R)   Rationale: decrease pain, increase ROM, increase tissue extensibility and reduce myofascial stiffness to improve the patients ability to perform ADLs          with TE min Patient Education: [x] Review HEP     Other Objective/Functional Measures:    PRE progression - lateral plank 15 sec     Pain Level (0-10 scale) post treatment: 0    ASSESSMENT/Changes in Function:   Patient challenged by PRE progression. Note taut bands of L periscapular's ciera UT, iliocostalis and rhomboids. Progressing very well overall with treatment for postural strengthening.   Held HV sec to no increased pain - assess for change in post treatment pain levels NV       Patient will continue to benefit from skilled PT services to modify and progress therapeutic interventions, address functional mobility deficits, address ROM deficits, address strength deficits, analyze and address soft tissue restrictions, analyze and cue movement patterns, analyze and modify body mechanics/ergonomics and assess and modify postural abnormalities to attain remaining goals. [x]  See Plan of Care  []  See progress note/recertification  []  See Discharge Summary         Progress towards goals / Updated goals: All goals reviewed and progressing appropriately as of 5/22/2019  · Short Term Goals: To be accomplished in  1  weeks:  1. Pt will be independent and compliant with HEP. -Goal met; pt notes compliance (5/17/19)  · Long Term Goals: To be accomplished in  8-12  treatments:  1. Patient will increase FOTO score to 63/100 for indications of increased functional mobility. 2.  Patient will demo 4+/5 shoulder flexion strength for ease with lifting ADLs. Goal progressing - added lateral plank to progress [address] strength today 5/22/19  3. Patient will demo 4/5 MT strength for progression of postural strength to decrease reoccurrence of myofasical TRP. -Good tolerance to PNF diagonal patterns (5/20/19)  4.   Patient will report 50% overall improvement in sx for imrpoved activity tolerance and QOL    PLAN  [x]  Upgrade activities as tolerated     [x]  Continue plan of care  []  Update interventions per flow sheet       []  Discharge due to:_  []  Other:_assess response to no HV     Franco Brewer, PT 5/22/2019

## 2019-05-28 ENCOUNTER — HOSPITAL ENCOUNTER (OUTPATIENT)
Dept: PHYSICAL THERAPY | Age: 49
Discharge: HOME | End: 2019-05-28
Payer: MEDICAID

## 2019-05-28 PROCEDURE — 97110 THERAPEUTIC EXERCISES: CPT | Performed by: PHYSICAL THERAPIST

## 2019-05-28 PROCEDURE — 97140 MANUAL THERAPY 1/> REGIONS: CPT | Performed by: PHYSICAL THERAPIST

## 2019-05-28 PROCEDURE — 97014 ELECTRIC STIMULATION THERAPY: CPT | Performed by: PHYSICAL THERAPIST

## 2019-05-28 NOTE — PROGRESS NOTES
PT DAILY TREATMENT NOTE     Patient Name: Pacheco Banda  Date:2019  : 1970  [x]  Patient  Verified  Payor: [facility] MEDICAID / Plan: [facility] / Product Type: Managed Care Medicaid /    In time: 157pm    Out time: 259  Total Treatment Time (min): 62  Visit #: 5 of     Treatment Area: Chronic neck pain [M54.2, G89.29]    SUBJECTIVE  Pain Level (0-10 scale): 2 in R shoulder   Any medication changes, allergies to medications, adverse drug reactions, diagnosis change, or new procedure performed?: [x] No    [] Yes (see summary sheet for update)  Subjective functional status/changes:   [] No changes reported  \"My R shoulder hurts today . \"    OBJECTIVE  Modality rationale: decrease pain to improve the patients ability to perform ADLs     Min Type Additional Details   15 MHP, with IFC to R shoulder    Position: prone  Location: R UT ans shoulder   [x] Skin assessment post-treatment:  [x]intact    []redness- no adverse reaction                                                                                 []redness - adverse reaction:     37 min Therapeutic Exercise:  [x] See flow sheet:    Rationale: increase ROM, increase strength and improve coordination to improve the patients ability to perform ADLs     10 min Manual Therapy:  R shoulder scap mobes in SL, UT massage, R shlds inferior/post mobes, R pect mm DTM   Rationale: decrease pain, increase ROM, increase tissue extensibility and reduce myofascial stiffness to improve the patients ability to perform ADLs          with TE min Patient Education: [x] Review HEP     Other Objective/Functional Measures:   90 % overall improvement in sx  MT strength: 4-  Adjusted Y's to wall V's due to impingement signs  TTP over R pect mm, biceps tendon   Added pect S on R at doorway. Pain Level (0-10 scale) post treatment: 0    ASSESSMENT/Changes in Function:    Pt challenged with prone letters.  Presents with signs/sx of R SS impingement with pain with resisted ABD, and TTP over biceps tendon. Noticeable caving in at scapulas(winging) with [facility] dog: regressed to no TB to focus on SA mm press in q-ped. Pt reports he really feels like e-stim decreases pain and would like to do again. Patient will continue to benefit from skilled PT services to modify and progress therapeutic interventions, address functional mobility deficits, address ROM deficits, address strength deficits, analyze and address soft tissue restrictions, analyze and cue movement patterns, analyze and modify body mechanics/ergonomics and assess and modify postural abnormalities to attain remaining goals. [x]  See Plan of Care  []  See progress note/recertification  []  See Discharge Summary         Progress towards goals / Updated goals: All goals reviewed and progressing appropriately as of 5/28/2019  · Short Term Goals: To be accomplished in  1  weeks:  1. Pt will be independent and compliant with HEP. -Goal met; pt notes compliance (5/17/19)  · Long Term Goals: To be accomplished in  8-12  treatments:  1. Patient will increase FOTO score to 63/100 for indications of increased functional mobility. 2.  Patient will demo 4+/5 shoulder flexion strength for ease with lifting ADLs. Goal progressing - added lateral plank to progress [address] strength today 5/22/19  3. Patient will demo 4/5 MT strength for progression of postural strength to decrease reoccurrence of myofasical TRP. -Mid trap 4-  (5/28/19)  4. Patient will report 50% overall improvement in sx for imrpoved activity tolerance and QOL      MET, Pt reports 90% improvements in overall sx.    PLAN  [x]  Upgrade activities as tolerated     [x]  Continue plan of care  []  Update interventions per flow sheet       []  Discharge due to:_  []  Other:_    Marlene Thibodeaux, PT 5/28/2019

## 2019-05-29 NOTE — MR AVS SNAPSHOT
[address] [place] [address] [address] [facility] [address] 
[phone] Patient: Salty Chávez MRN: [id number] ECN:[id number] Visit Information Date & Time Provider Department Dept. Phone Encounter #  
 8/22/2018  3:00 PM Anmol Squires MD [facility] [phone] [id number] Follow-up Instructions Return with Dr. Nicole Brooks for surgical consult. Upcoming Health Maintenance Date Due Pneumococcal 19-64 Highest Risk (3 of 3 - PCV13) 10/15/2017 Influenza Age 5 to Adult 8/1/2018 DTaP/Tdap/Td series (2 - Td) 10/15/2022 Allergies as of 8/22/2018  Review Complete On: 8/22/2018 By: Anmol Squires MD  
  
 Severity Noted Reaction Type Reactions Other Food  01/16/2018    Other (comments) Walnuts. Gets headaches Post  01/16/2018    Other (comments) Told by  allergic Shellfish Derived  01/16/2018    Swelling Swelling. States okay with betadine Current Immunizations  Never Reviewed Name Date Hib (PRP-OMP) 10/19/2007 12:00 AM  
 Influenza Vaccine 11/24/2017 12:00 AM, 10/14/2016 12:00 AM, 10/15/2015 12:00 AM, 10/17/2014 12:00 AM, 10/19/2007 12:00 AM, 10/25/2006 12:00 AM, 10/25/2005 12:00 AM  
 Meningococcal (MCV4P) Vaccine 8/31/2016 12:00 AM, 10/19/2007 12:00 AM  
 Pneumococcal Conjugate (PCV-7) 10/17/2014 12:00 AM  
 Pneumococcal Polysaccharide (PPSV-23) 10/15/2016 12:00 AM, 8/31/2016 12:00 AM, 10/19/2007 12:00 AM  
 TB Skin Test (PPD) Intradermal 4/16/2012 12:00 AM  
 Tdap 10/15/2012 12:00 AM  
  
 Not reviewed this visit You Were Diagnosed With   
  
 Codes Comments Numbness and tingling in left arm    -  Primary ICD-10-CM: R20.0, R20.2 ICD-9-CM: 782.0 Abnormal EMG     ICD-10-CM: R94.131 ICD-9-CM: 794.17 Vitals BP Pulse Temp Resp Height(growth percentile) Weight(growth percentile) The patient has been examined and the H&P has been reviewed:    I concur with the findings and no changes have occurred since H&P was written.     No change in the location or quality of the pain since the most recent clinic visit.  No new symptoms.  She wishes to proceed with the procedure today.    PE, unchanged from previous:  CV:  RRR  Resp: unlabored, no wheezing.    NPO since MN.      Anesthesia/Surgery risks, benefits and alternative options discussed and understood by patient/family.          Active Hospital Problems    Diagnosis  POA    Lumbosacral spondylosis [M47.817]  Yes      Resolved Hospital Problems   No resolved problems to display.      127/69 89 98.1 °F (36.7 °C) (Oral) 18 5' 7\" (1.702 m) 141 lb (64 kg) SpO2 BMI Smoking Status 95% 22.08 kg/m2 Former Smoker BMI and BSA Data Body Mass Index Body Surface Area 22.08 kg/m 2 1.74 m 2 Preferred Pharmacy Pharmacy Name Phone [facility]  [facility] [phone] Your Updated Medication List  
  
   
This list is accurate as of 8/22/18  3:46 PM.  Always use your most recent med list.  
  
  
  
  
 aspirin  mg tablet Take 1,000 mg by mouth every twelve (12) hours as needed for Pain. HYDROcodone-acetaminophen 5-500 mg Cap Take 1 Tab by mouth every six (6) hours as needed. INDERAL LA 80 mg SR capsule Generic drug:  propranolol LA Take 80 mg by mouth daily. Headache prevention  
  
 ketorolac 10 mg tablet Commonly known as:  TORADOL  
take 1 tablet by mouth every 6 hours if needed for pain * loratadine 10 mg dissolvable tablet Commonly known as:  Elfreda [place] Take 10 mg by mouth daily. * ALAVERT PO Take  by mouth.  
  
 meclizine 25 mg tablet Commonly known as:  ANTIVERT Take  by mouth three (3) times daily as needed. methylPREDNISolone 4 mg tablet Commonly known as:  MEDROL DOSEPACK  
take as directed on pack  
  
 multivitamin tablet Commonly known as:  ONE A DAY Take 1 Tab by mouth daily. * naproxen 250 mg tablet Commonly known as:  NAPROSYN Take  by mouth two (2) times daily (with meals). * naproxen 500 mg tablet Commonly known as:  NAPROSYN * pregabalin 75 mg capsule Commonly known as:  Larey Evert Take 1 Cap by mouth two (2) times a day. Max Daily Amount: 150 mg.  
  
 * pregabalin 150 mg capsule Commonly known as:  Larey Evert Take 1 Cap by mouth two (2) times a day. Max Daily Amount: 300 mg. SUMAtriptan 50 mg tablet Commonly known as:  IMITREX  
  
 valACYclovir 1 gram tablet Commonly known as:  VALTREX Take 1 Tab by mouth as needed. * Notice: This list has 6 medication(s) that are the same as other medications prescribed for you. Read the directions carefully, and ask your doctor or other care provider to review them with you. Follow-up Instructions Return with Dr. Pavithra Villegas for surgical consult. Introducing [facility] SERVICES! [employer] introduces [email or web address] patient portal. Now you can access parts of your medical record, email your doctor's office, and request medication refills online. 1. In your internet browser, go to [email or web address] 2. Click on the First Time User? Click Here link in the Sign In box. You will see the New Member Sign Up page. 3. Enter your [email or web address] Access Code exactly as it appears below. You will not need to use this code after youve completed the sign-up process. If you do not sign up before the expiration date, you must request a new code. · [email or web address] Access Code: [email or web address] Expires: 10/14/2018  3:13 PM 
 
4. Enter the last four digits of your Social Security Number (xxxx) and Date of Birth (mm/dd/yyyy) as indicated and click Submit. You will be taken to the next sign-up page. 5. Create a [email or web address] ID. This will be your [email or web address] login ID and cannot be changed, so think of one that is secure and easy to remember. 6. Create a [email or web address] password. You can change your password at any time. 7. Enter your Password Reset Question and Answer. This can be used at a later time if you forget your password. 8. Enter your e-mail address. You will receive e-mail notification when new information is available in [address] 9. Click Sign Up. You can now view and download portions of your medical record. 10. Click the Download Summary menu link to download a portable copy of your medical information. If you have questions, please visit the Frequently Asked Questions section of the [email or web address] website.  Remember, [email or web address] is NOT to be used for urgent needs. For medical emergencies, dial 911. Now available from your iPhone and Android! Please provide this summary of care documentation to your next provider. Your primary care clinician is listed as Pastora Ojeda. If you have any questions after today's visit, please call [phone].

## 2019-05-30 ENCOUNTER — HOSPITAL ENCOUNTER (OUTPATIENT)
Dept: PHYSICAL THERAPY | Age: 49
Discharge: HOME | End: 2019-05-30
Payer: MEDICAID

## 2019-05-30 PROCEDURE — 97140 MANUAL THERAPY 1/> REGIONS: CPT | Performed by: PHYSICAL THERAPIST

## 2019-05-30 PROCEDURE — 97014 ELECTRIC STIMULATION THERAPY: CPT | Performed by: PHYSICAL THERAPIST

## 2019-05-30 PROCEDURE — 97110 THERAPEUTIC EXERCISES: CPT | Performed by: PHYSICAL THERAPIST

## 2019-05-30 NOTE — PROGRESS NOTES
PT DAILY TREATMENT NOTE     Patient Name: Dk Chatman  Date:2019  : 1970  [x]  Patient  Verified  Payor: [facility] MEDICAID / Plan: [facility] [employer] PLUS / Product Type: Managed Care Medicaid /    In time: 200 pm    Out time: 257pm  Total Treatment Time (min): 62  Visit #: 6 of     Treatment Area: Chronic neck pain [M54.2, G89.29]    SUBJECTIVE  Pain Level (0-10 scale): 5  in R shoulder   Any medication changes, allergies to medications, adverse drug reactions, diagnosis change, or new procedure performed?: [x] No    [] Yes (see summary sheet for update)  Subjective functional status/changes:   [] No changes reported  \" My vertigo is acting up today. I was having a sickle cell crisis this morning, but it is better this afternooon. \"    OBJECTIVE  Modality rationale: decrease pain to improve the patients ability to perform ADLs     Min Type Additional Details   12 MHP, with  premod  to R shoulder UT and mid scap mm   Position: reclined  Location: R UT and mid scap. [x] Skin assessment post-treatment:  [x]intact    []redness- no adverse reaction                                                                                 []redness - adverse reaction:     22 min Therapeutic Exercise:  [x] See flow sheet:    Rationale: increase ROM, increase strength and improve coordination to improve the patients ability to perform ADLs     23 min Manual Therapy:  R c/s levator scap , UT DTM/TrPR, t/s PA mobes and DTM to R rhomboids   Rationale: decrease pain, increase ROM, increase tissue extensibility and reduce myofascial stiffness to improve the patients ability to perform ADLs          with TE min Patient Education: [x] Review HEP     Other Objective/Functional Measures:    TrP in R levator mm and UT mm,   Decreased form with planks with scapular winging cues needed for SA activation  Added SA + with table push ups   Prone chin tucks for postural stability instead of full scapular program today     Pain Level (0-10 scale) post treatment: 1    ASSESSMENT/Changes in Function:    Pt has decreased tolerance to therex today due to vertigo sx in supine especially with L c/s rot, and also reports of am pain due to sickle cell episode. Pt has hx of vertigo and takes medication. Increased TTP in R scapular rhomboids and LS mm today with trP noted. Fair tolerance to MT with cues for relaxation. Patient will continue to benefit from skilled PT services to modify and progress therapeutic interventions, address functional mobility deficits, address ROM deficits, address strength deficits, analyze and address soft tissue restrictions, analyze and cue movement patterns, analyze and modify body mechanics/ergonomics and assess and modify postural abnormalities to attain remaining goals. [x]  See Plan of Care  []  See progress note/recertification  []  See Discharge Summary         Progress towards goals / Updated goals: All goals reviewed and progressing appropriately as of 5/30/2019  · Short Term Goals: To be accomplished in  1  weeks:  1. Pt will be independent and compliant with HEP. -Goal met; pt notes compliance (5/17/19)  · Long Term Goals: To be accomplished in  8-12  treatments:  1. Patient will increase FOTO score to 63/100 for indications of increased functional mobility. 2.  Patient will demo 4+/5 shoulder flexion strength for ease with lifting ADLs. Goal progressing - added lateral plank to progress [address] strength today 5/22/19  3. Patient will demo 4/5 MT strength for progression of postural strength to decrease reoccurrence of myofasical TRP. -Mid trap 4-  (5/28/19)  4. Patient will report 50% overall improvement in sx for imrpoved activity tolerance and QOL      MET, Pt reports 90% improvements in overall sx.    PLAN  [x]  Upgrade activities as tolerated     [x]  Continue plan of care  []  Update interventions per flow sheet       []  Discharge due to:_  []  Other:_    Elizabeth Mealing Van Slade, PT 5/30/2019

## 2019-06-03 ENCOUNTER — HOSPITAL ENCOUNTER (OUTPATIENT)
Dept: PHYSICAL THERAPY | Age: 49
Discharge: HOME | End: 2019-06-03
Payer: MEDICAID

## 2019-06-03 PROCEDURE — 97140 MANUAL THERAPY 1/> REGIONS: CPT

## 2019-06-03 PROCEDURE — 97110 THERAPEUTIC EXERCISES: CPT

## 2019-06-03 NOTE — PROGRESS NOTES
PT DAILY TREATMENT NOTE     Patient Name: Aleksey Lux  Date:6/3/2019  : 1970  [x]  Patient  Verified  Payor: [facility] MEDICAID / Plan: [facility] [employer] PLUS / Product Type: Managed Care Medicaid /    In time: 2:58   Out time: 3:48  Total Treatment Time (min): 50  Visit #: 7 of     Treatment Area: Chronic neck pain [M54.2, G89.29]    SUBJECTIVE  Pain Level (0-10 scale): 5/10   Any medication changes, allergies to medications, adverse drug reactions, diagnosis change, or new procedure performed?: [x] No    [] Yes (see summary sheet for update)  Subjective functional status/changes:   [] No changes reported  I am feeling a little better but I'd still like to hold some exercises today. OBJECTIVE  Modality rationale: decrease pain to improve the patients ability to perform ADLs     Min Type Additional Details   10 MHP, with  premod  to R shoulder UT and mid scap mm   Position: reclined  Location: R UT and mid scap. [x] Skin assessment post-treatment:  [x]intact    []redness- no adverse reaction                                                                                 []redness - adverse reaction:     30 min Therapeutic Exercise:  [x] See flow sheet:    Rationale: increase ROM, increase strength and improve coordination to improve the patients ability to perform ADLs     10 min Manual Therapy:  R c/s levator scap , UT DTM/TrPR, t/s PA mobes and DTM to R rhomboids   Rationale: decrease pain, increase ROM, increase tissue extensibility and reduce myofascial stiffness to improve the patients ability to perform ADLs          with TE min Patient Education: [x] Review HEP     Other Objective/Functional Measures: TrP in R levator mm and UT mm  Fair tolerance to TE  Audible pops with PA T/S mobs    Pain Level (0-10 scale) post treatment: 2/10    ASSESSMENT/Changes in Function:   Patient educated on last scheduled appt and upcoming reassessment.  Continued to hold some TE due to elevated pain levels and recent vertigo/sickle cell episode. Patient requires demo and cuing for form with TE, especially to prevent scapular winging with planks and prone TE. Patient will continue to benefit from skilled PT services to modify and progress therapeutic interventions, address functional mobility deficits, address ROM deficits, address strength deficits, analyze and address soft tissue restrictions, analyze and cue movement patterns, analyze and modify body mechanics/ergonomics and assess and modify postural abnormalities to attain remaining goals. [x]  See Plan of Care  []  See progress note/recertification  []  See Discharge Summary         Progress towards goals / Updated goals: All goals reviewed and progressing appropriately as of 6/3/2019  · Short Term Goals: To be accomplished in  1  weeks:  1. Pt will be independent and compliant with HEP. -Goal met; pt notes compliance (5/17/19)  · Long Term Goals: To be accomplished in  8-12  treatments:  1. Patient will increase FOTO score to 63/100 for indications of increased functional mobility. 2.  Patient will demo 4+/5 shoulder flexion strength for ease with lifting ADLs. Goal progressing - added lateral plank to progress [address] strength today 5/22/19  3. Patient will demo 4/5 MT strength for progression of postural strength to decrease reoccurrence of myofasical TRP. -Mid trap 4-  (5/28/19)  4. Patient will report 50% overall improvement in sx for imrpoved activity tolerance and QOL      MET, Pt reports 90% improvements in overall sx.      PLAN  [x]  Upgrade activities as tolerated     [x]  Continue plan of care  []  Update interventions per flow sheet       []  Discharge due to:_  [x]  Other:_last appt scheduled for Thursday - likely Xenia Pulido, GUILLE 6/3/2019   3:48 PM

## 2019-06-06 ENCOUNTER — HOSPITAL ENCOUNTER (OUTPATIENT)
Dept: PHYSICAL THERAPY | Age: 49
Discharge: HOME | End: 2019-06-06
Payer: MEDICAID

## 2019-06-06 PROCEDURE — 97110 THERAPEUTIC EXERCISES: CPT | Performed by: PHYSICAL THERAPIST

## 2019-06-06 PROCEDURE — 97530 THERAPEUTIC ACTIVITIES: CPT | Performed by: PHYSICAL THERAPIST

## 2019-06-06 NOTE — PROGRESS NOTES
[address] [facility] [address] [facility] AT [address]. [place] [address], , [place] [address]  Phone: [phone] Fax [phone]  DISCHARGE SUMMARY  Patient Name: Meghan Isaacs : 1970   Treatment/Medical Diagnosis: Chronic neck pain [M54.2, G89.29]   Referral Source: Maximilian Salcedo MD     Date of Initial Visit: 19 Attended Visits: 8 Missed Visits: 0     SUMMARY OF TREATMENT  Pt is a 50y.o. year old male who presents with CS/TS pain with long history of pain and radiculopathy with hx of Sickle Cell Anemia. Treatment consist of therapeutic exercise including ROM, stretching,  strengthening, stabilization training, postural ed, patient education, HEP instruction, MHP, manual therapy and e-stim. CURRENT STATUS  Subjective: Patient reports 90% improvements in sx since [facility]. Pain levels in c/s and shoulder  range from 2 to 5. Patient's current complaints: continued pain and tightness in UT B and turning neck to R. Patient performing HEP daily and is I.  Objective: c/s AROM is WFL, UE AROM WFL, TTP over B UTmm,   Improvements: shoulder strength, upper back and shoulder blade strength, able to hold my cup now without dropping ( improved.)  Deficits: continued pain, self limited activity endurance due to pain, difficulty turning neck to R . FOTO: 59/100     Progress towards goals / Updated goals: · Short Term Goals: To be accomplished in  1  weeks:  1.  Pt will be independent and compliant with HEP. -Goal met; pt notes compliance (19)  · Long Term Goals: To be accomplished in  8-12  treatments:  1.  Patient will increase FOTO score to 63/100 for indications of increased functional mobility.  Partially MEt at 59  2.  Patient will demo 4+/5 shoulder flexion strength for ease with lifting ADLs. Goal partially met 4/5 B with UT pain.    3.  Patient will demo 4/5 MT strength for progression of postural strength to decrease reoccurrence of myofasical TRP. $/5 MET for MT, LT:4-/5 strength  4.  Patient will report 50% overall improvement in sx for imrpoved activity tolerance and QOL      MET, Pt reports 90% improvements in overall sx. RECOMMENDATIONS  Discontinue therapy. Progressing towards or have reached established goals. If you have any questions/comments please contact us directly at [phone]. Thank you for allowing us to assist in the care of your patient.   Therapist Signature: Hawk Rosales PT Date: 6/6/19   Reporting Period:  Time: 12:10 PM

## 2019-06-06 NOTE — PROGRESS NOTES
PT DAILY TREATMENT NOTE     Patient Name: Salty Chávez  Date:2019  : 1970  [x]  Patient  Verified  Payor: [facility] MEDICAID / Plan: [facility] [employer] PLUS / Product Type: Managed Care Medicaid /    In time:1136   Out time: 9076  Total Treatment Time (min): 23min  Visit #: 8 of     Treatment Area: Chronic neck pain [M54.2, G89.29]    SUBJECTIVE  Pain Level (0-10 scale): 2/10   Any medication changes, allergies to medications, adverse drug reactions, diagnosis change, or new procedure performed?: [x] No    [] Yes (see summary sheet for update)  Subjective functional status/changes:   [] No changes reported  See DC    OBJECTIVE  Modality rationale: decrease pain to improve the patients ability to perform ADLs     Min Type Additional Details   Nt MHP, with  premod  to R shoulder UT and mid scap mm   Position: reclined  Location: R UT and mid scap. [x] Skin assessment post-treatment:  [x]intact    []redness- no adverse reaction                                                                                 []redness - adverse reaction:     23 min Therapeutic Exercise:  [x] See flow sheet: D/C reassessment, Prone letters, FOTO   Rationale: increase ROM, increase strength and improve coordination to improve the patients ability to perform ADLs     NT min Manual Therapy:  R c/s levator scap , UT DTM/TrPR, t/s PA mobes and DTM to R rhomboids   Rationale: decrease pain, increase ROM, increase tissue extensibility and reduce myofascial stiffness to improve the patients ability to perform ADLs          with TE min Patient Education: [x] Review HEP     Other Objective/Functional Measures:     Subjective: Patient reports 90% improvements in sx since [facility]. Pain levels range from 2 to 5. Patient's current complaints: continued pain and tightness in UT B and turning neck to R. Patient performing HEP daily.   Objective: c/s AROM is WFL, UE AROM WFL, TTP over B UTmm,   Improvements: shoulder strength, upper back and shoulder blade strength, able to hold my cup now without dropping ( improved.)  Deficits: continued pain, self limited activity endurance due to pain, difficulty turning neck to R . FOTO: 59/100    Progress towards goals / Updated goals: · Short Term Goals: To be accomplished in  1  weeks:  1. Pt will be independent and compliant with HEP. -Goal met; pt notes compliance (5/17/19)  · Long Term Goals: To be accomplished in  8-12  treatments:  1. Patient will increase FOTO score to 63/100 for indications of increased functional mobility. 2.  Patient will demo 4+/5 shoulder flexion strength for ease with lifting ADLs. Goal partially met 4/5 B with UT pain. 3.  Patient will demo 4/5 MT strength for progression of postural strength to decrease reoccurrence of myofasical TRP. $/5 MET for MT, LT:4-/5 strength  4. Patient will report 50% overall improvement in sx for imrpoved activity tolerance and QOL      MET, Pt reports 90% improvements in overall sx. Pain Level (0-10 scale) post treatment: 2/10    ASSESSMENT/Changes in Function:   See DC  Patient will continue to benefit from skilled PT services to modify and progress therapeutic interventions, address functional mobility deficits, address ROM deficits, address strength deficits, analyze and address soft tissue restrictions, analyze and cue movement patterns, analyze and modify body mechanics/ergonomics and assess and modify postural abnormalities to attain remaining goals.      []  See Plan of Care  []  See progress note/recertification  [x]  See Discharge Summary          PLAN  [x]  Upgrade activities as tolerated     [x]  Continue plan of care  []  Update interventions per flow sheet       []  Discharge due to:_  [x]  Other: DC to [address] [place] [address] Avenue, PT, 6/6/2019   3:48 PM

## 2019-06-13 ENCOUNTER — OFFICE VISIT (OUTPATIENT)
Dept: ORTHOPEDIC SURGERY | Age: 49
End: 2019-06-13

## 2019-06-13 VITALS
SYSTOLIC BLOOD PRESSURE: 135 MMHG | OXYGEN SATURATION: 97 % | TEMPERATURE: 98.2 F | RESPIRATION RATE: 18 BRPM | DIASTOLIC BLOOD PRESSURE: 66 MMHG | HEIGHT: 67 IN | BODY MASS INDEX: 21.19 KG/M2 | HEART RATE: 70 BPM | WEIGHT: 135 LBS

## 2019-06-13 DIAGNOSIS — M54.2 NECK PAIN: Primary | ICD-10-CM

## 2019-06-13 DIAGNOSIS — Z98.1 S/P CERVICAL SPINAL FUSION: ICD-10-CM

## 2019-06-13 DIAGNOSIS — M79.18 CERVICAL MYOFASCIAL PAIN SYNDROME: ICD-10-CM

## 2019-06-13 RX ORDER — COLCHICINE 0.6 MG/1
TABLET ORAL
Refills: 0 | COMMUNITY
Start: 2019-06-10

## 2019-06-13 RX ORDER — NAPROXEN 500 MG/1
500 TABLET ORAL
Qty: 60 TAB | Refills: 5 | Status: SHIPPED | OUTPATIENT
Start: 2019-06-13 | End: 2020-05-15

## 2019-06-13 NOTE — PROGRESS NOTES
ACUTE/SICK VISIT:    Nursing notes reviewed and accepted    ASSESSMENT/PLAN:  Geo was seen today for other.  Diagnoses and all orders for this visit:    Seizures (CMS/[facility]): spoke with Dr. Yoder (Pediatric Neurology) who highly suspects epilepsy.  She recommends EEG and MRI to be ordered and follow up appointment to be scheduled with Neurology.  -     SERVICE TO PEDIATRIC NEUROLOGY--Dr. Yoder  -     EEG - routine; Future (to be done in next 48 hours);  Make appt with Dr. Yoder after EEG appt made  -     MRI Brain; Future          SUBJECTIVE:  HPI:  Geo Viera is an 10 month old female who presents with Maternal grandmother and Maternal grandfather with the following chief complaint   Chief Complaint   Patient presents with   • Other     possible seizure        Geo Viera is here for evaluation for possible seizures.  Grandmother describes an episode 2 days ago where Geo possibly had a seizure.  She was on the bed with her Uncle and when he got up she didn't move.  She was laying on her left side on the bed and per Grandmother was \"unresponsive.\"  She wasn't shaking.  When Grandmother tried to pick her up, her left side seemed to be \"locked up\" and she was just staring.  There was no eye deviation.  The whole episode seemed to last about 5 minutes.  Once the episode resolved, she returned was more tired than normal.  She hasn't been sick.  Grandmother denies fevers, cold symptoms, or vomiting.  Then, yesterday, she had another episode.  The one yesterday occurred after a nap.  She was trying to crawl and pull herself up onto the couch . However, she couldn't do it.  She kept falling over.  Her left side was \"locked up\" again. Her hands were also clenched at that time.  Her eyes were open but she seemed \"dazed.\"  She was tired again yesterday after the episode.  Her father does have a seizure disorder.  In March, she had a similar episode but was having fevers at that time.   [address]  [address]  [place], [address] [place] [address] Street  Phone: [phone]  Fax: [phone]        Mary Wright  : 1970  PCP: Colleen King MD  2019    PROGRESS NOTE      HISTORY OF PRESENT ILLNESS  Sandra Manriquez is a 52 y.o. male. He was seen initially 2018 for c/o chronic neck pain radiating into his LUE and numbness in his fingers that has worsened over the last 4 months with the new symptoms being radicular. He recently completed 10 visits to PT and has 6 remaining. He notes when he lays down, his whole body gets paralyzed. He has seen a sleep doctor who states he had sleep apnea, but no sleep paralysis. The only thing that has provided him any relief has been pain medication, but it has had less of an effect recently. He previously had neck pain for which he had an MRI in , but he did not begin to have numbness in his fingers on his left hand until recently. He has a dx of sickle cell, but notes he does not have a pain management or hematologist. His PCP has been managing his sickle cell for him. He notes he did not find any relief from the cervical injections, and his neck pain and LUE paraesthesia has worsened over the last month. He notes he now has a right-sided neck pain associated with his right shoulder pain. He is scheduled to see Dr. Adams Montgomery on Monday for his right shoulder pain. He did not tolerate Gabapentin well as he experienced somnolence. Pt is interested in chiropractic care. He found no relief from PT, so Dr. Adams Montgomery ordered a cervical spine MRI. He continues to have neck pain radiating into his first three digits in his left hand. He found some mild relief from Lyrica but it caused a side effect of ED so he has chosen to discontinue the medication. Cervical MRI 8/15/18: Multilevel central stenosis involving the C3-4 through the C6-7 levels.  Stenosis is greatest at C4-5 level, mild to moderate in Geo Viera denies a history of any other symptoms.    Appetite has been significantly decreased.  Geo Viera has been sleeping well.  Activity is significantly decreased. After the episode she is very tired and sleepy.    Social Hx:  /School: No; none  Exposure to someone at /school with similar symptoms: No  Exposure to someone else at home with similar symptoms:  No    REVIEW OF SYSTEMS see HPI; otherwise denies HEENT, NECK, RESP, CARDIAC, GI, , NEURO or PSYCH Sx    Past medical, family, and social history reviewed and updated per family.    Current Outpatient Prescriptions   Medication Sig Dispense Refill   • acetaminophen (TYLENOL CHILDRENS) 160 MG/5ML suspension Take 15 mg/kg by mouth every 4 hours as needed for Fever.     • ibuprofen (MOTRIN,ADVIL) 100 MG/5ML suspension Take by mouth every 8 hours as needed for Fever.       No current facility-administered medications for this visit.      ALLERGIES:  No Known Allergies    OBJECTIVE:  PHYSICAL EXAM:  Visit Vitals   • Temp 98.2 °F (36.8 °C) (Axillary)   • Wt 11 kg     General appearance: alert, in no distress; cooperative  HEENT:  Left head tilt with right head turn (more pronounced than in the past)   eyes:  normal without erythema or drainage   ears:  Right TM:  normal          Left TM:  normal   nose: normal  OP: Clear  Neck: normal; no masses  Lungs:  clear to auscultation  Heart:  regular rate and rhythm and no murmurs, clicks, or gallops  Abdomen:  soft, non-distended, BS normal  Skin: no rashes  Neuro:  Normal tone; 2+ DTR in lower extremities; alert and responsive          Sari Epps MD, IBCLC  5/16/2017  9:46 AM                        degree, produced by left central/subarticular disc protrusion superimposed on disc bulge. Although there is mild cord deformity at these levels of stenosis, no abnormal cord signal is seen. Left posterolateral C6-7 disc herniation superposed upon disc bulge including focal subarticular extrusion component extending inferiorly with left axillary recess narrowing. Exiting left C7 nerve root may be affected. Clinical correlation is recommended. Mild C3-4 and C5-C6 levels of mild central stenosis are produced by disc bulge and left central disc protrusion, respectively. Slight reversal of normal cervical lordosis. No subluxation seen. Prominent nasopharyngeal and oropharyngeal tonsillar soft tissue as well as several scattered normal sized cervical nodes. These findings are nonspecific and could reflect reactive lymphoid tissue. Cannot exclude lymphoproliferative process or adenopathy from other etiologies. Clinical correlation recommended. He saw Dr. Steven Castellon, who recommended a surgical solution. He saw Phylicia Currie NP in 2/19 and he was 5 month s/p ACDF C6-7 (9/17/18) that improved his LUE pain. He did not tolerate Cymbalta as it gave him erectile dysfunction. He has tried and failed Lyrica (ED), Gabitril (somnolence), and Gabapentin (somnolence). He also c/o some numbness in the second and third digits of his left hand. He did not find relief from heat, ice, Flexeril, Mobic, or lidocaine patches. He returned 5/2/19 for an MRI f/u. He c/o right-sided neck pain radiating down his back to between his shoulder blades. He denies new radicular symptoms, but continues to have residual LUE numbness. He notes that he had to discontinue the Prednisone dose pack because it caused a sickle cell crisis. He continues to have neck and back pain, but it is not as severe as it was previously. He denies any injury, but noted that he had to lift his mother with his dad to help get her dressed when EMTs came.  She recently passed away a week and a half ago due to septic shock. He notes that his vertigo also came back since he has been stressed. Cervical MRI 4/11/19: Evidence of ACDF C6-C7. No recurrent or residual central or foraminal stenosis at this level. Previously there was a broad-based protrusion/extrusion on the left. Degenerative changes with reversal of the cervical lordosis as described above. Central stenosis with mild cord distortion at C3-C4 and C4-C5. No cord signal change and this would not with complete confidence correlate with a cervical myelopathy. No new high-grade foraminal stenosis. He was given trigger point injections that significantly improved his pain. George Cm comes in to the office today for f/u. He notes that the day after he had the trigger point injections, he had to go to the ED the next day because he had sickle cell crisis, then the following day he had to go back and was admitted and had to stay 4-5 days, so we will avoid steroids (injected and oral). He attended PT (Lupe Rodriguez) with benefit and recently had a massage that was beneficial. He notes that since he left the hospital, he has had gout attacks that are being managed by his PCP. He finds the most relief of his pain with Naproxen. I advised on the risks of the medication. He rates his pain as a 3/10 today. ASSESSMENT  His residual pain likely remains myofascial in nature. PLAN  1. Naproxen 500 mg BID PRN. I advised he can add Tylenol with his Naproxen. 2. Referral to PT with dry needling (Kenisha)    Pt will f/u in 8 weeks or sooner as needed. Diagnoses and all orders for this visit:    1. Neck pain  -     REFERRAL TO PHYSICAL THERAPY  -     naproxen (NAPROSYN) 500 mg tablet; Take 1 Tab by mouth two (2) times daily as needed for Pain. 2. Cervical myofascial pain syndrome  -     REFERRAL TO PHYSICAL THERAPY  -     naproxen (NAPROSYN) 500 mg tablet; Take 1 Tab by mouth two (2) times daily as needed for Pain.     3. S/P cervical spinal fusion  -     REFERRAL TO PHYSICAL THERAPY  -     naproxen (NAPROSYN) 500 mg tablet; Take 1 Tab by mouth two (2) times daily as needed for Pain. PAST MEDICAL HISTORY   Past Medical History:   Diagnosis Date    Anemia NEC     Bursitis of shoulder, left 08/03/2009    MRI revealed tendinitis and bursitis of the rotator cuff.  DJD (degenerative joint disease) 08/03/2009    Early djd, left radiocarpal joint with associated ganglion cyst.      Elevated white blood cell count 07/20/2009    GERD (gastroesophageal reflux disease)     H/O: rotator cuff tear 06/07/2006    Chronic    Hyperlipidemia     Pain in joint, upper arm 07/2009    Pain in shoulder, elbow and wrist.    Sickle cell anemia (HCC)     Sleep apnea     Does not use CPAP    Testicular failure     Vertigo 07/20/2009       Past Surgical History:   Procedure Laterality Date    HX ADENOIDECTOMY      HX ADENOIDECTOMY      HX CERVICAL FUSION  09/17/2018    ACDF C6/7    HX CHOLECYSTECTOMY  03/27/2018    HX HEENT      eardrum repair    HX SHOULDER ARTHROSCOPY Right    . MEDICATIONS    Current Outpatient Medications   Medication Sig Dispense Refill    methylPREDNISolone (MEDROL, SHU,) 4 mg tablet Per dose pack instructions 1 Dose Pack 0    cyclobenzaprine (FLEXERIL) 10 mg tablet Take 1 Tab by mouth three (3) times daily as needed for Muscle Spasm(s). 90 Tab 1    DULoxetine (CYMBALTA) 20 mg capsule Take 1 Cap by mouth daily. 30 Cap 1    cephALEXin (KEFLEX) 500 mg capsule Take 1 Cap by mouth four (4) times daily. Indications: Skin and Skin Structure Infection 28 Cap 0    meclizine (ANTIVERT) 25 mg tablet take 1 tablet by mouth every 8 hours if needed for VERTIGO  0    tiaGABine (GABITRIL) 4 mg tablet 1 tab po q hs x 1 week, then 1 tab po bid 60 Tab 1    OXYGEN-AIR DELIVERY SYSTEMS 2 L by Nasal route as needed (shortness of breath).       acetaminophen (TYLENOL) 325 mg tablet Take 650 mg by mouth every six (6) hours as needed for Pain or Fever.  senna-docusate (P-COL RITE) 8.6-50 mg per tablet Take 1 Tab by mouth daily as needed for Constipation.  oxyCODONE-acetaminophen (PERCOCET)  mg per tablet Take 1 Tab by mouth every six (6) hours as needed for Pain. Max Daily Amount: 4 Tabs. 20 Tab 0    SUMAtriptan (IMITREX) 50 mg tablet       loratadine (ALAVERT PO) Take  by mouth.  pregabalin (LYRICA) 75 mg capsule Take 1 Cap by mouth two (2) times a day. Max Daily Amount: 150 mg. 14 Cap 0    pregabalin (LYRICA) 150 mg capsule Take 1 Cap by mouth two (2) times a day. Max Daily Amount: 300 mg. 60 Cap 2    valACYclovir (VALTREX) 1 gram tablet Take 1 Tab by mouth daily as needed (cold sores). 0    multivitamin (ONE A DAY) tablet Take 1 Tab by mouth daily.  naproxen (NAPROSYN) 250 mg tablet Take  by mouth two (2) times daily (with meals). ALLERGIES  Allergies   Allergen Reactions    Other Food Other (comments)     Walnuts. Gets headaches    Corn Other (comments)     Told by  allergic    Shellfish Derived Swelling     Swelling. States okay with betadine          SOCIAL HISTORY    Social History     Socioeconomic History    Marital status: SINGLE     Spouse name: Not on file    Number of children: Not on file    Years of education: Not on file    Highest education level: Not on file   Tobacco Use    Smoking status: Former Smoker     Types: Cigarettes     Last attempt to quit: 7/12/2003     Years since quitting: 15.9    Smokeless tobacco: Never Used   Substance and Sexual Activity    Alcohol use: No    Drug use: No    Sexual activity: Yes     Partners: Female       FAMILY HISTORY  Family History   Problem Relation Age of Onset    Diabetes Mother     Cancer Father          REVIEW OF SYSTEMS  Review of Systems   Musculoskeletal: Positive for neck pain.           PHYSICAL EXAMINATION  Visit Vitals  /66   Pulse 70   Temp 98.2 °F (36.8 °C)   Resp 18   Ht 5' 7\" (1.702 m)   Wt 135 lb (61.2 kg)   SpO2 97%   BMI 21.14 kg/m²       Pain Assessment  4/1/2019   Location of Pain Neck;Back;Hand   Location Modifiers Left   Severity of Pain 8   Quality of Pain Sharp   Quality of Pain Comment stabbing, numbness, tingling, weakness, shooting   Duration of Pain -   Duration of Pain Comment -   Frequency of Pain Constant   Aggravating Factors (No Data)   Aggravating Factors Comment pain is just there    Limiting Behavior -   Relieving Factors Nothing   Relieving Factors Comment robaxin & mobic eases slightly   Result of Injury -           Constitutional:  Well developed, well nourished, in no acute distress. Psychiatric: Affect and mood are appropriate. Integumentary: No rashes or abrasions noted on exposed areas. SPINE/MUSCULOSKELETAL EXAM    Cervical spine:  Neck is midline. Normal muscle tone. No focal atrophy is noted. ROM painful. Shoulder ROM intact. Tenderness to palpation. Negative Spurling's sign. Negative Tinel's sign. Negative Tomas's sign.       Sensation in the bilateral arms grossly intact to light touch.       Lumbar spine:  No rash, ecchymosis, or gross obliquity. No fasciculations. No focal atrophy is noted. No pain with hip ROM. Full range of motion. Tenderness to palpation. No tenderness to palpation at the sciatic notch. SI joints non-tender. Trochanters non tender.      Sensation in the bilateral legs grossly intact to light touch.     Updates from 5/2/19:  Tenderness to palpation of trapezii (R>L)  No myelopathic signs        MOTOR:      Biceps  Triceps Deltoids Wrist Ext Wrist Flex Hand Intrin   Right 5/5 5/5 5/5 5/5 5/5 5/5   Left 5/5 5/5 5/5 5/5 5/5 5/5             Hip Flex  Quads Hamstrings Ankle DF EHL Ankle PF   Right 5/5 5/5 5/5 5/5 5/5 5/5   Left 5/5 5/5 5/5 5/5 5/5 5/5     DTRs are 2+ biceps, triceps, brachioradialis, patella, and Achilles.      Negative Straight Leg raise. Squat not tested.    No difficulty with tandem gait.       Ambulation without assistive device. FWB.       RADIOGRAPHS  Cervical MRI images taken on 8/15/18 personally reviewed with patient:  FINDINGS:      Slight reversal of normal cervical lordosis is present with minimal kyphosis at  the C4 level. No subluxation is seen.  Osseous marrow signal is within normal  limits.  The cervicomedullary junction is unremarkable.  No abnormal signal is  seen in the cervical spinal cord.     C2/3 level: There is no central or foraminal stenosis.     C3/4 level: Disc bulge is present slightly effacing ventral cord. No abnormal  cord signal is seen. Mild central stenosis is present with estimated midline AP  diameter of thecal sac measuring 8-9 mm. Slight foraminal narrowing is present  bilaterally.     C4/5 level: Disc bulge is present with superimposed left central/subarticular  disc protrusion. The left ventral cord is mildly flattened. No abnormal cord  signal is seen. Mild to moderate central stenosis is present with estimated  midline AP diameter of thecal sac measuring 7-8 mm. Uncovertebral joint  hypertrophy is present with moderate right and mild to moderate left foraminal  stenosis. There is no central or foraminal stenosis.     C5/6 level: Disc bulge is present with tiny left central superimposed disc  protrusion. Ventral cord is effaced. No abnormal cord signal is seen. Mild  central stenosis is present with estimated midline AP diameter of thecal sac  measuring 8-9 mm. Mild foraminal stenosis is seen bilaterally.     C6/7 level: Disc bulge is present. Superimposed left posterolateral disc  herniation is present including small subarticular component extending  inferiorly. There is effacement of the left ventral cord and moderate narrowing  of the left axillary recess. Disc herniation may slightly extend into the  foramen. Moderate left and mild right foraminal stenosis is seen.  Central canal  narrowing is milder at the midline with mild central stenosis and estimated  midline AP diameter of thecal sac measuring 8-9 mm.     C7/T1 level: There is no central or foraminal stenosis.     The visualized intracranial contents are unremarkable.      Punctate susceptibility artifact suggested along prominent sized bilateral  oropharyngeal tonsillar pillars, nonspecific but potentially calcifications  related to prior infection. Prominent nasopharyngeal soft tissue is seen without  definite discrete focal lesion. There are several scattered subcentimeter  anterior and posterior cervical chain nodes bilaterally, not meeting size  criteria for adenopathy.           ________________________     IMPRESSION  IMPRESSION:     1. Multilevel central stenosis involving the C3-4 through the C6-7 levels. Stenosis is greatest at C4-5 level, mild to moderate in degree, produced by left  central/subarticular disc protrusion superimposed on disc bulge. Although there  is mild cord deformity at these levels of stenosis, no abnormal cord signal is  seen.     2. Left posterolateral C6-7 disc herniation superposed upon disc bulge including  focal subarticular extrusion component extending inferiorly with left axillary  recess narrowing. Exiting left C7 nerve root may be affected. Clinical  correlation is recommended.     3. Mild C3-4 and C5-C6 levels of mild central stenosis are produced by disc  bulge and left central disc protrusion, respectively.     4. Slight reversal of normal cervical lordosis. No subluxation seen.     5. Prominent nasopharyngeal and oropharyngeal tonsillar soft tissue as well as  several scattered normal sized cervical nodes. These findings are nonspecific  and could reflect reactive lymphoid tissue. Cannot exclude lymphoproliferative  process or adenopathy from other etiologies. Clinical correlation recommended.     2V Cervical XR images taken on 4/16/18 personally reviewed with patient:  Straightening of the cervical lordosis  Endplate osteophytes  Slight lean to right  Normal disc spacing  No obvious compression fractures or instabilities          Cervical MRI images taken on 1/14/16 personally reviewed with patient:  COMPARISON: MRI cervical spine 2/8/12    TECHNIQUE: Cervical spine scanned with axial and sagittal T2W scans, sagittal T1W scans. FINDINGS:  Straightening of the cervical spine. Slight retrolisthesis of C4 on C5. Remainder of vertebral bodies maintain normal alignment. The cervical cord appears normal. Diffuse hypointense marrow signal, similar to prior studies. The visualized posterior fossa structures are unremarkable. C2/C3: No significant spinal stenosis or neural foraminal narrowing. C3/C4: Mild disc bulge with posterior central annular tear. Mild/moderate right and mild left foraminal stenosis. Mild narrowing of the central canal, midline AP diameter is 8.4 mm.      C4/C5: Mild disc bulge. Mild left foraminal stenosis. Mild central canal stenosis, midline AP diameter is 8 mm. C5/C6: No significant spinal stenosis or neural foraminal narrowing. C6/C7: Small left paracentral disc extrusion with annular tear. Disc material extends slightly below the disc space. Mild narrowing of the left central canal. Neural foramen are patent. C7/T1: No significant spinal stenosis or neural foraminal narrowing. Vertebral artery flow voids present. Similar appearance of prominent adenoid tissue and cervical adenopathy.      Impression:    1. Mild/moderate multilevel degenerative discogenic disease. Marginal change in comparison to prior. 2. Similar appearance of small left paracentral disc extrusion at C6-C7. 3. Similar appearance of adenopathy and prominent adenoid tissue. 4. Diffuse hypointense marrow signal, similar to prior. Could be related to anemia in this patient with history of sickle cell disease. 20 minutes of face-to-face contact were spent with the patient during today's visit extensively discussing symptoms and treatment plan.   All questions were answered. More than half of this visit today was spent on counseling.      Written by Lynn Argueta as dictated by Slade Saunders MD

## 2019-06-19 ENCOUNTER — HOSPITAL ENCOUNTER (OUTPATIENT)
Dept: PHYSICAL THERAPY | Age: 49
Discharge: HOME | End: 2019-06-19
Payer: MEDICAID

## 2019-06-19 PROCEDURE — 97014 ELECTRIC STIMULATION THERAPY: CPT

## 2019-06-19 PROCEDURE — 97110 THERAPEUTIC EXERCISES: CPT

## 2019-06-19 PROCEDURE — 97140 MANUAL THERAPY 1/> REGIONS: CPT

## 2019-06-19 NOTE — PROGRESS NOTES
PT DAILY TREATMENT NOTE 8    Patient Name: Bayron Pope  Date:2019  : 1970  [x]  Patient  Verified  Payor: [facility] MEDICAID / Plan: Mayank Welch / Product Type: Managed Care Medicaid /    In time: 5:30 pm     Out time: 6:22 pm  Total Treatment Time (min): 52  Visit #: 1 of     Treatment Area: Chronic neck pain [M54.2, G89.29]    SUBJECTIVE  Pain Level (0-10 scale): 0  Any medication changes, allergies to medications, adverse drug reactions, diagnosis change, or new procedure performed?: [x] No    [] Yes (see summary sheet for update)  Subjective functional status/changes:   [] No changes reported  \"I have been feeling pretty good since the surgery lately. \"    OBJECTIVE  Modality rationale: decrease pain to improve the patients ability to perform ADLs     Min Type Additional Details   10 MHP, with  IFC to C/S   Position: reclined  Location: C/S    [x] Skin assessment post-treatment:  [x]intact    []redness- no adverse reaction                                                                                 []redness - adverse reaction:     33 min Therapeutic Exercise:  [x] See flow sheet:    Rationale: increase ROM, increase strength and improve coordination to improve the patients ability to perform ADLs     9 min Manual Therapy:  STM to (B) UT f/b MFR to (B)TPS   Rationale: decrease pain, increase ROM, increase tissue extensibility and reduce myofascial stiffness to improve the patients ability to perform ADLs          with TE min Patient Education: [x] Review HEP     Other Objective/Functional Measures:   PT reinitiated per MD orders. Pain Level (0-10 scale) post treatment: 0    ASSESSMENT/Changes in Function:   Good tolerance to treatment.     Patient will continue to benefit from skilled PT services to modify and progress therapeutic interventions, address functional mobility deficits, address ROM deficits, address strength deficits, analyze and address soft tissue restrictions, analyze and cue movement patterns, analyze and modify body mechanics/ergonomics and assess and modify postural abnormalities to attain remaining goals.      []  See Plan of Care  []  See progress note/recertification  []  See Discharge Summary           PLAN  [x]  Upgrade activities as tolerated     [x]  Continue plan of care  []  Update interventions per flow sheet       []  Discharge due to:_  []  Other:_    Van Sapp PTA, 6/19/2019

## 2019-06-25 ENCOUNTER — HOSPITAL ENCOUNTER (OUTPATIENT)
Dept: PHYSICAL THERAPY | Age: 49
Discharge: HOME | End: 2019-06-25
Payer: MEDICAID

## 2019-06-25 PROCEDURE — 97140 MANUAL THERAPY 1/> REGIONS: CPT | Performed by: PHYSICAL THERAPIST

## 2019-06-25 PROCEDURE — 97110 THERAPEUTIC EXERCISES: CPT | Performed by: PHYSICAL THERAPIST

## 2019-06-25 PROCEDURE — 97014 ELECTRIC STIMULATION THERAPY: CPT | Performed by: PHYSICAL THERAPIST

## 2019-06-25 NOTE — PROGRESS NOTES
Request for use of Dry Needling/Intramuscular Manual Therapy  Patient: Reagan Clark     Referral Source: Devi Peoples MD  Diagnosis: Chronic neck pain [M54.2, G89.29]      : 1970  Date of initial visit: 19   Attended visits: 10  Missed Visits: 0    Based on findings from the physical therapy examination and evaluation, the evaluating therapist believes the patientReagan  would benefit from including Dry Needling as part of the plan of care. Dry needling is a treatment technique utilized in conjunction with other PT interventions to inactivate myofascial trigger points and the pain and dysfunction they cause. Dry Needling is an advanced procedure that requires additional training including greater than 54 hours of intensive course work. Physical Therapists at [address] [place] [address] are certified through [address] [place] [address] courses for their education and are certified to perform treatments. PROCEDURE:   Solid filament sterile needle (typically 0.3mm/30 gauge) inserted into a trigger point   Repeated movements inactivate the trigger points, taking 30-60 seconds per site   Typically consists of 1 dry needling session per week and a possible second treatment including muscle re-education, flexibility, strengthening and other manual techniques to facilitate the benefits of dry needling     BENEFITS:   Inactivation of trigger points   Decreased pain   Increased muscle length   Improved movement patterns   Restoration of function POTENTIAL RISKS:   Post-needling soreness   Infection   Bruising/bleeding   Penetration of a nerve   Pneumothorax   All treating PTs have been thoroughly educated in avoiding adverse reactions    If you agree with this recommendation, please sign this form and fax it to us at [phone].   If you have questions or concerns regarding dry needling or any other treatment we may be providing, please contact us at [phone]    Thank you for allowing us to assist in the care of your patient. Edmonia Soulier, PT    6/25/2019 2:41 PM     NOTE TO PHYSICIAN:  PLEASE COMPLETE THE ORDERS BELOW AND   FAX TO [facility]: [phone]  If you are unable to process this request in 24 hours please contact our office:   [phone] I have read the above request and AGREE to the recommendation of including dry needling as part of the plan of care. ? I have read the above request and DO NOT AGREE to including dry needling as part of the plan of care.   ? I have read the above report and request that my patient continue therapy with the following changes/special instructions:  ________________________________________________________________________    Physicians signature: _______________________________________________     Date: ______________Time:_______________

## 2019-06-25 NOTE — PROGRESS NOTES
PT DAILY TREATMENT NOTE 8-    Patient Name: Tete Salcedo  Date:2019  : 1970  [x]  Patient  Verified  Payor: [facility] MEDICAID / Plan: Mayank Welch / Product Type: Managed Care Medicaid /    In time:232pm   Out time: 328  Total Treatment Time (min): 56min  Visit #: 2 of 8    Treatment Area: Chronic neck pain [M54.2, G89.29]    SUBJECTIVE  Pain Level (0-10 scale): 10   Any medication changes, allergies to medications, adverse drug reactions, diagnosis change, or new procedure performed?: [x] No    [] Yes (see summary sheet for update)  Subjective functional status/changes:   [] No changes reported  I slept wrong 2 nights ago and it is still bothering me. OBJECTIVE  Modality rationale: decrease pain to improve the patients ability to perform ADLs     Min Type Additional Details   10 MHP, with  premod  to R shoulder UT and mid scap mm   Position: prone  Location: R UT and mid scap. [x] Skin assessment post-treatment:  [x]intact    []redness- no adverse reaction                                                                                 []redness - adverse reaction:     38 min Therapeutic Exercise:  [x] See flow sheet:   Rationale: increase ROM, increase strength and improve coordination to improve the patients ability to perform ADLs     8 min Manual Therapy:  R c/s levator scap , UT DTM/TrPR, t/s PA mobes and DTM to R rhomboids   Rationale: decrease pain, increase ROM, increase tissue extensibility and reduce myofascial stiffness to improve the patients ability to perform ADLs          with TE min Patient Education: [x] Review HEP     Other Objective/Functional Measures: Added shrugs/retro shoulder rolls with 5#, c/s stabs at wall with 2# (3way)  rows  Pain Level (0-10 scale) post treatment: 0/10    ASSESSMENT/Changes in Function:   Patient reports that his doctor would like him to try Dry needling for mm tension. Request was sent today.  Pt continues with increased mm tension in R UT mm, LS mm. Pt reports decreased pain following MT and Pre-mod. Patient will continue to benefit from skilled PT services to modify and progress therapeutic interventions, address functional mobility deficits, address ROM deficits, address strength deficits, analyze and address soft tissue restrictions, analyze and cue movement patterns, analyze and modify body mechanics/ergonomics and assess and modify postural abnormalities to attain remaining goals. []  See Plan of Care  []  See progress note/recertification  [x]  See Discharge Summary      Progress towards goals / Updated goals: · Short Term Goals: To be accomplished in  1  weeks:  1. Pt will be independent and compliant with HEP. -Goal met; pt notes compliance (5/17/19)  · Long Term Goals: To be accomplished in  8-12  treatments:  1. Patient will increase FOTO score to 63/100 for indications of increased functional mobility. 2.  Patient will demo 4+/5 shoulder flexion strength for ease with lifting ADLs. Goal partially met 4/5 B with UT pain. 3.  Patient will demo 4/5 MT strength for progression of postural strength to decrease reoccurrence of myofasical TRP. $/5 MET for MT, LT:4-/5 strength  4. Patient will report 50% overall improvement in sx for imrpoved activity tolerance and QOL      MET, Pt reports 90% improvements in overall sx.        PLAN  [x]  Upgrade activities as tolerated     [x]  Continue plan of care  []  Update interventions per flow sheet       []  Discharge due to:_  [x]  Other: check goals ANANT Ta, PT, 6/25/2019   3:48 PM

## 2019-06-25 NOTE — PROGRESS NOTES
[address] [facility] [address] [facility] AT [address], [place], [place]  Phone: [phone] Fax [phone]  PROGRESS NOTE  Patient Name: Sandra Manriquez : 1970   Treatment/Medical Diagnosis: Chronic neck pain [M54.2, G89.29]   Referral Source: Andrae Queen MD     Date of Initial Visit: 19 Attended Visits: 8 Missed Visits: 0     SUMMARY OF TREATMENT  Pt is a 50y.o. year old male who presents with CS/TS pain with long history of pain and radiculopathy with hx of Sickle Cell Anemia. Treatment consist of therapeutic exercise including ROM, stretching,  strengthening, stabilization training, postural ed, patient education, HEP instruction, MHP, manual therapy and e-stim. CURRENT STATUS  Subjective: Patient reports 90% improvements in sx since [facility]. Pain levels in c/s and shoulder  range from 2 to 5. Patient's current complaints: continued pain and tightness in UT B and turning neck to R. Patient performing HEP daily and is I.  Objective: c/s AROM is WFL, UE AROM WFL, TTP over B UTmm,   Improvements: shoulder strength, upper back and shoulder blade strength, able to hold my cup now without dropping ( improved.)  Deficits: continued pain, self limited activity endurance due to pain, difficulty turning neck to R . FOTO: 59/100     Progress towards goals / Updated goals: · Short Term Goals: To be accomplished in  1  weeks:  1.  Pt will be independent and compliant with HEP. -Goal met; pt notes compliance (19)  · Long Term Goals: To be accomplished in  8-12  treatments:  1.  Patient will increase FOTO score to 63/100 for indications of increased functional mobility.  Partially MEt at 59  2.  Patient will demo 4+/5 shoulder flexion strength for ease with lifting ADLs. Goal partially met 4/5 B with UT pain.    3.  Patient will demo 4/5 MT strength for progression of postural strength to decrease reoccurrence of myofasical TRP. $/5 MET for MT, LT:4-/5 strength  4.  Patient will report 50% overall improvement in sx for imrpoved activity tolerance and QOL      MET, Pt reports 90% improvements in overall sx. Cont per est goals. RECOMMENDATIONS  Cont skilled PT ar 2x4 for 8 treatments to address deficits and achieve stated goals. If you have any questions/comments please contact us directly at [phone]. Thank you for allowing us to assist in the care of your patient.   Therapist Signature: Brad Barker PTA Date: 6/19/19   Therapist signature Luz Maria Monterroso MPT Time: 12:10 PM

## 2019-06-27 ENCOUNTER — HOSPITAL ENCOUNTER (OUTPATIENT)
Dept: PHYSICAL THERAPY | Age: 49
Discharge: HOME | End: 2019-06-27
Payer: MEDICAID

## 2019-06-27 PROCEDURE — 97140 MANUAL THERAPY 1/> REGIONS: CPT

## 2019-06-27 PROCEDURE — 97110 THERAPEUTIC EXERCISES: CPT

## 2019-06-27 PROCEDURE — 97014 ELECTRIC STIMULATION THERAPY: CPT

## 2019-06-27 NOTE — PROGRESS NOTES
PT DAILY TREATMENT NOTE 8-14    Patient Name: Lluvia Remy  Date:2019  : 1970  [x]  Patient  Verified  Payor: [facility] MEDICAID / Plan: Gregorio Ac / Product Type: Managed Care Medicaid /    In time: 1:00 pm   Out time: 1:58 pm   Total Treatment Time (min): 58  Visit #: 3 of 8    Treatment Area: Chronic neck pain [M54.2, G89.29]    SUBJECTIVE  Pain Level (0-10 scale): 4  Any medication changes, allergies to medications, adverse drug reactions, diagnosis change, or new procedure performed?: [x] No    [] Yes (see summary sheet for update)  Subjective functional status/changes:   [] No changes reported  \"I am a little better today. I have trouble looking to the right. \"    OBJECTIVE  Modality rationale: decrease pain to improve the patients ability to perform ADLs     Min Type Additional Details   10 MHP, with premod ES to (B) shoulder UT and mid scap mm Position: prone  Location: (B) UT and TPS. [x] Skin assessment post-treatment:  [x]intact    []redness- no adverse reaction                                                                                 []redness - adverse reaction:     39 min Therapeutic Exercise:  [x] See flow sheet:   Rationale: increase ROM, increase strength and improve coordination to improve the patients ability to perform ADLs     9 min Manual Therapy:  Grade III PA mobs to T/S; supine STM to (B) UT f/b PROM C/S rotation   Rationale: decrease pain, increase ROM, increase tissue extensibility and reduce myofascial stiffness to improve the patients ability to perform ADLs          with TE min Patient Education: [x] Review HEP     Other Objective/Functional Measures:       Pain Level (0-10 scale) post treatment: 0    ASSESSMENT/Changes in Function:   Mild TrP in the (R) UT addressed well with manual interventions and prone chin tuck. Patient demos full and symmetrical passive C/S rotation.  Shoulder scaption AROM to ~ 110 deg sec strength deficits. Patient will continue to benefit from skilled PT services to modify and progress therapeutic interventions, address functional mobility deficits, address ROM deficits, address strength deficits, analyze and address soft tissue restrictions, analyze and cue movement patterns, analyze and modify body mechanics/ergonomics and assess and modify postural abnormalities to attain remaining goals. []  See Plan of Care  []  See progress note/recertification  []  See Discharge Summary      Progress towards goals / Updated goals:   1. Patient will increase FOTO score to 63/100 for indications of increased functional mobility. 2.  Patient will demo 4+/5 shoulder flexion strength for ease with lifting ADLs. 3.  Patient will demo 4/5 MT strength for progression of postural strength to decrease reoccurrence of myofasical TRP.     PLAN  [x]  Upgrade activities as tolerated     [x]  Continue plan of care  []  Update interventions per flow sheet       []  Discharge due to:_  [x]  Other: initiate dry needling as request returned with MD signature    Jg Pandya PTA, 6/27/2019

## 2019-07-01 ENCOUNTER — HOSPITAL ENCOUNTER (OUTPATIENT)
Dept: PHYSICAL THERAPY | Age: 49
End: 2019-07-01
Payer: MEDICAID

## 2019-07-02 ENCOUNTER — HOSPITAL ENCOUNTER (OUTPATIENT)
Dept: PHYSICAL THERAPY | Age: 49
Discharge: HOME | End: 2019-07-02
Payer: MEDICAID

## 2019-07-02 ENCOUNTER — APPOINTMENT (OUTPATIENT)
Dept: PHYSICAL THERAPY | Age: 49
End: 2019-07-02
Payer: MEDICAID

## 2019-07-02 DIAGNOSIS — Z98.1 S/P CERVICAL SPINAL FUSION: ICD-10-CM

## 2019-07-02 DIAGNOSIS — M54.2 NECK PAIN: ICD-10-CM

## 2019-07-02 DIAGNOSIS — M79.18 CERVICAL MYOFASCIAL PAIN SYNDROME: ICD-10-CM

## 2019-07-02 PROCEDURE — 97014 ELECTRIC STIMULATION THERAPY: CPT | Performed by: PHYSICAL THERAPIST

## 2019-07-02 PROCEDURE — 97140 MANUAL THERAPY 1/> REGIONS: CPT | Performed by: PHYSICAL THERAPIST

## 2019-07-02 PROCEDURE — 97110 THERAPEUTIC EXERCISES: CPT | Performed by: PHYSICAL THERAPIST

## 2019-07-02 NOTE — PROGRESS NOTES
PT DAILY TREATMENT NOTE 8-14    Patient Name: Sherine Tamez  Date:2019  : 1970  [x]  Patient  Verified  Payor: [facility] MEDICAID / Plan: Giacomo William / Product Type: Managed Care Medicaid /    In time: 1101 am   Out time: 1200 am   Total Treatment Time (min): 59  Visit #: 4 of 8    Treatment Area: Neck pain [M54.2]  Cervical myofascial pain syndrome [M79.18]  S/P cervical spinal fusion [Z98.1]    SUBJECTIVE  Pain Level (0-10 scale): 0  Any medication changes, allergies to medications, adverse drug reactions, diagnosis change, or new procedure performed?: [x] No    [] Yes (see summary sheet for update)  Subjective functional status/changes:   [] No changes reported  \"I am feeling good today. Pt reports he still has the same neck/UT pain though. \"    OBJECTIVE  Modality rationale: decrease pain to improve the patients ability to perform ADLs     Min Type Additional Details   10 MHP, with premod ES to (B) shoulder UT and mid scap mm Position: prone  Location: (B) UT and TPS.     [x] Skin assessment post-treatment:  [x]intact    []redness- no adverse reaction                                                                                 []redness - adverse reaction:     41 min Therapeutic Exercise:  [x] See flow sheet:   Rationale: increase ROM, increase strength and improve coordination to improve the patients ability to perform ADLs     8 min Manual Therapy:  Grade III PA mobs to T/S; supine STM to (B) UT f/b PROM C/S rotation   Rationale: decrease pain, increase ROM, increase tissue extensibility and reduce myofascial stiffness to improve the patients ability to perform ADLs          with TE min Patient Education: [x] Review HEP     Other Objective/Functional Measures:   Increased to 12 reps with AROM 3 way,   Lowered mat for table push ups  Cues for decreased UT compensation with rows    Pain Level (0-10 scale) post treatment: 0    ASSESSMENT/Changes in Function:   Pt reports mod fatigue with shrugs and 3 way AROM , fair tolerance to MT with c/o TTP over upper t/s throughout with PA gr III mobes, Mod TTP over R med scapular mm and UT. Pt making slow progress,Pt states his pain comes and goes, but overall its about the same in the R UT area. Pt reports he sleeps pretty much all day, which could attribute to his slow progression. Patient will continue to benefit from skilled PT services to modify and progress therapeutic interventions, address functional mobility deficits, address ROM deficits, address strength deficits, analyze and address soft tissue restrictions, analyze and cue movement patterns, analyze and modify body mechanics/ergonomics and assess and modify postural abnormalities to attain remaining goals. []  See Plan of Care  []  See progress note/recertification  []  See Discharge Summary      Progress towards goals / Updated goals:   1. Patient will increase FOTO score to 63/100 for indications of increased functional mobility. 2.  Patient will demo 4+/5 shoulder flexion strength for ease with lifting ADLs. 3.  Patient will demo 4/5 MT strength for progression of postural strength to decrease reoccurrence of myofasical TRP.     PLAN  [x]  Upgrade activities as tolerated     [x]  Continue plan of care  []  Update interventions per flow sheet       []  Discharge due to:_  [x]  Other:    Liliam Mccord, PT, 7/2/2019

## 2019-07-03 ENCOUNTER — HOSPITAL ENCOUNTER (OUTPATIENT)
Dept: PHYSICAL THERAPY | Age: 49
Discharge: HOME | End: 2019-07-03
Payer: MEDICAID

## 2019-07-03 PROCEDURE — 97110 THERAPEUTIC EXERCISES: CPT

## 2019-07-03 PROCEDURE — 97140 MANUAL THERAPY 1/> REGIONS: CPT

## 2019-07-03 PROCEDURE — 97014 ELECTRIC STIMULATION THERAPY: CPT

## 2019-07-09 ENCOUNTER — HOSPITAL ENCOUNTER (OUTPATIENT)
Dept: PHYSICAL THERAPY | Age: 49
Discharge: HOME | End: 2019-07-09
Payer: MEDICAID

## 2019-07-09 ENCOUNTER — APPOINTMENT (OUTPATIENT)
Dept: PHYSICAL THERAPY | Age: 49
End: 2019-07-09
Payer: MEDICAID

## 2019-07-09 PROCEDURE — 97110 THERAPEUTIC EXERCISES: CPT

## 2019-07-09 PROCEDURE — 97014 ELECTRIC STIMULATION THERAPY: CPT

## 2019-07-09 NOTE — PROGRESS NOTES
PT DAILY TREATMENT NOTE 8-    Patient Name: Ayesha Deras  Date:2019  : 1970  [x]  Patient  Verified  Payor: [facility] MEDICAID / Plan: [facility] [employer] / Product Type: Managed Care Medicaid /    In time: 2:37 pm           Out time: 3:30 pm  Total Treatment Time (min): 53  Visit #: 6 of 8    Treatment Area: Chronic neck pain [M54.2, G89.29]    SUBJECTIVE  Pain Level (0-10 scale): 0  Any medication changes, allergies to medications, adverse drug reactions, diagnosis change, or new procedure performed?: [x] No    [] Yes (see summary sheet for update)  Subjective functional status/changes:   [] No changes reported  \"Doing good. \"    OBJECTIVE  Modality rationale: decrease pain and increase tissue extensibility to improve the patients ability to C/S rotation    Min Type Additional Details   10 [x] Estim: []Att   [x]Unatt        []TENS instruct                  []IFC  [x]Premod   []NMES                     []Other:  []w/US   []w/ice   [x]w/heat  Position: prone  Location: (B) UT    [x] Skin assessment post-treatment:  [x]intact []redness- no adverse reaction       []redness - adverse reaction:       39 min Therapeutic Exercise:  [x] See flow sheet:    Rationale: increase ROM, increase strength and improve coordination to improve the patients ability to dress, ADls, mobility      4 min Manual Therapy: Sustained PA mobs to T/S in prone f/b STM to (B) TPS   Rationale:      decrease pain, increase ROM, and increase tissue extensibility to improve patient's ability to dress, ADLs, mobility       X min Patient Education: [x] Review HEP        Other Objective/Functional Measures:       Pain Level (0-10 scale) post treatment: 0    ASSESSMENT/Changes in Function:   Good tolerance to treatment. Patient demos inc endurance with progressed therex.     Patient will continue to benefit from skilled PT services to modify and progress therapeutic interventions, address functional mobility deficits, address ROM deficits, address strength deficits, analyze and address soft tissue restrictions, analyze and cue movement patterns, analyze and modify body mechanics/ergonomics, assess and modify postural abnormalities and instruct in home and community integration to attain remaining goals. []  See Plan of Care  [x]  See progress note/recertification  []  See Discharge Summary         Progress towards goals / Updated goals:  1.  Patient will increase FOTO score to 63/100 for indications of increased functional mobility.    2.  Patient will demo 4+/5 shoulder flexion strength for ease with lifting ADLs. Progressed strength, with min hike on the R (7/3/19)    3.  Patient will demo 4/5 MT strength for progression of postural strength to decrease reoccurrence of myofasical TRP.  (B) MT MMT is 4-/5 (7/3/19)     PLAN  [x]  Upgrade activities as tolerated     [x]  Continue plan of care  []  Update interventions per flow sheet       []  Discharge due to:_  []  Other:_     Anthony James, PTA    7/9/2019

## 2019-07-10 ENCOUNTER — HOSPITAL ENCOUNTER (OUTPATIENT)
Dept: PHYSICAL THERAPY | Age: 49
Discharge: HOME | End: 2019-07-10
Payer: MEDICAID

## 2019-07-10 PROCEDURE — 97140 MANUAL THERAPY 1/> REGIONS: CPT

## 2019-07-10 PROCEDURE — 97014 ELECTRIC STIMULATION THERAPY: CPT

## 2019-07-10 PROCEDURE — 97110 THERAPEUTIC EXERCISES: CPT

## 2019-07-10 NOTE — PROGRESS NOTES
PT DAILY TREATMENT NOTE 8-14    Patient Name: Billie Vieyra  Date:7/10/2019  : 1970  [x]  Patient  Verified  Payor: [facility] MEDICAID / Plan: Jonnathan Decree / Product Type: Managed Care Medicaid /    In time: 10:28 am        Out time: 11:31 am  Total Treatment Time (min): 63  Visit #: 7 of 8    Treatment Area: Chronic neck pain [M54.2, G89.29]    SUBJECTIVE  Pain Level (0-10 scale): 0  Any medication changes, allergies to medications, adverse drug reactions, diagnosis change, or new procedure performed?: [x] No    [] Yes (see summary sheet for update)  Subjective functional status/changes:   [] No changes reported  PT technician inquired about pain levels today. Patient - \"Nope. \"    OBJECTIVE  Modality rationale: decrease pain and increase tissue extensibility to improve the patients ability to C/S rotation    Min Type Additional Details   10 [x] Estim: []Att   [x]Unatt        []TENS instruct                  []IFC  [x]Premod   []NMES                     []Other:  []w/US   []w/ice   [x]w/heat  Position: prone  Location: (B) UT    [x] Skin assessment post-treatment:  [x]intact []redness- no adverse reaction       []redness - adverse reaction:       45 min Therapeutic Exercise:  [x] See flow sheet: added wall angels and wall V's to address scapular tilting   Rationale: increase ROM, increase strength and improve coordination to improve the patients ability to dress, ADls, mobility      8 min Manual Therapy: Sustained PA mobs to T/S in prone f/b STM to (B) TPS; MFR to C/S and T/S   Rationale:      decrease pain, increase ROM, and increase tissue extensibility to improve patient's ability to dress, ADLs, mobility       X min Patient Education: [x] Review HEP for D/C       Other Objective/Functional Measures:  Patient is LHD. Subjective improvement of 95% in symptoms since IE reported.   Improvements: all ADLs, lifting grille (50# weight limit), reaching, pain  Deficits: heavier lifting  FOTO score: 67/100 (at last assessment, 59/100)  C/S AROM: flex WNL chin to chest, ext 36 deg, (R)SB 45deg, (L)SB 40deg, (R)rot 56deg, (L)rot 58deg  (B) shoulder AROM: full and WNL for elevation (with scapular winging) and ER (L) 90 deg, (R) 80 deg; FIR to level of T5   Shoulder strength: grossly 5/5, except 4/5 (L) shoulder flexion and ABD strength  Scapular strength: MT 4/5  Pain: (B) 0, (W) 0    Pain Level (0-10 scale) post treatment: 0    ASSESSMENT/Changes in Function:   Progressed therex to involve SA/LT coupling due to scapular tilting at rest (B) and winging with all elevation attempts. See D/C. Patient will continue to benefit from skilled PT services to modify and progress therapeutic interventions, address functional mobility deficits, address ROM deficits, address strength deficits, analyze and address soft tissue restrictions, analyze and cue movement patterns, analyze and modify body mechanics/ergonomics, assess and modify postural abnormalities and instruct in home and community integration to attain remaining goals. [x]  See Discharge Summary         Progress towards goals / Updated goals:  1.  Patient will increase FOTO score to 63/100 for indications of increased functional mobility. -Goal met; 67/100   2.  Patient will demo 4+/5 shoulder flexion strength for ease with lifting ADLs. -Goal partially met; (R) 4/5, (L) 5/5   3.  Patient will demo 4/5 MT strength for progression of postural strength to decrease reoccurrence of myofasical TRP.  -Goal met; 4/5 MT strength    PLAN  [x]  Discharge due to: patient meeting goals; full functional mobility demonstrated in clinic    Kodak Quigley, PTA    7/10/2019

## 2019-07-10 NOTE — PROGRESS NOTES
[address] [facility] [address] [facility] AT [address] , [place]     Phone: [phone] Fax: [phone]  DISCHARGE SUMMARY  Patient Name: Sebastian Moses : 1970   Treatment/Medical Diagnosis: Chronic neck pain [M54.2, G89.29]   Referral Source: Claritza Drake MD     Date of Initial Visit: 19 Attended Visits: 15 Missed Visits: 1     SUMMARY OF TREATMENT  Pt is a 50y.o. year old male who presents with CS/TS pain with long history of pain and radiculopathy with hx of Sickle Cell Anemia. Treatment consist of therapeutic exercise including ROM, stretching,  strengthening, stabilization training, postural ed, patient education, HEP instruction, MHP, manual therapy including dry needling, and e-stim. CURRENT STATUS  Patient has made excellent progress in PT. Subjective improvement of 95% in symptoms since IE reported. Assessment as follows:  Patient is LHD. Improvements: all ADLs, lifting grille (50# weight limit), reaching, pain  Deficits: heavier lifting  FOTO score: 67/100 (at last assessment, 59/100)  C/S AROM: flex WNL chin to chest, ext 36 deg, (R)SB 45deg, (L)SB 40deg, (R)rot 56deg, (L)rot 58deg  (B) shoulder AROM: full and WNL for elevation (with scapular winging) and ER (L) 90 deg, (R) 80 deg; FIR to level of T5   Shoulder strength: grossly 5/5, except 4/5 (L) shoulder flexion and ABD strength  Scapular strength: MT 4/5  Pain: (B) 0, (W) 0    Goal/Measure of Progress:  1.  Patient will increase FOTO score to 63/100 for indications of increased functional mobility. -Goal met; 67/100   2.  Patient will demo 4+/5 shoulder flexion strength for ease with lifting ADLs. -Goal partially met; (R) 4/5, (L) 5/5   3.  Patient will demo 4/5 MT strength for progression of postural strength to decrease reoccurrence of myofasical TRP.  -Goal met; 4/5 MT strength    Home exercise program established on initial evaluation and progressed as patient is able to address deficits. Patient now has all of the knowledge to continue with progress per HEP. RECOMMENDATIONS  Discontinue therapy. Progressing towards or have reached established goals. If you have any questions/comments please contact us directly at [phone]. Thank you for allowing us to assist in the care of your patient. LPTA Signature: Alisa Granger PTA Date: 7/10/19   Therapist Signature: Shan Arevalo DPALISSA  Time: 11:14 AM     NOTE TO PHYSICIAN:    To ensure we are able to process the patient's encounter and avoid risk of your patient   receiving a bill for our services, please sign and return this discharge summary by 8/10/19. Thank you!      ___ I have read the above report and request that my patient be discharged from therapy.      Physician Signature:        Date:       Time:

## 2019-07-11 ENCOUNTER — APPOINTMENT (OUTPATIENT)
Dept: PHYSICAL THERAPY | Age: 49
End: 2019-07-11
Payer: MEDICAID

## 2019-07-12 ENCOUNTER — APPOINTMENT (OUTPATIENT)
Dept: PHYSICAL THERAPY | Age: 49
End: 2019-07-12
Payer: MEDICAID

## 2019-07-22 ENCOUNTER — OFFICE VISIT (OUTPATIENT)
Dept: ORTHOPEDIC SURGERY | Age: 49
End: 2019-07-22

## 2019-07-22 VITALS
HEIGHT: 67 IN | DIASTOLIC BLOOD PRESSURE: 65 MMHG | BODY MASS INDEX: 20.81 KG/M2 | SYSTOLIC BLOOD PRESSURE: 119 MMHG | HEART RATE: 73 BPM | WEIGHT: 132.6 LBS | TEMPERATURE: 98.3 F | OXYGEN SATURATION: 96 % | RESPIRATION RATE: 16 BRPM

## 2019-07-22 DIAGNOSIS — M79.18 CERVICAL MYOFASCIAL PAIN SYNDROME: ICD-10-CM

## 2019-07-22 DIAGNOSIS — M54.2 NECK PAIN: Primary | ICD-10-CM

## 2019-07-22 DIAGNOSIS — Z98.1 S/P CERVICAL SPINAL FUSION: ICD-10-CM

## 2019-07-22 NOTE — PROGRESS NOTES
[address]  [place], [address] [place] [address] Street  Phone: [phone]  Fax: [phone]        Cici Rojo  : 1970  PCP: Rajan Glass MD  2019    PROGRESS NOTE      HISTORY OF PRESENT ILLNESS  Shira Jeffrey is a 52 y.o. male. Peggye Duverney was seen initially 2018 for c/o chronic neck pain radiating into his LUE and numbness in his fingers that has worsened over the last 4 months with the new symptoms being radicular. He recently completed 10 visits to PT and has 6 remaining. He notes when he lays down, his whole body gets paralyzed. He has seen a sleep doctor who states he had sleep apnea, but no sleep paralysis. The only thing that has provided him any relief has been pain medication, but it has had less of an effect recently. He previously had neck pain for which he had an MRI in , but he did not begin to have numbness in his fingers on his left hand until recently. He has a dx of sickle cell, but notes he does not have a pain management or hematologist. His PCP has been managing his sickle cell for him. He notes he did not find any relief from the cervical injections, and his neck pain and LUE paraesthesia has worsened over the last month. He notes he now has a right-sided neck pain associated with his right shoulder pain. He is scheduled to see Dr. Nila Crowell on Monday for his right shoulder pain. He did not tolerate Gabapentin well as he experienced somnolence. Pt is interested in chiropractic care. He found no relief from PT, so Dr. Nila Crowell ordered a cervical spine MRI. He continues to have neck pain radiating into his first three digits in his left hand. He found some mild relief from Lyrica but it caused a side effect of ED so he has chosen to discontinue the medication. Cervical MRI 8/15/18: Multilevel central stenosis involving the C3-4 through the C6-7 levels.  Stenosis is greatest at C4-5 level, mild to moderate in degree, produced by left central/subarticular disc protrusion superimposed on disc bulge. Although there is mild cord deformity at these levels of stenosis, no abnormal cord signal is seen. Left posterolateral C6-7 disc herniation superposed upon disc bulge including focal subarticular extrusion component extending inferiorly with left axillary recess narrowing. Exiting left C7 nerve root may be affected. Clinical correlation is recommended. Mild C3-4 and C5-C6 levels of mild central stenosis are produced by disc bulge and left central disc protrusion, respectively. Slight reversal of normal cervical lordosis. No subluxation seen. Prominent nasopharyngeal and oropharyngeal tonsillar soft tissue as well as several scattered normal sized cervical nodes. These findings are nonspecific and could reflect reactive lymphoid tissue. Cannot exclude lymphoproliferative process or adenopathy from other etiologies. Clinical correlation recommended. He saw Dr. Juan Trinh, who recommended a surgical solution.      He saw Nely Raya NP in 2/19 and he was 5 month s/p ACDF C6-7 (9/17/18) that improved his LUE pain. He did not tolerate Cymbalta as it gave him erectile dysfunction. He has tried and failed Lyrica (ED), Gabitril (somnolence), and Gabapentin (somnolence). He also c/o some numbness in the second and third digits of his left hand. He did not find relief from heat, ice, Flexeril, Mobic, or lidocaine patches. He returned 5/2/19 for an MRI f/u. He c/o right-sided neck pain radiating down his back to between his shoulder blades. He denies new radicular symptoms, but continues to have residual LUE numbness. He notes that he had to discontinue the Prednisone dose pack because it caused a sickle cell crisis. He continues to have neck and back pain, but it is not as severe as it was previously. He denies any injury, but noted that he had to lift his mother with his dad to help get her dressed when EMTs came.  She recently passed away a week and a half ago due to septic shock. He notes that his vertigo also came back since he has been stressed. Cervical MRI 4/11/19: Evidence of ACDF C6-C7. No recurrent or residual central or foraminal stenosis at this level. Previously there was a broad-based protrusion/extrusion on the left. Degenerative changes with reversal of the cervical lordosis as described above. Central stenosis with mild cord distortion at C3-C4 and C4-C5. No cord signal change and this would not with complete confidence correlate with a cervical myelopathy. No new high-grade foraminal stenosis. He was given trigger point injections that significantly improved his pain. He notes that the day after he had the trigger point injections, he had to go to the ED the next day because he had sickle cell crisis, then the following day he had to go back and was admitted and had to stay 4-5 days, so we will avoid steroids (injected and oral). He attended PT (Qing) with benefit and recently had a massage that was beneficial. He notes that since he left the hospital, he has had gout attacks that are being managed by his PCP. He finds the most relief of his pain with Naproxen. I advised on the risks of the medication. George Burris comes in to the office today for f/u. He has seen significant improvement with PT with dry needling (6/25/19-7/10/19; Kenisha). He notes that he did not tolerate dry needling well, but PT overall was effective. He rates his pain as a 2/10 today. ASSESSMENT  His residual pain likely remains myofascial in nature. He seems to be maintaining well. PLAN  1. Maintain HEP. Pt will f/u PRN. Diagnoses and all orders for this visit:    1. Neck pain    2. Cervical myofascial pain syndrome    3.  S/P cervical spinal fusion               PAST MEDICAL HISTORY   Past Medical History:   Diagnosis Date    Anemia NEC     Bursitis of shoulder, left 08/03/2009    MRI revealed tendinitis and bursitis of the rotator cuff.  DJD (degenerative joint disease) 08/03/2009    Early djd, left radiocarpal joint with associated ganglion cyst.      Elevated white blood cell count 07/20/2009    GERD (gastroesophageal reflux disease)     H/O: rotator cuff tear 06/07/2006    Chronic    Hyperlipidemia     Pain in joint, upper arm 07/2009    Pain in shoulder, elbow and wrist.    Sickle cell anemia (HCC)     Sleep apnea     Does not use CPAP    Testicular failure     Vertigo 07/20/2009       Past Surgical History:   Procedure Laterality Date    HX ADENOIDECTOMY      HX ADENOIDECTOMY      HX CERVICAL FUSION  09/17/2018    ACDF C6/7    HX CHOLECYSTECTOMY  03/27/2018    HX HEENT      eardrum repair    HX SHOULDER ARTHROSCOPY Right    . MEDICATIONS    Current Outpatient Medications   Medication Sig Dispense Refill    colchicine 0.6 mg tablet take 2 tablets by mouth then 1 tablet by mouth 1 hour LATER (TOTAL 3 TABLETS PER ATTACK)  0    naproxen (NAPROSYN) 500 mg tablet Take 1 Tab by mouth two (2) times daily as needed for Pain. 60 Tab 5    methylPREDNISolone (MEDROL, SHU,) 4 mg tablet Per dose pack instructions 1 Dose Pack 0    cyclobenzaprine (FLEXERIL) 10 mg tablet Take 1 Tab by mouth three (3) times daily as needed for Muscle Spasm(s). 90 Tab 1    DULoxetine (CYMBALTA) 20 mg capsule Take 1 Cap by mouth daily. 30 Cap 1    cephALEXin (KEFLEX) 500 mg capsule Take 1 Cap by mouth four (4) times daily. Indications: Skin and Skin Structure Infection 28 Cap 0    meclizine (ANTIVERT) 25 mg tablet take 1 tablet by mouth every 8 hours if needed for VERTIGO  0    tiaGABine (GABITRIL) 4 mg tablet 1 tab po q hs x 1 week, then 1 tab po bid 60 Tab 1    OXYGEN-AIR DELIVERY SYSTEMS 2 L by Nasal route as needed (shortness of breath).  acetaminophen (TYLENOL) 325 mg tablet Take 650 mg by mouth every six (6) hours as needed for Pain or Fever.       senna-docusate (P-COL RITE) 8.6-50 mg per tablet Take 1 Tab by mouth daily as needed for Constipation.  oxyCODONE-acetaminophen (PERCOCET)  mg per tablet Take 1 Tab by mouth every six (6) hours as needed for Pain. Max Daily Amount: 4 Tabs. 20 Tab 0    SUMAtriptan (IMITREX) 50 mg tablet       loratadine (ALAVERT PO) Take  by mouth.  pregabalin (LYRICA) 75 mg capsule Take 1 Cap by mouth two (2) times a day. Max Daily Amount: 150 mg. 14 Cap 0    pregabalin (LYRICA) 150 mg capsule Take 1 Cap by mouth two (2) times a day. Max Daily Amount: 300 mg. 60 Cap 2    valACYclovir (VALTREX) 1 gram tablet Take 1 Tab by mouth daily as needed (cold sores). 0    multivitamin (ONE A DAY) tablet Take 1 Tab by mouth daily.  naproxen (NAPROSYN) 250 mg tablet Take  by mouth two (2) times daily (with meals). ALLERGIES  Allergies   Allergen Reactions    Other Food Other (comments)     Walnuts. Gets headaches    Corn Other (comments)     Told by  allergic    Shellfish Derived Swelling     Swelling. States okay with betadine          SOCIAL HISTORY    Social History     Socioeconomic History    Marital status: SINGLE     Spouse name: Not on file    Number of children: Not on file    Years of education: Not on file    Highest education level: Not on file   Tobacco Use    Smoking status: Former Smoker     Types: Cigarettes     Last attempt to quit: 2003     Years since quittin.0    Smokeless tobacco: Never Used   Substance and Sexual Activity    Alcohol use: No    Drug use: No    Sexual activity: Yes     Partners: Female       FAMILY HISTORY  Family History   Problem Relation Age of Onset    Diabetes Mother     Cancer Father          REVIEW OF SYSTEMS  Review of Systems   Musculoskeletal: Positive for neck pain.           PHYSICAL EXAMINATION  Visit Vitals  /65   Pulse 73   Temp 98.3 °F (36.8 °C) (Oral)   Resp 16   Ht 5' 7\" (1.702 m)   Wt 132 lb 9.6 oz (60.1 kg)   SpO2 96%   BMI 20.77 kg/m²       Pain Assessment  2019   Location of Pain Neck Location Modifiers -   Severity of Pain 2   Quality of Pain Aching   Quality of Pain Comment -   Duration of Pain -   Duration of Pain Comment -   Frequency of Pain Constant   Aggravating Factors (No Data)   Aggravating Factors Comment twisting and turning   Limiting Behavior -   Relieving Factors Rest   Relieving Factors Comment -   Result of Injury -           Constitutional:  Well developed, well nourished, in no acute distress. Psychiatric: Affect and mood are appropriate. Integumentary: No rashes or abrasions noted on exposed areas. SPINE/MUSCULOSKELETAL EXAM    Cervical spine:  Neck is midline. Normal muscle tone. No focal atrophy is noted. ROM painful. Shoulder ROM intact. Tenderness to palpation. Negative Spurling's sign. Negative Tinel's sign. Negative Tomas's sign.       Sensation in the bilateral arms grossly intact to light touch.       Lumbar spine:  No rash, ecchymosis, or gross obliquity. No fasciculations. No focal atrophy is noted. No pain with hip ROM. Full range of motion. Tenderness to palpation. No tenderness to palpation at the sciatic notch. SI joints non-tender. Trochanters non tender.      Sensation in the bilateral legs grossly intact to light touch.     Updates from 5/2/19:  Tenderness to palpation of trapezii (R>L)  No myelopathic signs       MOTOR:      Biceps  Triceps Deltoids Wrist Ext Wrist Flex Hand Intrin   Right 5/5 5/5 5/5 5/5 5/5 5/5   Left 5/5 5/5 5/5 5/5 5/5 5/5             Hip Flex  Quads Hamstrings Ankle DF EHL Ankle PF   Right 5/5 5/5 5/5 5/5 5/5 5/5   Left 5/5 5/5 5/5 5/5 5/5 5/5     DTRs are 2+ biceps, triceps, brachioradialis, patella, and Achilles.      Negative Straight Leg raise. Squat not tested. No difficulty with tandem gait.       Ambulation without assistive device. FWB.         RADIOGRAPHS  Cervical MRI images taken on 8/15/18 personally reviewed with patient:  FINDINGS:      Slight reversal of normal cervical lordosis is present with minimal kyphosis at  the C4 level. No subluxation is seen.  Osseous marrow signal is within normal  limits.  The cervicomedullary junction is unremarkable.  No abnormal signal is  seen in the cervical spinal cord.     C2/3 level: There is no central or foraminal stenosis.     C3/4 level: Disc bulge is present slightly effacing ventral cord. No abnormal  cord signal is seen. Mild central stenosis is present with estimated midline AP  diameter of thecal sac measuring 8-9 mm. Slight foraminal narrowing is present  bilaterally.     C4/5 level: Disc bulge is present with superimposed left central/subarticular  disc protrusion. The left ventral cord is mildly flattened. No abnormal cord  signal is seen. Mild to moderate central stenosis is present with estimated  midline AP diameter of thecal sac measuring 7-8 mm. Uncovertebral joint  hypertrophy is present with moderate right and mild to moderate left foraminal  stenosis. There is no central or foraminal stenosis.     C5/6 level: Disc bulge is present with tiny left central superimposed disc  protrusion. Ventral cord is effaced. No abnormal cord signal is seen. Mild  central stenosis is present with estimated midline AP diameter of thecal sac  measuring 8-9 mm. Mild foraminal stenosis is seen bilaterally.     C6/7 level: Disc bulge is present. Superimposed left posterolateral disc  herniation is present including small subarticular component extending  inferiorly. There is effacement of the left ventral cord and moderate narrowing  of the left axillary recess. Disc herniation may slightly extend into the  foramen. Moderate left and mild right foraminal stenosis is seen. Central canal  narrowing is milder at the midline with mild central stenosis and estimated  midline AP diameter of thecal sac measuring 8-9 mm.     C7/T1 level:  There is no central or foraminal stenosis.     The visualized intracranial contents are unremarkable.      Punctate susceptibility artifact suggested along prominent sized bilateral  oropharyngeal tonsillar pillars, nonspecific but potentially calcifications  related to prior infection. Prominent nasopharyngeal soft tissue is seen without  definite discrete focal lesion. There are several scattered subcentimeter  anterior and posterior cervical chain nodes bilaterally, not meeting size  criteria for adenopathy.     ________________________     IMPRESSION  IMPRESSION:     1. Multilevel central stenosis involving the C3-4 through the C6-7 levels. Stenosis is greatest at C4-5 level, mild to moderate in degree, produced by left  central/subarticular disc protrusion superimposed on disc bulge. Although there  is mild cord deformity at these levels of stenosis, no abnormal cord signal is  seen.     2. Left posterolateral C6-7 disc herniation superposed upon disc bulge including  focal subarticular extrusion component extending inferiorly with left axillary  recess narrowing. Exiting left C7 nerve root may be affected. Clinical  correlation is recommended.     3. Mild C3-4 and C5-C6 levels of mild central stenosis are produced by disc  bulge and left central disc protrusion, respectively.     4. Slight reversal of normal cervical lordosis. No subluxation seen.     5. Prominent nasopharyngeal and oropharyngeal tonsillar soft tissue as well as  several scattered normal sized cervical nodes. These findings are nonspecific  and could reflect reactive lymphoid tissue. Cannot exclude lymphoproliferative  process or adenopathy from other etiologies. Clinical correlation recommended.     2V Cervical XR images taken on 4/16/18 personally reviewed with patient:  Straightening of the cervical lordosis  Endplate osteophytes  Slight lean to right  Normal disc spacing  No obvious compression fractures or instabilities          Cervical MRI images taken on 1/14/16 personally reviewed with patient:  COMPARISON: MRI cervical spine 2/8/12    TECHNIQUE: Cervical spine scanned with axial and sagittal T2W scans, sagittal T1W scans. FINDINGS:  Straightening of the cervical spine. Slight retrolisthesis of C4 on C5. Remainder of vertebral bodies maintain normal alignment. The cervical cord appears normal. Diffuse hypointense marrow signal, similar to prior studies. The visualized posterior fossa structures are unremarkable. C2/C3: No significant spinal stenosis or neural foraminal narrowing. C3/C4: Mild disc bulge with posterior central annular tear. Mild/moderate right and mild left foraminal stenosis. Mild narrowing of the central canal, midline AP diameter is 8.4 mm.      C4/C5: Mild disc bulge. Mild left foraminal stenosis. Mild central canal stenosis, midline AP diameter is 8 mm. C5/C6: No significant spinal stenosis or neural foraminal narrowing. C6/C7: Small left paracentral disc extrusion with annular tear. Disc material extends slightly below the disc space. Mild narrowing of the left central canal. Neural foramen are patent. C7/T1: No significant spinal stenosis or neural foraminal narrowing. Vertebral artery flow voids present. Similar appearance of prominent adenoid tissue and cervical adenopathy.      Impression:    1. Mild/moderate multilevel degenerative discogenic disease. Marginal change in comparison to prior. 2. Similar appearance of small left paracentral disc extrusion at C6-C7. 3. Similar appearance of adenopathy and prominent adenoid tissue. 4. Diffuse hypointense marrow signal, similar to prior. Could be related to anemia in this patient with history of sickle cell disease. 5 minutes of face-to-face contact were spent with the patient during today's visit extensively discussing symptoms and treatment plan. All questions were answered. More than half of this visit today was spent on counseling.      Written by Brenda Vivar as dictated by Sarai Laws MD

## 2019-09-24 PROBLEM — Z23 NEED FOR IMMUNIZATION AGAINST INFLUENZA: Status: RESOLVED | Noted: 2018-01-16 | Resolved: 2019-09-24

## 2019-10-07 ENCOUNTER — APPOINTMENT (OUTPATIENT)
Dept: PHYSICAL THERAPY | Age: 49
End: 2019-10-07
Payer: MEDICAID

## 2019-10-08 ENCOUNTER — HOSPITAL ENCOUNTER (OUTPATIENT)
Dept: PHYSICAL THERAPY | Age: 49
Discharge: HOME | End: 2019-10-08
Payer: MEDICAID

## 2019-10-08 PROCEDURE — 97162 PT EVAL MOD COMPLEX 30 MIN: CPT

## 2019-10-08 NOTE — PROGRESS NOTES
PT VESTIBULAR AND TREATMENT    Patient Name: Nathaniel Oliver  Date:10/8/2019  : 1970  [x]  Patient  Verified  Payor: [facility] MEDICAID / Plan: [facility] [employer] PLUS / Product Type: Managed Care Medicaid /    In time:315  Out time:345  Total Treatment Time (min): 30  Visit #: 1 of 4-8    Treatment Area: Labyrinthine dysfunction, unspecified ear [H83.2X9]    SUBJECTIVE  Pain Level (0-10 scale): (C):0  (B):0  (W): 0   Any medication changes, allergies to medications, diagnosis change, or new procedure performed: see summary sheet for update  Subjective functional status/changes:  CHIEF COMPLAINT: Patient reports with co dizziness, vertigo and balance issues starting in  when patient was dx with vertigo. Patient reports symptoms are exacerbated by lying and rolling consistent with BPPV, but also exacerbated by surgery (CS fusion 2018) or illness (ie sickle cell crisis in May 2019). Patient has had a few falls associated with his dizziness. Patient has hearing / vestibular testing indicating hypofunction. PMH signifcant  for ear drum repair in .    DEFICITS: fear avoidance, rolling over in bed  OBJECTIVE  Other tests/comments: See hard chart for vestibular objective info     Patient Education/ Neuromuscular Re-education : [x] Discussed POT including PT expectation, established HEP with pictures and instruction,  VOR x 1  (minutes) : NC sec to insurance limitations     Modality (rationale): NI      Pain Level (0-10 scale) post treatment: 0  ASSESSMENT  [x]  See Plan of Care    PLAN  [x]  Upgrade activities as tolerated     []  Continue plan of care  []  Discharge due to:_  [x] Other:_ POC  1x 8 treatments     Tito Duvall, PT 10/8/2019       Justification for Eval Code Complexity:  Patient History : chronicity   Examination see exam   Clinical Presentation: evolving sec to fluctuation in sx   Clinical Decision Making : FOTO : 63 /100

## 2019-10-08 NOTE — PROGRESS NOTES
[facility] PHYSICAL THERAPY AT [address] [place], [place] [address]  Phone: [phone] Fax: [phone] / [address] [address] PHYSICAL THERAPY SERVICES  Patient Name: Chris Shaw : 1970   Medical   Diagnosis: Labyrinthine dysfunction, unspecified ear [H83.2X9] Treatment Diagnosis: Vestibular hypofunction    Onset Date:      Referral Source: Damian Ashby NP Start of Care [facility]): 10/8/2019   Prior Hospitalization: See medical history Provider #: [id number]   Prior Level of Function: Functional I    Comorbidities: Sickle cell, CS fusion    Medications: Verified on Patient Summary List   The Plan of Care and following information is based on the information from the initial evaluation.   ========================================================================  Assessment / key information:  Pt is a 52y.o. year old male who presents with co dizziness, vertigo and balance issues starting in  when patient was dx with vertigo. Patient reports symptoms are exacerbated by lying and rolling consistent with BPPV, but also exacerbated by surgery (CS fusion 2018) or illness (ie sickle cell crisis in May 2019). Patient has had a few falls associated with his dizziness. Patient has hearing / vestibular testing indicating hypofunction. [facility] signifcant  for ear drum repair in . Current deficits include: no pain associated with current treatment, + Gaze nystagmus (L greater than R), poor static balance with EC and on compliant surface, negative BPPV test [facility]) Functional deficits include: fear avoidance, rolling over in bed. Home exercise program initiated on initial evaluation to address these deficits.  Pt would benefit from PT to address these deficits for increased functional mobility and QOL.  ========================================================================  Eval Complexity: History: MEDIUM Complexity : 1-2 comorbidities / personal factors will impact the outcome/ POC Exam:HIGH Complexity : 4+ Standardized tests and measures addressing body structure, function, activity limitation and / or participation in recreation  Presentation: MEDIUM Complexity : Evolving with changing characteristics  Clinical Decision Making:MEDIUM Complexity : FOTO score of 26-74Overall Complexity:MEDIUM  Problem List: impaired gait/ balance, decrease ADL/ functional abilitiies, decrease activity tolerance and decrease transfer abilities   Treatment Plan may include any combination of the following: Therapeutic exercise, Therapeutic activities, Neuromuscular re-education, Physical agent/modality, Gait/balance training, Manual therapy, Aquatic therapy, Patient education, Self Care training, Functional mobility training, Home safety training and Stair training  Patient / Family readiness to learn indicated by: asking questions, trying to perform skills and interest  Persons(s) to be included in education: patient (P)  Barriers to Learning/Limitations: None  Measures taken:    Patient Goal (s): \"walk more balanced \"   Patient self reported health status: good  Rehabilitation Potential: good   Short Term Goals: To be accomplished in  1  weeks:  1. Pt will be independent and compliant with HEP   Long Term Goals: To be accomplished in  4  treatments:  1. Patient will increase FOTO score to 70/100 for indications of increased functional mobility. 2.  Patient will demo 2x4 stance 30 sec EC for improved vestibular input with ADLs   3. Patient will demo SLS 20 sec B for improved safety with stair negotiation  4. Patient will negative gaze nystagmus for decreased sx with gaze stabilization activities.    Frequency / Duration:   Patient to be seen  1  times per week for 4-8  treatments:  Patient / Caregiver education and instruction: self care, activity modification and exercises  Therapist Signature: Adonis Chen PT Date: 23/6/8526   Certification Period: NA Time: 404p   ========================================================================  I certify that the above Physical Therapy Services are being furnished while the patient is under my care. I agree with the treatment plan and certify that this therapy is necessary. Physician Signature:        Date:       Time:   Please sign and return to [facility] or you may fax the signed copy to [phone]. Thank you.

## 2019-10-24 ENCOUNTER — HOSPITAL ENCOUNTER (OUTPATIENT)
Dept: PHYSICAL THERAPY | Age: 49
Discharge: HOME | End: 2019-10-24
Payer: MEDICAID

## 2019-10-24 PROCEDURE — 97112 NEUROMUSCULAR REEDUCATION: CPT

## 2019-10-31 ENCOUNTER — HOSPITAL ENCOUNTER (OUTPATIENT)
Dept: PHYSICAL THERAPY | Age: 49
Discharge: HOME | End: 2019-10-31
Payer: MEDICAID

## 2019-10-31 PROCEDURE — 97112 NEUROMUSCULAR REEDUCATION: CPT

## 2019-10-31 NOTE — PROGRESS NOTES
PT DAILY TREATMENT NOTE 8-14    Patient Name: Daniel Wlater  Date:10/31/2019  : 1970  [x]  Patient  Verified  Payor: [facility] MEDICAID / Plan: [facility] [employer] / Product Type: Managed Care Medicaid /    In time: 12:59 pm         Out time: 1:24 pm  Total Treatment Time (min): 25  Visit #: 3 of 8    Treatment Area: Labyrinthine dysfunction, unspecified ear [H83.2X9]    SUBJECTIVE  Pain Level (0-10 scale): 0  Any medication changes, allergies to medications, adverse drug reactions, diagnosis change, or new procedure performed?: [x] No    [] Yes (see summary sheet for update)  Subjective functional status/changes:   [] No changes reported  \"Doing okay. \"    OBJECTIVE  Modality rationale: NI   Min Type Additional Details    [] Estim: []Att   []Unatt        []TENS instruct                  []IFC  []Premod   []NMES                     []Other:  []w/US   []w/ice   []w/heat  Position:  Location:    []  Traction: [] Cervical       []Lumbar                       [] Prone          []Supine                       []Intermittent   []Continuous Lbs:  [] before manual  [] after manual    []  Ultrasound: []Continuous   [] Pulsed                           []1MHz   []3MHz Location:  W/cm2:    []  Iontophoresis with dexamethasone         Location: [] Take home patch   [] In clinic    []  Ice     []  heat  []  Ice massage Position:  Location:    []  Vasopneumatic Device Pressure:       [] lo [] med [] hi   Temperature: [] lo [] med [] hi   [x] Skin assessment post-treatment:  [x]intact []redness- no adverse reaction       []redness - adverse reaction:       22 min Neuromuscular Reeducation:  [x] See flow sheet:   Rationale: improve coordination, improve balance and increase proprioception to improve the patients ability to decrease perception of imbalance and improved safety with amb and community mobility     3 min Gait Trainin feet x 2 without assistive device on level surfaces with SBA/S level of assist   Rationale: increase proprioception, improve balance strategies, increase strength to improve the patient's ability to ambulate with reduced fall risk    X with TE/MT min Patient Education: [x] Review HEP - progressed VOR to ROM     Other Objective/Functional Measures:    SR EO: (B) 30\"  Foam MSR instep: (B) 30\"  2x4 EO, regular stance: 30\"    Pain Level (0-10 scale) post treatment: 0    ASSESSMENT/Changes in Function:   Patient notes first f/u treatment did not cause any adverse reaction. Improved static balance. Patient demos mild instability with HHT gait, but no LOB. Patient will continue to benefit from skilled PT services to modify and progress therapeutic interventions, address functional mobility deficits, address ROM deficits, address strength deficits, analyze and address soft tissue restrictions, analyze and cue movement patterns, analyze and modify body mechanics/ergonomics and assess and modify postural abnormalities to attain remaining goals. [x]  See Plan of Care  []  See progress note/recertification  []  See Discharge Summary         Progress towards goals / Updated goals: · Short Term Goals: To be accomplished in  1  weeks:  1. Pt will be independent and compliant with HEP - goal met / progressing - patient reports excellent compliance with current program  10/24/19  · Long Term Goals: To be accomplished in  4  treatments:  1. Patient will increase FOTO score to 70/100 for indications of increased functional mobility. 2.  Patient will demo 2x4 stance 30 sec EC for improved vestibular input with ADLs. -Goal progressing; 2x4 regular EO 30 sec (10/31/19)   3. Patient will demo SLS 20 sec B for improved safety with stair negotiation  4. Patient will negative gaze nystagmus for decreased sx with gaze stabilization activities.     PLAN  [x]  Upgrade activities as tolerated     [x]  Continue plan of care  [x]  Update interventions per flow sheet       []  Discharge due to:_  [] Other:_    Kevin Leal, PTA 10/31/2019

## 2019-11-07 ENCOUNTER — HOSPITAL ENCOUNTER (OUTPATIENT)
Dept: PHYSICAL THERAPY | Age: 49
Discharge: HOME | End: 2019-11-07
Payer: MEDICAID

## 2019-11-07 PROCEDURE — 97112 NEUROMUSCULAR REEDUCATION: CPT

## 2019-11-07 PROCEDURE — 97116 GAIT TRAINING THERAPY: CPT

## 2019-11-07 NOTE — PROGRESS NOTES
[facility] [address] [facility] [address] [facility] PHYSICAL THERAPY AT [address]. [place]  Phone: [phone] Fax: [phone]  PROGRESS NOTE  Patient Name: Edmundo Pruitt : 1970   Treatment/Medical Diagnosis: Labyrinthine dysfunction, unspecified ear [H83.2X9]   Referral Source: Jennifer Mullins NP     Date of Initial Visit: 10/8/19 Attended Visits: 4 Missed Visits: 0     SUMMARY OF TREATMENT  Patient is being treated for vestibular hypofunction. Treatment has included static and dynamic balance, gait , VOR and HEP progression. CURRENT STATUS  Patient is progressing very well. Pain is not a symptom for which we are treating. Patient reports good compliance with VOR exercise and balance training at home which is an integral part to the success of his treatment. Other assessment as follows;  Pain at best 0, at worst 0  Subjective % improvement 60%  Objective:   SR EO: (B) 30\"  Foam MSR instep: (B) 30\"   Bunion 30 sec B   2x4 EO, regular stance: 30\"  SLS - R 26, L 13   EC Instep 30 sec   Bunion : 30 sec with increased ankle instability  Squatting - co dizziness  Improvements: HEP compliance, improved standing balance   Deficits squatting, stair negotiation, veering with walking, fear avoidance - ciera with EC activities  , no BPPV symptoms          Progress towards goals / Updated goals: · Short Term Goals: To be accomplished in  1  weeks:  1. Pt will be independent and compliant with HEP - goal met  - patient reports compliance with current program   · Long Term Goals: To be accomplished in  4  treatments:  1. Patient will increase FOTO score to 70/100 for indications of increased functional mobility.   goal not met - FOTOincreased 1 pt from IE   2. Patient will demo 2x4 stance 30 sec EC for improved vestibular input with ADLs. -Goal met  2x4 regular EO 30 sec   3.   Patient will demo SLS 20 sec B for improved safety with stair negotiation goal near met - L 13 sec, R 26 sec   4. Patient will negative gaze nystagmus for decreased sx with gaze stabilization activities. goal near met - mild R beating nystagmus at end range     New Goals to be achieved in __4__  treatments:  1. Patient will increase FOTO score to 70/100 for indications of increased functional mobility. 2.  Patient will demo SLS 30 sec B for improved safety with stair negotiation   3. Patient will be IND with DC HEP including tapering program for progression to self management    4. Patient will demo x 5 squat without co dizziness for ease with ADLs      RECOMMENDATIONS  Cont 1x per week per vestibular protocol for addition 4 weeks to progress function, balance and stability in the home and community. If you have any questions/comments please contact us directly at [phone]   Thank you for allowing us to assist in the care of your patient. Therapist Signature: Chalino Rodriguez PT Date: 11/7/2019   Reporting Period  NA Time: 312P   NOTE TO PHYSICIAN:  PLEASE COMPLETE THE ORDERS BELOW AND FAX TO   [facility]: [phone]. If you are unable to process this request in 24 hours please contact our office: [phone].  ___ I have read the above report and request that my patient continue as recommended.   ___ I have read the above report and request that my patient continue therapy with the following changes/special instructions:_________________________________________________________   ___ I have read the above report and request that my patient be discharged from therapy.      Physician Signature:        Date:       Time:

## 2019-11-07 NOTE — PROGRESS NOTES
PT DAILY TREATMENT NOTE 8-14    Patient Name: Jayjay Ha  Date:2019  : 1970  [x]  Patient  Verified  Payor: [facility] MEDICAID / Plan: [facility] / Product Type: Managed Care Medicaid /    In time: 228   Out time: 308  Total Treatment Time (min):40  Visit #: 4 of 8    Treatment Area: Labyrinthine dysfunction, unspecified ear [H83.2X9]    SUBJECTIVE  Pain Level (0-10 scale): 0  Any medication changes, allergies to medications, adverse drug reactions, diagnosis change, or new procedure performed?: [x] No    [] Yes (see summary sheet for update)  Subjective functional status/changes:   [] No changes reported  Patient reports compliance with HEP     OBJECTIVE  [x] Skin assessment post-treatment:  [x]intact []redness- no adverse reaction       []redness - adverse reaction:       30 min Neuromuscular Reeducation:  [x] See flow sheet: REASSESS, FOTO    Rationale: improve coordination, improve balance and increase proprioception to improve the patients ability to decrease perception of imbalance and improved safety with amb and community mobility     10 min Gait Training:  Per FS - with SBA for balance    Rationale: increase proprioception, improve balance strategies, increase strength to improve the patient's ability to ambulate with reduced fall risk    X with TE/MT min Patient Education: [x] Review HEP -      Other Objective/Functional Measures:    Goals assessed for PN   Pain at best 0, at worst 0  Subjective % improvement 60%  Objective:   SR EO: (B) 30\"  Foam MSR instep: (B) 30\"   Bunion 30 sec B   2x4 EO, regular stance: 30\"  SLS - R 26, L 13   EC Instep 30 sec   Bunion : 30 sec with increased ankle instability  Squatting - co dizziness  Improvements: HEP compliance, improved standing balance   Deficits squatting, stair negotiation, veering with walking, fear avoidance - ciera with EC activities  , no BPPV symptoms     Pain Level (0-10 scale) post treatment: 0    ASSESSMENT/Changes in Function:   SEE 4TH visit PN      Patient will continue to benefit from skilled PT services to modify and progress therapeutic interventions, address functional mobility deficits, address ROM deficits, address strength deficits, analyze and address soft tissue restrictions, analyze and cue movement patterns, analyze and modify body mechanics/ergonomics and assess and modify postural abnormalities to attain remaining goals. []  See Plan of Care  [x]  See progress note/recertification 65/0/13  []  See Discharge Summary         Progress towards goals / Updated goals: · Short Term Goals: To be accomplished in  1  weeks:  1. Pt will be independent and compliant with HEP - goal met  - patient reports compliance with current program   · Long Term Goals: To be accomplished in  4  treatments:  1. Patient will increase FOTO score to 70/100 for indications of increased functional mobility.   goal not met - FOTOincreased 1 pt from IE   2. Patient will demo 2x4 stance 30 sec EC for improved vestibular input with ADLs. -Goal met  2x4 regular EO 30 sec   3. Patient will demo SLS 20 sec B for improved safety with stair negotiation goal near met - L 13 sec, R 26 sec   4. Patient will negative gaze nystagmus for decreased sx with gaze stabilization activities. goal near met - mild R beating nystagmus at end range     PLAN  [x]  Upgrade activities as tolerated     [x]  Continue plan of care  [x]  Update interventions per flow sheet       []  Discharge due to:_  []  Other:_Cont 4 more weekly sessions per FS/ vestibular protocol     Inge Mccoy, PT 11/7/2019

## 2019-11-14 ENCOUNTER — HOSPITAL ENCOUNTER (OUTPATIENT)
Dept: PHYSICAL THERAPY | Age: 49
Discharge: HOME | End: 2019-11-14
Payer: MEDICAID

## 2019-11-14 PROCEDURE — 97112 NEUROMUSCULAR REEDUCATION: CPT

## 2019-11-14 PROCEDURE — 97116 GAIT TRAINING THERAPY: CPT

## 2019-11-14 NOTE — PROGRESS NOTES
PT DAILY TREATMENT NOTE     Patient Name: Mikal Ortiz  Date:2019  : 1970  [x]  Patient  Verified  Payor: [facility] MEDICAID / Plan: [facility] [employer] PL[employer] / Product Type: Managed Care Medicaid /    In time: 2:30 pm       Out time: 3:04 pm  Total Treatment Time (min): 34  Visit #: 1 of 4    Treatment Area: Labyrinthine dysfunction, unspecified ear [H83.2X9]    SUBJECTIVE  Pain Level (0-10 scale): 0  Any medication changes, allergies to medications, adverse drug reactions, diagnosis change, or new procedure performed?: [x] No    [] Yes (see summary sheet for update)  Subjective functional status/changes:   [] No changes reported  \"My feet burns when I stand for too long. \"    OBJECTIVE  25 min Neuromuscular Reeducation:  [x] See flow sheet: added standing visual acuity for depth perception (4 challenges in differing heights of visual field and distances between patient)   Rationale: improve coordination, improve balance and increase proprioception to improve the patients ability to decrease perception of imbalance and improved safety with amb and community mobility     9 min Gait Training:  Per FS - with SBA for balance    Rationale: increase proprioception, improve balance strategies, increase strength to improve the patient's ability to ambulate with reduced fall risk    X with TE/MT min Patient Education: [x] Review HEP - VOR progression to include busy background with feet together      Other Objective/Functional Measures:    SLS: (R) 27 sec, (L) 14 sec - decreased glut recruitment    Pain Level (0-10 scale) post treatment: 0    ASSESSMENT/Changes in Function:   Patient demos good tolerance to VOR progression; able to demo normal ROM and speed in both planes without nystagmus or dizziness reported. Moderate left lateral deviation with EC gait, but without symptoms of instability which may cause a fall.       Patient will continue to benefit from skilled PT services to modify and progress therapeutic interventions, address functional mobility deficits, address ROM deficits, address strength deficits, analyze and address soft tissue restrictions, analyze and cue movement patterns, analyze and modify body mechanics/ergonomics and assess and modify postural abnormalities to attain remaining goals. []  See Plan of Care  [x]  See progress note/recertification 63/2/71  []  See Discharge Summary         Progress towards goals / Updated goals:  New Goals to be achieved in __4__  treatments:  1.    Patient will increase FOTO score to 70/100 for indications of increased functional mobility. 2.  Patient will demo SLS 30 sec B for improved safety with stair negotiation. -Goal progressing; SLS: (R) 27 sec, (L) 14 sec (11/14/19)   3. Patient will be IND with DC HEP including tapering program for progression to self management    4.   Patient will demo x 5 squat without co dizziness for ease with ADLs        PLAN  [x]  Upgrade activities as tolerated     [x]  Continue plan of care  [x]  Update interventions per flow sheet       []  Discharge due to:_  []  Other:_    Janneth Alva, PTA 11/14/2019

## 2019-11-21 ENCOUNTER — HOSPITAL ENCOUNTER (OUTPATIENT)
Dept: PHYSICAL THERAPY | Age: 49
Discharge: HOME | End: 2019-11-21
Payer: MEDICAID

## 2019-11-21 PROCEDURE — 97116 GAIT TRAINING THERAPY: CPT

## 2019-11-21 PROCEDURE — 97112 NEUROMUSCULAR REEDUCATION: CPT

## 2019-11-21 NOTE — PROGRESS NOTES
PT DAILY TREATMENT NOTE 8-14    Patient Name: Mili Jewell  Date:2019  : 1970  [x]  Patient  Verified  Payor: [facility] MEDICAID / Plan: [facility] [employer] / Product Type: Managed Care Medicaid /    In time: 2:19 pm        Out time: 3:01 pm  Total Treatment Time (min): 42  Visit #: 2 of 4    Treatment Area: Labyrinthine dysfunction, unspecified ear [H83.2X9]    SUBJECTIVE  Pain Level (0-10 scale): 0  Any medication changes, allergies to medications, adverse drug reactions, diagnosis change, or new procedure performed?: [x] No    [] Yes (see summary sheet for update)  Subjective functional status/changes:   [] No changes reported  \"Doing pretty good. I walk slow because it makes me dizzy if I walk too fast.\"    OBJECTIVE  33 min Neuromuscular Reeducation:  [x] See flow sheet:   Rationale: improve coordination, improve balance and increase proprioception to improve the patients ability to decrease perception of imbalance and improved safety with amb and community mobility     9 min Gait Training:  Per FS - with SBA for balance    Rationale: increase proprioception, improve balance strategies, increase strength to improve the patient's ability to ambulate with reduced fall risk    X with TE/MT min Patient Education: [x] Review HEP     Other Objective/Functional Measures:    2x4 EC: 12 seconds    SR EO: 30 seconds bilaterally    Pain Level (0-10 scale) post treatment: 0    ASSESSMENT/Changes in Function:   Patient demos improved ease with transfers. Mild right deviation with EC amb, but no LOB.       Patient will continue to benefit from skilled PT services to modify and progress therapeutic interventions, address functional mobility deficits, address ROM deficits, address strength deficits, analyze and address soft tissue restrictions, analyze and cue movement patterns, analyze and modify body mechanics/ergonomics and assess and modify postural abnormalities to attain remaining goals.     []  See Plan of Care  [x]  See progress note/recertification 38/4/71  []  See Discharge Summary         Progress towards goals / Updated goals:  New Goals to be achieved in __4__  treatments:  1.    Patient will increase FOTO score to 70/100 for indications of increased functional mobility. 2.  Patient will demo SLS 30 sec B for improved safety with stair negotiation. -Goal progressing; SLS: (R) 27 sec, (L) 14 sec (11/14/19)   3. Patient will be IND with DC HEP including tapering program for progression to self management    4. Patient will demo x 5 squat without co dizziness for ease with ADLs.  -Goal progressing; pt able to demo transfers in clinic safely multiple attempts (11/21/19)       PLAN  [x]  Upgrade activities as tolerated     [x]  Continue plan of care  [x]  Update interventions per flow sheet       []  Discharge due to:_  []  Other:_    Yogesh Ames, PTA 11/21/2019

## 2019-11-26 ENCOUNTER — HOSPITAL ENCOUNTER (OUTPATIENT)
Dept: PHYSICAL THERAPY | Age: 49
Discharge: HOME | End: 2019-11-26
Payer: MEDICAID

## 2019-11-26 PROCEDURE — 97116 GAIT TRAINING THERAPY: CPT

## 2019-11-26 PROCEDURE — 97112 NEUROMUSCULAR REEDUCATION: CPT

## 2019-11-26 NOTE — PROGRESS NOTES
PT DAILY TREATMENT NOTE 8-14    Patient Name: Cesar Ojeda  Date:2019  : 1970  [x]  Patient  Verified  Payor: [facility] MEDICAID / Plan: [facility] [employer] PLUS / Product Type: Managed Care Medicaid /    In time: 624        Out time: 309  Total Treatment Time (min): 36  Visit #: 3 of 4    Treatment Area: Labyrinthine dysfunction, unspecified ear [H83.2X9]    SUBJECTIVE  Pain Level (0-10 scale): 0  Any medication changes, allergies to medications, adverse drug reactions, diagnosis change, or new procedure performed?: [x] No    [] Yes (see summary sheet for update)  Subjective functional status/changes:   [] No changes reported  No new co     OBJECTIVE  26 min Neuromuscular Reeducation:  [x] See flow sheet: Eye exercises - gaze stabilization (horizontal, vertical and figure 8 )   Rationale: improve coordination, improve balance and increase proprioception to improve the patients ability to decrease perception of imbalance and improved safety with amb and community mobility     10 min Gait Training:  Per FS - with SBA for balance    Rationale: increase proprioception, improve balance strategies, increase strength to improve the patient's ability to ambulate with reduced fall risk    X with NM/GT min Patient Education: [x] Review HEP - progress VOR to busy background in tandem - patient declined picture      Other Objective/Functional Measures:      Gait challenge - change in speed on TM - interval training   ROM EC : 30 sec   2x4 EC 25 sec       Pain Level (0-10 scale) post treatment: 0    ASSESSMENT/Changes in Function:   Patient reports using busy background for card exercise at home. Improved tolerance to standing exercises. Patient reports no dizziness with speed intervals, only co fatigue on last interval. Patient feels like he is rocking on a boat with foam EC. Patient demo ocular m fatigue with 1 min figure 8 challenge.  Educated on upcoming reassessment - possible DC to HEP per vestibular protocol . Patient will continue to benefit from skilled PT services to modify and progress therapeutic interventions, address functional mobility deficits, address ROM deficits, address strength deficits, analyze and address soft tissue restrictions, analyze and cue movement patterns, analyze and modify body mechanics/ergonomics and assess and modify postural abnormalities to attain remaining goals. []  See Plan of Care  [x]  See progress note/recertification 64/5/83  []  See Discharge Summary         Progress towards goals / Updated goals:  New Goals to be achieved in __4__  treatments:  1.    Patient will increase FOTO score to 70/100 for indications of increased functional mobility. 2.  Patient will demo SLS 30 sec B for improved safety with stair negotiation. -Goal progressing  11/26/19   3. Patient will be IND with DC HEP including tapering program for progression to self management    4. Patient will demo x 5 squat without co dizziness for ease with ADLs.  -Goal progressing; pt able to demo transfers in clinic safely multiple attempts (11/21/19)       PLAN  [x]  Upgrade activities as tolerated     [x]  Continue plan of care  [x]  Update interventions per flow sheet       []  Discharge due to:_  []  Other:_PN due NV for 8th visit assessment (cont vs DC at 8 visit per vestibular protocol )    Delvin Banks PT 11/26/2019   Future Appointments   Date Time Provider Monica Duckworth   11/26/2019  2:30 PM [place], PT [facility]   12/5/2019  2:30 PM [place], PT [facility]   12/12/2019  2:30 PM Charlotte Winter PTA [facility]   12/19/2019  2:30 PM Charlotte Winter, PTA [facility]

## 2019-11-27 ENCOUNTER — APPOINTMENT (OUTPATIENT)
Dept: PHYSICAL THERAPY | Age: 49
End: 2019-11-27
Payer: MEDICAID

## 2019-12-05 ENCOUNTER — HOSPITAL ENCOUNTER (OUTPATIENT)
Dept: PHYSICAL THERAPY | Age: 49
Discharge: HOME | End: 2019-12-05
Payer: MEDICAID

## 2019-12-05 PROCEDURE — 97112 NEUROMUSCULAR REEDUCATION: CPT

## 2019-12-05 PROCEDURE — 97116 GAIT TRAINING THERAPY: CPT

## 2019-12-05 NOTE — PROGRESS NOTES
[address] [facility] [address] [facility] AT [address] Jonelle [place]  Phone: [phone] Fax: [phone]  PROGRESS NOTE  Patient Name: Harris Mendoza : 1970   Treatment/Medical Diagnosis: Labyrinthine dysfunction, unspecified ear [H83.2X9]   Referral Source: Gail Yañez NP     Date of Initial Visit: 10/8/19 Attended Visits: 8 Missed Visits: 0     SUMMARY OF TREATMENT  Patient is being treated for vestibular hypofunction. Treatment has included static and dynamic balance, gait , VOR and HEP progression. CURRENT STATUS  Patient continues to make steady progress in PT, stating he no longer has difficulty climbing or descending stairs. Assessment as follows:  Pain at best 0, at worst 0  Subjective % improvement 75%  FOTO score: 72/100 (at last assessment, 64/100)  Objective:   SR EO: (B) 30\"  Foam MSR instep: (B) 30\"                 Bunion 30 sec B   2x4 EO/EC, regular stance: 30\" each  SLS - R 30, L 26  EC Instep 30 sec  EC Bunion : 30 sec with increased ankle instability  Squatting to chair - 15 reps (I), no dizziness  Gait with horizontal head turns: mild instability and right lateral deviation  Gait with vertical head turns: normal stride length without lateral deviation  Improvements: stair negotiation, transfers, bed mobility,  Deficits walking in crowded areas (grocery store or mall)    Goal/Measure of Progress   1.    Patient will increase FOTO score to 70/100 for indications of increased functional mobility. -Goal met; 72/100    2.  Patient will demo SLS 30 sec B for improved safety with stair negotiation. -Goal near met; SLS right 30 sec, left 26 sec    3.  Patient will be IND with DC HEP in program for progression to self management. cluding tapering -Goal progressing; pt notes compliance but not ready for DC at this time   4.  Patient will demo x 5 squat without co dizziness for ease with ADLs.  -Goal met; pt able to demo chair squat x 15 reps without dizziness       New Goals to be achieved in __2__  treatments:  1. Patient will be IND with DC HEP in program for progression to self management. cluding tapering. 2.  Patient will demo SLS 30 sec B for improved safety with stair negotiation. 3.  Patient will demonstrate dynamic gait with HHT without lateral deviation to reduce fall risk with community amb. RECOMMENDATIONS  Recommend cont skilled PT at 1x2 for 2 treatments to complete original POC and return patient towards PLOF. If you have any questions/comments please contact us directly at [phone]. Thank you for allowing us to assist in the care of your patient. LPTA Signature: Guadalupe Will  Date: 12/5/2019   PT Signature: Marisela Brar DPALISSA  Time: 12:49 PM   NOTE TO PHYSICIAN:  PLEASE COMPLETE THE ORDERS BELOW AND FAX TO   [facility]: [phone]. If you are unable to process this request in 24 hours please contact our office: [phone].  ___ I have read the above report and request that my patient continue as recommended.   ___ I have read the above report and request that my patient continue therapy with the following changes/special instructions:_________________________________________________________   ___ I have read the above report and request that my patient be discharged from therapy.      Physician Signature:        Date:       Time:

## 2019-12-05 NOTE — PROGRESS NOTES
PT DAILY TREATMENT NOTE 8-14    Patient Name: Ludy Patterson  Date:2019  : 1970  [x]  Patient  Verified  Payor: [facility] MEDICAID / Plan: [facility] Float: [place] PLUS / Product Type: Managed Care Medicaid /    In time: 2:30 pm         Out time: 3:03 pm  Total Treatment Time (min): 33  Visit #: 4 of 4    Treatment Area: Labyrinthine dysfunction, unspecified ear [H83.2X9]    SUBJECTIVE  Pain Level (0-10 scale): 0  Any medication changes, allergies to medications, adverse drug reactions, diagnosis change, or new procedure performed?: [x] No    [] Yes (see summary sheet for update)  Subjective functional status/changes:   [] No changes reported  \"My balance is better, but I have trouble walking in the malls. \"    OBJECTIVE  21 min Neuromuscular Reeducation:  [x] See flow sheet:    Rationale: improve coordination, improve balance and increase proprioception to improve the patients ability to decrease perception of imbalance and improved safety with amb and community mobility     12 min Gait Training:  Per FS - with SBA for balance    Rationale: increase proprioception, improve balance strategies, increase strength to improve the patient's ability to ambulate with reduced fall risk    X with NM/GT min Patient Education: [x] Review HEP - progress VOR to busy background in tandem - patient declined picture      Other Objective/Functional Measures:    Pain at best 0, at worst 0  Subjective % improvement 75%  FOTO score: 72/100 (at last assessment, 64/100)  Objective:   SR EO: (B) 30\"  Foam MSR instep: (B) 30\"                 Bunion 30 sec B   2x4 EO/EC, regular stance: 30\" each  SLS - R 30, L 26  EC Instep 30 sec  EC Bunion : 30 sec with increased ankle instability  Squatting to chair - 15 reps (I), no dizziness  Gait with horizontal head turns: mild instability and right lateral deviation  Gait with vertical head turns: normal stride length without lateral deviation  Improvements: stair negotiation, transfers, bed mobility,  Deficits walking in crowded areas (grocery store mall)    Pain Level (0-10 scale) post treatment: 0    ASSESSMENT/Changes in Function:   See PN. Patient will continue to benefit from skilled PT services to modify and progress therapeutic interventions, address functional mobility deficits, address ROM deficits, address strength deficits, analyze and address soft tissue restrictions, analyze and cue movement patterns, analyze and modify body mechanics/ergonomics and assess and modify postural abnormalities to attain remaining goals. []  See Plan of Care  [x]  See progress note/recertification (93/1/69)  []  See Discharge Summary         Progress towards goals / Updated goals:  1.    Patient will increase FOTO score to 70/100 for indications of increased functional mobility. -Goal met; 72/100    2. Patient will demo SLS 30 sec B for improved safety with stair negotiation. -Goal near met; SLS right 30 sec, left 26 sec    3. Patient will be IND with DC HEP including tapering program for progression to self management. -Goal progressing; pt notes compliance but not ready for DC at this time   4. Patient will demo x 5 squat without co dizziness for ease with ADLs.  -Goal met; pt able to demo chair squat x 15 reps without dizziness       PLAN  [x]  Upgrade activities as tolerated     [x]  Continue plan of care  [x]  Update interventions per flow sheet       []  Discharge due to:_  [x]  Other: cont per 2 more treatments as per vestibular protocol of 10 weeks    Christiana Mauricio PTA 12/5/2019     Future Appointments   Date Time Provider Monica Duckworth   12/5/2019  2:30 PM Barrington Cadena [facility]   12/12/2019  2:30 PM Tati Cabrera PTA [facility]   12/19/2019  2:30 PM Tati Cabrera PTA [facility]

## 2019-12-12 ENCOUNTER — HOSPITAL ENCOUNTER (OUTPATIENT)
Dept: PHYSICAL THERAPY | Age: 49
Discharge: HOME | End: 2019-12-12
Payer: MEDICAID

## 2019-12-12 PROCEDURE — 97112 NEUROMUSCULAR REEDUCATION: CPT

## 2019-12-12 PROCEDURE — 97116 GAIT TRAINING THERAPY: CPT

## 2019-12-12 NOTE — PROGRESS NOTES
PT DAILY TREATMENT NOTE 8-    Patient Name: Heriberto Kaur  Date:2019  : 1970  [x]  Patient  Verified  Payor: [facility] MEDICAID / Plan: [facility] / Product Type: Managed Care Medicaid /    In time: 2:30 pm         Out time: 3:00 pm  Total Treatment Time (min): 30  Visit #: 1 of 2    Treatment Area: Labyrinthine dysfunction, unspecified ear [H83.2X9]    SUBJECTIVE  Pain Level (0-10 scale): 0  Any medication changes, allergies to medications, adverse drug reactions, diagnosis change, or new procedure performed?: [x] No    [] Yes (see summary sheet for update)  Subjective functional status/changes:   [] No changes reported  Patient states his feet burn when he walks for longer distances. Denies falls recently. OBJECTIVE  22 min Neuromuscular Reeducation:  [x] See flow sheet: initiated ball tosses (OH, lateral, low, left upper/lower, right upper/lower)   Rationale: improve coordination, improve balance and increase proprioception to improve the patients ability to decrease perception of imbalance and improved safety with amb and community mobility     8 min Gait Training:  Per FS - with SBA for balance    Rationale: increase proprioception, improve balance strategies, increase strength to improve the patient's ability to ambulate with reduced fall risk    X with NM/GT min Patient Education: [x] Review HEP      Other Objective/Functional Measures:    Gait: HHT without lateral deviation    Pain Level (0-10 scale) post treatment: 0    ASSESSMENT/Changes in Function:   Patient demos good visual acuity/tracking when catching a GMB at differing speeds; no dizziness noted. Cont's to be challenged appropriately with VOR progression, therefore, unable to progress today.      Patient will continue to benefit from skilled PT services to modify and progress therapeutic interventions, address functional mobility deficits, address ROM deficits, address strength deficits, analyze and address soft tissue restrictions, analyze and cue movement patterns, analyze and modify body mechanics/ergonomics and assess and modify postural abnormalities to attain remaining goals. []  See Plan of Care  [x]  See progress note/recertification (87/2/80)  []  See Discharge Summary         Progress towards goals / Updated goals:  1. Patient will be IND with DC HEP in program for progression to self management. cluding tapering.   2.  Patient will demo SLS 30 sec B for improved safety with stair negotiation.   3.  Patient will demonstrate dynamic gait with HHT without lateral deviation to reduce fall risk with community amb. -Goal met today in clinic (12/12/19)    PLAN  [x]  Upgrade activities as tolerated     [x]  Continue plan of care  [x]  Update interventions per flow sheet       []  Discharge due to:_  [x]  Other: reassess goals NV: likely D/C    Yogesh Ames, PTA 12/12/2019     Future Appointments   Date Time Provider Monica Duckworth   12/19/2019  2:30 PM Marielena Love [facility]

## 2019-12-19 ENCOUNTER — HOSPITAL ENCOUNTER (OUTPATIENT)
Dept: PHYSICAL THERAPY | Age: 49
Discharge: HOME | End: 2019-12-19
Payer: MEDICAID

## 2019-12-19 PROCEDURE — 97112 NEUROMUSCULAR REEDUCATION: CPT

## 2019-12-19 PROCEDURE — 97116 GAIT TRAINING THERAPY: CPT

## 2019-12-19 NOTE — PROGRESS NOTES
PT DAILY TREATMENT NOTE 8-    Patient Name: Griselda Bandy  Date:2019  : 1970  [x]  Patient  Verified  Payor: [facility] MEDICAID / Plan: [facility] [employer] PLUS / Product Type: Managed Care Medicaid /    In time: 2:30 pm         Out time: 3:04 pm  Total Treatment Time (min): 34  Visit #: 2 of 2    Treatment Area: Labyrinthine dysfunction, unspecified ear [H83.2X9]    SUBJECTIVE  Pain Level (0-10 scale): 0  Any medication changes, allergies to medications, adverse drug reactions, diagnosis change, or new procedure performed?: [x] No    [] Yes (see summary sheet for update)  Subjective functional status/changes:   [] No changes reported  \"Doing okay. \"    OBJECTIVE  26 min Neuromuscular Reeducation:  [x] See flow sheet: reassessment   Rationale: improve coordination, improve balance and increase proprioception to improve the patients ability to decrease perception of imbalance and improved safety with amb and community mobility     8 min Gait Training:  Per FS - with SBA for balance    Rationale: increase proprioception, improve balance strategies, increase strength to improve the patient's ability to ambulate with reduced fall risk    X with NM/GT min Patient Education: [x] Review HEP      Other Objective/Functional Measures:    SLS: (R) 16 sec, (L) 12 sec best today  2x4 EO/EC: 30 sec  Gait with horizontal head turns: normal stride length without lateral deviation    Patient able to perform ball tosses without difficulty, good tracking and eye/hand coordination to catch green medicine ball in all directions. Pain Level (0-10 scale) post treatment: 0    ASSESSMENT/Changes in Function:   See D/C.      Patient will continue to benefit from skilled PT services to modify and progress therapeutic interventions, address functional mobility deficits, address ROM deficits, address strength deficits, analyze and address soft tissue restrictions, analyze and cue movement patterns, analyze and modify body mechanics/ergonomics and assess and modify postural abnormalities to attain remaining goals. [x]  See Discharge Summary         Progress towards goals / Updated goals:  1. Patient will be IND with DC HEP in program for progression to self management. cluding tapering. -Goal met  2. Patient will demo SLS 30 sec B for improved safety with stair negotiation. -Goal not met; regression to (R) 16 sec, (L) 12 sec at best today, although pt notes inc'd activity today contributing to overall fatigue  3. Patient will demonstrate dynamic gait with HHT without lateral deviation to reduce fall risk with community amb. -Goal met today in clinic    PLAN     [x]  Discharge due to: patient meeting goals    Amberly Guerra, PTA 12/19/2019     No future appointments.

## 2019-12-19 NOTE — PROGRESS NOTES
[address] [facility] [address] [facility] AT [address] [facility]     Phone: [phone] Fax: [phone] SUMMARY  Patient Name: Mikal Ortiz : 1970   Treatment/Medical Diagnosis: Labyrinthine dysfunction, unspecified ear [H83.2X9]   Referral Source: Pura Aguirre NP     Date of Initial Visit: 10/8/19 Attended Visits: 10 Missed Visits: 0     SUMMARY OF TREATMENT  Patient is being treated for vestibular hypofunction. Treatment has included static and dynamic balance, gait , VOR and HEP progression.     CURRENT STATUS  Patient has made excellent overall progress in PT, reporting less difficulty walking in crowded areas. Patient demos (-) nystagmus with VOR and improved visual acuity and tracking to catch as evidenced by ability to catch a ball. Assessment as follows:  SLS: (R) 16 sec, (L) 12 sec best today  2x4 EO/EC: 30 sec  Gait with horizontal head turns: normal stride length without lateral deviation    Goal/Measure of Progress:  1.  Patient will be IND with DC HEP in program for progression to self management. cluding tapering. -Goal met  2.  Patient will demo SLS 30 sec B for improved safety with stair negotiation. -Goal not met; regression to (R) 16 sec, (L) 12 sec at best today, although pt notes inc'd activity today contributing to overall fatigue  3.  Patient will demonstrate dynamic gait with HHT without lateral deviation to reduce fall risk with community amb. -Goal met today in clinic    Home exercise program established on initial evaluation and progressed as patient is able to address deficits. Patient now has all of the knowledge to continue with progress per HEP. RECOMMENDATIONS  Discontinue therapy. Progressing towards or have reached established goals. If you have any questions/comments please contact us directly at [phone]. Thank you for allowing us to assist in the care of your patient.     GUILLE Signature: Coco Mesa PTA Date: 12/19/19   Therapist Signature: Rashel Erickson DPT  Time: 6:48 PM     NOTE TO PHYSICIAN:    To ensure we are able to process the patient's encounter and avoid risk of your patient   receiving a bill for our services, please sign and return this discharge summary by 1/17/20. Thank you!      ___ I have read the above report and request that my patient be discharged from therapy.      Physician Signature:        Date:       Time:

## 2020-01-13 ENCOUNTER — HOSPITAL ENCOUNTER (EMERGENCY)
Age: 50
Discharge: HOME | End: 2020-01-14
Attending: EMERGENCY MEDICINE | Admitting: EMERGENCY MEDICINE
Payer: MEDICAID

## 2020-01-13 ENCOUNTER — APPOINTMENT (OUTPATIENT)
Dept: CT IMAGING | Age: 50
End: 2020-01-13
Attending: EMERGENCY MEDICINE
Payer: MEDICAID

## 2020-01-13 VITALS
BODY MASS INDEX: 21.97 KG/M2 | HEIGHT: 67 IN | WEIGHT: 140 LBS | TEMPERATURE: 98.7 F | OXYGEN SATURATION: 97 % | SYSTOLIC BLOOD PRESSURE: 146 MMHG | DIASTOLIC BLOOD PRESSURE: 76 MMHG | RESPIRATION RATE: 16 BRPM | HEART RATE: 80 BPM

## 2020-01-13 DIAGNOSIS — D57.1 HB-SS DISEASE WITHOUT CRISIS (HCC): ICD-10-CM

## 2020-01-13 DIAGNOSIS — R19.7 DIARRHEA, UNSPECIFIED TYPE: ICD-10-CM

## 2020-01-13 DIAGNOSIS — R11.2 NON-INTRACTABLE VOMITING WITH NAUSEA, UNSPECIFIED VOMITING TYPE: ICD-10-CM

## 2020-01-13 DIAGNOSIS — R10.84 ACUTE GENERALIZED ABDOMINAL PAIN: Primary | ICD-10-CM

## 2020-01-13 LAB
ALBUMIN SERPL-MCNC: 4 G/DL (ref 3.4–5)
ALBUMIN/GLOB SERPL: 0.9 {RATIO} (ref 0.8–1.7)
ALP SERPL-CCNC: 238 U/L (ref 45–117)
ALT SERPL-CCNC: 27 U/L (ref 16–61)
ANION GAP SERPL CALC-SCNC: 10 MMOL/L (ref 3–18)
AST SERPL-CCNC: 50 U/L (ref 10–38)
BASOPHILS # BLD: 0.1 K/UL (ref 0–0.1)
BASOPHILS NFR BLD: 1 % (ref 0–2)
BILIRUB SERPL-MCNC: 5.5 MG/DL (ref 0.2–1)
BUN SERPL-MCNC: 9 MG/DL (ref 7–18)
BUN/CREAT SERPL: 8 (ref 12–20)
CALCIUM SERPL-MCNC: 9.9 MG/DL (ref 8.5–10.1)
CHLORIDE SERPL-SCNC: 109 MMOL/L (ref 100–111)
CO2 SERPL-SCNC: 21 MMOL/L (ref 21–32)
CREAT SERPL-MCNC: 1.15 MG/DL (ref 0.6–1.3)
DIFFERENTIAL METHOD BLD: ABNORMAL
EOSINOPHIL # BLD: 0.3 K/UL (ref 0–0.4)
EOSINOPHIL NFR BLD: 2 % (ref 0–5)
ERYTHROCYTE [DISTWIDTH] IN BLOOD BY AUTOMATED COUNT: 27.7 % (ref 11.6–14.5)
GLOBULIN SER CALC-MCNC: 4.5 G/DL (ref 2–4)
GLUCOSE SERPL-MCNC: 95 MG/DL (ref 74–99)
HCT VFR BLD AUTO: 22.3 % (ref 36–48)
HGB BLD-MCNC: 7.6 G/DL (ref 13–16)
LIPASE SERPL-CCNC: 279 U/L (ref 73–393)
LYMPHOCYTES # BLD: 2.3 K/UL (ref 0.9–3.6)
LYMPHOCYTES NFR BLD: 17 % (ref 21–52)
MCH RBC QN AUTO: 25.8 PG (ref 24–34)
MCHC RBC AUTO-ENTMCNC: 34.1 G/DL (ref 31–37)
MCV RBC AUTO: 75.6 FL (ref 74–97)
MONOCYTES # BLD: 1.4 K/UL (ref 0.05–1.2)
MONOCYTES NFR BLD: 10 % (ref 3–10)
NEUTS SEG # BLD: 9.7 K/UL (ref 1.8–8)
NEUTS SEG NFR BLD: 70 % (ref 40–73)
PLATELET # BLD AUTO: 746 K/UL (ref 135–420)
PMV BLD AUTO: 9.6 FL (ref 9.2–11.8)
POTASSIUM SERPL-SCNC: 4.3 MMOL/L (ref 3.5–5.5)
PROT SERPL-MCNC: 8.5 G/DL (ref 6.4–8.2)
RBC # BLD AUTO: 2.95 M/UL (ref 4.7–5.5)
RETICS/RBC NFR AUTO: 19 % (ref 0.5–2.3)
SODIUM SERPL-SCNC: 140 MMOL/L (ref 136–145)
WBC # BLD AUTO: 13.7 K/UL (ref 4.6–13.2)

## 2020-01-13 PROCEDURE — 85045 AUTOMATED RETICULOCYTE COUNT: CPT

## 2020-01-13 PROCEDURE — 96374 THER/PROPH/DIAG INJ IV PUSH: CPT

## 2020-01-13 PROCEDURE — 99283 EMERGENCY DEPT VISIT LOW MDM: CPT

## 2020-01-13 PROCEDURE — 80053 COMPREHEN METABOLIC PANEL: CPT

## 2020-01-13 PROCEDURE — 96361 HYDRATE IV INFUSION ADD-ON: CPT

## 2020-01-13 PROCEDURE — 85025 COMPLETE CBC W/AUTO DIFF WBC: CPT

## 2020-01-13 PROCEDURE — 74177 CT ABD & PELVIS W/CONTRAST: CPT

## 2020-01-13 PROCEDURE — 96375 TX/PRO/DX INJ NEW DRUG ADDON: CPT

## 2020-01-13 PROCEDURE — 74011250636 HC RX REV CODE- 250/636: Performed by: EMERGENCY MEDICINE

## 2020-01-13 PROCEDURE — 83690 ASSAY OF LIPASE: CPT

## 2020-01-13 PROCEDURE — 74011636320 HC RX REV CODE- 636/320: Performed by: EMERGENCY MEDICINE

## 2020-01-13 RX ORDER — MORPHINE SULFATE 4 MG/ML
4 INJECTION, SOLUTION INTRAMUSCULAR; INTRAVENOUS
Status: DISCONTINUED | OUTPATIENT
Start: 2020-01-13 | End: 2020-01-14 | Stop reason: HOSPADM

## 2020-01-13 RX ORDER — DICYCLOMINE HYDROCHLORIDE 10 MG/1
10 CAPSULE ORAL
Qty: 8 CAP | Refills: 0 | Status: SHIPPED | OUTPATIENT
Start: 2020-01-13

## 2020-01-13 RX ORDER — LOPERAMIDE HYDROCHLORIDE 2 MG/1
4 CAPSULE ORAL
Status: DISCONTINUED | OUTPATIENT
Start: 2020-01-13 | End: 2020-01-14 | Stop reason: HOSPADM

## 2020-01-13 RX ORDER — ONDANSETRON 2 MG/ML
4 INJECTION INTRAMUSCULAR; INTRAVENOUS
Status: COMPLETED | OUTPATIENT
Start: 2020-01-13 | End: 2020-01-13

## 2020-01-13 RX ORDER — LOPERAMIDE HCL 2 MG
2 TABLET ORAL
Qty: 10 TAB | Refills: 0 | Status: SHIPPED | OUTPATIENT
Start: 2020-01-13

## 2020-01-13 RX ORDER — FAMOTIDINE 10 MG/ML
20 INJECTION INTRAVENOUS
Status: COMPLETED | OUTPATIENT
Start: 2020-01-13 | End: 2020-01-13

## 2020-01-13 RX ORDER — ONDANSETRON 4 MG/1
4-8 TABLET, ORALLY DISINTEGRATING ORAL
Qty: 10 TAB | Refills: 0 | Status: SHIPPED | OUTPATIENT
Start: 2020-01-13

## 2020-01-13 RX ADMIN — SODIUM CHLORIDE 1000 ML: 900 INJECTION, SOLUTION INTRAVENOUS at 23:14

## 2020-01-13 RX ADMIN — MORPHINE SULFATE 4 MG: 4 INJECTION, SOLUTION INTRAMUSCULAR; INTRAVENOUS at 23:15

## 2020-01-13 RX ADMIN — ONDANSETRON 4 MG: 2 INJECTION INTRAMUSCULAR; INTRAVENOUS at 23:14

## 2020-01-13 RX ADMIN — FAMOTIDINE 20 MG: 10 INJECTION, SOLUTION INTRAVENOUS at 23:14

## 2020-01-13 RX ADMIN — IOPAMIDOL 100 ML: 612 INJECTION, SOLUTION INTRAVENOUS at 23:29

## 2020-01-13 NOTE — LETTER
NOTIFICATION RETURN TO WORK / SCHOOL 
 
1/13/2020 11:55 PM 
 
Mr. Pacheco Banda [address] [id number] To Whom It May Concern: 
 
Sumnernii Ferreira is currently under the care of [facility] EMERGENCY DEPT. He will return to work/school on: 1/15/20 If there are questions or concerns please have the patient contact our office. Sincerely, Fernanda Tolentino MD

## 2020-01-14 NOTE — ED TRIAGE NOTES
Abdominal pain with n/v/d x 2 days. Abdominal pain came first followed by diarrhea and then vomiting.

## 2020-01-14 NOTE — ED NOTES
12:09 AM  01/14/20     Discharge instructions given to patient (name) with verbalization of understanding. Patient accompanied by s/o. Patient discharged with the following prescriptions Bentyl, Imodium, Zofran. Patient discharged to home (destination).       Tarun Montero RN

## 2020-01-14 NOTE — ED PROVIDER NOTES
Veronica Montero is a 52 y.o. male with history of sickle cell disease who states he ate some pie on Saturday and immediately started having diarrhea with multiple episodes of watery diarrhea patient had some abdominal cramping. Patient started having nauseousness the next day. Patient then proceeded to try eating a bunch of saltine crackers and drinking a bunch of fluids in the proceed to have persistent nausea and vomiting and diarrhea which brought him in tonight. Patient feels dehydrated with some muscle aches. He has no specific joint pains or blood in the stool or recent antibiotics. Symptoms are worse with p.o. intake. He denies any other significant issues or other exposures to GI illness. The history is provided by the patient. Past Medical History:   Diagnosis Date    Anemia NEC     Bursitis of shoulder, left 08/03/2009    MRI revealed tendinitis and bursitis of the rotator cuff.       DJD (degenerative joint disease) 08/03/2009    Early djd, left radiocarpal joint with associated ganglion cyst.      Elevated white blood cell count 07/20/2009    GERD (gastroesophageal reflux disease)     H/O: rotator cuff tear 06/07/2006    Chronic    Hyperlipidemia     Pain in joint, upper arm 07/2009    Pain in shoulder, elbow and wrist.    Sickle cell anemia (HCC)     Sleep apnea     Does not use CPAP    Testicular failure     Vertigo 07/20/2009       Past Surgical History:   Procedure Laterality Date    HX ADENOIDECTOMY      HX ADENOIDECTOMY      HX CERVICAL FUSION  09/17/2018    ACDF C6/7    HX CHOLECYSTECTOMY  03/27/2018    HX HEENT      eardrum repair    HX SHOULDER ARTHROSCOPY Right          Family History:   Problem Relation Age of Onset    Diabetes Mother     Cancer Father        Social History     Socioeconomic History    Marital status: SINGLE     Spouse name: Not on file    Number of children: Not on file    Years of education: Not on file    Highest education level: Not on file   Occupational History    Not on file   Social Needs    Financial resource strain: Not on file    Food insecurity:     Worry: Not on file     Inability: Not on file    Transportation needs:     Medical: Not on file     Non-medical: Not on file   Tobacco Use    Smoking status: Former Smoker     Types: Cigarettes     Last attempt to quit: 2003     Years since quittin.5    Smokeless tobacco: Never Used   Substance and Sexual Activity    Alcohol use: No    Drug use: No    Sexual activity: Yes     Partners: Female   Lifestyle    Physical activity:     Days per week: Not on file     Minutes per session: Not on file    Stress: Not on file   Relationships    Social connections:     Talks on phone: Not on file     Gets together: Not on file     Attends Cheondoism service: Not on file     Active member of club or organization: Not on file     Attends meetings of clubs or organizations: Not on file     Relationship status: Not on file    Intimate partner violence:     Fear of current or ex partner: Not on file     Emotionally abused: Not on file     Physically abused: Not on file     Forced sexual activity: Not on file   Other Topics Concern    Not on file   Social History Narrative    Not on file         ALLERGIES: Other food; Corn; and Shellfish derived    Review of Systems   Constitutional: Positive for appetite change and fatigue. Negative for fever. HENT: Negative for sore throat. Eyes: Negative for visual disturbance. Cardiovascular: Negative for chest pain. Gastrointestinal: Positive for abdominal pain. Genitourinary: Negative for difficulty urinating. Musculoskeletal: Positive for myalgias. Skin: Negative for rash. Allergic/Immunologic: Positive for food allergies. Negative for immunocompromised state. Neurological: Positive for weakness. Negative for syncope. Psychiatric/Behavioral: Positive for sleep disturbance.        Vitals:    20 2117   BP: 146/76   Pulse: 80   Resp: 16   Temp: 98.7 °F (37.1 °C)   SpO2: 97%   Weight: 63.5 kg (140 lb)   Height: 5' 7\" (1.702 m)            Physical Exam  Vitals signs and nursing note reviewed. Constitutional:       General: He is not in acute distress. Appearance: He is not ill-appearing, toxic-appearing or diaphoretic. HENT:      Head: Normocephalic and atraumatic. Right Ear: External ear normal.      Left Ear: External ear normal.      Nose: Nose normal.      Mouth/Throat:      Pharynx: No oropharyngeal exudate. Eyes:      Conjunctiva/sclera: Conjunctivae normal.   Neck:      Musculoskeletal: Normal range of motion. Cardiovascular:      Rate and Rhythm: Normal rate and regular rhythm. Heart sounds: Normal heart sounds. Pulmonary:      Effort: Pulmonary effort is normal. No respiratory distress. Breath sounds: Normal breath sounds. Abdominal:      Palpations: Abdomen is soft. Tenderness: There is tenderness. Comments: Only minimal generalized abdominal tenderness. There is no guarding rebound rigidity. No obvious hepatosplenomegaly. Musculoskeletal: Normal range of motion. Skin:     General: Skin is warm and dry. Capillary Refill: Capillary refill takes less than 2 seconds. Neurological:      Mental Status: He is alert and oriented to person, place, and time.    Psychiatric:         Behavior: Behavior normal.          MDM       Procedures    Vitals:  Patient Vitals for the past 12 hrs:   Temp Pulse Resp BP SpO2   01/13/20 2117 98.7 °F (37.1 °C) 80 16 146/76 97 %         Medications ordered:   Medications   sodium chloride 0.9 % bolus infusion 1,000 mL (1,000 mL IntraVENous New Bag 1/13/20 4410)   morphine injection 4 mg (has no administration in time range)   loperamide (IMODIUM) capsule 4 mg (has no administration in time range)   sodium chloride 0.9 % bolus infusion 1,000 mL (has no administration in time range)   ondansetron (ZOFRAN) injection 4 mg (4 mg IntraVENous Given 1/13/20 8634)   famotidine (PF) (PEPCID) injection 20 mg (20 mg IntraVENous Given 1/13/20 2314)   iopamidol (ISOVUE 300) 61 % contrast injection 100 mL (100 mL IntraVENous Given 1/13/20 0119)         Lab findings:  Recent Results (from the past 12 hour(s))   CBC WITH AUTOMATED DIFF    Collection Time: 01/13/20 10:04 PM   Result Value Ref Range    WBC 13.7 (H) 4.6 - 13.2 K/uL    RBC 2.95 (L) 4.70 - 5.50 M/uL    HGB 7.6 (L) 13.0 - 16.0 g/dL    HCT 22.3 (L) 36.0 - 48.0 %    MCV 75.6 74.0 - 97.0 FL    MCH 25.8 24.0 - 34.0 PG    MCHC 34.1 31.0 - 37.0 g/dL    RDW 27.7 (H) 11.6 - 14.5 %    PLATELET 826 (H) 577 - 420 K/uL    MPV 9.6 9.2 - 11.8 FL    NEUTROPHILS 70 40 - 73 %    LYMPHOCYTES 17 (L) 21 - 52 %    MONOCYTES 10 3 - 10 %    EOSINOPHILS 2 0 - 5 %    BASOPHILS 1 0 - 2 %    ABS. NEUTROPHILS 9.7 (H) 1.8 - 8.0 K/UL    ABS. LYMPHOCYTES 2.3 0.9 - 3.6 K/UL    ABS. MONOCYTES 1.4 (H) 0.05 - 1.2 K/UL    ABS. EOSINOPHILS 0.3 0.0 - 0.4 K/UL    ABS. BASOPHILS 0.1 0.0 - 0.1 K/UL    DF AUTOMATED     METABOLIC PANEL, COMPREHENSIVE    Collection Time: 01/13/20 10:04 PM   Result Value Ref Range    Sodium 140 136 - 145 mmol/L    Potassium 4.3 3.5 - 5.5 mmol/L    Chloride 109 100 - 111 mmol/L    CO2 21 21 - 32 mmol/L    Anion gap 10 3.0 - 18 mmol/L    Glucose 95 74 - 99 mg/dL    BUN 9 7.0 - 18 MG/DL    Creatinine 1.15 0.6 - 1.3 MG/DL    BUN/Creatinine ratio 8 (L) 12 - 20      GFR est AA >60 >60 ml/min/1.73m2    GFR est non-AA >60 >60 ml/min/1.73m2    Calcium 9.9 8.5 - 10.1 MG/DL    Bilirubin, total 5.5 (H) 0.2 - 1.0 MG/DL    ALT (SGPT) 27 16 - 61 U/L    AST (SGOT) 50 (H) 10 - 38 U/L    Alk.  phosphatase 238 (H) 45 - 117 U/L    Protein, total 8.5 (H) 6.4 - 8.2 g/dL    Albumin 4.0 3.4 - 5.0 g/dL    Globulin 4.5 (H) 2.0 - 4.0 g/dL    A-G Ratio 0.9 0.8 - 1.7     LIPASE    Collection Time: 01/13/20 10:04 PM   Result Value Ref Range    Lipase 279 73 - 393 U/L   RETICULOCYTE COUNT    Collection Time: 01/13/20 10:04 PM   Result Value Ref Range    Reticulocyte count 19.0 (H) 0.5 - 2.3 %       EKG interpretation by ED Physician:      X-Ray, CT or other radiology findings or impressions:  CT ABD PELV W CONT    (Results Pending)   Appendix is mildly dilated with no surrounding inflammatory changes. Hepatomegaly. Spleen is absent. Progress notes, Consult notes or additional Procedure notes:   Most consistent with foodborne illness. Patient does not appear toxic here. Patient was hydrated will be given prescriptions for her symptomatic relief at home. Patient has no significant pain in the right lower quadrant  I have discussed with patient and/or family/sig other the results, interpretation of any imaging if performed, suspected diagnosis and treatment plan to include instructions regarding the diagnoses listed to which understanding was expressed with all questions answered      Reevaluation of patient:   stable    Disposition:  Diagnosis:   1. Acute generalized abdominal pain    2. Non-intractable vomiting with nausea, unspecified vomiting type    3. Diarrhea, unspecified type    4. Hb-SS disease without crisis [facility])        Disposition: home      Follow-up Information     Follow up With Specialties Details Why Contact Info    Vinny Anthony MD Geriatric Medicine Schedule an appointment as soon as possible for a visit  [address] [facility] [address] [facility] EMERGENCY DEPT Emergency Medicine  If symptoms worsen [address] [facility] [address]. [phone]            Patient's Medications   Start Taking    DICYCLOMINE (BENTYL) 10 MG CAPSULE    Take 1 Cap by mouth four (4) times daily as needed for Abdominal Cramps (abd cramps). LOPERAMIDE (IMODIUM A-D) 2 MG TABLET    Take 1 Tab by mouth four (4) times daily as needed for Diarrhea. ONDANSETRON (ZOFRAN ODT) 4 MG DISINTEGRATING TABLET    Take 1-2 Tabs by mouth every eight (8) hours as needed for Nausea or Vomiting.    Continue Taking    ACETAMINOPHEN (TYLENOL) 325 MG TABLET    Take 650 mg by mouth every six (6) hours as needed for Pain or Fever. COLCHICINE 0.6 MG TABLET    take 2 tablets by mouth then 1 tablet by mouth 1 hour LATER (TOTAL 3 TABLETS PER ATTACK)    DULOXETINE (CYMBALTA) 20 MG CAPSULE    Take 1 Cap by mouth daily. LORATADINE (ALAVERT PO)    Take  by mouth. MECLIZINE (ANTIVERT) 25 MG TABLET    take 1 tablet by mouth every 8 hours if needed for VERTIGO    MULTIVITAMIN (ONE A DAY) TABLET    Take 1 Tab by mouth daily. NAPROXEN (NAPROSYN) 500 MG TABLET    Take 1 Tab by mouth two (2) times daily as needed for Pain. OXYCODONE-ACETAMINOPHEN (PERCOCET)  MG PER TABLET    Take 1 Tab by mouth every six (6) hours as needed for Pain. Max Daily Amount: 4 Tabs. OXYGEN-AIR DELIVERY SYSTEMS    2 L by Nasal route as needed (shortness of breath). PREGABALIN (LYRICA) 150 MG CAPSULE    Take 1 Cap by mouth two (2) times a day. Max Daily Amount: 300 mg. PREGABALIN (LYRICA) 75 MG CAPSULE    Take 1 Cap by mouth two (2) times a day. Max Daily Amount: 150 mg. SENNA-DOCUSATE (P-COL RITE) 8.6-50 MG PER TABLET    Take 1 Tab by mouth daily as needed for Constipation. SUMATRIPTAN (IMITREX) 50 MG TABLET        TIAGABINE (GABITRIL) 4 MG TABLET    1 tab po q hs x 1 week, then 1 tab po bid    VALACYCLOVIR (VALTREX) 1 GRAM TABLET    Take 1 Tab by mouth daily as needed (cold sores). These Medications have changed    No medications on file   Stop Taking    CEPHALEXIN (KEFLEX) 500 MG CAPSULE    Take 1 Cap by mouth four (4) times daily. Indications: Skin and Skin Structure Infection    CYCLOBENZAPRINE (FLEXERIL) 10 MG TABLET    Take 1 Tab by mouth three (3) times daily as needed for Muscle Spasm(s). METHYLPREDNISOLONE (MEDROL, SHU,) 4 MG TABLET    Per dose pack instructions    NAPROXEN (NAPROSYN) 250 MG TABLET    Take  by mouth two (2) times daily (with meals).

## 2020-01-14 NOTE — DISCHARGE INSTRUCTIONS

## 2020-03-03 ENCOUNTER — OFFICE VISIT (OUTPATIENT)
Dept: ORTHOPEDIC SURGERY | Age: 50
End: 2020-03-03

## 2020-03-03 VITALS
WEIGHT: 139.6 LBS | RESPIRATION RATE: 24 BRPM | DIASTOLIC BLOOD PRESSURE: 70 MMHG | BODY MASS INDEX: 21.91 KG/M2 | SYSTOLIC BLOOD PRESSURE: 124 MMHG | HEIGHT: 67 IN | OXYGEN SATURATION: 90 % | TEMPERATURE: 98.7 F | HEART RATE: 88 BPM

## 2020-03-03 DIAGNOSIS — M54.6 THORACIC SPINE PAIN: ICD-10-CM

## 2020-03-03 DIAGNOSIS — M79.18 MYOFASCIAL PAIN: Primary | ICD-10-CM

## 2020-03-03 DIAGNOSIS — M62.838 MUSCLE SPASM: ICD-10-CM

## 2020-03-03 DIAGNOSIS — M54.9 UPPER BACK PAIN: ICD-10-CM

## 2020-03-03 RX ORDER — CYCLOBENZAPRINE HCL 10 MG
10 TABLET ORAL
Qty: 90 TAB | Refills: 2 | Status: SHIPPED | OUTPATIENT
Start: 2020-03-03 | End: 2020-09-21

## 2020-03-03 NOTE — PROGRESS NOTES
[address], [address]  [place], [address] [place] [address] Street  Phone: [phone]  Fax: [phone]        Hodan Ambrosio  : 1970  PCP: Ange Corey MD  3/3/2020    PROGRESS NOTE      HISTORY OF PRESENT ILLNESS  Elana Rivers is a 52 y.o. male. He was seen initially 2018 for c/o chronic neck pain radiating into his LUE and numbness in his fingers that has worsened over the last 4 months with the new symptoms being radicular. He recently completed 10 visits to PT and has 6 remaining. He notes when he lays down, his whole body gets paralyzed. He has seen a sleep doctor who states he had sleep apnea, but no sleep paralysis. The only thing that has provided him any relief has been pain medication, but it has had less of an effect recently. He previously had neck pain for which he had an MRI in , but he did not begin to have numbness in his fingers on his left hand until recently. He has a dx of sickle cell, but notes he does not have a pain management or hematologist. His PCP has been managing his sickle cell for him. He notes he did not find any relief from the cervical injections, and his neck pain and LUE paraesthesia has worsened over the last month. He notes he now has a right-sided neck pain associated with his right shoulder pain. He is scheduled to see Dr. Concepcion Castillo on Monday for his right shoulder pain. He did not tolerate Gabapentin well as he experienced somnolence. Pt is interested in chiropractic care. He found no relief from PT, so Dr. Concepcion Castillo ordered a cervical spine MRI. He continues to have neck pain radiating into his first three digits in his left hand. He found some mild relief from Lyrica but it caused a side effect of ED so he has chosen to discontinue the medication. Cervical MRI 8/15/18: Multilevel central stenosis involving the C3-4 through the C6-7 levels.  Stenosis is greatest at C4-5 level, mild to moderate in degree, produced by left central/subarticular disc protrusion superimposed on disc bulge. Although there is mild cord deformity at these levels of stenosis, no abnormal cord signal is seen. Left posterolateral C6-7 disc herniation superposed upon disc bulge including focal subarticular extrusion component extending inferiorly with left axillary recess narrowing. Exiting left C7 nerve root may be affected. Clinical correlation is recommended. Mild C3-4 and C5-C6 levels of mild central stenosis are produced by disc bulge and left central disc protrusion, respectively. Slight reversal of normal cervical lordosis. No subluxation seen. Prominent nasopharyngeal and oropharyngeal tonsillar soft tissue as well as several scattered normal sized cervical nodes. These findings are nonspecific and could reflect reactive lymphoid tissue. Cannot exclude lymphoproliferative process or adenopathy from other etiologies. Clinical correlation recommended. He saw Dr. Lisandro Shane, who recommended a surgical solution. He saw Sheyla Chen NP in 2/19 and he was 5 month s/p ACDF C6-7 (9/17/18) that improved his LUE pain. He did not tolerate Cymbalta as it gave him erectile dysfunction. He has tried and failed Lyrica (ED), Gabitril (somnolence), and Gabapentin (somnolence). He also c/o some numbness in the second and third digits of his left hand. He did not find relief from heat, ice, Flexeril, Mobic, or lidocaine patches. He returned 5/2/19 for an MRI f/u. He c/o right-sided neck pain radiating down his back to between his shoulder blades. He denies new radicular symptoms, but continues to have residual LUE numbness. He notes that he had to discontinue the Prednisone dose pack because it caused a sickle cell crisis. He continues to have neck and back pain, but it is not as severe as it was previously. He denies any injury, but noted that he had to lift his mother with his dad to help get her dressed when EMTs came.  She recently passed away a week and a half ago due to septic shock. He notes that his vertigo also came back since he has been stressed. Cervical MRI 4/11/19: Evidence of ACDF C6-C7. No recurrent or residual central or foraminal stenosis at this level. Previously there was a broad-based protrusion/extrusion on the left. Degenerative changes with reversal of the cervical lordosis as described above. Central stenosis with mild cord distortion at C3-C4 and C4-C5. No cord signal change and this would not with complete confidence correlate with a cervical myelopathy. No new high-grade foraminal stenosis. He was given trigger point injections that significantly improved his pain. He notes that the day after he had the trigger point injections, he had to go to the ED the next day because he had sickle cell crisis, then the following day he had to go back and was admitted and had to stay 4-5 days, so we will avoid steroids (injected and oral). He attended PT (Angie Rae) with benefit and recently had a massage that was beneficial. He notes that since he left the hospital, he has had gout attacks that are being managed by his PCP. [facility] finds the most relief of his pain with Naproxen. I advised on the risks of the medication. He has seen significant improvement with PT with dry needling (6/25/19-7/10/19; [facility]). He notes that he did not tolerate dry needling well, but PT overall was effective.     Opal Oconnor comes in to the office today for new c/o left parascapular pain. He denies injury or trauma, but he notes that he does sleep on the left side. On examination, he has some mild winging of the left scapula. Pt notes that his left parascapular region tenses up after prolonged walking. He continues to have numbness and tingling in the left hand in a C7 distribution. He rates his pain as a 7/10 today.      ASSESSMENT  His pain is likely myofascial in nature given the tenderness to palpation of the left parascapular musculature(rhomboids, serratus, trapezius, pec minor and infraspinatus) and slight winging of the left scapula. PLAN  1. Referral to PT [facility])  2. Flexeril 10 mg TID PRN. Pt will f/u in 8 weeks or sooner as needed. Diagnoses and all orders for this visit:    1. Myofascial pain  -     REFERRAL TO PHYSICAL THERAPY    2. Thoracic spine pain  -     REFERRAL TO PHYSICAL THERAPY    3. Upper back pain  -     REFERRAL TO PHYSICAL THERAPY    4. Muscle spasm  -     cyclobenzaprine (FLEXERIL) 10 mg tablet; Take 1 Tab by mouth three (3) times daily as needed for Muscle Spasm(s). PAST MEDICAL HISTORY   Past Medical History:   Diagnosis Date    Anemia NEC     Bursitis of shoulder, left 08/03/2009    MRI revealed tendinitis and bursitis of the rotator cuff.  DJD (degenerative joint disease) 08/03/2009    Early djd, left radiocarpal joint with associated ganglion cyst.      Elevated white blood cell count 07/20/2009    GERD (gastroesophageal reflux disease)     H/O: rotator cuff tear 06/07/2006    Chronic    Hyperlipidemia     Pain in joint, upper arm 07/2009    Pain in shoulder, elbow and wrist.    Sickle cell anemia (HCC)     Sleep apnea     Does not use CPAP    Testicular failure     Vertigo 07/20/2009       Past Surgical History:   Procedure Laterality Date    HX ADENOIDECTOMY      HX ADENOIDECTOMY      HX CERVICAL FUSION  09/17/2018    ACDF C6/7    HX CHOLECYSTECTOMY  03/27/2018    HX HEENT      eardrum repair    HX SHOULDER ARTHROSCOPY Right    . MEDICATIONS    Current Outpatient Medications   Medication Sig Dispense Refill    dicyclomine (BENTYL) 10 mg capsule Take 1 Cap by mouth four (4) times daily as needed for Abdominal Cramps (abd cramps). 8 Cap 0    ondansetron (ZOFRAN ODT) 4 mg disintegrating tablet Take 1-2 Tabs by mouth every eight (8) hours as needed for Nausea or Vomiting. 10 Tab 0    loperamide (IMODIUM A-D) 2 mg tablet Take 1 Tab by mouth four (4) times daily as needed for Diarrhea.  10 Tab 0    colchicine 0.6 mg tablet take 2 tablets by mouth then 1 tablet by mouth 1 hour LATER (TOTAL 3 TABLETS PER ATTACK)  0    naproxen (NAPROSYN) 500 mg tablet Take 1 Tab by mouth two (2) times daily as needed for Pain. 60 Tab 5    DULoxetine (CYMBALTA) 20 mg capsule Take 1 Cap by mouth daily. 30 Cap 1    meclizine (ANTIVERT) 25 mg tablet take 1 tablet by mouth every 8 hours if needed for VERTIGO  0    tiaGABine (GABITRIL) 4 mg tablet 1 tab po q hs x 1 week, then 1 tab po bid 60 Tab 1    OXYGEN-AIR DELIVERY SYSTEMS 2 L by Nasal route as needed (shortness of breath).  acetaminophen (TYLENOL) 325 mg tablet Take 650 mg by mouth every six (6) hours as needed for Pain or Fever.  senna-docusate (P-COL RITE) 8.6-50 mg per tablet Take 1 Tab by mouth daily as needed for Constipation.  oxyCODONE-acetaminophen (PERCOCET)  mg per tablet Take 1 Tab by mouth every six (6) hours as needed for Pain. Max Daily Amount: 4 Tabs. 20 Tab 0    SUMAtriptan (IMITREX) 50 mg tablet       loratadine (ALAVERT PO) Take  by mouth.  pregabalin (LYRICA) 75 mg capsule Take 1 Cap by mouth two (2) times a day. Max Daily Amount: 150 mg. 14 Cap 0    pregabalin (LYRICA) 150 mg capsule Take 1 Cap by mouth two (2) times a day. Max Daily Amount: 300 mg. 60 Cap 2    valACYclovir (VALTREX) 1 gram tablet Take 1 Tab by mouth daily as needed (cold sores). 0    multivitamin (ONE A DAY) tablet Take 1 Tab by mouth daily. ALLERGIES  Allergies   Allergen Reactions    Other Food Other (comments)     Walnuts. Gets headaches    Corn Other (comments)     Told by  allergic    Shellfish Derived Swelling     Swelling.  States okay with betadine          SOCIAL HISTORY    Social History     Socioeconomic History    Marital status: SINGLE     Spouse name: Not on file    Number of children: Not on file    Years of education: Not on file    Highest education level: Not on file   Tobacco Use    Smoking status: Former Smoker     Types: Cigarettes     Last attempt to quit: 2003     Years since quittin.6    Smokeless tobacco: Never Used   Substance and Sexual Activity    Alcohol use: No    Drug use: No    Sexual activity: Yes     Partners: Female       FAMILY HISTORY  Family History   Problem Relation Age of Onset    Diabetes Mother     Cancer Father          REVIEW OF SYSTEMS  Review of Systems   Musculoskeletal:        Left hand numbness and tingling  Left parascapular pain          PHYSICAL EXAMINATION  Visit Vitals  /70 (BP 1 Location: Right arm, BP Patient Position: Sitting)   Pulse 88   Temp 98.7 °F (37.1 °C) (Oral)   Resp 24   Ht 5' 7\" (1.702 m)   Wt 139 lb 9.6 oz (63.3 kg)   SpO2 90%   BMI 21.86 kg/m²       Pain Assessment  3/3/2020   Location of Pain Shoulder   Location Modifiers Left   Severity of Pain 7   Quality of Pain Sharp   Quality of Pain Comment \"numbness,tingling,weakness in left hand\"   Duration of Pain -   Duration of Pain Comment -   Frequency of Pain Intermittent   Aggravating Factors -   Aggravating Factors Comment -   Limiting Behavior -   Relieving Factors -   Relieving Factors Comment -   Result of Injury No           Constitutional:  Well developed, well nourished, in no acute distress. Psychiatric: Affect and mood are appropriate. Integumentary: No rashes or abrasions noted on exposed areas. SPINE/MUSCULOSKELETAL EXAM    Cervical spine:  Neck is midline. Normal muscle tone. No focal atrophy is noted. ROM painful. Shoulder ROM intact. Tenderness to palpation. Negative Spurling's sign. Negative Tinel's sign. Negative Tomas's sign.       Sensation in the bilateral arms grossly intact to light touch.       Lumbar spine:  No rash, ecchymosis, or gross obliquity. No fasciculations. No focal atrophy is noted. No pain with hip ROM. Full range of motion. Tenderness to palpation. No tenderness to palpation at the sciatic notch. SI joints non-tender. Trochanters non tender.      Sensation in the bilateral legs grossly intact to light touch.     Updates from 5/2/19:  Tenderness to palpation of trapezii (R>L)  No myelopathic signs    Updates 3/3/2020:  Winging of left scapula  Tenderness to palpation of left rhomboid, infraspinatus, coracoid process, pectoralis minor  Shoulder protraction      MOTOR:      Biceps  Triceps Deltoids Wrist Ext Wrist Flex Hand Intrin   Right 5/5 5/5 5/5 5/5 5/5 5/5   Left 5/5 5/5 5/5 5/5 5/5 5/5             Hip Flex  Quads Hamstrings Ankle DF EHL Ankle PF   Right 5/5 5/5 5/5 5/5 5/5 5/5   Left 5/5 5/5 5/5 5/5 5/5 5/5     DTRs are 2+ biceps, triceps, brachioradialis, patella, and Achilles.      Negative Straight Leg raise. Squat not tested. No difficulty with tandem gait.       Ambulation without assistive device. FWB.       RADIOGRAPHS  Cervical MRI images taken on 8/15/18 personally reviewed with patient:  FINDINGS:      Slight reversal of normal cervical lordosis is present with minimal kyphosis at  the C4 level. No subluxation is seen.  Osseous marrow signal is within normal  limits.  The cervicomedullary junction is unremarkable.  No abnormal signal is  seen in the cervical spinal cord.     C2/3 level: There is no central or foraminal stenosis.     C3/4 level: Disc bulge is present slightly effacing ventral cord. No abnormal  cord signal is seen. Mild central stenosis is present with estimated midline AP  diameter of thecal sac measuring 8-9 mm. Slight foraminal narrowing is present  bilaterally.     C4/5 level: Disc bulge is present with superimposed left central/subarticular  disc protrusion. The left ventral cord is mildly flattened. No abnormal cord  signal is seen. Mild to moderate central stenosis is present with estimated  midline AP diameter of thecal sac measuring 7-8 mm. Uncovertebral joint  hypertrophy is present with moderate right and mild to moderate left foraminal  stenosis. There is no central or foraminal stenosis.     C5/6 level: Disc bulge is present with tiny left central superimposed disc  protrusion. Ventral cord is effaced. No abnormal cord signal is seen. Mild  central stenosis is present with estimated midline AP diameter of thecal sac  measuring 8-9 mm. Mild foraminal stenosis is seen bilaterally.     C6/7 level: Disc bulge is present. Superimposed left posterolateral disc  herniation is present including small subarticular component extending  inferiorly. There is effacement of the left ventral cord and moderate narrowing  of the left axillary recess. Disc herniation may slightly extend into the  foramen. Moderate left and mild right foraminal stenosis is seen. Central canal  narrowing is milder at the midline with mild central stenosis and estimated  midline AP diameter of thecal sac measuring 8-9 mm.     C7/T1 level: There is no central or foraminal stenosis.     The visualized intracranial contents are unremarkable.      Punctate susceptibility artifact suggested along prominent sized bilateral  oropharyngeal tonsillar pillars, nonspecific but potentially calcifications  related to prior infection. Prominent nasopharyngeal soft tissue is seen without  definite discrete focal lesion. There are several scattered subcentimeter  anterior and posterior cervical chain nodes bilaterally, not meeting size  criteria for adenopathy.     ________________________     IMPRESSION  IMPRESSION:     1. Multilevel central stenosis involving the C3-4 through the C6-7 levels. Stenosis is greatest at C4-5 level, mild to moderate in degree, produced by left  central/subarticular disc protrusion superimposed on disc bulge. Although there  is mild cord deformity at these levels of stenosis, no abnormal cord signal is  seen.     2. Left posterolateral C6-7 disc herniation superposed upon disc bulge including  focal subarticular extrusion component extending inferiorly with left axillary  recess narrowing.  Exiting left C7 nerve root may be affected. Clinical  correlation is recommended.     3. Mild C3-4 and C5-C6 levels of mild central stenosis are produced by disc  bulge and left central disc protrusion, respectively.     4. Slight reversal of normal cervical lordosis. No subluxation seen.     5. Prominent nasopharyngeal and oropharyngeal tonsillar soft tissue as well as  several scattered normal sized cervical nodes. These findings are nonspecific  and could reflect reactive lymphoid tissue. Cannot exclude lymphoproliferative  process or adenopathy from other etiologies. Clinical correlation recommended. 2V Cervical XR images taken on 4/16/18 personally reviewed with patient:  Straightening of the cervical lordosis  Endplate osteophytes  Slight lean to right  Normal disc spacing  No obvious compression fractures or instabilities          Cervical MRI images taken on 1/14/16 personally reviewed with patient:  COMPARISON: MRI cervical spine 2/8/12    TECHNIQUE: Cervical spine scanned with axial and sagittal T2W scans, sagittal T1W scans. FINDINGS:  Straightening of the cervical spine. Slight retrolisthesis of C4 on C5. Remainder of vertebral bodies maintain normal alignment. The cervical cord appears normal. Diffuse hypointense marrow signal, similar to prior studies. The visualized posterior fossa structures are unremarkable. C2/C3: No significant spinal stenosis or neural foraminal narrowing. C3/C4: Mild disc bulge with posterior central annular tear. Mild/moderate right and mild left foraminal stenosis. Mild narrowing of the central canal, midline AP diameter is 8.4 mm.      C4/C5: Mild disc bulge. Mild left foraminal stenosis. Mild central canal stenosis, midline AP diameter is 8 mm. C5/C6: No significant spinal stenosis or neural foraminal narrowing. C6/C7: Small left paracentral disc extrusion with annular tear. Disc material extends slightly below the disc space.  Mild narrowing of the left central canal. Neural foramen are patent. C7/T1: No significant spinal stenosis or neural foraminal narrowing. Vertebral artery flow voids present. Similar appearance of prominent adenoid tissue and cervical adenopathy.      Impression:    1. Mild/moderate multilevel degenerative discogenic disease. Marginal change in comparison to prior. 2. Similar appearance of small left paracentral disc extrusion at C6-C7. 3. Similar appearance of adenopathy and prominent adenoid tissue. 4. Diffuse hypointense marrow signal, similar to prior. Could be related to anemia in this patient with history of sickle cell disease. 21 minutes of face-to-face contact were spent with the patient during today's visit extensively discussing symptoms and treatment plan. All questions were answered. More than half of this visit today was spent on counseling.      Written by Steve Terry as dictated by Neil Moreno MD

## 2020-03-12 ENCOUNTER — HOSPITAL ENCOUNTER (OUTPATIENT)
Dept: PHYSICAL THERAPY | Age: 50
Discharge: HOME | End: 2020-03-12
Payer: MEDICAID

## 2020-03-12 PROCEDURE — 97162 PT EVAL MOD COMPLEX 30 MIN: CPT

## 2020-03-12 NOTE — PROGRESS NOTES
[facility] PHYSICAL THERAPY AT [address] [place], [place] [address]  Phone: [phone] Fax: [phone] / [address] [address] PHYSICAL THERAPY SERVICES  Patient Name: Ruddy Tate : 1970   Medical   Diagnosis: Acute upper back pain [M54.9]  Pain in thoracic spine [M54.6]  Myalgia, other site [M79.18] Treatment Diagnosis: L scapular myofascial pain    Onset Date: 2020      Referral Source: Luis Giraldo MD [facility]): 3/12/2020   Prior Hospitalization: See medical history Provider #: [id number]   Prior Level of Function: Functional I    Comorbidities: CS FUSION    Medications: Verified on Patient Summary List   The Plan of Care and following information is based on the information from the initial evaluation.   ========================================================================  Assessment / key information:  Pt is a 52y.o. year old male who presents with L scapular pain of unknown origin starting around 2020. Patient has had multiple round of PT in the past for CS pain sec to hx of fusion Sep 2018. Patient was gin Flexiril for pain but hasn't been taking it. Patient has been taking Vicodin from pain management which aids in pain reduction. He also got lidocaine patches that he has not tried yet. Patient also uses heat at home. Patient is L hand dominant. .  Current deficits include: increased pain to 1/10 at best and 9/10 at worst, 5/10 average,  OBJECTIVE  Posture: CS slight fwd head , decreased CS lordosis                No noted scapular winging              Good upward rotation  Shoulder/Scapular Screen: WNL all direction without pain   AROM - Thoracic Spine: dec 10% B - general stiffness      Palpation: significant TTP to light and moderate touch rhomboid, infraspinatus, TS paraspinals      Muscle Flexibility: general tightness   Global Muscular Weakness:               Shoulder : Flexion L 3+, R 4+/5, ABD L 4- R 4+/5, ER 5/5, IR 5/5               Middle Trapezius/ Rhomboids L 3+ R 4/5              Lower Trapezius L 3- R 4/5  Functional deficits include: walking tolerance: 2 blocks . Home exercise program initiated on initial evaluation to address these deficits. Pt would benefit from PT to address these deficits for increased functional mobility and QOL.  ========================================================================  Eval Complexity: History: MEDIUM  Complexity : 1-2 comorbidities / personal factors will impact the outcome/ POC Exam:HIGH Complexity : 4+ Standardized tests and measures addressing body structure, function, activity limitation and / or participation in recreation  Presentation: MEDIUM Complexity : Evolving with changing characteristics  Clinical Decision Making:MEDIUM Complexity : FOTO score of 26-74Overall Complexity:MEDIUM  Problem List: pain affecting function, decrease strength, decrease ADL/ functional abilitiies, decrease activity tolerance, decrease flexibility/ joint mobility and other foto 61/100   Treatment Plan may include any combination of the following: Therapeutic exercise, Therapeutic activities, Neuromuscular re-education, Physical agent/modality, Gait/balance training, Manual therapy, Aquatic therapy, Patient education, Self Care training, Functional mobility training and Home safety training  Patient / Family readiness to learn indicated by: asking questions, trying to perform skills and interest  Persons(s) to be included in education: patient (P)  Barriers to Learning/Limitations: None  Measures taken:    Patient Goal (s): \"Strengthen my shoulder \"   Patient self reported health status: good  Rehabilitation Potential: good   Short Term Goals: To be accomplished in  1  weeks:  1. Pt will be independent and compliant with HEP   Long Term Goals: To be accomplished in  8-12  treatments:  1.   Patient will increase FOTO score to 63/100 for indications of increased functional mobility. 2.  Patient will demo 4/5 L shoulder flexion strength for improved lifting and reaching without exacerbation of pain   3. Patient will demo 4/5 MT strength for improved postural strength for repeated activities in the home   4. Patient will report 4 blocks walking tolerance before limited by pain for ease with community mobility   Frequency / Duration:   Patient to be seen  2  times per week for 8-12  treatments:  Patient / Caregiver education and instruction: self care, activity modification and exercises  Therapist Signature: Benji Encinas PT Date: 6/07/5840   Certification Period: NA Time: 450p   ========================================================================  I certify that the above Physical Therapy Services are being furnished while the patient is under my care. I agree with the treatment plan and certify that this therapy is necessary. Physician Signature:        Date:       Time:   Please sign and return to [facility] or you may fax the signed copy to [phone]. Thank you.

## 2020-03-12 NOTE — PROGRESS NOTES
PT CERVICAL EVAL AND TREATMENT     Patient Name: Jose Alberto Hua  Date:3/12/2020  : 1970  [x]  Patient  Verified  Payor: [facility] MEDICAID / Plan: [facility] [employer] PLUS / Product Type: Managed Care Medicaid /    In time:409  Out time:445  Total Treatment Time (min): 36  Visit #: 1 of     Treatment Area: TS p ain     SUBJECTIVE  Pain Level (0-10 scale): (C):5  (B): 1 (W):  9  Any medication changes, allergies to medications, diagnosis change, or new procedure performed: see summary sheet for update    Chief Complaint:  Patient reports with co L scapular pain of unknown origin starting around 2020. Patient has had multiple round of PT in the past for CS pain sec to hx of fusion Sep 2018. Patient was gin Flexiril for pain but hasn't been taking it. Patient has been taking Vicodin from pain management which aids in pain reduction. He also got lidocaine patches that he has not tried yet. Patient also uses heat at home. Patient is L hand dominant.    Functional Deficits walking tolerance: 2 blocks     OBJECTIVE  Posture: CS slight fwd head , decreased CS lordosis     No noted scapular winging   Good upward rotation  Shoulder/Scapular Screen: WNL all direction without pain   AROM - Thoracic Spine: dec 10% B - general stiffness     Palpation: significant TTP to light and moderate touch rhomboid, infraspinatus, TS paraspinals     Muscle Flexibility: general tightness   Global Muscular Weakness:    Shoulder : Flexion L 3+, R 4+/5, ABD L 4- R 4+/5, ER 5/5, IR 5/5    Middle Trapezius/ Rhomboids L 3+ R 4/5   Lower Trapezius L 3- R 4/5      Patient Education/ Therapeutic Exercise : [x] Discussed POT including PT expectation, established HEP with pictures and instruction  (minutes) : 5 NC sec to insurance limitation     Manual: NT     Modality (rationale): NC - decrease pain to increase function   [x]  E-Stim: type IFC to L scapula with MHP 10 min     Pain Level (0-10 scale) post treatment: 4    ASSESSMENT  [x]  See Plan of Care    PLAN  [x]  Upgrade activities as tolerated      [x] Other:_POC  2-3 x 8-12     Veronda Patches, PT 3/12/2020    Justification for Eval Code Complexity:  Patient History : multiple rounds of PT for myofascial pain syndrome   Examination see exam   Clinical Presentation: evolving sec to hx fo CS fusion   Clinical Decision Making : FOTO : 61 /100

## 2020-03-17 ENCOUNTER — HOSPITAL ENCOUNTER (OUTPATIENT)
Dept: PHYSICAL THERAPY | Age: 50
Discharge: HOME | End: 2020-03-17
Payer: MEDICAID

## 2020-03-17 PROCEDURE — 97110 THERAPEUTIC EXERCISES: CPT

## 2020-03-17 PROCEDURE — 97140 MANUAL THERAPY 1/> REGIONS: CPT

## 2020-03-17 PROCEDURE — 97014 ELECTRIC STIMULATION THERAPY: CPT

## 2020-03-17 NOTE — PROGRESS NOTES
PHYSICAL THERAPY - DAILY TREATMENT NOTE    Patient Name: Iesha Gilbert        Date: 3/17/2020  : 1970   YES Patient  Verified  Visit #:   2   of     Insurance: Payor: [facility] MEDICAID / Plan: [facility] [employer] PLUS / Product Type: Managed Care Medicaid /      In time: 150 Out time: 239   Total Treatment Time: 49     TREATMENT AREA =  Acute upper back pain [M54.9]  Pain in thoracic spine [M54.6]  Myalgia, other site [M79.18]    SUBJECTIVE    Pain Level (on 0 to 10 scale):  7  / 10   Medication Changes/New allergies or changes in medical history, any new surgeries or procedures? NO    If yes, update Summary List   Subjective Functional Status/Changes:  []  No changes reported     Pt reports compliance with HEP since evaluation last session but symptoms have remained the same.         OBJECTIVE  Therapeutic Procedures:  Min Procedure Specifics + Rationale   31 [x] Therapeutic Exercise    See Flowsheet   Rationale: increase ROM and increase strength to improve the patients ability to participate in ADL's    8 [x]  Manual Therapy          In Prone, STM of Left Rhomboid followed by Grade 2-3 Thoracic PA Mobilization    Rationale: decrease pain, increase ROM, increase tissue extensibility, decrease trigger points and increase postural awareness to attain functional use and participation with ADL's             Modality rationale: decrease inflammation, decrease pain, increase tissue extensibility and increase muscle contraction/control to improve the patients ability to perform ADL's with greater ease   Time: 10 Additional Details    [x] E-Stim:       [] Att                 [x] Unatt                         [x] IFC                [] TENS                           [] EDN: Location: Left Scapula  [x] supine              [] prone  [] legs elevatedv   [] legs flat  [x]w/heat  []w/ice  []w/US     []  Traction:   [] Cervical          []Lumbar                          [] Intermitent []Continuous Step x1 Ramp/hold/ lbs:     Angle:  [] supine              [] prone  [] legs elevated    [] legs flat    []  Heat          [] pre-XENA          [] post-XENA Location:    [] supine              [] prone  [] legs elevated    [] legs flat    []  Cold Pack  [] pre-XENA          [] post-XENA  []  Ice massage Location:    [] supine              [] prone  [] legs elevated    [] legs flat    []  Vasopneumatic Device, press/temp                              Location  [] Right Knee []Left Shoulder  [] Left Knee []Right Ankle  [] Right Shoulder [] Left Ankle    Skin assessment post-treatment:  intact no adverse reaction          min Patient Education:  YES Reviewed HEP         Other Objective/Functional Measures:    See flowsheet for more details  TTP left rhomboid that decreased in severity after manual  Min/Mod VC and demos required throughout session for proper form and increased safety  Fatigued quickly with standing wall angels requiring rest break after 6 repititions  Progressed therex per flowsheet     Post Treatment Pain Level (on 0 to 10) scale:   7  / 10     ASSESSMENT    Assessment/Changes in Function:     Patient tolerated treatment session well and is progressing well towards goals. Pt responded well to cuing today and manual therapy. Was able to alter movements after minimal cuing. No exacerbation of symptoms were reported during session and no apprehension with movement. []  See Progress Note/Recertification   Patient will continue to benefit from skilled PT services to analyze,, cue,, progress,, modify,, demonstrate,, instruct, and address, movement patterns,, therapeutic interventions,, postural abnormalities,, soft tissue restrictions,, ROM,, strength,, functional mobility,, body mechanics/ergonomics, and home and community integration, to attain remaining goals.    Progress toward goals / Updated goals:    1st session since initial evaluation, no notable progress yet but does report compliance with HEP.       PLAN    [x]  Upgrade activities as tolerated YES Continue plan of care   []  Discharge due to :    []  Other:      Therapist: Shana Lopez DPT    Date: 3/17/2020 Time: 1:46 PM     Future Appointments   Date Time Provider Monica Duckworth   3[facility]17[facility]2020  2:[facility]   3/20/2020  2:00 PM [facility]   3/24/2020  2:00 PM [facility]   3/27/2020  2:00 PM [facility] PT [place] [facility]   3/31/2020  2:00 PM [facility] 1 [facility]   4/2/2020  2:00 PM [facility]   4/7/2020  2:00 PM [facility] 1 [facility]   4/10/2020  2:00 PM [facility] PT [place] [facility]   4/28/2020  1:45 PM Devi Peoples  [address]

## 2020-03-20 ENCOUNTER — HOSPITAL ENCOUNTER (OUTPATIENT)
Dept: PHYSICAL THERAPY | Age: 50
Discharge: HOME | End: 2020-03-20
Payer: MEDICAID

## 2020-03-20 PROCEDURE — 97014 ELECTRIC STIMULATION THERAPY: CPT | Performed by: GENERAL ACUTE CARE HOSPITAL

## 2020-03-20 PROCEDURE — 97110 THERAPEUTIC EXERCISES: CPT | Performed by: GENERAL ACUTE CARE HOSPITAL

## 2020-03-20 PROCEDURE — 97140 MANUAL THERAPY 1/> REGIONS: CPT | Performed by: GENERAL ACUTE CARE HOSPITAL

## 2020-03-20 NOTE — PROGRESS NOTES
PT DAILY TREATMENT NOTE     Patient Name: Kassandra Grijalva  Date:3/20/2020  : 1970  [x]  Patient  Verified  Payor: [facility] MEDICAID / Plan: [facility]  [employer] PLUS / Product Type: Managed Care Medicaid /    In time: 1:56 pm  Out time: 2:53  Total Treatment Time (min): 57  Total Timed Codes (min): 47  1:1 Treatment Time (min): 47   Visit #: 3 of     Treatment Area: Acute upper back pain [M54.9]  Pain in thoracic spine [M54.6]  Myalgia, other site [M79.18]    SUBJECTIVE  Pain Level (0-10 scale): 510  Any medication changes, allergies to medications, adverse drug reactions, diagnosis change, or new procedure performed?: [x] No    [] Yes (see summary sheet for update)  Subjective functional status/changes:   [] No changes reported  \"Feeling alright\"     OBJECTIVE  Modality rationale: decrease pain and increase tissue extensibility to improve the patients ability to perform functional UE tasks    Min Type Additional Details   10 [x] Estim: []Att   [x]Unatt  []TENS instruct                 [x]IFC  []Premod []NMES                       []Other:  []w/US   []w/ice   [x]w/heat  Position: supine  Location: L shoulder     []  Traction: [] Cervical       []Lumbar                       [] Prone          []Supine                       []Intermittent   []Continuous Lbs:  [] before manual  [] after manual    []  Ultrasound: []Continuous   [] Pulsed                           []1MHz   []3MHz Location:  W/cm2:    []  Iontophoresis with dexamethasone         Location: [] Take home patch   [] In clinic    []  Ice     []  heat  []  Ice massage Position:  Location:    []  Vasopneumatic Device Pressure: [] lo [] med [] hi   Temp: [] lo [] med [] hi   [x] Skin assessment post-treatment:  [x]intact []redness- no adverse reaction       []redness - adverse reaction:       32 min Therapeutic Exercise:  [] See flow sheet :   Rationale: increase ROM and increase strength to improve the patients ability to perform functional ADL's without pain     15 min Manual Therapy: DTM to L latissumus, t/s paraspinals, and rhomboid f/b Grade 2-3 T/s PA Mobilization   Rationale: decrease pain, increase ROM and increase tissue extensibility to perform functional ADL's         min Patient Education: [x] Review HEP    [] Progressed/Changed HEP based on:   [] positioning   [] body mechanics   [] transfers   [] heat/ice application        Other Objective/Functional Measures: Added Shoulder IR/ER, SA punch, and prone series      Pain Level (0-10 scale) post treatment: 2/10     ASSESSMENT/Changes in Function: Pt with pain during supine angels at beginning of session, but demo improved tolerance post MT to complete full range. Good tolerance to initiation of shoulder strengthening with low resistance. Educated patient of progression of HEP at home for mobility and strengthening in the event that clinic closes for COVID-19. Patient will continue to benefit from skilled PT services to modify and progress therapeutic interventions, address functional mobility deficits, address ROM deficits, address strength deficits, analyze and address soft tissue restrictions, analyze and cue movement patterns, analyze and modify body mechanics/ergonomics, assess and modify postural abnormalities, address imbalance/dizziness and instruct in home and community integration to attain remaining goals. [x]  See Plan of Care  []  See progress note/recertification  []  See Discharge Summary         Progress towards goals / Updated goals: · Short Term Goals: To be accomplished in  1  weeks:  1. Pt will be independent and compliant with HEP Met; reports compliance with current HEP (3/20/20)  · Long Term Goals: To be accomplished in  8-12  treatments:  1. Patient will increase FOTO score to 63/100 for indications of increased functional mobility.     2.  Patient will demo 4/5 L shoulder flexion strength for improved lifting and reaching without exacerbation of pain   3. Patient will demo 4/5 MT strength for improved postural strength for repeated activities in the home Initiated prone series (3/20/20)  4.  Patient will report 4 blocks walking tolerance before limited by pain for ease with community mobility     PLAN  [x]  Upgrade activities as tolerated     []  Continue plan of care  []  Update interventions per flow sheet       []  Discharge due to:_  []  Other:_      Regan Cary, PT 3/20/2020  12:42 PM

## 2020-03-24 ENCOUNTER — APPOINTMENT (OUTPATIENT)
Dept: PHYSICAL THERAPY | Age: 50
End: 2020-03-24
Payer: MEDICAID

## 2020-03-27 ENCOUNTER — APPOINTMENT (OUTPATIENT)
Dept: PHYSICAL THERAPY | Age: 50
End: 2020-03-27
Payer: MEDICAID

## 2020-03-31 ENCOUNTER — APPOINTMENT (OUTPATIENT)
Dept: PHYSICAL THERAPY | Age: 50
End: 2020-03-31
Payer: MEDICAID

## 2020-04-01 NOTE — PROGRESS NOTES
[address] [facility] [address] [facility] AT [address]. [facility]  Phone: [phone] Fax: [phone]  PROGRESS NOTE  Patient Name: Bayron Pope : 1970   Treatment/Medical Diagnosis: Acute upper back pain [M54.9]  Pain in thoracic spine [M54.6]  Myalgia, other site [M79.18]   Referral Source: Kenneth Hood MD     Date of Initial Visit: 3/12/2020 Attended Visits: 3 Missed Visits: -     SUMMARY OF TREATMENT  Thank you for sending your patient to be cared for in our facility. While we are STILL OPEN for essential patients, this pt has been placed on a temporarily hold due to the nationwide concerns over COVID-19. The physical therapist has issued HEP and therapy staff will continue to contact pt every 1-2 weeks via phone to monitor progress as the patient feels necessary. If the patient desires to return, we plan to resume physical therapy once concerns improve and will be performing reassessments upon return. Thank you and please feel free to reach out to use with any questions or concerns. If you have any questions/comments please contact us directly at [phone]   Thank you for allowing us to assist in the care of your patient. Therapist Signature: Edmonia Soulier, PT Date: 2020   Reporting Period   Time: 6:59 AM   NOTE TO PHYSICIAN:  PLEASE COMPLETE THE ORDERS BELOW AND FAX TO   [facility]: [phone]. If you are unable to process this request in 24 hours please contact our office: [phone].  ___ I have read the above report and request that my patient continue as recommended.   ___ I have read the above report and request that my patient continue therapy with the following changes/special instructions:_________________________________________________________   ___ I have read the above report and request that my patient be discharged from therapy.      Physician Signature: Date:       Time:

## 2020-04-02 ENCOUNTER — APPOINTMENT (OUTPATIENT)
Dept: PHYSICAL THERAPY | Age: 50
End: 2020-04-02

## 2020-04-07 ENCOUNTER — APPOINTMENT (OUTPATIENT)
Dept: PHYSICAL THERAPY | Age: 50
End: 2020-04-07

## 2020-04-10 ENCOUNTER — APPOINTMENT (OUTPATIENT)
Dept: PHYSICAL THERAPY | Age: 50
End: 2020-04-10

## 2020-05-14 DIAGNOSIS — Z98.1 S/P CERVICAL SPINAL FUSION: ICD-10-CM

## 2020-05-14 DIAGNOSIS — M79.18 CERVICAL MYOFASCIAL PAIN SYNDROME: ICD-10-CM

## 2020-05-14 DIAGNOSIS — M54.2 NECK PAIN: ICD-10-CM

## 2020-05-15 RX ORDER — NAPROXEN 500 MG/1
TABLET ORAL
Qty: 60 TAB | Refills: 5 | Status: SHIPPED | OUTPATIENT
Start: 2020-05-15

## 2020-06-01 NOTE — PROGRESS NOTES
[address] [facility] [address] [facility] AT [address]. [facility] [address]  Phone: [phone] Fax: [phone]  DISCHARGE NOTE  Patient Name: Kassandra Grijalva : 1970   Treatment/Medical Diagnosis: Acute upper back pain [M54.9]  Pain in thoracic spine [M54.6]  Myalgia, other site [M79.18]   Referral Source: Comfort Maria MD     Date of Initial Visit: 3/12/2020 Attended Visits: 3 Missed Visits: -     SUMMARY OF TREATMENT  Patient declined return after COVID restrictions. Patient will require new script for return. Unable to formally assess goals sec to non return. DC at this time       If you have any questions/comments please contact us directly at [phone]   Thank you for allowing us to assist in the care of your patient.   Therapist Signature: Daniela Marmolejo, JO-ANN Date: 2020   Reporting Period  na Time: 6:59 AM

## 2020-09-21 DIAGNOSIS — M62.838 MUSCLE SPASM: ICD-10-CM

## 2020-09-21 RX ORDER — CYCLOBENZAPRINE HCL 10 MG
TABLET ORAL
Qty: 90 TAB | Refills: 2 | Status: SHIPPED | OUTPATIENT
Start: 2020-09-21

## 2022-03-18 PROBLEM — R79.89 ABNORMAL LIVER FUNCTION TEST: Status: ACTIVE | Noted: 2018-01-16

## 2022-03-18 PROBLEM — R30.0 DIFFICULT OR PAINFUL URINATION: Status: ACTIVE | Noted: 2018-01-16

## 2022-03-18 PROBLEM — D57.1 HB-SS DISEASE WITHOUT CRISIS (HCC): Status: ACTIVE | Noted: 2018-01-16

## 2022-03-18 PROBLEM — D57.00 SICKLE CELL PAIN CRISIS (HCC): Status: ACTIVE | Noted: 2017-01-03

## 2022-03-18 PROBLEM — M50.20 HNP (HERNIATED NUCLEUS PULPOSUS), CERVICAL: Status: ACTIVE | Noted: 2018-09-17

## 2022-03-18 PROBLEM — M54.2 NECK PAIN: Status: ACTIVE | Noted: 2018-01-16

## 2022-03-18 PROBLEM — M54.12 CERVICAL RADICULOPATHY: Status: ACTIVE | Noted: 2018-01-16

## 2022-03-18 PROBLEM — H90.11 CONDUCTIVE HEARING LOSS IN RIGHT EAR: Status: ACTIVE | Noted: 2018-04-10

## 2022-03-18 PROBLEM — M87.00 AVASCULAR NECROSIS (HCC): Status: ACTIVE | Noted: 2018-01-16

## 2022-03-18 PROBLEM — Z98.1 S/P CERVICAL SPINAL FUSION: Status: ACTIVE | Noted: 2018-10-01

## 2022-03-18 PROBLEM — M10.9 GOUT: Status: ACTIVE | Noted: 2018-01-16

## 2022-03-18 PROBLEM — G89.4 CHRONIC PAIN SYNDROME: Status: ACTIVE | Noted: 2018-04-26

## 2022-03-18 PROBLEM — H81.10 BENIGN PAROXYSMAL POSITIONAL VERTIGO: Status: ACTIVE | Noted: 2018-01-16

## 2022-03-18 PROBLEM — M87.00 AVASCULAR NECROSIS OF BONE (HCC): Status: ACTIVE | Noted: 2018-01-16

## 2022-03-18 PROBLEM — M54.6 THORACIC BACK PAIN: Status: ACTIVE | Noted: 2018-01-16

## 2022-03-18 PROBLEM — G56.00 CARPAL TUNNEL SYNDROME: Status: ACTIVE | Noted: 2018-01-16

## 2022-03-18 PROBLEM — R82.998 DARK URINE: Status: ACTIVE | Noted: 2018-01-16

## 2022-03-18 PROBLEM — K59.00 CONSTIPATION: Status: ACTIVE | Noted: 2018-01-16

## 2022-03-18 PROBLEM — J02.9 ACUTE PHARYNGITIS: Status: ACTIVE | Noted: 2018-01-16

## 2022-03-19 PROBLEM — G44.82 COITAL HEADACHE: Status: ACTIVE | Noted: 2018-01-16

## 2022-03-19 PROBLEM — M19.90 ARTHRITIS: Status: ACTIVE | Noted: 2018-01-16

## 2022-03-19 PROBLEM — R07.89 ATYPICAL CHEST PAIN: Status: ACTIVE | Noted: 2018-01-16

## 2022-03-19 PROBLEM — J30.9 ATOPIC RHINITIS: Status: ACTIVE | Noted: 2018-01-16

## 2022-03-19 PROBLEM — I27.20 PULMONARY HYPERTENSION (HCC): Status: ACTIVE | Noted: 2018-04-10

## 2022-03-19 PROBLEM — T14.8XXA NERVE COMPRESSION: Status: ACTIVE | Noted: 2018-04-10

## 2022-03-19 PROBLEM — D57.00 SICKLE CELL CRISIS (HCC): Status: ACTIVE | Noted: 2017-01-03

## 2022-03-19 PROBLEM — H81.399 VERTIGO, PERIPHERAL: Status: ACTIVE | Noted: 2018-04-10

## 2022-03-19 PROBLEM — D57.00 SICKLE CELL ANEMIA WITH PAIN (HCC): Status: ACTIVE | Noted: 2017-12-09

## 2022-03-19 PROBLEM — K80.20 CHOLELITHIASIS: Status: ACTIVE | Noted: 2018-01-16

## 2022-03-19 PROBLEM — D75.839 THROMBOCYTOSIS: Status: ACTIVE | Noted: 2018-04-10

## 2022-03-19 PROBLEM — M25.569 KNEE PAIN: Status: ACTIVE | Noted: 2018-01-16

## 2022-03-19 PROBLEM — M75.20 BICIPITAL TENDINITIS: Status: ACTIVE | Noted: 2018-04-10

## 2022-03-19 PROBLEM — M79.629 PAIN IN AXILLA: Status: ACTIVE | Noted: 2018-01-16

## 2022-03-19 PROBLEM — B00.1 RECURRENT COLD SORES: Status: ACTIVE | Noted: 2018-04-10

## 2022-03-19 PROBLEM — R51.9 HEADACHE: Status: ACTIVE | Noted: 2018-04-10

## 2022-03-19 PROBLEM — H72.00 CENTRAL PERFORATION OF TYMPANIC MEMBRANE: Status: ACTIVE | Noted: 2018-01-16

## 2022-03-19 PROBLEM — N46.9 MALE FERTILITY PROBLEM: Status: ACTIVE | Noted: 2018-04-10

## 2022-03-19 PROBLEM — Z79.891 OPIOID CONTRACT EXISTS: Status: ACTIVE | Noted: 2018-04-10

## 2022-03-19 PROBLEM — R20.2 PARESTHESIA OF LEFT UPPER EXTREMITY: Status: ACTIVE | Noted: 2018-04-10

## 2022-03-19 PROBLEM — R59.1 LYMPHADENOPATHY: Status: ACTIVE | Noted: 2018-04-10

## 2022-03-19 PROBLEM — J30.9 ALLERGIC RHINITIS: Status: ACTIVE | Noted: 2018-01-16

## 2022-03-19 PROBLEM — M79.605 PAIN OF LEFT LOWER EXTREMITY: Status: ACTIVE | Noted: 2018-01-16

## 2022-03-19 PROBLEM — G47.33 OSA (OBSTRUCTIVE SLEEP APNEA): Status: ACTIVE | Noted: 2018-04-10

## 2022-03-19 PROBLEM — S20.211A CONTUSION OF RIB ON RIGHT SIDE: Status: ACTIVE | Noted: 2018-04-10

## 2022-03-19 PROBLEM — Z86.39 HISTORY OF HYPERKALEMIA: Status: ACTIVE | Noted: 2018-04-10

## 2022-03-19 PROBLEM — Z86.69 HISTORY OF PERFORATED EAR DRUM: Status: ACTIVE | Noted: 2018-04-10

## 2022-03-19 PROBLEM — M75.100 SUPRASPINATUS TENDON TEAR: Status: ACTIVE | Noted: 2018-04-10

## 2022-03-19 PROBLEM — M79.603 PAIN OF UPPER EXTREMITY: Status: ACTIVE | Noted: 2018-01-16

## 2022-03-19 PROBLEM — G47.21 SLEEP-WAKE SCHEDULE DISORDER, DELAYED PHASE TYPE: Status: ACTIVE | Noted: 2018-01-16

## 2022-03-19 PROBLEM — S29.9XXA: Status: ACTIVE | Noted: 2018-01-16

## 2022-03-19 PROBLEM — E78.5 HYPERLIPIDEMIA: Status: ACTIVE | Noted: 2018-04-10

## 2022-03-19 PROBLEM — H90.2 CONDUCTIVE HEARING LOSS, UNILATERAL: Status: ACTIVE | Noted: 2018-01-16

## 2022-03-19 PROBLEM — R79.89 ABNORMAL LIVER FUNCTION TESTS: Status: ACTIVE | Noted: 2018-01-16

## 2022-03-19 PROBLEM — M47.812 SPONDYLOSIS OF CERVICAL REGION WITHOUT MYELOPATHY OR RADICULOPATHY: Status: ACTIVE | Noted: 2018-04-26

## 2022-03-19 PROBLEM — Z09 HOSPITAL DISCHARGE FOLLOW-UP: Status: ACTIVE | Noted: 2018-04-10

## 2022-03-19 PROBLEM — D72.829 LEUKOCYTOSIS: Status: ACTIVE | Noted: 2018-04-10

## 2022-03-19 PROBLEM — J06.9 ACUTE UPPER RESPIRATORY INFECTION: Status: ACTIVE | Noted: 2018-01-16

## 2022-03-19 PROBLEM — J18.9 COMMUNITY ACQUIRED PNEUMONIA: Status: ACTIVE | Noted: 2017-12-09

## 2022-03-19 PROBLEM — M50.30 DEGENERATIVE DISC DISEASE, CERVICAL: Status: ACTIVE | Noted: 2018-04-26

## 2022-03-19 PROBLEM — M75.42 IMPINGEMENT SYNDROME OF LEFT SHOULDER: Status: ACTIVE | Noted: 2018-04-10

## 2022-03-19 PROBLEM — D64.9 ANEMIA: Status: ACTIVE | Noted: 2018-01-16

## 2022-03-20 PROBLEM — N52.9 MALE ERECTILE DYSFUNCTION, UNSPECIFIED: Status: ACTIVE | Noted: 2018-04-10

## 2022-03-20 PROBLEM — R07.89 STERNUM PAIN: Status: ACTIVE | Noted: 2018-04-10

## 2022-03-20 PROBLEM — M79.2 NEURITIS: Status: ACTIVE | Noted: 2018-10-01

## 2022-03-20 PROBLEM — Z87.39 HISTORY OF OSTEONECROSIS: Status: ACTIVE | Noted: 2018-04-10

## 2022-03-20 PROBLEM — Z20.820 EXPOSURE TO CHICKENPOX: Status: ACTIVE | Noted: 2018-04-10

## 2023-03-02 ENCOUNTER — HOSPITAL ENCOUNTER (OUTPATIENT)
Facility: HOSPITAL | Age: 53
Setting detail: RECURRING SERIES
Discharge: HOME | End: 2023-03-05
Payer: MEDICAID

## 2023-03-02 PROCEDURE — 97162 PT EVAL MOD COMPLEX 30 MIN: CPT

## 2023-03-02 NOTE — PROGRESS NOTES
[facility] PHYSICAL THERAPY  [address] [facility] [address] [address] Phone: [phone] Fax [phone] of Care / Statement of Necessity for Physical Therapy Services     Patient Name: Stacie Strong : 1970   Medical   Diagnosis: Neck pain, (B) shoulder mita, R arm pain  Treatment Diagnosis: Neck pain [M54.2]  Left shoulder pain [M25.512]  Right shoulder pain [M25.511]   Onset Date: 23     Referral Source: ERASMO Patino* Start of Care [facility]): 3/2/2023   Prior Hospitalization: See medical history Provider #: [id number]   Prior Level of Function: Chronic pain 7/10 in L > R shoulders, UT; chronic knee pain   Comorbidities: Sickle cell anemia   Medications: Verified on Patient Summary List     Assessment / key information:  Pt is a 45 yo male s/p with chronic pain who got an epidural steroid injection in the neck on 23 noting a new onset of R radicular pain in the medial forearm. Pain has been largely resolving over time and now it is intermittent. Unable to be reproduced today. Pt presents with pain 1-9/10 in the (B) UT, L posterior shoulder girdle, R medial forearm and L pec. His previous PLOF pain levels are 7/10 in the L UT and shoulder girdle, however. CS and GHJ AROM is WNL most planes. MMT is reduced in scapular stabilizers with winging scapula on the R. Reduced TS mobility. Pt has (-) CS special tests for radiculopathy. (-) n glides in the R although given ulnar n glide to address some c/o R ulnar n symptoms. Pt has no sensory changes. TTP in the (B) UT, L rhomboids, L infraspinatus, L pec major and minor. Pt has sedentary lifestyle and would benefit from PT to address his mobility and strength limitations and address pain to whatever point we are able to. Pt has poor posture with forward head, rounded shoulders and increased TS kyphosis.  Pt will benefit from PT interventions to address the aforementioned deficits and allow pt to return to PLOF. Evaluation Complexity:  History:  HIGH Complexity :3+ comorbidities / personal factors will impact the outcome/ POC ; Examination:  HIGH Complexity : 4+ Standardized tests and measures addressing body structure, function, activity limitation and / or participation in recreation  ;Presentation:  MEDIUM Complexity : Evolving with changing characteristics  ; Clinical Decision Making:  MEDIUM Complexity : FOTO score of 26-74 FOTO score = an established functional score where 100 = no disability  Overall Complexity Rating: MEDIUM  Problem List: pain affecting function, decrease ROM, decrease strength, impaired gait/balance, decrease ADL/functional abilities, decrease activity tolerance, decrease flexibility/joint mobility, and decrease transfer abilities    Treatment Plan may include any combination of the followin Therapeutic Exercise, 28057 Neuromuscular Re-Education, 36625 Manual Therapy, 41261 Therapeutic Activity, 75953 Self Care/Home Management, 44985 Electrical Stim unattended, and 37771 Mechanical Traction If patient is receiving VASO: Vasopnuematic compression justification:  Per bilateral girth measures taken and listed above the edema is considered significant and having an impact on the patient's strength, balance, gait, transfers, self care, and ADL's  Patient / Family readiness to learn indicated by: asking questions, trying to perform skills, interest, return verbalization , and return demonstration   Persons(s) to be included in education: patient (P)  Barriers to Learning/Limitations: none  Measures taken if barriers to learning present: none  Patient Goal (s): get rid of th R arm symptoms   Patient Self Reported Health Status: good  Rehabilitation Potential: fair    Short Term Goals: To be accomplished in 2 treatments  1. Pt will be independent and compliant with HEP to decrease pain, increase ROM and return pt to PLOF.     Status at last assessment: Initiated at [facility]: To be accomplished in 8-10 treatments  1. Pt will increase score on the FOTO to > or = 60/100 to demo an increase in functional activity tolerance. Status at last assessment:  56/100   2. Pt will rate + or = +5 on the GROC to demo na increase in functional activity tolerance  Status at last assessment: na   3. Pt will note < or = 5/10 ave pain in (B) UT, shoulder girdle with all mobility to improve comfort with ADL's. Status at last assessment: ranges 1-9/10  4. Pt will have an increase in MT and LT MMT to > or = 4+/5 to restore postural stability and UE ADL tolerance   Status at last assessment: 3+/5  5. Pt will note a decrease in R UE radicular sxs by < or = 50% of currently frequency and duration to restore PLOF  Status at last: several times/ day; lasting longer duration    Frequency / Duration: Patient would benefit from skilled PT 2 times per week for up to 36 sessions as needed in this certification period. Goals will be assigned and reassessed every 10 visits/ 30 days per guidelines . Patient/ Caregiver education and instruction: Diagnosis, prognosis, self care, activity modification, and exercises [x]  Plan of care has been reviewed with PTA    Certification Period: na     Karson Ahuja, PT       3/2/2023       12:42 PM  ===================================================================  I certify that the above Therapy Services are being furnished while the patient is under my care. I agree with the treatment plan and certify that this therapy is necessary. [de-identified] Signature:_________________________   DATE:_________   TIME:________                           ERASMO Glasgow*    ** Signature, Date and Time must be completed for valid certification **  [facility] or you may fax the signed copy to [phone]. Thank you.

## 2023-03-02 NOTE — PROGRESS NOTES
PHYSICAL / OCCUPATIONAL THERAPY - DAILY TREATMENT NOTE (updated )    Patient Name: Asha Schumacher    Date: 3/2/2023    : 1970  Insurance: Payor: Derrick Mail / Plan: Jenny Median / Product Type: *No Product type* /      Patient  verified Yes     Visit #   Current / Total 1 8-10   Time   In / Out 12:39 1:20   Pain   In / Out 7 7   Subjective Functional Status/Changes: See IE    Changes to:  Meds, Allergies, Med Hx, Sx Hx? If yes, update Summary List no       TREATMENT AREA =  Neck pain [M54.2]  Left shoulder pain [M25.512]  Right shoulder pain [M25.511]    SUBJECTIVE  Current symptoms/Complaints:  pt is undergoing pain management treatments for pain in the (B) feet. And he also mentioned that he has pain in the CS and (B) shoulders. Was scheduled for an episural injection that resulted in new onset of R UE pain. Pain notes con't n pain in the posterior R forearm. Now (B) UT pain. Pain in the R arm is intermittent; not sure the frequency or what triggers it. Also notes some anterior L chest wall pain - like needles (1 years of that c/o pain). He has not seen a cardiologist for that. The UT pain has lasted 15 years or so. Denies signs of stroke. Notes frequent EKGs when he goes to ER for treatment. Mechanism of Injury: ongoing pain, worse with Epidural   Pain Level (0-10 scale):C: 5, B: 1, W: 9  []constant [x]intermittent []improving []worsening []no change since onset  Worsens: \"unsure\"; pain in the UT when he wakes up in the morning  Eases: sleep  PLOF: prior to the PHILLIP pain was on the L shoulder, scapula, typically 7/10 ave during the day.    Limitations to PLOF: sleeping; sustained CS flexion for reading; concentrating   Previous Treatment/Compliance: meds; PHILLIP; PT   PMHx/Surgical Hx: sickle cell anemia  Work Hx: not working;   Fitness/fun: watch TV, bike riding in the good weather   Living Situation: lives in home, 2 story; stair negotiation is hard due to (B) knee pain; pt lives with brother and father. Pt Goals: \"get better \"  Barriers: []pain []financial []time []transportation []other  Motivation: fair   Substance use: none     OBJECTIVE       Vasopnuematic compression justification:  Per bilateral girth measures taken and listed above the edema is considered significant and having an impact on the patient's self care and ADL's    Therapeutic Procedures: Tx Min Billable or 1:1 Min (if diff from Tx Min) Procedure, Rationale, Specifics     12739 Therapeutic Activity (timed):  use of dynamic activities replicating functional movements to increase ROM, strength, coordination, balance, and proprioception in order to improve patient's ability to progress to PLOF and address remaining functional goals. (see flow sheet as applicable)     Details if applicable:         98817 Neuromuscular Re-Education (timed):  improve balance, coordination, kinesthetic sense, posture, core stability and proprioception to improve patient's ability to develop conscious control of individual muscles and awareness of position of extremities in order to progress to PLOF and address remaining functional goals. (see flow sheet as applicable)     Details if applicable:       38456 Therapeutic Exercise (timed):  increase ROM, strength, coordination, balance, and proprioception to improve patient's ability to progress to PLOF and address remaining functional goals.  (see flow sheet as applicable)     Details if applicable:            Details if applicable:            Details if applicable:     [facility] Totals Reminder: bill using total billable min of TIMED therapeutic procedures (example: do not include dry needle or estim unattended, both untimed codes, in totals to left)  8-22 min = 1 unit; 23-37 min = 2 units; 38-52 min = 3 units; 53-67 min = 4 units; 68-82 min = 5 units   Total Total     [x]  Patient Education billed concurrently with other procedures   [x] Review HEP    [] Progressed/Changed HEP, detail:    [] Other detail:       Objective Information/Functional Measures/Assessment    Diagnostic Tests: MRI - recently but unsure of the results     Headaches: Do you have headaches? Yes. HA for years - 4 years. Noted all over but mostly at temporal and posterior aspect of skull. Triggered - bright lights  Alleviated -  tylenol  Dizziness/tinnitus/vision changes:  dizziness - BPPV (pt notes he has the jeremi hallpike performed every few years)  Vision changes increased recently. Pt notes new onset of double vision and has not seen an eye doctor since onset of COVID-19. Will see optometrist.     Sleeping position: (L) SL with L arm in full flexion and head resting on the L arm.        OBJECTIVE  Posture: poor posture, forward head and protracted shoulder   C-Kyphosis:  [] increased   [] decreased   C-Lordosis:   [] increased   [] decreased  T-Kyphosis:  [x] increased   [] decreased  T-Lordosis:   [] increased   [] decreased     Shoulder/Scapular Screen:   AROM in supine: (B) GHJ flexion full, ER at 90: 95 deg, IR at 90: 60 deg  AROM in sitting: all WNL      Active Movements: intact :  ROM % CS % TS Comments:pain, area   Forward flexion 50     Extension 65     SB right 42     SB left  34     Rotation right 70     Rotation left 70       Thoracic Spine: reduced spring, pain with PA glides     Palpation: TTP (B) UT, L rhomboids, L infraspinatus  No atrophy of the hands     Special Tests:  Cervical:        Spurling's:  [x] R    [] L    [] +    [x] -       Distraction:  [x] R    [x] L    [] +    [x] -       Compression: [] R    [] L    [] +    [x] -  Muscle Flexibility: reduced (B) UT, pec major, pec minor     OBJECTIVE LS  Posture:  Lateral Shift: [] R    [] L     [] +  [x] -  Kyphosis: [x] Increased [] Decreased   []  WNL  Cervical  Lordosis:  [] Increased [] Decreased   [] WNL    Dural Mobility:  UE neural tension all (-); given ulnar glide as pt does have c/o ulnar n symptoms intermittently      Strength  GHJ MMT: 5/5  Elbow 4+/5  Wrist 4+/5  Thumb extension 5/5  : L 70, 76, 70 (pt is L hand dominant); R 62, 68, 64  Scapular stabilizers: MT (B) 3+/5, Lower trap (B) 3+/5  Winging scapaula on the R     Special Tests  Froment's tests (for ulnar n):(-)  Tinnel at the ulnar n: -     Patient will continue to benefit from skilled PT / OT services to modify and progress therapeutic interventions, analyze and address functional mobility deficits, analyze and address ROM deficits, analyze and address strength deficits, analyze and address soft tissue restrictions, analyze and cue for proper movement patterns, analyze and modify for postural abnormalities, analyze and address imbalance/dizziness, and instruct in home and community integration to address functional deficits and attain remaining goals. Progress toward goals / Updated goals:  []  See Progress Note/Recertification  See IE for goals and treatment plan    PLAN  Yes  Continue plan of care  []  Upgrade activities as tolerated  []  Discharge due to :  [x]  Other: Con't 2x/week for 8-10 sessions     Diaz Cotton PT    3/2/2023    12:42 PM    No future appointments.     Justification for Eval Code Complexity:  Patient History : chronic pain,unable to reproduce R UE sxs; (B) knee pain; sedentary  Examination see exam   Clinical Presentation: evolving and changing; unpredictable   Clinical Decision Making : FOTO : 56 /100 Initial (On Arrival)

## 2023-03-16 ENCOUNTER — HOSPITAL ENCOUNTER (OUTPATIENT)
Facility: HOSPITAL | Age: 53
Setting detail: RECURRING SERIES
Discharge: HOME | End: 2023-03-19
Payer: MEDICAID

## 2023-03-16 PROCEDURE — 97110 THERAPEUTIC EXERCISES: CPT | Performed by: PHYSICAL THERAPIST

## 2023-03-16 PROCEDURE — G0283 ELEC STIM OTHER THAN WOUND: HCPCS | Performed by: PHYSICAL THERAPIST

## 2023-03-16 PROCEDURE — 97112 NEUROMUSCULAR REEDUCATION: CPT | Performed by: PHYSICAL THERAPIST

## 2023-03-16 PROCEDURE — 97530 THERAPEUTIC ACTIVITIES: CPT | Performed by: PHYSICAL THERAPIST

## 2023-03-16 NOTE — PROGRESS NOTES
PHYSICAL / OCCUPATIONAL THERAPY - DAILY TREATMENT NOTE (updated )    Patient Name: Gabi Foot    Date: 3/16/2023    : 1970  Insurance: Payor: Elsy Velasquez / Plan: Mao Bhatt / Product Type: *No Product type* /      Patient  verified Yes     Visit #   Current / Total 2 8-10   Time   In / Out 1:03pm 156  53min   Pain   In / Out 5 5   Subjective Functional Status/Changes: Pt retold recent history. The r forearm is always painful. Changes to:  Meds, Allergies, Med Hx, Sx Hx? If yes, update Summary List no       TREATMENT AREA =  Neck pain [M54.2]  Left shoulder pain [M25.512]  Right shoulder pain [M25.511]    OBJECTIVE    Modalities Rationale:     decrease inflammation, decrease pain, and increase tissue extensibility to improve patient's ability to progress to PLOF and address remaining functional goals. 14 min [x] Estim Unattended, type/location: Pre-mod  to mid scap mm Collins                                     []  w/ice    [x]  w/heat    min [] Estim Attended, type/location:                                     []  w/US     []  w/ice    []  w/heat    []  TENS insruct      min []  Mechanical Traction: type/lbs                   []  pro   []  sup   []  int   []  cont    []  before manual    []  after manual    min []  Ultrasound, settings/location:      min []  Iontophoresis w/ dexamethasone, location:                                               []  take home patch       []  in clinic    min  unbill []  Ice     []  Heat    location/position:     min []  Paraffin,  details:     min []  Vasopneumatic Device, press/temp:     min []  Drea Gasmen / [place]:     If using vaso (only need to measure limb vaso being performed on)      pre-treatment girth :       post-treatment girth :       measured at (landmark location) :      min []  Other:    Skin assessment post-treatment (if applicable):    []  intact    []  redness- no adverse reaction                 []redness - adverse

## 2023-03-22 ENCOUNTER — HOSPITAL ENCOUNTER (OUTPATIENT)
Facility: HOSPITAL | Age: 53
Setting detail: RECURRING SERIES
Discharge: HOME | End: 2023-03-25
Payer: MEDICAID

## 2023-03-22 PROCEDURE — G0283 ELEC STIM OTHER THAN WOUND: HCPCS

## 2023-03-22 PROCEDURE — 97530 THERAPEUTIC ACTIVITIES: CPT

## 2023-03-22 PROCEDURE — 97110 THERAPEUTIC EXERCISES: CPT

## 2023-03-22 PROCEDURE — 97112 NEUROMUSCULAR REEDUCATION: CPT

## 2023-03-22 NOTE — PROGRESS NOTES
functional mobility deficits, analyze and address ROM deficits, analyze and address strength deficits, analyze and address soft tissue restrictions, analyze and cue for proper movement patterns, analyze and modify for postural abnormalities, and instruct in home and community integration to address functional deficits and attain remaining goals. Progress toward goals / Updated goals:  []  See Progress Note/Recertification    Short Term Goals: To be accomplished in 2 treatments  1. Pt will be independent and compliant with HEP to decrease pain, increase ROM and return pt to PLOF. Status at last assessment: Initiated at   Current: good compliance with daily HEP reported (3/22/23)  Long Term Goals: To be accomplished in 8-10 treatments  1. Pt will increase score on the FOTO to > or = 60/100 to demo an increase in functional activity tolerance. Status at last assessment:  56/100   2. Pt will rate + or = +5 on the GROC to demo na increase in functional activity tolerance  Status at last assessment: na   3. Pt will note < or = 5/10 ave pain in (B) UT, shoulder girdle with all mobility to improve comfort with ADL's. Status at last assessment: ranges 1-9/10  4. Pt will have an increase in MT and LT MMT to > or = 4+/5 to restore postural stability and UE ADL tolerance   Status at last assessment: 3+/5  5.  Pt will note a decrease in R UE radicular sxs by < or = 50% of currently frequency and duration to restore PLOF  Status at last: several times/ day; lasting longer duration      PLAN  Yes  Continue plan of care  []  Upgrade activities as tolerated  []  Discharge due to :  []  Other:    Iran Mcburney, PT    3/22/2023    3:06 PM    Future Appointments   Date Time Provider Patricia Roper   3/29/2023  2:30 PM Mahesh Salomon PTA [facility]   3/31/2023  1:30 PM EDILMA Sanchez [facility]   4/5/2023 12:00 PM Iran Mcburney, PT [facility]   4/7/2023  1:00 PM EDILMA Sanchez [facility]   4/11/2023 12:00 PM

## 2023-03-25 NOTE — PERIOP NOTES
Mr Asha Castaneda  was here today for his PAT appointment. Health assessment was completed and instructions given regarding NPO status, medications, Hibiclens washes, and removal of all jewelry and/or body piercing. Pt denies skin rash or open wound. WBC today 15, Romina at Dr Keven Del Cid office made aware Opportunity was given to ask questions and all questions were answered. Understanding of instructions was verbalized.
TRANSFER - OUT REPORT: 
 
Verbal report given to [facility] RN(name) on Salty Chávez  being transferred to [address] [place] [address](unit) for routine post - op Report consisted of patients Situation, Background, Assessment and  
Recommendations(SBAR). Information from the following report(s) SBAR, OR Summary, Procedure Summary, Intake/Output and MAR was reviewed with the receiving nurse. Lines:  
Peripheral IV 09/17/18 Right Forearm (Active) Site Assessment Clean, dry, & intact 9/17/2018  1:38 PM  
Phlebitis Assessment 0 9/17/2018  1:38 PM  
Infiltration Assessment 0 9/17/2018  1:38 PM  
Dressing Status Clean, dry, & intact 9/17/2018  1:38 PM  
Dressing Type Transparent;Tape 9/17/2018  1:38 PM  
Hub Color/Line Status Pink;Capped 9/17/2018  1:38 PM  
   
Peripheral IV 09/17/18 Left Hand (Active) Site Assessment Clean, dry, & intact 9/17/2018  1:38 PM  
Phlebitis Assessment 0 9/17/2018  1:38 PM  
Infiltration Assessment 0 9/17/2018  1:38 PM  
Dressing Status Clean, dry, & intact 9/17/2018  1:38 PM  
Dressing Type Transparent;Tape 9/17/2018  1:38 PM  
Hub Color/Line Status Pink; Infusing 9/17/2018  1:38 PM  
  
 
Opportunity for questions and clarification was provided. Patient transported with: 
 O2 @ 3 liters Registered Nurse Tech
Incision & Drainage
normal affect/normal behavior

## 2023-03-29 ENCOUNTER — HOSPITAL ENCOUNTER (OUTPATIENT)
Facility: HOSPITAL | Age: 53
Setting detail: RECURRING SERIES
Discharge: HOME | End: 2023-04-01
Payer: MEDICAID

## 2023-03-29 PROCEDURE — 97530 THERAPEUTIC ACTIVITIES: CPT | Performed by: PHYSICAL THERAPIST

## 2023-03-29 PROCEDURE — 97112 NEUROMUSCULAR REEDUCATION: CPT | Performed by: PHYSICAL THERAPIST

## 2023-03-29 PROCEDURE — 97110 THERAPEUTIC EXERCISES: CPT | Performed by: PHYSICAL THERAPIST

## 2023-03-29 PROCEDURE — G0283 ELEC STIM OTHER THAN WOUND: HCPCS | Performed by: PHYSICAL THERAPIST

## 2023-03-29 NOTE — PROGRESS NOTES
PHYSICAL / OCCUPATIONAL THERAPY - DAILY TREATMENT NOTE (updated )    Patient Name: Mayra Mccoy    Date: 3/29/2023    : 1970  Insurance: Payor: Mireille Medina / Plan: Janak Melgoza / Product Type: *No Product type* /      Patient  verified Yes     Visit #   Current / Total 4 8-10   Time   In / Out 230 315pm (45)   Pain   In / Out 3 3   Subjective Functional Status/Changes: \" The arm pain still occurs daily off and on. Starting at elbow and going down the arm. \"   Changes to:  Meds, Allergies, Med Hx, Sx Hx? If yes, update Summary List no       TREATMENT AREA =  Neck pain [M54.2]  Left shoulder pain [M25.512]  Right shoulder pain [M25.511]    OBJECTIVE    Modalities Rationale:     decrease inflammation, decrease pain, and increase tissue extensibility to improve patient's ability to progress to PLOF and address remaining functional goals. 13 min [x] Estim Unattended, type/location: Pre-mod  to mid scap mm Collins                                     []  w/ice    [x]  w/heat    min [] Estim Attended, type/location:                                     []  w/US     []  w/ice    []  w/heat    []  TENS insruct      min []  Mechanical Traction: type/lbs                   []  pro   []  sup   []  int   []  cont    []  before manual    []  after manual    min []  Ultrasound, settings/location:      min []  Iontophoresis w/ dexamethasone, location:                                               []  take home patch       []  in clinic    min  unbill []  Ice     []  Heat    location/position:     min []  Paraffin,  details:     min []  Vasopneumatic Device, press/temp:     min []  Veronica Kerline / [place]:     If using vaso (only need to measure limb vaso being performed on)      pre-treatment girth :       post-treatment girth :       measured at (landmark location) :      min []  Other:    Skin assessment post-treatment (if applicable):    [x]  intact    []  redness- no adverse reaction

## 2023-03-31 ENCOUNTER — HOSPITAL ENCOUNTER (OUTPATIENT)
Facility: HOSPITAL | Age: 53
Setting detail: RECURRING SERIES
End: 2023-03-31
Payer: MEDICAID

## 2023-03-31 PROCEDURE — 97530 THERAPEUTIC ACTIVITIES: CPT

## 2023-03-31 PROCEDURE — 97112 NEUROMUSCULAR REEDUCATION: CPT

## 2023-03-31 PROCEDURE — 97110 THERAPEUTIC EXERCISES: CPT

## 2023-03-31 PROCEDURE — G0283 ELEC STIM OTHER THAN WOUND: HCPCS

## 2023-03-31 NOTE — PROGRESS NOTES
as tolerated  []  Discharge due to :  []  Other:    Andrew Spence, PTA    3/31/2023    3:41 PM    Future Appointments   Date Time Provider Patricia Roper   4/5/2023 12:00 PM Sukumar Ricketts, PT [facility]   4/7/2023  1:00 PM Andrew Spence, PTA [facility]   4/11/2023 12:00 PM Andrew Spence, PTA [facility]   4/13/2023 12:00 PM Andrew Spence, PTA [facility]

## 2023-04-05 ENCOUNTER — HOSPITAL ENCOUNTER (OUTPATIENT)
Facility: HOSPITAL | Age: 53
Setting detail: RECURRING SERIES
Discharge: HOME | End: 2023-04-08
Payer: MEDICAID

## 2023-04-05 PROCEDURE — 97112 NEUROMUSCULAR REEDUCATION: CPT

## 2023-04-05 PROCEDURE — G0283 ELEC STIM OTHER THAN WOUND: HCPCS

## 2023-04-05 PROCEDURE — 97110 THERAPEUTIC EXERCISES: CPT

## 2023-04-05 PROCEDURE — 97530 THERAPEUTIC ACTIVITIES: CPT

## 2023-04-05 NOTE — PROGRESS NOTES
Physical Therapy Discharge Instructions      [facility] - [address]  [phone] fax      Patient: Gerber Cardoza  : 1970      Continue Home Exercise Program 2 times per day for 4 weeks, then decrease to 5 times per week      Continue with    [] Ice  as needed 2 times per day     [x] Heat           Follow up with MD:     [x] Upon completion of therapy     [] As needed      Recommendations:     [x]   Return to activity with home program    []   Return to activity with the following modifications:       []Post Rehab Program    []Join Independent aquatic program     []Return to/join local gym    Stanford Zaldivar, PT 2023 4:44 PM
analyze and address soft tissue restrictions, analyze and cue for proper movement patterns, analyze and modify for postural abnormalities, and instruct in home and community integration to address functional deficits and attain remaining goals. Progress toward goals / Updated goals:  []  See Progress Note/Recertification:    See DC     PLAN  Yes  Continue plan of care  []  Upgrade activities as tolerated  [x]  Discharge due to : lack of progress toward goals  []  Other:    Diane Garza, PT    4/5/2023    4:45 PM    No future appointments.
patient's insurance requires this discharge note be signed and returned. PLEASE COMPLETE THE ORDERS BELOW AND RETURN TO:  [facility]    ___ I have read the above report and request that my patient be discharged from therapy.      Physician Signature:        Date:       Time:                                        ERASMO Perez*

## 2023-04-05 NOTE — PROGRESS NOTES
Statement Selected [address], [address]  [facility], [address]  Phone: [phone]  Fax: [phone]        Liliana Blum  : 1970  PCP: Sagar Euceda MD  2018    PROGRESS NOTE      ASSESSMENT AND PLAN    Eduardo Amador comes in to the office today for PT and MRI f/u. He found no relief from PT, so Dr. Kalli Ritter ordered a cervical spine MRI. He continues to have neck pain radiating into his first three digits in his left hand. He found some mild relief from Lyrica but it caused some side effects. He rates his pain as an 8/10 today. His MRI revealed multilevel central stenosis C3-4 through C6-7, worst at C4-5 where it is mild to moderate. There is also a left posterolateral C6-7 disc herniation superimposed upon disc bulge with left axillary recess narrowing that may affect exiting left C7 nerve root. There is slight reversal of the normal cervical lordosis. His symptoms may be due to the left C7 cervical radiculopathy evidenced on his MRI. I referred for a LUE EMG for further evaluation that may lead to a surgical consultation given the ineffectiveness of the cervical interlaminar injections. Pt will f/u EMG or sooner as needed. Diagnoses and all orders for this visit:    1. Cervical radiculopathy  -     EMG ONE EXTREMITY UPPER LT; Future       Follow-up Disposition: Not on File      HISTORY OF PRESENT ILLNESS  George Sam is a 50 y.o. male. A&P / HPI from 18:  Etienne Maloneure in to the office today c/o chronic neck pain radiating into his LUE and numbness in his fingers that has worsened over the last 4 months with the new symptoms being radicular. He rates his pain as an 8/10 today.      On examination, he had decreased sensation on the left along a C7 distribution to light touch. Otherwise, he is neurologically intact.  He had tenderness to palpation throughout his back axially, worse in his parascapular region.      Given his symptoms, and previous MRI findings (2016) of a left paracentral disc extrusion at C6-7, we discussed cervical injections. His pain is likely myofascial pain with a left-sided C7 cervical radiculopathy. He would like to move forward with the cervical injection for the cervical radiculopathy and then resume PT for his myofascial pain.      I referred to Dr. Rimma Pugh for cervical epidural injections. I also referred for an updated cervical MRI and increased his Gabapentin to 600mg TID.     If the cervical injections do not provide relief of his symptoms, we will likely consider an EMG vs surgical consult.       Pt will f/u in 2 weeks or sooner if needed.     HISTORY OF PRESENT ILLNESS  Darrel Lozano is a 52 y. o. male c/o neck pain radiating into his LUE and numbness in his fingers. He recently completed 10 visits to PT and has 6 remaining. He notes when he lays down, his whole body gets paralyzed. He has seen a sleep doctor who states he had sleep apnea, but no sleep paralysis. The only thing that has provided him any relief has been pain medication, but it has had less of an effect recently. He previously had neck pain for which he had an MRI in 2016, but he did not begin to have numbness in his fingers on his left hand until recently.      He has a dx of sickle cell, but notes he does not have a pain management or hematologist. His PCP has been managing his sickle cell for him.      Pt denies any fevers, chills, nausea, vomiting. Pt denies any chest pain and shortness of breath. Pt denies any ear, nose, and throat problems. Pt denies any fecal or urinary incontinence.      Updates from 06/22/18:  Pt presents for cervical injection f/u.     He notes he did not find any relief from the cervical injections, and his neck pain and LUE paraesthesia has worsened over the last month. He notes he now has a right-sided neck pain associated with his right shoulder pain.  He is scheduled to see Dr. Meredith Min on Monday for his right shoulder pain.     He did not tolerate Gabapentin well as he experienced somnolence.      Pt is interested in chiropractic care. Updates from 08/21/18:  Pt presents for PT and MRI f/u. He found no relief from PT, so Dr. Meredith Min ordered a cervical spine MRI. He continues to have neck pain radiating into his first three digits in his left hand. He found some mild relief from Lyrica but it caused a side effect of ED so he has chosen to discontinue the medication. PAST MEDICAL HISTORY   Past Medical History:   Diagnosis Date    Anemia NEC     Bursitis of shoulder, left 08/03/2009    MRI revealed tendinitis and bursitis of the rotator cuff.  DJD (degenerative joint disease) 08/03/2009    Early djd, left radiocarpal joint with associated ganglion cyst.      Elevated white blood cell count 07/20/2009    GERD (gastroesophageal reflux disease)     H/O: rotator cuff tear 06/07/2006    Chronic    Hyperlipidemia     Pain in joint, upper arm 07/2009    Pain in shoulder, elbow and wrist.    Sickle cell anemia (HCC)     Sleep apnea     Does not use CPAP    Testicular failure     Vertigo 07/20/2009       Past Surgical History:   Procedure Laterality Date    HX CHOLECYSTECTOMY  03/27/2018    HX HEENT      eardrum repair    HX SHOULDER ARTHROSCOPY Right    . MEDICATIONS    Current Outpatient Prescriptions   Medication Sig Dispense Refill    loratadine (ALAVERT PO) Take  by mouth.  pregabalin (LYRICA) 75 mg capsule Take 1 Cap by mouth two (2) times a day. Max Daily Amount: 150 mg. 14 Cap 0    pregabalin (LYRICA) 150 mg capsule Take 1 Cap by mouth two (2) times a day. Max Daily Amount: 300 mg. 60 Cap 2    ketorolac (TORADOL) 10 mg tablet take 1 tablet by mouth every 6 hours if needed for pain 30 Tab 0    loratadine (CLARITIN REDITABS) 10 mg dissolvable tablet Take 10 mg by mouth daily.       propranolol LA (INDERAL LA) 80 mg SR capsule Take 80 mg by mouth daily. Headache prevention      valACYclovir (VALTREX) 1 gram tablet Take 1 Tab by mouth as needed. 0    aspirin  mg tablet Take 1,000 mg by mouth every twelve (12) hours as needed for Pain.  multivitamin (ONE A DAY) tablet Take 1 Tab by mouth daily.  meclizine (ANTIVERT) 25 mg tablet Take  by mouth three (3) times daily as needed.  naproxen (NAPROSYN) 250 mg tablet Take  by mouth two (2) times daily (with meals).  hydrocodone-acetaminophen 5-500 mg Cap Take 1 Tab by mouth every six (6) hours as needed. ALLERGIES  Allergies   Allergen Reactions    Other Food Other (comments)     Walnuts. Gets headaches    Corn Other (comments)     Told by  allergic    Shellfish Derived Swelling     Swelling. States okay with betadine          SOCIAL HISTORY    Social History     Social History    Marital status: SINGLE     Spouse name: N/A    Number of children: N/A    Years of education: N/A     Social History Main Topics    Smoking status: Former Smoker     Types: Cigarettes     Quit date: 7/12/2003    Smokeless tobacco: Never Used    Alcohol use No    Drug use: No    Sexual activity: Yes     Partners: Female     Other Topics Concern    Not on file     Social History Narrative       FAMILY HISTORY  Family History   Problem Relation Age of Onset    Diabetes Mother     Cancer Father          REVIEW OF SYSTEMS  Review of Systems   Musculoskeletal: Positive for back pain. LUE paraesthesia into first 3 digits          PHYSICAL EXAMINATION  There were no vitals taken for this visit.     Pain Assessment  8/6/2018   Location of Pain Shoulder   Location Modifiers Left;Right   Severity of Pain 0   Quality of Pain (No Data)   Quality of Pain Comment N/A   Duration of Pain (No Data)   Duration of Pain Comment N/A   Frequency of Pain -   Aggravating Factors Other (Comment)   Aggravating Factors Comment PUSH UPS   Limiting Behavior No   Relieving Factors (No Data) Relieving Factors Comment NO    Result of Injury No           Constitutional:  Well developed, well nourished, in no acute distress. Psychiatric: Affect and mood are appropriate. Integumentary: No rashes or abrasions noted on exposed areas. SPINE/MUSCULOSKELETAL EXAM    Cervical spine:  Neck is midline. Normal muscle tone. No focal atrophy is noted. ROM painful. Shoulder ROM intact. Tenderness to palpation. Negative Spurling's sign. Negative Tinel's sign. Negative Tomas's sign.       Sensation in the bilateral arms grossly intact to light touch.       Lumbar spine:  No rash, ecchymosis, or gross obliquity. No fasciculations. No focal atrophy is noted. No pain with hip ROM. Full range of motion. Tenderness to palpation. No tenderness to palpation at the sciatic notch. SI joints non-tender. Trochanters non tender.      Sensation in the bilateral legs grossly intact to light touch. MOTOR:      Biceps  Triceps Deltoids Wrist Ext Wrist Flex Hand Intrin   Right 5/5 5/5 5/5 5/5 5/5 5/5   Left 5/5 5/5 5/5 5/5 5/5 5/5             Hip Flex  Quads Hamstrings Ankle DF EHL Ankle PF   Right 5/5 5/5 5/5 5/5 5/5 5/5   Left 5/5 5/5 5/5 5/5 5/5 5/5     DTRs are 2+ biceps, triceps, brachioradialis, patella, and Achilles.      Negative Straight Leg raise. Squat not tested. No difficulty with tandem gait.       Ambulation without assistive device. FWB.       RADIOGRAPHS  Cervical MRI images taken on 8/15/18 personally reviewed with patient:  FINDINGS:      Slight reversal of normal cervical lordosis is present with minimal kyphosis at  the C4 level. No subluxation is seen. Osseous marrow signal is within normal  limits. The cervicomedullary junction is unremarkable. No abnormal signal is  seen in the cervical spinal cord.     C2/3 level: There is no central or foraminal stenosis.     C3/4 level: Disc bulge is present slightly effacing ventral cord.  No abnormal  cord signal is seen. Mild central stenosis is present with estimated midline AP  diameter of thecal sac measuring 8-9 mm. Slight foraminal narrowing is present  bilaterally.     C4/5 level: Disc bulge is present with superimposed left central/subarticular  disc protrusion. The left ventral cord is mildly flattened. No abnormal cord  signal is seen. Mild to moderate central stenosis is present with estimated  midline AP diameter of thecal sac measuring 7-8 mm. Uncovertebral joint  hypertrophy is present with moderate right and mild to moderate left foraminal  stenosis. There is no central or foraminal stenosis.     C5/6 level: Disc bulge is present with tiny left central superimposed disc  protrusion. Ventral cord is effaced. No abnormal cord signal is seen. Mild  central stenosis is present with estimated midline AP diameter of thecal sac  measuring 8-9 mm. Mild foraminal stenosis is seen bilaterally.     C6/7 level: Disc bulge is present. Superimposed left posterolateral disc  herniation is present including small subarticular component extending  inferiorly. There is effacement of the left ventral cord and moderate narrowing  of the left axillary recess. Disc herniation may slightly extend into the  foramen. Moderate left and mild right foraminal stenosis is seen. Central canal  narrowing is milder at the midline with mild central stenosis and estimated  midline AP diameter of thecal sac measuring 8-9 mm.     C7/T1 level: There is no central or foraminal stenosis.     The visualized intracranial contents are unremarkable.      Punctate susceptibility artifact suggested along prominent sized bilateral  oropharyngeal tonsillar pillars, nonspecific but potentially calcifications  related to prior infection. Prominent nasopharyngeal soft tissue is seen without  definite discrete focal lesion.  There are several scattered subcentimeter  anterior and posterior cervical chain nodes bilaterally, not meeting size  criteria for adenopathy.           ________________________     IMPRESSION  IMPRESSION:     1. Multilevel central stenosis involving the C3-4 through the C6-7 levels. Stenosis is greatest at C4-5 level, mild to moderate in degree, produced by left  central/subarticular disc protrusion superimposed on disc bulge. Although there  is mild cord deformity at these levels of stenosis, no abnormal cord signal is  seen.     2. Left posterolateral C6-7 disc herniation superposed upon disc bulge including  focal subarticular extrusion component extending inferiorly with left axillary  recess narrowing. Exiting left C7 nerve root may be affected. Clinical  correlation is recommended.     3. Mild C3-4 and C5-C6 levels of mild central stenosis are produced by disc  bulge and left central disc protrusion, respectively.     4. Slight reversal of normal cervical lordosis. No subluxation seen.     5. Prominent nasopharyngeal and oropharyngeal tonsillar soft tissue as well as  several scattered normal sized cervical nodes. These findings are nonspecific  and could reflect reactive lymphoid tissue. Cannot exclude lymphoproliferative  process or adenopathy from other etiologies. Clinical correlation recommended. 2V Cervical XR images taken on 4/16/18 personally reviewed with patient:  Straightening of the cervical lordosis  Endplate osteophytes  Slight lean to right  Normal disc spacing  No obvious compression fractures or instabilities          Cervical MRI images taken on 1/14/16 personally reviewed with patient:  COMPARISON: MRI cervical spine 2/8/12    TECHNIQUE: Cervical spine scanned with axial and sagittal T2W scans, sagittal T1W scans. FINDINGS:  Straightening of the cervical spine. Slight retrolisthesis of C4 on C5. Remainder of vertebral bodies maintain normal alignment. The cervical cord appears normal. Diffuse hypointense marrow signal, similar to prior studies.  The visualized posterior fossa structures are unremarkable. C2/C3: No significant spinal stenosis or neural foraminal narrowing. C3/C4: Mild disc bulge with posterior central annular tear. Mild/moderate right and mild left foraminal stenosis. Mild narrowing of the central canal, midline AP diameter is 8.4 mm.      C4/C5: Mild disc bulge. Mild left foraminal stenosis. Mild central canal stenosis, midline AP diameter is 8 mm. C5/C6: No significant spinal stenosis or neural foraminal narrowing. C6/C7: Small left paracentral disc extrusion with annular tear. Disc material extends slightly below the disc space. Mild narrowing of the left central canal. Neural foramen are patent. C7/T1: No significant spinal stenosis or neural foraminal narrowing. Vertebral artery flow voids present. Similar appearance of prominent adenoid tissue and cervical adenopathy.      Impression:    1. Mild/moderate multilevel degenerative discogenic disease. Marginal change in comparison to prior. 2. Similar appearance of small left paracentral disc extrusion at C6-C7. 3. Similar appearance of adenopathy and prominent adenoid tissue. 4. Diffuse hypointense marrow signal, similar to prior. Could be related to anemia in this patient with history of sickle cell disease. 10 minutes of face-to-face contact were spent with the patient during today's visit extensively discussing symptoms and treatment plan. All questions were answered. More than half of this visit today was spent on counseling.      Written by Steve Terry as dictated by Gerda Guerra MD

## 2023-04-07 ENCOUNTER — APPOINTMENT (OUTPATIENT)
Facility: HOSPITAL | Age: 53
End: 2023-04-07
Payer: MEDICAID

## 2023-04-11 ENCOUNTER — APPOINTMENT (OUTPATIENT)
Facility: HOSPITAL | Age: 53
End: 2023-04-11
Payer: MEDICAID

## 2023-04-13 ENCOUNTER — APPOINTMENT (OUTPATIENT)
Facility: HOSPITAL | Age: 53
End: 2023-04-13
Payer: MEDICAID

## (undated) DEVICE — SEAL CANN F/12MM 8.5-13MM DISP -- DA VINCI

## (undated) DEVICE — PACKING 8004000 NEURAY 200PK 13X13MM: Brand: NEURAY ®

## (undated) DEVICE — REM POLYHESIVE ADULT PATIENT RETURN ELECTRODE: Brand: VALLEYLAB

## (undated) DEVICE — KENDALL SCD EXPRESS SLEEVES, KNEE LENGTH, MEDIUM: Brand: KENDALL SCD

## (undated) DEVICE — LIGHT HANDLE: Brand: DEVON

## (undated) DEVICE — WATERPROOF, BACTERIA PROOF DRESSING WITH ABSORBENT SEE THROUGH PAD: Brand: OPSITE POST-OP VISIBLE 10X8CM CTN 20

## (undated) DEVICE — TRAY SUPP STD NO DRUG W EXTENSION SET

## (undated) DEVICE — MIRAGE SWIFT II PILLOW LGE: Brand: MIRAGE SWIFT II

## (undated) DEVICE — SSC BONE WAX: Brand: SSC BONE WAX

## (undated) DEVICE — SCREW EXT FIX L14MM FOR DISTRCTN

## (undated) DEVICE — SOL IRR NACL 0.9% 500ML POUR --

## (undated) DEVICE — SUTURE MCRYL SZ 3-0 L27IN ABSRB UD L24MM PS-1 3/8 CIR PRIM Y936H

## (undated) DEVICE — 10FR FRAZIER SUCTION HANDLE: Brand: CARDINAL HEALTH

## (undated) DEVICE — CUFF BLD PRESSURE MONITORING LNG AD 23-33 CM 1 TUBE MY CUF

## (undated) DEVICE — NEEDLE HYPO 25GA L1.5IN BVL ORIENTED ECLIPSE

## (undated) DEVICE — SYR 10ML CTRL LR LCK NSAF LF --

## (undated) DEVICE — 12 ML SYRINGE,LUER-LOCK TIP: Brand: MONOJECT

## (undated) DEVICE — SYRINGE MED 3ML NDL 22GA L1 1/2IN REG BVL SFGLDE

## (undated) DEVICE — INSULATED BLADE ELECTRODE: Brand: EDGE

## (undated) DEVICE — SUTURE MCRYL SZ 4-0 L18IN ABSRB UD L19MM PS-2 3/8 CIR PRIM Y496G

## (undated) DEVICE — SEAL CANN 5MM S/SI DISP -- DA VINCI

## (undated) DEVICE — SOLUTION IV 1000ML 0.9% SOD CHL

## (undated) DEVICE — PORT GUIDE CANN SNGL SITE DISP -- DA VINCI

## (undated) DEVICE — INTENDED FOR TISSUE SEPARATION, AND OTHER PROCEDURES THAT REQUIRE A SHARP SURGICAL BLADE TO PUNCTURE OR CUT.: Brand: BARD-PARKER ® CARBON RIB-BACK BLADES

## (undated) DEVICE — STOCKING COMPR M L29-31IN REG 19MMHG ANK 8-9IN CALF 12-15IN

## (undated) DEVICE — 3M™ TEGADERM™ TRANSPARENT FILM DRESSING FRAME STYLE, 1626W, 4 IN X 4-3/4 IN (10 CM X 12 CM), 50/CT 4CT/CASE: Brand: 3M™ TEGADERM™

## (undated) DEVICE — Device

## (undated) DEVICE — (D)BNDG ADHESIVE FABRIC 3/4X3 -- DISC BY MFR USE ITEM 357960

## (undated) DEVICE — SUTURE PDS II SZ 1 L27IN ABSRB VLT CT-1 L36MM 1/2 CIR Z341H

## (undated) DEVICE — DRAIN SURG W7MMXL20CM SIL FULL PERF HUBLESS FLAT RADPQ STRP

## (undated) DEVICE — SUTURE VCRL SZ 3-0 L27IN ABSRB UD L26MM SH 1/2 CIR J416H

## (undated) DEVICE — FLEX ADVANTAGE 3000CC: Brand: FLEX ADVANTAGE

## (undated) DEVICE — SOFT SILICONE HYDROCELLULAR SACRUM DRESSING WITH LOCK AWAY LAYER: Brand: ALLEVYN LIFE SACRUM (LARGE) PACK OF 10

## (undated) DEVICE — STERILE POLYISOPRENE POWDER-FREE SURGICAL GLOVES: Brand: PROTEXIS

## (undated) DEVICE — SET EXTN SM 0.5ML L13IN BOR W/O INJ SITE

## (undated) DEVICE — KIT CLN UP BON SECOURS MARYV

## (undated) DEVICE — DRAPE,UTILITY,TAPE,15X26,STERILE: Brand: MEDLINE

## (undated) DEVICE — DRAPE SURG EQUIP W105XH13XL20IN 3 ARM DISPOSABLE DA VINCI S

## (undated) DEVICE — PREP CHLORAPREP 10.5 ML ORG --

## (undated) DEVICE — DRAPE TWL SURG 16X26IN BLU ORB04] ALLCARE INC]

## (undated) DEVICE — 3M™ TEGADERM™ TRANSPARENT FILM DRESSING FRAME STYLE, 1624W, 2-3/8 IN X 2-3/4 IN (6 CM X 7 CM), 100/CT 4CT/CASE: Brand: 3M™ TEGADERM™

## (undated) DEVICE — CARTRIDGE CLP LIG HEMLOK GRN --

## (undated) DEVICE — APPLICATOR BNDG 1MM ADH PREMIERPRO EXOFIN

## (undated) DEVICE — 3.0MM PRECISION NEURO (MATCH HEAD)

## (undated) DEVICE — 3M™ BAIR PAWS FLEX™ WARMING GOWN, STANDARD, 20 PER CASE 81003: Brand: BAIR PAWS™

## (undated) DEVICE — SPONGE DISSECT PNUT SM 3/8IN -- 5/PK

## (undated) DEVICE — APPLIER CLP AUTO MED 9.75 IN TI SURGCLP SUPER INTLOK 20 DISP

## (undated) DEVICE — INTENDED FOR TISSUE SEPARATION, AND OTHER PROCEDURES THAT REQUIRE A SHARP SURGICAL BLADE TO PUNCTURE OR CUT.: Brand: BARD-PARKER SAFETY BLADES SIZE 10, STERILE

## (undated) DEVICE — GOWN,REINFORCED,POLY,AURORA,XLARGE,STRL: Brand: MEDLINE

## (undated) DEVICE — GAUZE,SPONGE,2"X2",8PLY,STERILE,LF,2'S: Brand: MEDLINE

## (undated) DEVICE — SOL ANTI-FOG 6ML MEDC -- MEDICHOICE - CONVERT TO 358427

## (undated) DEVICE — COLLAR CERV L H3.25X23IN M DENS FOAM COT STOCK CVR LO